# Patient Record
Sex: FEMALE | Race: BLACK OR AFRICAN AMERICAN | NOT HISPANIC OR LATINO
[De-identification: names, ages, dates, MRNs, and addresses within clinical notes are randomized per-mention and may not be internally consistent; named-entity substitution may affect disease eponyms.]

---

## 2017-01-09 ENCOUNTER — RX RENEWAL (OUTPATIENT)
Age: 62
End: 2017-01-09

## 2017-01-09 ENCOUNTER — OTHER (OUTPATIENT)
Age: 62
End: 2017-01-09

## 2017-01-13 ENCOUNTER — APPOINTMENT (OUTPATIENT)
Dept: INTERNAL MEDICINE | Facility: CLINIC | Age: 62
End: 2017-01-13

## 2017-01-19 ENCOUNTER — APPOINTMENT (OUTPATIENT)
Dept: RHEUMATOLOGY | Facility: CLINIC | Age: 62
End: 2017-01-19

## 2017-01-19 ENCOUNTER — LABORATORY RESULT (OUTPATIENT)
Age: 62
End: 2017-01-19

## 2017-02-22 ENCOUNTER — APPOINTMENT (OUTPATIENT)
Dept: RHEUMATOLOGY | Facility: CLINIC | Age: 62
End: 2017-02-22

## 2017-02-22 ENCOUNTER — LABORATORY RESULT (OUTPATIENT)
Age: 62
End: 2017-02-22

## 2017-02-22 VITALS
OXYGEN SATURATION: 98 % | HEART RATE: 103 BPM | BODY MASS INDEX: 28.43 KG/M2 | DIASTOLIC BLOOD PRESSURE: 81 MMHG | WEIGHT: 141 LBS | SYSTOLIC BLOOD PRESSURE: 117 MMHG | HEIGHT: 59 IN

## 2017-02-23 LAB
25(OH)D3 SERPL-MCNC: 34.3 NG/ML
ALBUMIN SERPL ELPH-MCNC: 4.2 G/DL
ALP BLD-CCNC: 68 U/L
ALT SERPL-CCNC: 16 U/L
ANION GAP SERPL CALC-SCNC: 17 MMOL/L
APPEARANCE: CLEAR
AST SERPL-CCNC: 19 U/L
BASOPHILS # BLD AUTO: 0 K/UL
BASOPHILS NFR BLD AUTO: 0 %
BILIRUB SERPL-MCNC: 0.2 MG/DL
BILIRUBIN URINE: NEGATIVE
BLOOD URINE: NEGATIVE
BUN SERPL-MCNC: 24 MG/DL
CALCIUM SERPL-MCNC: 9.9 MG/DL
CHLORIDE SERPL-SCNC: 97 MMOL/L
CO2 SERPL-SCNC: 26 MMOL/L
COLOR: YELLOW
CREAT SERPL-MCNC: 1.24 MG/DL
CREAT SPEC-SCNC: 338 MG/DL
CREAT/PROT UR: 0.1 RATIO
CRP SERPL-MCNC: 0.34 MG/DL
EOSINOPHIL # BLD AUTO: 0.85 K/UL
EOSINOPHIL NFR BLD AUTO: 7.4 %
ERYTHROCYTE [SEDIMENTATION RATE] IN BLOOD BY WESTERGREN METHOD: 42 MM/HR
GLUCOSE QUALITATIVE U: NORMAL MG/DL
GLUCOSE SERPL-MCNC: 102 MG/DL
HBV CORE IGG+IGM SER QL: REACTIVE
HBV SURFACE AB SER QL: REACTIVE
HBV SURFACE AG SER QL: NONREACTIVE
HCT VFR BLD CALC: 41.7 %
HCV AB SER QL: NONREACTIVE
HCV S/CO RATIO: 0.14 S/CO
HGB BLD-MCNC: 13.4 G/DL
KETONES URINE: NEGATIVE
LEUKOCYTE ESTERASE URINE: ABNORMAL
LYMPHOCYTES # BLD AUTO: 1.91 K/UL
LYMPHOCYTES NFR BLD AUTO: 16.7 %
MAN DIFF?: NORMAL
MCHC RBC-ENTMCNC: 23.7 PG
MCHC RBC-ENTMCNC: 32.1 GM/DL
MCV RBC AUTO: 73.8 FL
MONOCYTES # BLD AUTO: 1.49 K/UL
MONOCYTES NFR BLD AUTO: 13 %
NEUTROPHILS # BLD AUTO: 7.11 K/UL
NEUTROPHILS NFR BLD AUTO: 59.2 %
NITRITE URINE: NEGATIVE
PH URINE: 5
PLATELET # BLD AUTO: 300 K/UL
POTASSIUM SERPL-SCNC: 4 MMOL/L
PROT SERPL-MCNC: 7.4 G/DL
PROT UR-MCNC: 22 MG/DL
PROTEIN URINE: NEGATIVE MG/DL
RBC # BLD: 5.65 M/UL
RBC # FLD: 16.1 %
SODIUM SERPL-SCNC: 140 MMOL/L
SPECIFIC GRAVITY URINE: 1.02
UROBILINOGEN URINE: NORMAL MG/DL
WBC # FLD AUTO: 11.46 K/UL

## 2017-02-24 LAB — DSDNA AB SER-ACNC: <12 IU/ML

## 2017-02-28 LAB
INFLIXIMAB AB SERPL-MCNC: <22 NG/ML
INFLIXIMAB SERPL-MCNC: 26 UG/ML

## 2017-03-15 ENCOUNTER — LABORATORY RESULT (OUTPATIENT)
Age: 62
End: 2017-03-15

## 2017-03-15 ENCOUNTER — APPOINTMENT (OUTPATIENT)
Dept: RHEUMATOLOGY | Facility: CLINIC | Age: 62
End: 2017-03-15

## 2017-04-05 ENCOUNTER — APPOINTMENT (OUTPATIENT)
Dept: PULMONOLOGY | Facility: CLINIC | Age: 62
End: 2017-04-05

## 2017-04-05 VITALS
SYSTOLIC BLOOD PRESSURE: 138 MMHG | RESPIRATION RATE: 14 BRPM | HEIGHT: 59 IN | HEART RATE: 79 BPM | WEIGHT: 140 LBS | OXYGEN SATURATION: 90 % | TEMPERATURE: 97.8 F | DIASTOLIC BLOOD PRESSURE: 80 MMHG | BODY MASS INDEX: 28.22 KG/M2

## 2017-04-11 ENCOUNTER — OTHER (OUTPATIENT)
Age: 62
End: 2017-04-11

## 2017-04-17 ENCOUNTER — LABORATORY RESULT (OUTPATIENT)
Age: 62
End: 2017-04-17

## 2017-04-17 ENCOUNTER — APPOINTMENT (OUTPATIENT)
Dept: RHEUMATOLOGY | Facility: CLINIC | Age: 62
End: 2017-04-17

## 2017-04-17 VITALS
BODY MASS INDEX: 28.63 KG/M2 | DIASTOLIC BLOOD PRESSURE: 90 MMHG | TEMPERATURE: 98.2 F | HEART RATE: 89 BPM | HEIGHT: 59 IN | SYSTOLIC BLOOD PRESSURE: 136 MMHG | WEIGHT: 142 LBS

## 2017-04-17 RX ADMIN — DENOSUMAB 0 MG/ML: 60 INJECTION SUBCUTANEOUS at 00:00

## 2017-04-18 LAB
ALBUMIN SERPL ELPH-MCNC: 4.4 G/DL
ALP BLD-CCNC: 74 U/L
ALT SERPL-CCNC: 15 U/L
ANION GAP SERPL CALC-SCNC: 16 MMOL/L
AST SERPL-CCNC: 20 U/L
BASOPHILS # BLD AUTO: 0.16 K/UL
BASOPHILS NFR BLD AUTO: 1.8 %
BILIRUB SERPL-MCNC: 0.4 MG/DL
BUN SERPL-MCNC: 24 MG/DL
C3 SERPL-MCNC: 180 MG/DL
C4 SERPL-MCNC: 39 MG/DL
CALCIUM SERPL-MCNC: 9.6 MG/DL
CHLORIDE SERPL-SCNC: 96 MMOL/L
CO2 SERPL-SCNC: 27 MMOL/L
CREAT SERPL-MCNC: 1.14 MG/DL
CRP SERPL-MCNC: 0.51 MG/DL
DSDNA AB SER-ACNC: <12 IU/ML
EOSINOPHIL # BLD AUTO: 0.24 K/UL
EOSINOPHIL NFR BLD AUTO: 2.7 %
ERYTHROCYTE [SEDIMENTATION RATE] IN BLOOD BY WESTERGREN METHOD: 40 MM/HR
GLUCOSE SERPL-MCNC: 89 MG/DL
HCT VFR BLD CALC: 41.3 %
HGB BLD-MCNC: 12.7 G/DL
LYMPHOCYTES # BLD AUTO: 2.73 K/UL
LYMPHOCYTES NFR BLD AUTO: 30.9 %
MAN DIFF?: NORMAL
MCHC RBC-ENTMCNC: 22.6 PG
MCHC RBC-ENTMCNC: 30.8 GM/DL
MCV RBC AUTO: 73.4 FL
MONOCYTES # BLD AUTO: 0.32 K/UL
MONOCYTES NFR BLD AUTO: 3.6 %
NEUTROPHILS # BLD AUTO: 4.75 K/UL
NEUTROPHILS NFR BLD AUTO: 53.7 %
PLATELET # BLD AUTO: 298 K/UL
POTASSIUM SERPL-SCNC: 3.8 MMOL/L
PROT SERPL-MCNC: 7 G/DL
RBC # BLD: 5.63 M/UL
RBC # FLD: 17.1 %
SODIUM SERPL-SCNC: 139 MMOL/L
WBC # FLD AUTO: 8.85 K/UL

## 2017-04-19 ENCOUNTER — OTHER (OUTPATIENT)
Age: 62
End: 2017-04-19

## 2017-04-19 LAB
HBV DNA # SERPL NAA+PROBE: NOT DETECTED IU/ML
HEPB DNA PCR LOG: NOT DETECTED LOGIU/ML

## 2017-04-21 LAB
INFLIXIMAB AB SERPL-MCNC: <22 NG/ML
INFLIXIMAB SERPL-MCNC: 28 UG/ML

## 2017-05-10 ENCOUNTER — LABORATORY RESULT (OUTPATIENT)
Age: 62
End: 2017-05-10

## 2017-05-10 ENCOUNTER — APPOINTMENT (OUTPATIENT)
Dept: RHEUMATOLOGY | Facility: CLINIC | Age: 62
End: 2017-05-10

## 2017-06-09 ENCOUNTER — APPOINTMENT (OUTPATIENT)
Dept: INTERNAL MEDICINE | Facility: CLINIC | Age: 62
End: 2017-06-09

## 2017-06-09 VITALS
HEART RATE: 107 BPM | SYSTOLIC BLOOD PRESSURE: 132 MMHG | DIASTOLIC BLOOD PRESSURE: 84 MMHG | TEMPERATURE: 97.8 F | OXYGEN SATURATION: 96 % | WEIGHT: 144 LBS | HEIGHT: 59 IN | BODY MASS INDEX: 29.03 KG/M2

## 2017-06-09 LAB — HBA1C MFR BLD HPLC: 6.1

## 2017-06-22 ENCOUNTER — RX RENEWAL (OUTPATIENT)
Age: 62
End: 2017-06-22

## 2017-07-05 ENCOUNTER — RX RENEWAL (OUTPATIENT)
Age: 62
End: 2017-07-05

## 2017-07-10 ENCOUNTER — APPOINTMENT (OUTPATIENT)
Dept: RHEUMATOLOGY | Facility: CLINIC | Age: 62
End: 2017-07-10

## 2017-07-10 VITALS
WEIGHT: 142 LBS | HEART RATE: 93 BPM | OXYGEN SATURATION: 95 % | DIASTOLIC BLOOD PRESSURE: 97 MMHG | SYSTOLIC BLOOD PRESSURE: 136 MMHG | TEMPERATURE: 98.3 F | BODY MASS INDEX: 28.63 KG/M2 | HEIGHT: 59 IN

## 2017-07-27 ENCOUNTER — LABORATORY RESULT (OUTPATIENT)
Age: 62
End: 2017-07-27

## 2017-07-27 ENCOUNTER — APPOINTMENT (OUTPATIENT)
Dept: RHEUMATOLOGY | Facility: CLINIC | Age: 62
End: 2017-07-27
Payer: MEDICARE

## 2017-07-27 PROCEDURE — 96413 CHEMO IV INFUSION 1 HR: CPT

## 2017-07-27 PROCEDURE — 36415 COLL VENOUS BLD VENIPUNCTURE: CPT

## 2017-07-31 RX ORDER — DENOSUMAB 60 MG/ML
60 INJECTION SUBCUTANEOUS
Qty: 1 | Refills: 0 | Status: COMPLETED | OUTPATIENT
Start: 2017-04-17

## 2017-09-22 ENCOUNTER — LABORATORY RESULT (OUTPATIENT)
Age: 62
End: 2017-09-22

## 2017-09-22 ENCOUNTER — APPOINTMENT (OUTPATIENT)
Dept: RHEUMATOLOGY | Facility: CLINIC | Age: 62
End: 2017-09-22
Payer: MEDICARE

## 2017-09-22 PROCEDURE — 36415 COLL VENOUS BLD VENIPUNCTURE: CPT

## 2017-09-22 PROCEDURE — 96415 CHEMO IV INFUSION ADDL HR: CPT

## 2017-09-22 PROCEDURE — 96413 CHEMO IV INFUSION 1 HR: CPT

## 2017-10-09 ENCOUNTER — APPOINTMENT (OUTPATIENT)
Dept: RHEUMATOLOGY | Facility: CLINIC | Age: 62
End: 2017-10-09
Payer: MEDICARE

## 2017-10-09 VITALS
BODY MASS INDEX: 29.23 KG/M2 | OXYGEN SATURATION: 98 % | DIASTOLIC BLOOD PRESSURE: 85 MMHG | WEIGHT: 145 LBS | TEMPERATURE: 98.3 F | SYSTOLIC BLOOD PRESSURE: 143 MMHG | HEIGHT: 59 IN | HEART RATE: 87 BPM

## 2017-10-09 PROCEDURE — 96401 CHEMO ANTI-NEOPL SQ/IM: CPT

## 2017-10-09 PROCEDURE — 99214 OFFICE O/P EST MOD 30 MIN: CPT | Mod: 25

## 2017-10-09 RX ORDER — DENOSUMAB 60 MG/ML
60 INJECTION SUBCUTANEOUS
Qty: 0 | Refills: 0 | Status: COMPLETED | OUTPATIENT
Start: 2017-10-09

## 2017-10-09 RX ADMIN — DENOSUMAB 0 MG/ML: 60 INJECTION SUBCUTANEOUS at 00:00

## 2017-10-19 ENCOUNTER — APPOINTMENT (OUTPATIENT)
Dept: PULMONOLOGY | Facility: CLINIC | Age: 62
End: 2017-10-19
Payer: MEDICARE

## 2017-10-19 VITALS
WEIGHT: 147 LBS | HEART RATE: 101 BPM | SYSTOLIC BLOOD PRESSURE: 130 MMHG | BODY MASS INDEX: 29.64 KG/M2 | HEIGHT: 59 IN | OXYGEN SATURATION: 98 % | RESPIRATION RATE: 19 BRPM | TEMPERATURE: 98.6 F | DIASTOLIC BLOOD PRESSURE: 80 MMHG

## 2017-10-19 PROCEDURE — 99213 OFFICE O/P EST LOW 20 MIN: CPT

## 2017-11-06 ENCOUNTER — MEDICATION RENEWAL (OUTPATIENT)
Age: 62
End: 2017-11-06

## 2017-11-09 ENCOUNTER — APPOINTMENT (OUTPATIENT)
Dept: INTERNAL MEDICINE | Facility: CLINIC | Age: 62
End: 2017-11-09
Payer: MEDICARE

## 2017-11-09 VITALS
WEIGHT: 147 LBS | TEMPERATURE: 97.8 F | HEART RATE: 108 BPM | HEIGHT: 59 IN | DIASTOLIC BLOOD PRESSURE: 78 MMHG | OXYGEN SATURATION: 98 % | SYSTOLIC BLOOD PRESSURE: 120 MMHG | BODY MASS INDEX: 29.64 KG/M2

## 2017-11-09 LAB — HBA1C MFR BLD HPLC: 6.4

## 2017-11-09 PROCEDURE — 99214 OFFICE O/P EST MOD 30 MIN: CPT | Mod: 25

## 2017-11-09 PROCEDURE — 83036 HEMOGLOBIN GLYCOSYLATED A1C: CPT | Mod: QW

## 2017-11-16 ENCOUNTER — LABORATORY RESULT (OUTPATIENT)
Age: 62
End: 2017-11-16

## 2017-11-16 ENCOUNTER — APPOINTMENT (OUTPATIENT)
Dept: RHEUMATOLOGY | Facility: CLINIC | Age: 62
End: 2017-11-16
Payer: MEDICARE

## 2017-11-16 PROCEDURE — 96415 CHEMO IV INFUSION ADDL HR: CPT

## 2017-11-16 PROCEDURE — 96413 CHEMO IV INFUSION 1 HR: CPT

## 2017-11-16 PROCEDURE — 36415 COLL VENOUS BLD VENIPUNCTURE: CPT

## 2017-12-03 ENCOUNTER — APPOINTMENT (OUTPATIENT)
Dept: MRI IMAGING | Facility: CLINIC | Age: 62
End: 2017-12-03
Payer: COMMERCIAL

## 2017-12-03 ENCOUNTER — OUTPATIENT (OUTPATIENT)
Dept: OUTPATIENT SERVICES | Facility: HOSPITAL | Age: 62
LOS: 1 days | End: 2017-12-03
Payer: COMMERCIAL

## 2017-12-03 DIAGNOSIS — Z00.8 ENCOUNTER FOR OTHER GENERAL EXAMINATION: ICD-10-CM

## 2017-12-03 DIAGNOSIS — Z98.1 ARTHRODESIS STATUS: Chronic | ICD-10-CM

## 2017-12-03 PROCEDURE — 72141 MRI NECK SPINE W/O DYE: CPT

## 2017-12-03 PROCEDURE — 72141 MRI NECK SPINE W/O DYE: CPT | Mod: 26

## 2017-12-13 ENCOUNTER — OUTPATIENT (OUTPATIENT)
Dept: OUTPATIENT SERVICES | Facility: HOSPITAL | Age: 62
LOS: 1 days | Discharge: HOME | End: 2017-12-13

## 2017-12-13 DIAGNOSIS — Z98.1 ARTHRODESIS STATUS: Chronic | ICD-10-CM

## 2017-12-13 DIAGNOSIS — D72.89 OTHER SPECIFIED DISORDERS OF WHITE BLOOD CELLS: ICD-10-CM

## 2017-12-19 ENCOUNTER — RESULT REVIEW (OUTPATIENT)
Age: 62
End: 2017-12-19

## 2017-12-19 ENCOUNTER — OUTPATIENT (OUTPATIENT)
Dept: OUTPATIENT SERVICES | Facility: HOSPITAL | Age: 62
LOS: 1 days | End: 2017-12-19
Payer: MEDICARE

## 2017-12-19 ENCOUNTER — APPOINTMENT (OUTPATIENT)
Dept: HEMATOLOGY ONCOLOGY | Facility: CLINIC | Age: 62
End: 2017-12-19
Payer: MEDICARE

## 2017-12-19 VITALS
OXYGEN SATURATION: 99 % | HEART RATE: 100 BPM | WEIGHT: 145.51 LBS | RESPIRATION RATE: 16 BRPM | DIASTOLIC BLOOD PRESSURE: 72 MMHG | SYSTOLIC BLOOD PRESSURE: 120 MMHG | TEMPERATURE: 98.3 F | BODY MASS INDEX: 29.39 KG/M2

## 2017-12-19 DIAGNOSIS — D72.829 ELEVATED WHITE BLOOD CELL COUNT, UNSPECIFIED: ICD-10-CM

## 2017-12-19 DIAGNOSIS — Z98.1 ARTHRODESIS STATUS: Chronic | ICD-10-CM

## 2017-12-19 DIAGNOSIS — R70.0 ELEVATED ERYTHROCYTE SEDIMENTATION RATE: ICD-10-CM

## 2017-12-19 LAB
BASOPHILS # BLD AUTO: 0.1 K/UL — SIGNIFICANT CHANGE UP (ref 0–0.2)
BASOPHILS NFR BLD AUTO: 1.2 % — SIGNIFICANT CHANGE UP (ref 0–2)
EOSINOPHIL # BLD AUTO: 0.4 K/UL — SIGNIFICANT CHANGE UP (ref 0–0.5)
EOSINOPHIL NFR BLD AUTO: 4.2 % — SIGNIFICANT CHANGE UP (ref 0–6)
ERYTHROCYTE [SEDIMENTATION RATE] IN BLOOD: 17 MM/HR — SIGNIFICANT CHANGE UP (ref 0–20)
HCT VFR BLD CALC: 38.9 % — SIGNIFICANT CHANGE UP (ref 34.5–45)
HGB BLD-MCNC: 13 G/DL — SIGNIFICANT CHANGE UP (ref 11.5–15.5)
LYMPHOCYTES # BLD AUTO: 2.7 K/UL — SIGNIFICANT CHANGE UP (ref 1–3.3)
LYMPHOCYTES # BLD AUTO: 27.5 % — SIGNIFICANT CHANGE UP (ref 13–44)
MCHC RBC-ENTMCNC: 24.2 PG — LOW (ref 27–34)
MCHC RBC-ENTMCNC: 33.4 G/DL — SIGNIFICANT CHANGE UP (ref 32–36)
MCV RBC AUTO: 72.5 FL — LOW (ref 80–100)
MONOCYTES # BLD AUTO: 0.6 K/UL — SIGNIFICANT CHANGE UP (ref 0–0.9)
MONOCYTES NFR BLD AUTO: 6.6 % — SIGNIFICANT CHANGE UP (ref 2–14)
NEUTROPHILS # BLD AUTO: 5.9 K/UL — SIGNIFICANT CHANGE UP (ref 1.8–7.4)
NEUTROPHILS NFR BLD AUTO: 60.5 % — SIGNIFICANT CHANGE UP (ref 43–77)
PLATELET # BLD AUTO: 254 K/UL — SIGNIFICANT CHANGE UP (ref 150–400)
RBC # BLD: 5.36 M/UL — HIGH (ref 3.8–5.2)
RBC # FLD: 14.7 % — HIGH (ref 10.3–14.5)
WBC # BLD: 9.7 K/UL — SIGNIFICANT CHANGE UP (ref 3.8–10.5)
WBC # FLD AUTO: 9.7 K/UL — SIGNIFICANT CHANGE UP (ref 3.8–10.5)

## 2017-12-19 PROCEDURE — 99204 OFFICE O/P NEW MOD 45 MIN: CPT

## 2017-12-19 PROCEDURE — G0452: CPT | Mod: 26

## 2017-12-19 PROCEDURE — 88185 FLOWCYTOMETRY/TC ADD-ON: CPT

## 2017-12-19 PROCEDURE — 81207 BCR/ABL1 GENE MINOR BP: CPT

## 2017-12-19 PROCEDURE — 81206 BCR/ABL1 GENE MAJOR BP: CPT

## 2017-12-19 PROCEDURE — 88184 FLOWCYTOMETRY/ TC 1 MARKER: CPT

## 2017-12-19 PROCEDURE — 88189 FLOWCYTOMETRY/READ 16 & >: CPT

## 2017-12-20 DIAGNOSIS — D72.829 ELEVATED WHITE BLOOD CELL COUNT, UNSPECIFIED: ICD-10-CM

## 2017-12-22 LAB — TM INTERPRETATION: SIGNIFICANT CHANGE UP

## 2017-12-28 LAB
BCR/ABL BY RT - PCR QUANTITATIVE: SIGNIFICANT CHANGE UP
CRP SERPL-MCNC: 0.5 MG/DL

## 2018-01-12 ENCOUNTER — APPOINTMENT (OUTPATIENT)
Dept: DERMATOLOGY | Facility: CLINIC | Age: 63
End: 2018-01-12

## 2018-01-13 ENCOUNTER — APPOINTMENT (OUTPATIENT)
Dept: RHEUMATOLOGY | Facility: CLINIC | Age: 63
End: 2018-01-13
Payer: MEDICARE

## 2018-01-13 PROCEDURE — 36415 COLL VENOUS BLD VENIPUNCTURE: CPT

## 2018-01-13 PROCEDURE — 96413 CHEMO IV INFUSION 1 HR: CPT

## 2018-01-13 PROCEDURE — 96415 CHEMO IV INFUSION ADDL HR: CPT

## 2018-01-16 ENCOUNTER — OTHER (OUTPATIENT)
Age: 63
End: 2018-01-16

## 2018-02-08 ENCOUNTER — APPOINTMENT (OUTPATIENT)
Dept: RHEUMATOLOGY | Facility: CLINIC | Age: 63
End: 2018-02-08
Payer: MEDICARE

## 2018-02-08 VITALS
SYSTOLIC BLOOD PRESSURE: 136 MMHG | DIASTOLIC BLOOD PRESSURE: 83 MMHG | OXYGEN SATURATION: 97 % | HEIGHT: 59 IN | TEMPERATURE: 98.2 F | WEIGHT: 141 LBS | HEART RATE: 104 BPM | BODY MASS INDEX: 28.43 KG/M2
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
## 2018-02-08 PROCEDURE — 99215 OFFICE O/P EST HI 40 MIN: CPT

## 2018-02-09 ENCOUNTER — APPOINTMENT (OUTPATIENT)
Dept: INTERNAL MEDICINE | Facility: CLINIC | Age: 63
End: 2018-02-09

## 2018-02-09 LAB
25(OH)D3 SERPL-MCNC: 33 NG/ML
ALBUMIN SERPL ELPH-MCNC: 4.1 G/DL
ALP BLD-CCNC: 82 U/L
ALT SERPL-CCNC: 15 U/L
ANION GAP SERPL CALC-SCNC: 16 MMOL/L
AST SERPL-CCNC: 21 U/L
BASOPHILS # BLD AUTO: 0.03 K/UL
BASOPHILS NFR BLD AUTO: 0.3 %
BILIRUB SERPL-MCNC: 0.2 MG/DL
BUN SERPL-MCNC: 15 MG/DL
CALCIUM SERPL-MCNC: 10.1 MG/DL
CHLORIDE SERPL-SCNC: 99 MMOL/L
CO2 SERPL-SCNC: 28 MMOL/L
CREAT SERPL-MCNC: 1.19 MG/DL
CRP SERPL-MCNC: 0.6 MG/DL
EOSINOPHIL # BLD AUTO: 0.38 K/UL
EOSINOPHIL NFR BLD AUTO: 3.4 %
GLUCOSE SERPL-MCNC: 102 MG/DL
HCT VFR BLD CALC: 43.3 %
HGB BLD-MCNC: 13.3 G/DL
IMM GRANULOCYTES NFR BLD AUTO: 0.3 %
LYMPHOCYTES # BLD AUTO: 2.82 K/UL
LYMPHOCYTES NFR BLD AUTO: 24.9 %
MAN DIFF?: NORMAL
MCHC RBC-ENTMCNC: 23.3 PG
MCHC RBC-ENTMCNC: 30.7 GM/DL
MCV RBC AUTO: 76 FL
MONOCYTES # BLD AUTO: 1.2 K/UL
MONOCYTES NFR BLD AUTO: 10.6 %
NEUTROPHILS # BLD AUTO: 6.88 K/UL
NEUTROPHILS NFR BLD AUTO: 60.5 %
PLATELET # BLD AUTO: 299 K/UL
POTASSIUM SERPL-SCNC: 3.9 MMOL/L
PROT SERPL-MCNC: 7.1 G/DL
RBC # BLD: 5.7 M/UL
RBC # FLD: 16 %
SODIUM SERPL-SCNC: 143 MMOL/L
WBC # FLD AUTO: 11.34 K/UL

## 2018-02-16 ENCOUNTER — RX RENEWAL (OUTPATIENT)
Age: 63
End: 2018-02-16

## 2018-02-22 ENCOUNTER — MEDICATION RENEWAL (OUTPATIENT)
Age: 63
End: 2018-02-22

## 2018-02-22 RX ORDER — ALBUTEROL SULFATE 90 UG/1
108 (90 BASE) AEROSOL, METERED RESPIRATORY (INHALATION)
Qty: 1 | Refills: 6 | Status: ACTIVE | COMMUNITY
Start: 2018-02-22 | End: 1900-01-01

## 2018-03-08 ENCOUNTER — LABORATORY RESULT (OUTPATIENT)
Age: 63
End: 2018-03-08

## 2018-03-08 ENCOUNTER — APPOINTMENT (OUTPATIENT)
Dept: RHEUMATOLOGY | Facility: CLINIC | Age: 63
End: 2018-03-08
Payer: MEDICARE

## 2018-03-08 PROCEDURE — 96415 CHEMO IV INFUSION ADDL HR: CPT

## 2018-03-08 PROCEDURE — 36415 COLL VENOUS BLD VENIPUNCTURE: CPT

## 2018-03-08 PROCEDURE — 96413 CHEMO IV INFUSION 1 HR: CPT

## 2018-04-16 ENCOUNTER — APPOINTMENT (OUTPATIENT)
Dept: RHEUMATOLOGY | Facility: CLINIC | Age: 63
End: 2018-04-16
Payer: MEDICARE

## 2018-04-16 VITALS
WEIGHT: 148 LBS | TEMPERATURE: 98 F | SYSTOLIC BLOOD PRESSURE: 140 MMHG | RESPIRATION RATE: 16 BRPM | HEIGHT: 59 IN | OXYGEN SATURATION: 97 % | BODY MASS INDEX: 29.84 KG/M2 | HEART RATE: 102 BPM | DIASTOLIC BLOOD PRESSURE: 87 MMHG

## 2018-04-16 DIAGNOSIS — M81.0 AGE-RELATED OSTEOPOROSIS W/OUT CURRENT PATHOLOGICAL FRACTURE: ICD-10-CM

## 2018-04-16 PROCEDURE — 96401 CHEMO ANTI-NEOPL SQ/IM: CPT

## 2018-04-16 RX ORDER — DENOSUMAB 60 MG/ML
60 INJECTION SUBCUTANEOUS
Qty: 0 | Refills: 0 | Status: COMPLETED | OUTPATIENT
Start: 2018-04-16

## 2018-04-16 RX ADMIN — DENOSUMAB 0 MG/ML: 60 INJECTION SUBCUTANEOUS at 00:00

## 2018-04-20 ENCOUNTER — APPOINTMENT (OUTPATIENT)
Dept: INTERNAL MEDICINE | Facility: CLINIC | Age: 63
End: 2018-04-20
Payer: MEDICARE

## 2018-04-20 VITALS
HEIGHT: 59 IN | WEIGHT: 142 LBS | DIASTOLIC BLOOD PRESSURE: 80 MMHG | HEART RATE: 102 BPM | BODY MASS INDEX: 28.63 KG/M2 | TEMPERATURE: 98.9 F | SYSTOLIC BLOOD PRESSURE: 140 MMHG | OXYGEN SATURATION: 97 %

## 2018-04-20 DIAGNOSIS — H10.13 ACUTE ATOPIC CONJUNCTIVITIS, BILATERAL: ICD-10-CM

## 2018-04-20 PROCEDURE — 99214 OFFICE O/P EST MOD 30 MIN: CPT

## 2018-04-20 RX ORDER — AZELASTINE HYDROCHLORIDE 0.5 MG/ML
0.05 SOLUTION/ DROPS OPHTHALMIC
Qty: 1 | Refills: 1 | Status: ACTIVE | COMMUNITY
Start: 2017-11-09 | End: 1900-01-01

## 2018-04-23 ENCOUNTER — APPOINTMENT (OUTPATIENT)
Dept: PULMONOLOGY | Facility: CLINIC | Age: 63
End: 2018-04-23
Payer: MEDICARE

## 2018-04-23 VITALS
HEIGHT: 59 IN | BODY MASS INDEX: 28.63 KG/M2 | WEIGHT: 142 LBS | TEMPERATURE: 98.4 F | OXYGEN SATURATION: 96 % | HEART RATE: 94 BPM | DIASTOLIC BLOOD PRESSURE: 80 MMHG | SYSTOLIC BLOOD PRESSURE: 120 MMHG | RESPIRATION RATE: 16 BRPM

## 2018-04-23 LAB
BASOPHILS # BLD AUTO: 0.02 K/UL
BASOPHILS NFR BLD AUTO: 0.1 %
EOSINOPHIL # BLD AUTO: 0.35 K/UL
EOSINOPHIL NFR BLD AUTO: 2 %
HCT VFR BLD CALC: 39.7 %
HGB BLD-MCNC: 12.1 G/DL
IMM GRANULOCYTES NFR BLD AUTO: 0.4 %
LYMPHOCYTES # BLD AUTO: 2.87 K/UL
LYMPHOCYTES NFR BLD AUTO: 16.7 %
MAN DIFF?: NORMAL
MCHC RBC-ENTMCNC: 23 PG
MCHC RBC-ENTMCNC: 30.5 GM/DL
MCV RBC AUTO: 75.5 FL
MONOCYTES # BLD AUTO: 1.29 K/UL
MONOCYTES NFR BLD AUTO: 7.5 %
NEUTROPHILS # BLD AUTO: 12.61 K/UL
NEUTROPHILS NFR BLD AUTO: 73.3 %
PLATELET # BLD AUTO: 285 K/UL
RBC # BLD: 5.26 M/UL
RBC # FLD: 16.9 %
WBC # FLD AUTO: 17.21 K/UL

## 2018-04-23 PROCEDURE — 99214 OFFICE O/P EST MOD 30 MIN: CPT

## 2018-05-02 ENCOUNTER — APPOINTMENT (OUTPATIENT)
Dept: SURGICAL ONCOLOGY | Facility: CLINIC | Age: 63
End: 2018-05-02
Payer: MEDICARE

## 2018-05-02 ENCOUNTER — APPOINTMENT (OUTPATIENT)
Dept: RHEUMATOLOGY | Facility: CLINIC | Age: 63
End: 2018-05-02

## 2018-05-02 VITALS
HEART RATE: 107 BPM | OXYGEN SATURATION: 98 % | HEIGHT: 57 IN | BODY MASS INDEX: 30.63 KG/M2 | WEIGHT: 142 LBS | SYSTOLIC BLOOD PRESSURE: 151 MMHG | DIASTOLIC BLOOD PRESSURE: 98 MMHG

## 2018-05-02 DIAGNOSIS — N61.0 MASTITIS WITHOUT ABSCESS: ICD-10-CM

## 2018-05-02 PROCEDURE — 99204 OFFICE O/P NEW MOD 45 MIN: CPT

## 2018-05-04 ENCOUNTER — LABORATORY RESULT (OUTPATIENT)
Age: 63
End: 2018-05-04

## 2018-05-04 ENCOUNTER — APPOINTMENT (OUTPATIENT)
Dept: INTERNAL MEDICINE | Facility: CLINIC | Age: 63
End: 2018-05-04
Payer: MEDICARE

## 2018-05-04 VITALS
BODY MASS INDEX: 30.63 KG/M2 | HEART RATE: 99 BPM | HEIGHT: 57 IN | OXYGEN SATURATION: 97 % | TEMPERATURE: 98.3 F | SYSTOLIC BLOOD PRESSURE: 110 MMHG | DIASTOLIC BLOOD PRESSURE: 80 MMHG | WEIGHT: 142 LBS

## 2018-05-04 DIAGNOSIS — N63.10 MASTODYNIA: ICD-10-CM

## 2018-05-04 DIAGNOSIS — R21 RASH AND OTHER NONSPECIFIC SKIN ERUPTION: ICD-10-CM

## 2018-05-04 DIAGNOSIS — N64.4 MASTODYNIA: ICD-10-CM

## 2018-05-04 DIAGNOSIS — L03.90 CELLULITIS, UNSPECIFIED: ICD-10-CM

## 2018-05-04 PROCEDURE — 99213 OFFICE O/P EST LOW 20 MIN: CPT | Mod: 25

## 2018-05-04 PROCEDURE — 36415 COLL VENOUS BLD VENIPUNCTURE: CPT

## 2018-05-07 ENCOUNTER — RX RENEWAL (OUTPATIENT)
Age: 63
End: 2018-05-07

## 2018-05-07 LAB
BASOPHILS # BLD AUTO: 0.02 K/UL
BASOPHILS NFR BLD AUTO: 0.1 %
EOSINOPHIL # BLD AUTO: 0.33 K/UL
EOSINOPHIL NFR BLD AUTO: 2.4 %
HCT VFR BLD CALC: 41.1 %
HGB BLD-MCNC: 12.8 G/DL
IMM GRANULOCYTES NFR BLD AUTO: 0.3 %
LYMPHOCYTES # BLD AUTO: 3.72 K/UL
LYMPHOCYTES NFR BLD AUTO: 27.5 %
MAN DIFF?: NORMAL
MCHC RBC-ENTMCNC: 22.8 PG
MCHC RBC-ENTMCNC: 31.1 GM/DL
MCV RBC AUTO: 73.1 FL
MONOCYTES # BLD AUTO: 0.85 K/UL
MONOCYTES NFR BLD AUTO: 6.3 %
NEUTROPHILS # BLD AUTO: 8.56 K/UL
NEUTROPHILS NFR BLD AUTO: 63.4 %
PLATELET # BLD AUTO: 336 K/UL
RBC # BLD: 5.62 M/UL
RBC # FLD: 16.5 %
WBC # FLD AUTO: 13.52 K/UL

## 2018-05-08 ENCOUNTER — APPOINTMENT (OUTPATIENT)
Dept: MAMMOGRAPHY | Facility: IMAGING CENTER | Age: 63
End: 2018-05-08

## 2018-05-08 ENCOUNTER — APPOINTMENT (OUTPATIENT)
Dept: ULTRASOUND IMAGING | Facility: IMAGING CENTER | Age: 63
End: 2018-05-08

## 2018-05-30 PROBLEM — N61.0 MASTITIS, RIGHT, ACUTE: Status: ACTIVE | Noted: 2018-05-30

## 2018-06-06 ENCOUNTER — OTHER (OUTPATIENT)
Age: 63
End: 2018-06-06

## 2018-06-21 ENCOUNTER — RX RENEWAL (OUTPATIENT)
Age: 63
End: 2018-06-21

## 2018-06-26 ENCOUNTER — APPOINTMENT (OUTPATIENT)
Dept: RHEUMATOLOGY | Facility: CLINIC | Age: 63
End: 2018-06-26

## 2018-07-12 ENCOUNTER — LABORATORY RESULT (OUTPATIENT)
Age: 63
End: 2018-07-12

## 2018-07-12 ENCOUNTER — APPOINTMENT (OUTPATIENT)
Dept: RHEUMATOLOGY | Facility: CLINIC | Age: 63
End: 2018-07-12
Payer: MEDICARE

## 2018-07-12 VITALS
HEIGHT: 57 IN | BODY MASS INDEX: 30.42 KG/M2 | RESPIRATION RATE: 16 BRPM | WEIGHT: 141 LBS | HEART RATE: 68 BPM | OXYGEN SATURATION: 98 % | DIASTOLIC BLOOD PRESSURE: 83 MMHG | SYSTOLIC BLOOD PRESSURE: 167 MMHG

## 2018-07-12 VITALS — SYSTOLIC BLOOD PRESSURE: 145 MMHG | DIASTOLIC BLOOD PRESSURE: 83 MMHG

## 2018-07-12 DIAGNOSIS — L40.9 PSORIASIS, UNSPECIFIED: ICD-10-CM

## 2018-07-12 DIAGNOSIS — D72.829 ELEVATED WHITE BLOOD CELL COUNT, UNSPECIFIED: ICD-10-CM

## 2018-07-12 PROCEDURE — 99214 OFFICE O/P EST MOD 30 MIN: CPT

## 2018-07-13 ENCOUNTER — APPOINTMENT (OUTPATIENT)
Dept: DERMATOLOGY | Facility: CLINIC | Age: 63
End: 2018-07-13

## 2018-07-13 LAB
ALBUMIN SERPL ELPH-MCNC: 4.7 G/DL
ALP BLD-CCNC: 79 U/L
ALT SERPL-CCNC: 31 U/L
ANION GAP SERPL CALC-SCNC: 15 MMOL/L
APPEARANCE: CLEAR
AST SERPL-CCNC: 18 U/L
BASOPHILS # BLD AUTO: 0.04 K/UL
BASOPHILS NFR BLD AUTO: 0.3 %
BILIRUB SERPL-MCNC: 0.2 MG/DL
BILIRUBIN URINE: NEGATIVE
BLOOD URINE: NEGATIVE
BUN SERPL-MCNC: 17 MG/DL
CALCIUM SERPL-MCNC: 10.9 MG/DL
CHLORIDE SERPL-SCNC: 99 MMOL/L
CO2 SERPL-SCNC: 31 MMOL/L
COLOR: YELLOW
CREAT SERPL-MCNC: 0.88 MG/DL
CREAT SPEC-SCNC: 109 MG/DL
CREAT/PROT UR: 0.1 RATIO
CRP SERPL-MCNC: 0.42 MG/DL
DSDNA AB SER-ACNC: <12 IU/ML
EOSINOPHIL # BLD AUTO: 0.42 K/UL
EOSINOPHIL NFR BLD AUTO: 3.6 %
ERYTHROCYTE [SEDIMENTATION RATE] IN BLOOD BY WESTERGREN METHOD: 41 MM/HR
GLUCOSE QUALITATIVE U: NEGATIVE MG/DL
GLUCOSE SERPL-MCNC: 71 MG/DL
HCT VFR BLD CALC: 41 %
HGB BLD-MCNC: 12.9 G/DL
IMM GRANULOCYTES NFR BLD AUTO: 0.5 %
KETONES URINE: NEGATIVE
LEUKOCYTE ESTERASE URINE: NEGATIVE
LYMPHOCYTES # BLD AUTO: 3.1 K/UL
LYMPHOCYTES NFR BLD AUTO: 26.8 %
MAN DIFF?: NORMAL
MCHC RBC-ENTMCNC: 23.5 PG
MCHC RBC-ENTMCNC: 31.5 GM/DL
MCV RBC AUTO: 74.7 FL
MONOCYTES # BLD AUTO: 0.94 K/UL
MONOCYTES NFR BLD AUTO: 8.1 %
NEUTROPHILS # BLD AUTO: 7.01 K/UL
NEUTROPHILS NFR BLD AUTO: 60.7 %
NITRITE URINE: NEGATIVE
PH URINE: 5.5
PLATELET # BLD AUTO: 303 K/UL
POTASSIUM SERPL-SCNC: 4.4 MMOL/L
PROT SERPL-MCNC: 7.5 G/DL
PROT UR-MCNC: 6 MG/DL
PROTEIN URINE: NEGATIVE MG/DL
RBC # BLD: 5.49 M/UL
RBC # FLD: 16.8 %
SODIUM SERPL-SCNC: 145 MMOL/L
SPECIFIC GRAVITY URINE: 1.02
UROBILINOGEN URINE: NEGATIVE MG/DL
WBC # FLD AUTO: 11.57 K/UL

## 2018-07-16 LAB
ANA PAT FLD IF-IMP: ABNORMAL
ANA SER IF-ACNC: ABNORMAL
HBV DNA # SERPL NAA+PROBE: NOT DETECTED
HEPB DNA PCR LOG: NOT DETECTED LOGIU/ML

## 2018-08-08 ENCOUNTER — APPOINTMENT (OUTPATIENT)
Dept: SURGICAL ONCOLOGY | Facility: CLINIC | Age: 63
End: 2018-08-08

## 2018-08-16 ENCOUNTER — APPOINTMENT (OUTPATIENT)
Dept: INTERNAL MEDICINE | Facility: CLINIC | Age: 63
End: 2018-08-16

## 2018-08-30 ENCOUNTER — RX RENEWAL (OUTPATIENT)
Age: 63
End: 2018-08-30

## 2018-09-05 ENCOUNTER — OTHER (OUTPATIENT)
Age: 63
End: 2018-09-05

## 2018-09-05 RX ORDER — DENOSUMAB 60 MG/ML
60 INJECTION SUBCUTANEOUS
Qty: 3 | Refills: 3 | Status: ACTIVE | COMMUNITY
Start: 2018-09-05 | End: 1900-01-01

## 2018-09-17 ENCOUNTER — APPOINTMENT (OUTPATIENT)
Dept: PULMONOLOGY | Facility: CLINIC | Age: 63
End: 2018-09-17
Payer: MEDICARE

## 2018-09-17 ENCOUNTER — RX RENEWAL (OUTPATIENT)
Age: 63
End: 2018-09-17

## 2018-09-17 VITALS
BODY MASS INDEX: 29.77 KG/M2 | SYSTOLIC BLOOD PRESSURE: 130 MMHG | OXYGEN SATURATION: 98 % | DIASTOLIC BLOOD PRESSURE: 80 MMHG | RESPIRATION RATE: 16 BRPM | TEMPERATURE: 98 F | WEIGHT: 138 LBS | HEIGHT: 57 IN | HEART RATE: 95 BPM

## 2018-09-17 PROCEDURE — 99214 OFFICE O/P EST MOD 30 MIN: CPT

## 2018-10-01 ENCOUNTER — OTHER (OUTPATIENT)
Age: 63
End: 2018-10-01

## 2018-10-08 ENCOUNTER — MEDICATION RENEWAL (OUTPATIENT)
Age: 63
End: 2018-10-08

## 2018-10-08 ENCOUNTER — APPOINTMENT (OUTPATIENT)
Dept: INTERNAL MEDICINE | Facility: CLINIC | Age: 63
End: 2018-10-08
Payer: MEDICARE

## 2018-10-08 ENCOUNTER — APPOINTMENT (OUTPATIENT)
Dept: VASCULAR SURGERY | Facility: CLINIC | Age: 63
End: 2018-10-08
Payer: MEDICARE

## 2018-10-08 VITALS
SYSTOLIC BLOOD PRESSURE: 124 MMHG | HEART RATE: 80 BPM | TEMPERATURE: 97.9 F | HEIGHT: 57 IN | DIASTOLIC BLOOD PRESSURE: 76 MMHG | WEIGHT: 140 LBS | BODY MASS INDEX: 30.2 KG/M2

## 2018-10-08 VITALS
HEIGHT: 57 IN | SYSTOLIC BLOOD PRESSURE: 150 MMHG | HEART RATE: 93 BPM | BODY MASS INDEX: 30.2 KG/M2 | WEIGHT: 140 LBS | DIASTOLIC BLOOD PRESSURE: 85 MMHG

## 2018-10-08 DIAGNOSIS — Z11.1 ENCOUNTER FOR SCREENING FOR RESPIRATORY TUBERCULOSIS: ICD-10-CM

## 2018-10-08 DIAGNOSIS — Z71.9 COUNSELING, UNSPECIFIED: ICD-10-CM

## 2018-10-08 DIAGNOSIS — Z86.69 PERSONAL HISTORY OF OTHER DISEASES OF THE NERVOUS SYSTEM AND SENSE ORGANS: ICD-10-CM

## 2018-10-08 DIAGNOSIS — Z00.00 ENCOUNTER FOR GENERAL ADULT MEDICAL EXAMINATION W/OUT ABNORMAL FINDINGS: ICD-10-CM

## 2018-10-08 DIAGNOSIS — Z86.79 PERSONAL HISTORY OF OTHER DISEASES OF THE CIRCULATORY SYSTEM: ICD-10-CM

## 2018-10-08 DIAGNOSIS — R06.9 UNSPECIFIED ABNORMALITIES OF BREATHING: ICD-10-CM

## 2018-10-08 DIAGNOSIS — R55 SYNCOPE AND COLLAPSE: ICD-10-CM

## 2018-10-08 DIAGNOSIS — J45.909 UNSPECIFIED ASTHMA, UNCOMPLICATED: ICD-10-CM

## 2018-10-08 PROCEDURE — 36415 COLL VENOUS BLD VENIPUNCTURE: CPT

## 2018-10-08 PROCEDURE — 93880 EXTRACRANIAL BILAT STUDY: CPT

## 2018-10-08 PROCEDURE — 99204 OFFICE O/P NEW MOD 45 MIN: CPT

## 2018-10-08 PROCEDURE — 99495 TRANSJ CARE MGMT MOD F2F 14D: CPT | Mod: 25

## 2018-10-08 RX ORDER — DOXYCYCLINE 100 MG/1
100 TABLET, FILM COATED ORAL TWICE DAILY
Qty: 28 | Refills: 0 | Status: DISCONTINUED | COMMUNITY
Start: 2018-04-20 | End: 2018-10-08

## 2018-10-08 NOTE — HISTORY OF PRESENT ILLNESS
[FreeTextEntry1] : hospital discharge \par dx syncope \par date of admission :-9/22/18\par discharged :-9/25/18 \par \par went to visit her niece and nephew in [place] came home went to bathroom came out was stilling at home at passed out - remember feeling very hot - was taken to [facility] - did CT head neck did w/u for heart - dx as carotid stenosis left side 70 % did ECHO \par she also had 3 ribs fractured left side \par was referred to see neuro Dr Paris Camacho has appt 11/28/18  [de-identified] : this is a 63- year-old female with past medical history of rheumatoid arthritis/Psoriasis, hypertension, hyperlipidemia, and asthma, came in for her follow up after hospital discharge \par \par Carotid artery stenosis - left side - records requested \par - was placed on asa 81 and cholesterol medications \par \par osteoporosis- followed by rheumatologist Dr Guzmán  , did bone density 2016 no improvement failed Reclast , takes vitamin D 2000 units and calcium \par bone density 2016 osteoporosis -now getting Prolia shots q 6 months , last was 10/2017 \par \par Hypertension - watching diet , eats low salt, no canned foods, no chest pain , no sob,  No palpitation. \par -eats out about once a month\par -amlodipine 10 mg needs refill for water pill \par \par Hyperlipidemia- now on cholesterol medication , has chronic ace and pains due to arthritis \par  \par Rheumatoid arthritis and Psoriasis- seeing rheum on methotrexate now reduced since 10/2017 now on 10 mg prednisone q 2 days , last TB gold test 2017 negative. Stable \par \par Asthma/ COPD -\par -saw pulm 10/2017 \par -The patient states that her asthma is well controlled and she has been using her rescue inhaler therapy she states that she's used it less than once a week. Denies any chest tightness or wheezing but does have dyspnea on exertion not more than her usual.\par -did PFTs showed mixed obstructive and restrictive lung disease without bronchodilator response in the diffusion capacity is mildly reduced at 69% of expected\par - taking only Proventil inhaler \par

## 2018-10-08 NOTE — ASSESSMENT
[FreeTextEntry1] : Left carotid artery stenosis \par - on asa and cholesterol medication \par - to see neuro \par =- referral for vascular given \par - hospital records requested \par \par osteoporosis- followed by rheumatologist -on prolia q 6 months \par -Bone density 2017 osteoporosis \par \par Colonoscopy- overdue, refuses , educated pt risk of colon cancer pt verbalis and refuses to get it done , refuses to see GASTRO will get FIT kit given \par \par Bilateral/ Conjunctivitis, allergic- saw Ophth 2016 recommended to take prednisone. She took it for 2 weeks did not work, so discontinued, will try zyrtec daily with azalesthine eye drops BID \par \par Refused mammogram -Patient verbalizes and understands risk of breast cancer, but refuses to get mammogram, did that once many years ago, and does not want to go through that process again, does monthly breast exam and yearly by physician.\par \par Hypertension- better , change amlodipine 5 mg daily ,rx for HCTZ 25 daily refilled advised compliance with medication, continue current medications, low sodium-DASH diet., discussed with patient to avoid canned foods, processed foods, to eat 3-4 servings of fruits and vegetables a day,refilled amlodipine \par \par Hyperlipidemia\par -back on rx  medication \par -eat a Low-fat diet, avoid red meat, cheese, butter, peanuts and exercise daily for 40 minutes start with walking.\par \par Rheumatoid arthritis-stable on current medications, follow up with her rheumatologist as scheduled, on prednisone treatment. LAst Tb test 2017 negative \par \par Asthma-stable at present, avoid extremes of whether, pollen, dust,etc, continue albuterol as needed a, f/u with Pulmonary \par \par Prediabetic -get aic \par -high carbohydrate diet identified - sweets , ice creams etc \par D/w patient to eat a low carbohydrate diet , avoid rice , pasta , soda, juices, sweets, etc, also encouraged to walk daily 30 minutes and loose weight. \par  \par refused flu and pneumovax. \par \par Blood work done today RTC CPE

## 2018-10-08 NOTE — REVIEW OF SYSTEMS
[Negative] : Gastrointestinal [Chest Pain] : no chest pain [Palpitations] : no palpitations [Lower Ext Edema] : no lower extremity edema [Cough] : no cough [Dyspnea on Exertion] : no dyspnea on exertion [Abdominal Pain] : no abdominal pain

## 2018-10-09 ENCOUNTER — MEDICATION RENEWAL (OUTPATIENT)
Age: 63
End: 2018-10-09

## 2018-10-09 LAB
ALBUMIN SERPL ELPH-MCNC: 4.1 G/DL
ALP BLD-CCNC: 89 U/L
ALT SERPL-CCNC: 14 U/L
ANION GAP SERPL CALC-SCNC: 14 MMOL/L
APPEARANCE: CLEAR
AST SERPL-CCNC: 15 U/L
BASOPHILS # BLD AUTO: 0.03 K/UL
BASOPHILS NFR BLD AUTO: 0.2 %
BILIRUB SERPL-MCNC: 0.4 MG/DL
BILIRUBIN URINE: NEGATIVE
BLOOD URINE: NEGATIVE
BUN SERPL-MCNC: 17 MG/DL
CALCIUM SERPL-MCNC: 9.7 MG/DL
CHLORIDE SERPL-SCNC: 100 MMOL/L
CHOLEST SERPL-MCNC: 208 MG/DL
CHOLEST/HDLC SERPL: 4.5 RATIO
CO2 SERPL-SCNC: 28 MMOL/L
COLOR: YELLOW
CREAT SERPL-MCNC: 1.19 MG/DL
EOSINOPHIL # BLD AUTO: 0.44 K/UL
EOSINOPHIL NFR BLD AUTO: 3.4 %
GLUCOSE QUALITATIVE U: NEGATIVE MG/DL
GLUCOSE SERPL-MCNC: 114 MG/DL
HBA1C MFR BLD HPLC: 6.2 %
HCT VFR BLD CALC: 38.7 %
HDLC SERPL-MCNC: 46 MG/DL
HGB BLD-MCNC: 11.5 G/DL
IMM GRANULOCYTES NFR BLD AUTO: 0.2 %
KETONES URINE: NEGATIVE
LDLC SERPL CALC-MCNC: 122 MG/DL
LEUKOCYTE ESTERASE URINE: NEGATIVE
LYMPHOCYTES # BLD AUTO: 2.39 K/UL
LYMPHOCYTES NFR BLD AUTO: 18.4 %
MAN DIFF?: NORMAL
MCHC RBC-ENTMCNC: 22.8 PG
MCHC RBC-ENTMCNC: 29.7 GM/DL
MCV RBC AUTO: 76.6 FL
MONOCYTES # BLD AUTO: 0.66 K/UL
MONOCYTES NFR BLD AUTO: 5.1 %
NEUTROPHILS # BLD AUTO: 9.44 K/UL
NEUTROPHILS NFR BLD AUTO: 72.7 %
NITRITE URINE: NEGATIVE
PH URINE: 6.5
PLATELET # BLD AUTO: 355 K/UL
POTASSIUM SERPL-SCNC: 5.2 MMOL/L
PROT SERPL-MCNC: 6.8 G/DL
PROTEIN URINE: NEGATIVE MG/DL
RBC # BLD: 5.05 M/UL
RBC # FLD: 17.9 %
SODIUM SERPL-SCNC: 142 MMOL/L
SPECIFIC GRAVITY URINE: 1.02
TRIGL SERPL-MCNC: 201 MG/DL
TSH SERPL-ACNC: 2.29 UIU/ML
UROBILINOGEN URINE: NEGATIVE MG/DL
WBC # FLD AUTO: 12.99 K/UL

## 2018-10-10 LAB
M TB IFN-G BLD-IMP: ABNORMAL
QUANTIFERON TB PLUS MITOGEN MINUS NIL: 0.08 IU/ML
QUANTIFERON TB PLUS NIL: 0.04 IU/ML
QUANTIFERON TB PLUS TB1 MINUS NIL: 0 IU/ML
QUANTIFERON TB PLUS TB2 MINUS NIL: 0 IU/ML

## 2018-10-16 ENCOUNTER — RX RENEWAL (OUTPATIENT)
Age: 63
End: 2018-10-16

## 2018-10-17 ENCOUNTER — APPOINTMENT (OUTPATIENT)
Dept: RHEUMATOLOGY | Facility: CLINIC | Age: 63
End: 2018-10-17

## 2018-10-18 ENCOUNTER — APPOINTMENT (OUTPATIENT)
Dept: RHEUMATOLOGY | Facility: CLINIC | Age: 63
End: 2018-10-18
Payer: MEDICARE

## 2018-10-18 ENCOUNTER — LABORATORY RESULT (OUTPATIENT)
Age: 63
End: 2018-10-18

## 2018-10-18 ENCOUNTER — APPOINTMENT (OUTPATIENT)
Dept: VASCULAR SURGERY | Facility: CLINIC | Age: 63
End: 2018-10-18
Payer: MEDICARE

## 2018-10-18 VITALS
TEMPERATURE: 97.6 F | SYSTOLIC BLOOD PRESSURE: 149 MMHG | WEIGHT: 138 LBS | HEIGHT: 57 IN | OXYGEN SATURATION: 96 % | HEART RATE: 98 BPM | BODY MASS INDEX: 29.77 KG/M2 | DIASTOLIC BLOOD PRESSURE: 87 MMHG

## 2018-10-18 VITALS
HEART RATE: 104 BPM | BODY MASS INDEX: 30.2 KG/M2 | HEIGHT: 57 IN | SYSTOLIC BLOOD PRESSURE: 136 MMHG | DIASTOLIC BLOOD PRESSURE: 96 MMHG | TEMPERATURE: 98.4 F | WEIGHT: 140 LBS

## 2018-10-18 VITALS — HEART RATE: 104 BPM | DIASTOLIC BLOOD PRESSURE: 94 MMHG | SYSTOLIC BLOOD PRESSURE: 140 MMHG

## 2018-10-18 PROCEDURE — 99213 OFFICE O/P EST LOW 20 MIN: CPT

## 2018-10-18 RX ORDER — ASPIRIN 81 MG
81 TABLET, DELAYED RELEASE (ENTERIC COATED) ORAL
Refills: 0 | Status: DISCONTINUED | COMMUNITY

## 2018-10-18 RX ORDER — CLOPIDOGREL BISULFATE 75 MG/1
75 TABLET, FILM COATED ORAL DAILY
Qty: 30 | Refills: 3 | Status: ACTIVE | COMMUNITY
Start: 2018-10-18 | End: 1900-01-01

## 2018-10-21 ENCOUNTER — FORM ENCOUNTER (OUTPATIENT)
Age: 63
End: 2018-10-21

## 2018-10-21 DIAGNOSIS — E79.0 HYPERURICEMIA W/OUT SIGNS OF INFLAMMATORY ARTHRITIS AND TOPHACEOUS DISEASE: ICD-10-CM

## 2018-10-22 ENCOUNTER — APPOINTMENT (OUTPATIENT)
Dept: RADIOLOGY | Facility: CLINIC | Age: 63
End: 2018-10-22
Payer: MEDICARE

## 2018-10-22 ENCOUNTER — OUTPATIENT (OUTPATIENT)
Dept: OUTPATIENT SERVICES | Facility: HOSPITAL | Age: 63
LOS: 1 days | End: 2018-10-22
Payer: COMMERCIAL

## 2018-10-22 DIAGNOSIS — Z98.1 ARTHRODESIS STATUS: Chronic | ICD-10-CM

## 2018-10-22 DIAGNOSIS — M06.9 RHEUMATOID ARTHRITIS, UNSPECIFIED: ICD-10-CM

## 2018-10-22 PROCEDURE — 73630 X-RAY EXAM OF FOOT: CPT | Mod: 26,LT,RT

## 2018-10-22 PROCEDURE — 73630 X-RAY EXAM OF FOOT: CPT

## 2018-10-22 PROCEDURE — 73130 X-RAY EXAM OF HAND: CPT | Mod: 26,LT,RT

## 2018-10-22 PROCEDURE — 72040 X-RAY EXAM NECK SPINE 2-3 VW: CPT | Mod: 26

## 2018-10-22 PROCEDURE — 73130 X-RAY EXAM OF HAND: CPT

## 2018-10-22 PROCEDURE — 72040 X-RAY EXAM NECK SPINE 2-3 VW: CPT

## 2018-10-24 PROBLEM — E79.0 HYPERURICEMIA: Status: ACTIVE | Noted: 2018-10-24

## 2018-10-24 LAB
ALBUMIN SERPL ELPH-MCNC: 4.5 G/DL
ALP BLD-CCNC: 101 U/L
ALT SERPL-CCNC: 15 U/L
ANION GAP SERPL CALC-SCNC: 21 MMOL/L
APPEARANCE: CLEAR
AST SERPL-CCNC: 12 U/L
BASOPHILS # BLD AUTO: 0.02 K/UL
BASOPHILS NFR BLD AUTO: 0.1 %
BILIRUB SERPL-MCNC: 0.3 MG/DL
BILIRUBIN URINE: NEGATIVE
BLOOD URINE: NEGATIVE
BUN SERPL-MCNC: 30 MG/DL
C3 SERPL-MCNC: 193 MG/DL
C4 SERPL-MCNC: 41 MG/DL
CALCIUM SERPL-MCNC: 10.4 MG/DL
CHLORIDE SERPL-SCNC: 96 MMOL/L
CO2 SERPL-SCNC: 24 MMOL/L
COLOR: YELLOW
CREAT SERPL-MCNC: 1.32 MG/DL
CREAT SPEC-SCNC: 281 MG/DL
CREAT/PROT UR: 0.1 RATIO
CRP SERPL-MCNC: 0.96 MG/DL
DSDNA AB SER-ACNC: <12 IU/ML
EOSINOPHIL # BLD AUTO: 0.11 K/UL
EOSINOPHIL NFR BLD AUTO: 0.7 %
GLUCOSE QUALITATIVE U: NEGATIVE MG/DL
GLUCOSE SERPL-MCNC: 124 MG/DL
HCT VFR BLD CALC: 40.6 %
HGB BLD-MCNC: 12.8 G/DL
IMM GRANULOCYTES NFR BLD AUTO: 0.5 %
KETONES URINE: ABNORMAL
LEUKOCYTE ESTERASE URINE: ABNORMAL
LYMPHOCYTES # BLD AUTO: 2.72 K/UL
LYMPHOCYTES NFR BLD AUTO: 18 %
MAN DIFF?: NORMAL
MCHC RBC-ENTMCNC: 23.4 PG
MCHC RBC-ENTMCNC: 31.5 GM/DL
MCV RBC AUTO: 74.4 FL
MONOCYTES # BLD AUTO: 1.17 K/UL
MONOCYTES NFR BLD AUTO: 7.7 %
NEUTROPHILS # BLD AUTO: 11.05 K/UL
NEUTROPHILS NFR BLD AUTO: 73 %
NITRITE URINE: NEGATIVE
PH URINE: 5
PLATELET # BLD AUTO: 381 K/UL
POTASSIUM SERPL-SCNC: 3.7 MMOL/L
PROT SERPL-MCNC: 7.4 G/DL
PROT UR-MCNC: 23 MG/DL
PROTEIN URINE: NEGATIVE MG/DL
RBC # BLD: 5.46 M/UL
RBC # FLD: 16.7 %
SODIUM SERPL-SCNC: 141 MMOL/L
SPECIFIC GRAVITY URINE: 1.03
URATE SERPL-MCNC: 9.7 MG/DL
UROBILINOGEN URINE: NEGATIVE MG/DL
WBC # FLD AUTO: 15.14 K/UL

## 2018-10-24 RX ORDER — PREDNISONE 10 MG/1
10 TABLET ORAL
Qty: 60 | Refills: 0 | Status: ACTIVE | COMMUNITY
Start: 2018-10-24 | End: 1900-01-01

## 2018-10-29 ENCOUNTER — NON-APPOINTMENT (OUTPATIENT)
Age: 63
End: 2018-10-29

## 2018-10-29 ENCOUNTER — APPOINTMENT (OUTPATIENT)
Dept: CARDIOLOGY | Facility: CLINIC | Age: 63
End: 2018-10-29
Payer: MEDICARE

## 2018-10-29 VITALS
SYSTOLIC BLOOD PRESSURE: 137 MMHG | BODY MASS INDEX: 29.77 KG/M2 | DIASTOLIC BLOOD PRESSURE: 84 MMHG | HEIGHT: 57 IN | OXYGEN SATURATION: 98 % | HEART RATE: 104 BPM | WEIGHT: 138 LBS

## 2018-10-29 DIAGNOSIS — M06.9 RHEUMATOID ARTHRITIS, UNSPECIFIED: ICD-10-CM

## 2018-10-29 DIAGNOSIS — R73.03 PREDIABETES.: ICD-10-CM

## 2018-10-29 DIAGNOSIS — J45.901 CHRONIC OBSTRUCTIVE PULMONARY DISEASE WITH (ACUTE) EXACERBATION: ICD-10-CM

## 2018-10-29 DIAGNOSIS — I65.29 OCCLUSION AND STENOSIS OF UNSPECIFIED CAROTID ARTERY: ICD-10-CM

## 2018-10-29 DIAGNOSIS — J44.1 CHRONIC OBSTRUCTIVE PULMONARY DISEASE WITH (ACUTE) EXACERBATION: ICD-10-CM

## 2018-10-29 PROCEDURE — 93000 ELECTROCARDIOGRAM COMPLETE: CPT

## 2018-10-29 PROCEDURE — 99205 OFFICE O/P NEW HI 60 MIN: CPT

## 2018-10-29 RX ORDER — PREDNISONE 5 MG/1
5 TABLET ORAL
Refills: 0 | Status: ACTIVE | COMMUNITY
Start: 2018-09-17

## 2018-11-01 ENCOUNTER — OUTPATIENT (OUTPATIENT)
Dept: OUTPATIENT SERVICES | Facility: HOSPITAL | Age: 63
LOS: 1 days | End: 2018-11-01

## 2018-11-01 VITALS
WEIGHT: 136.03 LBS | HEART RATE: 83 BPM | SYSTOLIC BLOOD PRESSURE: 150 MMHG | RESPIRATION RATE: 16 BRPM | DIASTOLIC BLOOD PRESSURE: 80 MMHG | HEIGHT: 59 IN | TEMPERATURE: 97 F | OXYGEN SATURATION: 98 %

## 2018-11-01 DIAGNOSIS — R73.03 PREDIABETES: ICD-10-CM

## 2018-11-01 DIAGNOSIS — I65.22 OCCLUSION AND STENOSIS OF LEFT CAROTID ARTERY: ICD-10-CM

## 2018-11-01 DIAGNOSIS — I10 ESSENTIAL (PRIMARY) HYPERTENSION: ICD-10-CM

## 2018-11-01 DIAGNOSIS — T78.40XA ALLERGY, UNSPECIFIED, INITIAL ENCOUNTER: ICD-10-CM

## 2018-11-01 DIAGNOSIS — Z98.1 ARTHRODESIS STATUS: Chronic | ICD-10-CM

## 2018-11-01 DIAGNOSIS — G47.33 OBSTRUCTIVE SLEEP APNEA (ADULT) (PEDIATRIC): ICD-10-CM

## 2018-11-01 LAB
BLD GP AB SCN SERPL QL: NEGATIVE — SIGNIFICANT CHANGE UP
BUN SERPL-MCNC: 28 MG/DL — HIGH (ref 7–23)
CALCIUM SERPL-MCNC: 9.8 MG/DL — SIGNIFICANT CHANGE UP (ref 8.4–10.5)
CHLORIDE SERPL-SCNC: 93 MMOL/L — LOW (ref 98–107)
CO2 SERPL-SCNC: 31 MMOL/L — SIGNIFICANT CHANGE UP (ref 22–31)
CREAT SERPL-MCNC: 1.23 MG/DL — SIGNIFICANT CHANGE UP (ref 0.5–1.3)
GLUCOSE SERPL-MCNC: 141 MG/DL — HIGH (ref 70–99)
HBA1C BLD-MCNC: 6 % — HIGH (ref 4–5.6)
HCT VFR BLD CALC: 41.1 % — SIGNIFICANT CHANGE UP (ref 34.5–45)
HGB BLD-MCNC: 12.5 G/DL — SIGNIFICANT CHANGE UP (ref 11.5–15.5)
MCHC RBC-ENTMCNC: 23.2 PG — LOW (ref 27–34)
MCHC RBC-ENTMCNC: 30.4 % — LOW (ref 32–36)
MCV RBC AUTO: 76.4 FL — LOW (ref 80–100)
NRBC # FLD: 0 — SIGNIFICANT CHANGE UP
PLATELET # BLD AUTO: 343 K/UL — SIGNIFICANT CHANGE UP (ref 150–400)
PMV BLD: 10.7 FL — SIGNIFICANT CHANGE UP (ref 7–13)
POTASSIUM SERPL-MCNC: 4.5 MMOL/L — SIGNIFICANT CHANGE UP (ref 3.5–5.3)
POTASSIUM SERPL-SCNC: 4.5 MMOL/L — SIGNIFICANT CHANGE UP (ref 3.5–5.3)
RBC # BLD: 5.38 M/UL — HIGH (ref 3.8–5.2)
RBC # FLD: 17.6 % — HIGH (ref 10.3–14.5)
RH IG SCN BLD-IMP: POSITIVE — SIGNIFICANT CHANGE UP
SODIUM SERPL-SCNC: 139 MMOL/L — SIGNIFICANT CHANGE UP (ref 135–145)
WBC # BLD: 19.71 K/UL — HIGH (ref 3.8–10.5)
WBC # FLD AUTO: 19.71 K/UL — HIGH (ref 3.8–10.5)

## 2018-11-01 RX ORDER — SODIUM CHLORIDE 9 MG/ML
1000 INJECTION, SOLUTION INTRAVENOUS
Qty: 0 | Refills: 0 | Status: DISCONTINUED | OUTPATIENT
Start: 2018-11-13 | End: 2018-11-15

## 2018-11-01 NOTE — H&P PST ADULT - HISTORY OF PRESENT ILLNESS
pt is a 63 y.o. female ; pt with h/o htn, hld, ra, asthma, pt reports admission 9/22/18 to [facility] secondary to syncope ; pt states a w/u was done " blockage in my left carotid artery " pt to surgeon ; pt now presents for Left Transcarotid Artery revascularazation

## 2018-11-01 NOTE — H&P PST ADULT - CARDIOVASCULAR
Pt returned from [facility]. Pt AAOx4. Pt stable. Siderails upx2, bed in lowest position and call bell within reach. Will continue to monitor.    detailed exam details…

## 2018-11-01 NOTE — H&P PST ADULT - LYMPHATIC
posterior cervical L/supraclavicular L/posterior cervical R/anterior cervical R/anterior cervical L/supraclavicular R

## 2018-11-01 NOTE — H&P PST ADULT - PMH
Asthma    Carotid Artery Dissection    Carotid artery stenosis    Eczema  bilateral elbows  Eczema    HLD (hyperlipidemia)    HTN (Hypertension)    Myocardial Infarction    Osteoporosis    Psoriasis    Rheumatoid arthritis  tx with  methotrexate and prednisone

## 2018-11-01 NOTE — H&P PST ADULT - OTHER CARE PROVIDERS
Dr navdeep webber cardiologist          Dr Montemayor rheumatologist Dr Sharee Jacobs cardiologist          Dr Montemayor rheumatologist

## 2018-11-01 NOTE — H&P PST ADULT - PROBLEM SELECTOR PLAN 1
Left Transcarotid Artery Revascularization    pre op instructions reviewed with pt ; pt appears to have a good understanding of pre op instructions    Pt to Dr Sharee Jacobs for pre op cardiac clearance; st/echo scheduled 11/09/18    Regarding plavix ; as per Kandis in surgeons office ; pt to remain on plavix pre op

## 2018-11-01 NOTE — H&P PST ADULT - PSH
Cataract Extraction Surgery    S/P lumbar fusion  h/o vertral fracture with cement fusion 209  tonsillectomy  s/p Excision Right lymph node ; pt unclear

## 2018-11-06 PROBLEM — L30.9 DERMATITIS, UNSPECIFIED: Chronic | Status: ACTIVE | Noted: 2018-11-01

## 2018-11-06 PROBLEM — M81.0 AGE-RELATED OSTEOPOROSIS WITHOUT CURRENT PATHOLOGICAL FRACTURE: Chronic | Status: ACTIVE | Noted: 2018-11-01

## 2018-11-06 PROBLEM — I65.29 OCCLUSION AND STENOSIS OF UNSPECIFIED CAROTID ARTERY: Chronic | Status: ACTIVE | Noted: 2018-11-01

## 2018-11-09 ENCOUNTER — OTHER (OUTPATIENT)
Age: 63
End: 2018-11-09

## 2018-11-09 ENCOUNTER — APPOINTMENT (OUTPATIENT)
Dept: CV DIAGNOSTICS | Facility: HOSPITAL | Age: 63
End: 2018-11-09

## 2018-11-09 ENCOUNTER — OUTPATIENT (OUTPATIENT)
Dept: OUTPATIENT SERVICES | Facility: HOSPITAL | Age: 63
LOS: 1 days | End: 2018-11-09
Payer: COMMERCIAL

## 2018-11-09 ENCOUNTER — APPOINTMENT (OUTPATIENT)
Dept: CV DIAGNOSITCS | Facility: HOSPITAL | Age: 63
End: 2018-11-09

## 2018-11-09 DIAGNOSIS — Z00.00 ENCOUNTER FOR GENERAL ADULT MEDICAL EXAMINATION WITHOUT ABNORMAL FINDINGS: ICD-10-CM

## 2018-11-09 DIAGNOSIS — Z98.1 ARTHRODESIS STATUS: Chronic | ICD-10-CM

## 2018-11-09 PROCEDURE — A9500: CPT

## 2018-11-09 PROCEDURE — 78452 HT MUSCLE IMAGE SPECT MULT: CPT

## 2018-11-09 PROCEDURE — 93016 CV STRESS TEST SUPVJ ONLY: CPT

## 2018-11-09 PROCEDURE — 93017 CV STRESS TEST TRACING ONLY: CPT

## 2018-11-09 PROCEDURE — 93306 TTE W/DOPPLER COMPLETE: CPT

## 2018-11-09 PROCEDURE — 93018 CV STRESS TEST I&R ONLY: CPT

## 2018-11-09 PROCEDURE — 93306 TTE W/DOPPLER COMPLETE: CPT | Mod: 26

## 2018-11-09 PROCEDURE — 78452 HT MUSCLE IMAGE SPECT MULT: CPT | Mod: 26

## 2018-11-09 RX ORDER — DIPHENHYDRAMINE HCL 50 MG/1
50 CAPSULE ORAL
Qty: 1 | Refills: 0 | Status: ACTIVE | COMMUNITY
Start: 2018-11-09 | End: 1900-01-01

## 2018-11-09 RX ORDER — PREDNISONE 50 MG/1
50 TABLET ORAL
Qty: 3 | Refills: 0 | Status: ACTIVE | COMMUNITY
Start: 2018-11-09 | End: 1900-01-01

## 2018-11-12 ENCOUNTER — TRANSCRIPTION ENCOUNTER (OUTPATIENT)
Age: 63
End: 2018-11-12

## 2018-11-12 NOTE — ASU PATIENT PROFILE, ADULT - PRESSURE ULCER(S)
Chief Complaint   Patient presents with   • Pneumonia       S:  Patient presents with chief complaint of Pneumonia  .  States she went to urgent care on Saturday and was told to follow up today. States she had fever, she had felt short of breath as well.  She had a CXR and was found to have bilateral pneumonia.  She was pus on levaquin and prednisone as well.  States she is feeling better than she was on Saturday.  She states she feels short of breath.  Has felt restless and not sleeping well.  She is diabetic and blood sugars have running around 240.  Has had bloody nose yesterday, left ear is aching as well.  States she has also had loose stools.  Started on Saturday and yesterday.      Stays at the [facility].  And has nursing access and lab draw access there apparently.  Patient in a nun.    ROS:  As above    Past Medical History   Diagnosis Date   • Arthritis    • Atrial fibrillation      On coumadin and asa-sees cardiology at [facility]   • Benign neoplasm of colon 2004     Colon Polyp   • Benign neoplasm of colon 2007     Tubular adenoma   • Carpal tunnel syndrome    • Chronic airway obstruction, V1 74%, DL 70% 3/21/2011   • Coronary atherosclerosis of unspecified type of vessel, native or graft      stent placed to rca-   • CREST syndrome    • Diffuse cystic mastopathy    • GERD (gastroesophageal reflux disease)    • Malignant hyperthermia      pt's nephew  from malignant hyperthermia   • Other and unspecified hyperlipidemia    • Other chronic pain      arthritis   • Other specified disorder of female genital organs      interductal hyperplasia of breast   • PONV (postoperative nausea and vomiting)    • Proteinuria 2007   • Pulmonary HTN      2ndary to dona-   • Tricuspid regurgitation      Severe   • Type II or unspecified type diabetes mellitus without mention of complication, not stated as uncontrolled    • Unspecified essential hypertension         Current Outpatient Prescriptions   Medication   • predniSONE (DELTASONE) 20 MG tablet   • levofloxacin (LEVAQUIN) 500 MG tablet   • Tadalafil, PAH, (ADCIRCA) 20 MG Tab   • metFORMIN (GLUCOPHAGE) 500 MG tablet   • metolazone (ZAROXOLYN) 2.5 MG tablet   • losartan (COZAAR) 50 MG tablet   • HYDROcodone-acetaminophen (NORCO) 5-325 MG per tablet   • glimepiride (AMARYL) 4 MG tablet   • digoxin (LANOXIN) 0.125 MG tablet   • hydroxychloroquine (PLAQUENIL) 200 MG tablet   • cetirizine (ZYRTEC) 5 MG tablet   • potassium chloride (K-DUR, KLOR-CON) 10 MEQ CR tablet   • esomeprazole (NEXIUM) 40 MG capsule   • furosemide (LASIX) 40 MG tablet   • fluticasone-salmeterol (ADVAIR HFA) 115-21 MCG/ACT inhaler   • diphenhydrAMINE (BENADRYL) 25 MG capsule   • atorvastatin (LIPITOR) 40 MG tablet   • metformin (GLUCOPHAGE-XR) 500 MG 24 hr tablet   • diltiazem (CARDIZEM) 120 MG tablet   • albuterol (PROAIR HFA) 108 (90 BASE) MCG/ACT inhaler   • mometasone (NASONEX) 50 MCG/ACT nasal spray   • aspirin  MG EC tablet   • calcium carbonate-vitamin D (CALTRATE+D) 600-400 MG-UNIT per tablet   • docusate sodium (COLACE) 250 MG capsule   • calcium carbonate (TUMS) 500 MG chewable tablet   • folic acid (FOLATE) 400 MCG tablet   • warfarin (COUMADIN) 7.5 MG tablet   • Omega-3 Fatty Acids (GNP FISH OIL) 1200 MG OR CAPS   • VITAMIN B-12 500 MCG PO TABS   • VITAMIN B-6 50 MG PO TABS   • VITAMIN C 500 MG PO TABS   • CINNAMON 500 MG PO CAPS   • VITAMIN D3 1000 UNIT PO TABS   • TYLENOL ARTHRITIS PAIN 650 MG PO TBCR   • VITAMIN E 400 UNIT PO CAPS   • OCUVITE LUTEIN CAPS   PO   • ONETOUCH DELICA LANCETS 33G Misc   • ONETOUCH VERIO   • saline nasal (AYR,NASOGEL) Gel   • hydroCORTisone (ANUSOL-HC) 25 MG suppository   • penciclovir (DENAVIR) 1 % cream   • hydrocortisone (CORTIZONE) 2.5 % cream   • lidocaine (LIDODERM) 5 %     No current facility-administered medications for this visit.        Health Maintenance Summary     Topic Due On Due Status  Completed On    Osteoporosis Screening  Completed Jul 21, 2016    Immunization - Td/Tdap Jul 29, 2024 Not Due Jul 29, 2014    Immunization-Zoster  Completed Feb 3, 2010    Immunization - Pneumococcal  Completed Jun 14, 2016    Diabetes Eye Exam Mar 22, 2017 Not Due Mar 22, 2016    Glycohemoglobin A1C  (Diabetes Sugar)  Jan 11, 2017 Due On Oct 11, 2016    GFR  (Kidney Function Test)  Dec 14, 2017 Not Due Dec 14, 2016    Diabetes Foot Exam  Jul 6, 2017 Not Due Jul 6, 2016    Medicare Wellness Visit Jul 6, 2017 Due Soon Jul 6, 2016    Immunization-Influenza  Completed Sep 1, 2016            O:   VS:   Visit Vitals   • /60   • Pulse 98   • Temp 97.9 °F (36.6 °C) (Oral)   • Resp 18   • Wt 67.4 kg   • SpO2 96%   • BMI 26.32 kg/m2    Body mass index is 26.32 kg/(m^2).   Gen:  Patient is alert and oriented in NAD  HEENT:  TMs normal bilaterally, nasal turbinates with slight irritation on the left side, oropharynx is normal, no LAD  CV:  Irregular heart rate, varying from low 90s to low 100s.  No m/r/g  Resp:  LCTA bilaterally  Ext:  No peripheral edema        A/P:    CAP (community acquired pneumonia)  She seems to be improving add probiotic and finish out abx course and prednisone, watch blood sugars which should start to decline.  Her hypoxia has resolved.  She will need a repeat CXR in a month    Atrial fibrillation, unspecified type  Heart rate control a little borderline due to infection, will monitor without additional med for now, INR elevated today, will hold coumadin today and further instructions per her coumadin clinic through her cardiologist.   - Prothrombin Time; Future        Return in about 1 month (around 2/16/2017) for f/u on the pneumonia elevated heart rate.         no

## 2018-11-13 ENCOUNTER — APPOINTMENT (OUTPATIENT)
Dept: VASCULAR SURGERY | Facility: HOSPITAL | Age: 63
End: 2018-11-13

## 2018-11-13 ENCOUNTER — INPATIENT (INPATIENT)
Facility: HOSPITAL | Age: 63
LOS: 14 days | Discharge: NURSING, REHAB OR LONG-TERM CARE | End: 2018-11-28
Attending: SURGERY | Admitting: SURGERY
Payer: MEDICARE

## 2018-11-13 VITALS
HEIGHT: 59 IN | SYSTOLIC BLOOD PRESSURE: 155 MMHG | TEMPERATURE: 98 F | RESPIRATION RATE: 18 BRPM | WEIGHT: 136.03 LBS | HEART RATE: 101 BPM | DIASTOLIC BLOOD PRESSURE: 85 MMHG | OXYGEN SATURATION: 97 %

## 2018-11-13 DIAGNOSIS — I65.22 OCCLUSION AND STENOSIS OF LEFT CAROTID ARTERY: ICD-10-CM

## 2018-11-13 DIAGNOSIS — Z98.1 ARTHRODESIS STATUS: Chronic | ICD-10-CM

## 2018-11-13 LAB
GLUCOSE BLDC GLUCOMTR-MCNC: 119 MG/DL — HIGH (ref 70–99)
GLUCOSE BLDC GLUCOMTR-MCNC: 159 MG/DL — HIGH (ref 70–99)
RH IG SCN BLD-IMP: POSITIVE — SIGNIFICANT CHANGE UP

## 2018-11-13 PROCEDURE — 70496 CT ANGIOGRAPHY HEAD: CPT | Mod: 26

## 2018-11-13 PROCEDURE — 70498 CT ANGIOGRAPHY NECK: CPT | Mod: 26

## 2018-11-13 PROCEDURE — 37215 TRANSCATH STENT CCA W/EPS: CPT

## 2018-11-13 PROCEDURE — 70450 CT HEAD/BRAIN W/O DYE: CPT | Mod: 26,59

## 2018-11-13 RX ORDER — SODIUM CHLORIDE 9 MG/ML
1000 INJECTION, SOLUTION INTRAVENOUS ONCE
Qty: 0 | Refills: 0 | Status: COMPLETED | OUTPATIENT
Start: 2018-11-13 | End: 2018-11-13

## 2018-11-13 RX ORDER — SODIUM CHLORIDE 9 MG/ML
1000 INJECTION, SOLUTION INTRAVENOUS
Qty: 0 | Refills: 0 | Status: DISCONTINUED | OUTPATIENT
Start: 2018-11-13 | End: 2018-11-13

## 2018-11-13 RX ORDER — FENTANYL CITRATE 50 UG/ML
50 INJECTION INTRAVENOUS
Qty: 0 | Refills: 0 | Status: DISCONTINUED | OUTPATIENT
Start: 2018-11-13 | End: 2018-11-13

## 2018-11-13 RX ORDER — ALBUMIN HUMAN 25 %
250 VIAL (ML) INTRAVENOUS
Qty: 0 | Refills: 0 | Status: COMPLETED | OUTPATIENT
Start: 2018-11-13 | End: 2018-11-13

## 2018-11-13 RX ORDER — ATORVASTATIN CALCIUM 80 MG/1
80 TABLET, FILM COATED ORAL AT BEDTIME
Qty: 0 | Refills: 0 | Status: DISCONTINUED | OUTPATIENT
Start: 2018-11-13 | End: 2018-11-28

## 2018-11-13 RX ORDER — HYDROCHLOROTHIAZIDE 25 MG
25 TABLET ORAL DAILY
Qty: 0 | Refills: 0 | Status: DISCONTINUED | OUTPATIENT
Start: 2018-11-13 | End: 2018-11-13

## 2018-11-13 RX ORDER — ONDANSETRON 8 MG/1
4 TABLET, FILM COATED ORAL ONCE
Qty: 0 | Refills: 0 | Status: DISCONTINUED | OUTPATIENT
Start: 2018-11-13 | End: 2018-11-13

## 2018-11-13 RX ORDER — HEPARIN SODIUM 5000 [USP'U]/ML
5000 INJECTION INTRAVENOUS; SUBCUTANEOUS EVERY 8 HOURS
Qty: 0 | Refills: 0 | Status: DISCONTINUED | OUTPATIENT
Start: 2018-11-13 | End: 2018-11-28

## 2018-11-13 RX ORDER — AMLODIPINE BESYLATE 2.5 MG/1
5 TABLET ORAL DAILY
Qty: 0 | Refills: 0 | Status: DISCONTINUED | OUTPATIENT
Start: 2018-11-13 | End: 2018-11-13

## 2018-11-13 RX ORDER — SODIUM CHLORIDE 9 MG/ML
1000 INJECTION INTRAMUSCULAR; INTRAVENOUS; SUBCUTANEOUS ONCE
Qty: 0 | Refills: 0 | Status: COMPLETED | OUTPATIENT
Start: 2018-11-13 | End: 2018-11-13

## 2018-11-13 RX ORDER — CLOPIDOGREL BISULFATE 75 MG/1
75 TABLET, FILM COATED ORAL DAILY
Qty: 0 | Refills: 0 | Status: DISCONTINUED | OUTPATIENT
Start: 2018-11-13 | End: 2018-11-28

## 2018-11-13 RX ORDER — FENTANYL CITRATE 50 UG/ML
25 INJECTION INTRAVENOUS
Qty: 0 | Refills: 0 | Status: DISCONTINUED | OUTPATIENT
Start: 2018-11-13 | End: 2018-11-13

## 2018-11-13 RX ADMIN — SODIUM CHLORIDE 75 MILLILITER(S): 9 INJECTION, SOLUTION INTRAVENOUS at 21:36

## 2018-11-13 RX ADMIN — Medication 1000 MILLILITER(S): at 11:15

## 2018-11-13 RX ADMIN — Medication 1000 MILLILITER(S): at 11:31

## 2018-11-13 RX ADMIN — FENTANYL CITRATE 25 MICROGRAM(S): 50 INJECTION INTRAVENOUS at 12:00

## 2018-11-13 RX ADMIN — SODIUM CHLORIDE 1000 MILLILITER(S): 9 INJECTION INTRAMUSCULAR; INTRAVENOUS; SUBCUTANEOUS at 10:50

## 2018-11-13 RX ADMIN — SODIUM CHLORIDE 30 MILLILITER(S): 9 INJECTION, SOLUTION INTRAVENOUS at 17:40

## 2018-11-13 RX ADMIN — CLOPIDOGREL BISULFATE 75 MILLIGRAM(S): 75 TABLET, FILM COATED ORAL at 17:01

## 2018-11-13 RX ADMIN — SODIUM CHLORIDE 2000 MILLILITER(S): 9 INJECTION, SOLUTION INTRAVENOUS at 12:45

## 2018-11-13 RX ADMIN — HEPARIN SODIUM 5000 UNIT(S): 5000 INJECTION INTRAVENOUS; SUBCUTANEOUS at 17:41

## 2018-11-13 RX ADMIN — FENTANYL CITRATE 25 MICROGRAM(S): 50 INJECTION INTRAVENOUS at 11:48

## 2018-11-13 RX ADMIN — ATORVASTATIN CALCIUM 80 MILLIGRAM(S): 80 TABLET, FILM COATED ORAL at 21:36

## 2018-11-13 NOTE — CONSULT NOTE ADULT - ASSESSMENT
ASSESSMENT:  63y Female  s/p left transcarotid arterial sten via right femoral vein access; course complicated by code stroke approximately 8 hours postop.     PLAN:  ***  Neurologic:   Respiratory:   Cardiovascular:   Gastrointestinal/Nutrition:   Renal/Genitourinary:   Hematologic:   Infectious Disease:   Tubes/Lines/Drains:   Endocrine:   Disposition: To be followed on continuum of care    --------------------------------------------------------------------------------------    Critical Care Diagnoses: ASSESSMENT:  63y Female  s/p left transcarotid arterial sten via right femoral vein access; course complicated by code stroke approximately 8 hours postop.     PLAN:   Neurologic: Likely TIA  - f/u final read CT scans; prelim: MC2 occlusion  - per neurology: as symptoms as resolving, q4h neurological exams adequate     Respiratory: stable on RA    Cardiovascular:   - permissive hypertension per neurology: will d/c standing anti-hypertensives  - monitor on telemetry    Gastrointestinal/Nutrition:   NPO for now; advance diet tomorrow if stable    Renal/Genitourinary   - Monitor I/Os; replete electrolytes as needed    Hematologic:   - trend H/H  - c/w plavx    Infectious Disease: No active issues    Endocrine: No active issues    Disposition: To be followed on continuum of care    ------------------------------------------------------------------------------------ ASSESSMENT:  63y Female  s/p left transcarotid arterial sten via right femoral vein access; course complicated by code stroke approximately 8 hours postop.     PLAN:   Neurologic: TIA vs. emoblic  - f/u final read CT scans; prelim: patent left ICA stent and a distal M2 segment occlusion  - q1h neurological exams    Respiratory: stable on RA    Cardiovascular:   - permissive hypertension per neurology: will d/c standing anti-hypertensive  - goal -180; will start Mariusz to increase blood pressure     Gastrointestinal/Nutrition:   NPO for now; advance diet tomorrow if stable    Renal/Genitourinary   - Monitor I/Os; replete electrolytes as needed    Hematologic:   - trend H/H  - c/w plavx    Infectious Disease: No active issues    Endocrine: No active issues    Disposition: SICU    ------------------------------------------------------------------------------------

## 2018-11-13 NOTE — CONSULT NOTE ADULT - ASSESSMENT
Pt is a 63 one year old female with a Pmhx of HTN, HLD, RA, Asthma. She is now s/p elective left transcarotid artery revascularization via right groin access. Left sided carotid disease was found upon syncopal workup at [facility]. There were no complications estephanie-procedurally however a code stroke was called at 6:39pm because RN noticed pt to have speech changes, mildly garbled upon her return from the PACU. The vascular fellow was alerted who noted her to word finding difficulties and right tongue deviation. Last known well time is unclear to me as pt was in the PACU and was noted to have slurred speech s/p procedure possibly due to ETT. It was deemed that she was following commands at that time however a full aphasic exam was not performed until after arrival to the floor. NIHSS initially was 2 for dysarthria and mild right facial droop. the episdoe of word finding difficulties resolved by the time of my assessment. Upon arrival to the CT scanner her facial droop resolved as well. The only remaining neurological deficit was dysarthria. NIHSS 0. Pt was exluded form receiving tPA due to unclear last known well time, improvement of neurological exam and recent carotid arterial puncture that is non-compressible. Initial Ct head showed no evidence of acute hem    Recommendations:  Permissive Hypertension - Pt is being trasnferred to the SICU, Q2H neurochecks and started on Pressors  C/w Plavix   High intensity statin Atorvastatin 80mg daily  Basic labs and Hba1c and lipid panel   Neuro-assessments per protocol  MRI brain wo contrast Pt is a 63 one year old female with a Pmhx of HTN, HLD, RA, Asthma. She is now s/p elective left transcarotid artery revascularization via right groin access. Left sided carotid disease was found upon syncopal workup at [facility]. There were no complications estephanie-procedurally however a code stroke was called at 6:39pm because RN noticed pt to have speech changes, mildly garbled upon her return from the PACU. The vascular fellow was alerted who noted her to word finding difficulties and right tongue deviation. Last known well time is unclear to me as pt was in the PACU and was noted to have slurred speech s/p procedure possibly due to ETT. It was deemed that she was following commands at that time however a full aphasic exam was not performed until after arrival to the floor. NIHSS initially was 2 for dysarthria and mild right facial droop. the episode of word finding difficulties resolved by the time of my assessment. Upon arrival to the CT scanner her facial droop resolved as well. The only remaining neurological deficit was dysarthria. NIHSS 1. Pt was excluded from receiving tPA due to unclear last known well time, improvement of neurological exam and recent carotid arterial puncture that is non-compressible. Initial Ct head showed no evidence of acute findings. CTA head and neck showed a distal M2 branch occlusion    Over the course of the next few hours pt's neurological exam worsened. NIHSS 10, she was globally aphasic, not following commands and there was no blink to threat on the right. No hemiparesis or gross sensory loss. This case was discussed with the stroke attending on call and the INR attending on call, Dr. Mack and Dr. Sibley respectively. She was deemed not a candidate for intracranial thrombectomy due to the location of the M2 occlusion.     Recommendations:  Permissive Hypertension - Pt is being transferred to the SICU, Q2H neurochecks and started on Pressors  C/w Plavix   High intensity statin - Atorvastatin 80mg daily  Basic labs and Hba1c and lipid panel   Neuro-assessments per protocol  MRI brain wo contrast  TTE with Bubble study  Telemetry   Make NPO and perform dysphagia screen  PT/OT/SLP

## 2018-11-13 NOTE — CHART NOTE - NSCHARTNOTEFT_GEN_A_CORE
17:30 Called to examine patient after patient transferred to floor by nurse. Patient noticed to have some speech changes as well as tongue deviation to right. Patient examined at bedside. Patient was noted to be A&Ox3. Patient able to verbalize as well as follow commands. Speech was slightly garbled. Patient moved upper and lower extremities b/l.  Tongue slightly deviated to right. VS stable. HR 80 120/67 rr 17 SPo2 98%.STAT loabs ordered    Patient re-examined 10 minutes later with fellow and patient unable to verbalize. Patient had word finding difficulty and difficulty following commands. Code stroke called. CTA of head and neck ordered.     Neurology came to evaluate patient. 17:30 Called to examine patient after patient transferred to floor by nurse. Patient noticed to have some speech changes as well as tongue deviation to right. Patient examined at bedside. Patient was noted to be A&Ox3. Patient able to verbalize as well as follow commands. Speech was slightly garbled. Patient moved upper and lower extremities b/l.  Tongue slightly deviated to right. New from previous post-op check. VS stable. HR 80 120/67 rr 17 SPo2 98%.STAT loabs ordered    Patient re-examined 10 minutes later with fellow and patient unable to verbalize. Patient had word finding difficulty and difficulty following commands. Right facial droop noted. Code stroke called. CTA of head and neck ordered.     Neurology came to evaluate patient. 18:00 Called to examine patient after patient transferred to floor by nurse. Patient noticed to have some speech changes as well as tongue deviation to right. Patient examined at bedside. Patient was noted to be A&Ox3. Patient had increased facial swelling and no droop was noted. Patient able to verbalize as well as follow commands. Speech was slightly garbled. Patient moved upper and lower extremities b/l.  Tongue slightly deviated to right. New from previous post-op check. VS stable. HR 80 120/67 rr 17 SPo2 98%.STAT labs ordered. Vascular fellow notified.    Patient re-examined 10 minutes later with fellow and patient unable to verbalize. Patient had word finding difficulty and difficulty following commands. Right facial droop noted. Code stroke called. CTA of head and neck ordered. Dr. Heath notified.     Neurology came to evaluate patient. Rec STAT head/Neck CTA.      19:30 Addendum: On transport to CT scan facial droop resolved. Enrique recommending permissive HTN, Q1H neuro checks. Possible TIA in setting of resolving symptoms. SICU consult called for Q1H neuro checks. 18:25 Called to examine patient after patient transferred to floor by nurse. Patient noticed to have some speech changes as well as tongue deviation to right. Patient examined at bedside. Patient was noted to be A&Ox3. Patient had increased facial swelling and no droop was noted. Patient able to verbalize as well as follow commands. Speech was slightly garbled. Patient moved upper and lower extremities b/l.  Tongue slightly deviated to right. New from previous post-op check. VS stable. HR 80 120/67 rr 17 SPo2 98%.STAT labs ordered. Vascular fellow notified.    Patient re-examined 10 minutes later with fellow and patient unable to verbalize. Patient had word finding difficulty and difficulty following commands. Right facial droop noted. Code stroke called. CTA of head and neck ordered. Dr. Heath notified.     Neurology came to evaluate patient. Rec STAT head/Neck CTA.      19:30 Addendum: On transport to CT scan facial droop resolved. Enrique recommending permissive HTN, Q1H neuro checks. Possible TIA in setting of resolving symptoms. SICU consult called for Q1H neuro checks. 18:25 Called to examine patient after patient transferred to floor by nurse. Patient noticed to have some speech changes as well as tongue deviation to right. Patient examined at bedside. Patient was noted to be A&Ox3. Patient had increased facial swelling and no droop was noted. Patient able to verbalize as well as follow commands. Speech was slightly garbled. Patient moved upper and lower extremities b/l.  Tongue slightly deviated to right. New from previous post-op check. VS stable. HR 80 120/67 rr 17 SPo2 98%.STAT labs ordered. Vascular fellow notified.    Patient re-examined 10 minutes later with fellow and patient unable to verbalize. Patient had word finding difficulty and difficulty following commands. Right facial droop noted. Code stroke called. CTA of head and neck ordered. Dr. Heath notified.     Neurology came to evaluate patient. Rec STAT head/Neck CTA.     19:30 Addendum: On transport to CT scan facial droop resolved. Enrique recommending permissive HTN, Q1H neuro checks. Possible TIA in setting of resolving symptoms. SICU consult called for Q1H neuro checks.

## 2018-11-13 NOTE — CONSULT NOTE ADULT - SUBJECTIVE AND OBJECTIVE BOX
Neurology Consult Note Neurology Consult Note    Pt is a 63 one year old female with a Pmhx of HTN, HLD, RA, Asthma. She is now s/p elective left transcarotid artery revascularization via right groin access. Left sided carotid disease was found upon syncopal workup at [facility]. There were no complications estephanie-procedurally however a code stroke was called at 6:39pm because RN noticed pt to have speech changes, mildly garbled upon her return from the PACU. The vascular fellow was alerted who noted her to be having word finding difficulties and right tongue deviation. Last known well time is unclear to me as pt was in the PACU and was noted to have slurred speech s/p procedure possibly due to ETT. It was deemed that she was following commands however a full aphasic exam was not performed until after arrival to the floor.       Past Medical History:  Asthma    Carotid Artery Dissection    Carotid artery stenosis    Eczema  bilateral elbows  Eczema    HLD (hyperlipidemia)    HTN (Hypertension)    Myocardial Infarction    Osteoporosis    Psoriasis    Rheumatoid arthritis  tx with  methotrexate and prednisone.     Past Surgical History:  Cataract Extraction Surgery    S/P lumbar fusion  h/o vertral fracture with cement fusion 209  tonsillectomy  s/p Excision Right lymph node ; pt unclear.     Family History:  Mother  Still living? Unknown  Family history of hypertension in mother, Age at diagnosis: Age Unknown  Family history of diabetes mellitus in mother, Age at diagnosis: Age Unknown.     Social History: Lives with sister, single, retired      Physical Exam:  Vital Signs Last 24 Hrs  T(C): 36.6 (13 Nov 2018 21:24), Max: 36.8 (13 Nov 2018 06:28)  T(F): 97.8 (13 Nov 2018 21:24), Max: 98.3 (13 Nov 2018 06:28)  HR: 96 (13 Nov 2018 23:12) (76 - 101)  BP: 108/75 (13 Nov 2018 23:12) (98/67 - 155/85)  BP(mean): --  RR: 16 (13 Nov 2018 21:24) (14 - 18)  SpO2: 96% (13 Nov 2018 21:24) (94% - 100%)    General Adult Exam  GENERAL APPEARANCE: Well developed, well nourished, less cooperative, and appears to be in no acute distress.  CARDIAC: Normal S1 and S2. No S3, S4 or murmurs. Rhythm is regular. .  LUNGS: Clear to auscultation and percussion without rales, rhonchi, wheezing or diminished breath sounds.  ABDOMEN: Soft, nondistended, nontender. No guarding or rebound. No masses.  SKIN: Skin normal color, texture and turgor with no lesions or eruptions.    Neurological Exam:  Mental Status: Orientated to self, date and place.  Attention intact.  Mild dysarthria, no aphasia or neglect.  Knowledge intact.  Registration intact.  Short and long term memory grossly intact.      Cranial Nerves: CN I - not tested. PERRL, EOMI, VFF, CN V1-3 intact to light touch and pinprick.  Mild right facial droop.  Hearing intact to finger rub bilaterally.  Tongue, uvula and palate midline.  Sternocleidomastoid and Trapezius intact bilaterally.    Motor:   Tone: normal.                  Strength:     [] Upper extremity                      Delt       Bicep    Tricep                                                  R         5/5        5/5        5/5       5/5                                               L          5/5        5/5        5/5       5/5  [] Lower extremity                       HF          KE          KF        DF         PF                                               R        5/5        5/5        5/5       5/5       5/5                                               L         5/5        5/5       5/5       5/5        5/5  Pronator drift: none                 Dysmetria: None to finger-nose-finger b/l  No truncal ataxia.    Tremor: No resting, postural or action tremor.      Sensation: intact to light touch, pinprick throughout    Deep Tendon Reflexes:  Toes flexor bilaterally    Gait: unable to assess     Labs  No labs - Pt refused.     Imaging  < from: CT Brain Stroke Protocol (11.13.18 @ 19:38) >  No acute intracranial hemorrhage.  No abnormal extra-axial fluid collection.    The basal cisterns are patent.   No acute hydrocephalus.   No midline shift or mass effect.    Moderate age-related cerebral atrophy.   Severe chronic microvascular ischemic disease.  Basal ganglia calcification left greater than right, unchanged.  There is atherosclerotic disease of the left distal M1 segment.    No pathological opacification of the paranasal sinuses or mastoid cells.  No depressed calvarial fracture.     IMPRESSION:   No acute intracranial hemorrhage, midline shift or mass effect.     < end of copied text >    < from: CT Angio Head w/ IV Cont (11.13.18 @ 19:39) >  CTA head:  1. Subtle left M2 segment branch occlusion.  2. Alternating areas of stenosis involving the distal left [place] branch   vasculature.  3. Moderate focal stenosis involving the mid basilar artery and left V4   segment.   4. 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    CTA neck:  1. Interval placement of a left-sided internal carotid artery stent when   compared with the outside CT angiogramexamination.  2. 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 Findings appear unchanged when compared to the   prior exam.  3. Mild pulmonary edema which is partially visualized within the upper   lobes.    < end of copied text >

## 2018-11-13 NOTE — CONSULT NOTE ADULT - SUBJECTIVE AND OBJECTIVE BOX
SICU Consultation Note  =====================================================  Surgery Information: 63 year old female s/p left transcarotid arterial sten via right femoral vein access; course complicated by code stroke approximately 8 hours postop.     HPI:  pt is a 63 y.o. female ; pt with h/o htn, hld, ra, asthma, pt reports admission 9/22/18 to [facility] secondary to syncope ; pt states a w/u was done " blockage in my left carotid artery " pt to surgeon ; pt now presents for Left Transcarotid Artery revascularazation (01 Nov 2018 14:30)    Allergies: dairy products (Anaphylaxis; Hives)  eggs (Anaphylaxis; Hives)  fish (Anaphylaxis; Hives)  lamb, tomatoes, milk (Anaphylaxis; Hives)  peanut butter (Anaphylaxis)  peanuts (Anaphylaxis; Hives)  penicillins (Anaphylaxis)  propofol (Angioedema)  shellfish (Anaphylaxis; Hives)    PAST MEDICAL & SURGICAL HISTORY:  Osteoporosis  Eczema: bilateral elbows  Carotid artery stenosis  Rheumatoid arthritis: tx with  methotrexate and prednisone  HLD (hyperlipidemia)  Carotid Artery Dissection  Eczema  Psoriasis  Asthma  HTN (Hypertension)  Myocardial Infarction  S/P lumbar fusion: h/o vertral fracture with cement fusion 209  Cataract Extraction Surgery  tonsillectomy: s/p Excision Right lymph node ; pt unclear    FAMILY HISTORY:  Family history of diabetes mellitus in mother (Mother)  Family history of hypertension in mother (Mothe)    ADVANCE DIRECTIVES: Presumed Full Code    HOME MEDICATION:  amLODIPine 5 mg oral tablet: 1 tab(s) orally once a day (13 Nov 2018 06:35)  atorvastatin 40 mg oral tablet: 2 tab(s) orally once a day (13 Nov 2018 06:35)  clopidogrel 75 mg oral tablet: 1 tab(s) orally once a day (13 Nov 2018 06:35)  folic acid 1 mg oral tablet: 1 tab(s) orally once a day (13 Nov 2018 06:35)  hydroCHLOROthiazide 25 mg oral tablet: 1 tab(s) orally once a day (13 Nov 2018 06:35)  methotrexate 2.5 mg oral tablet: 4 tab(s) orally once a week Thursday (13 Nov 2018 06:35)  predniSONE 10 mg oral tablet: taper ; to complete 11/05/18 (13 Nov 2018 06:35)  Proventil: 2 puff(s) inhaled , As Needed (13 Nov 2018 06:35)  Tylenol Arthritis Caplet: 2 tab(s) orally , As Needed (13 Nov 2018 06:35)      CURRENT MEDICATIONS:   --------------------------------------------------------------------------------------  Neurologic Medications    Respiratory Medications    Cardiovascular Medications  hydrochlorothiazide 25 milliGRAM(s) Oral daily    Gastrointestinal Medications  lactated ringers. 1000 milliLiter(s) IV Continuous <Continuous>    Genitourinary Medications    Hematologic/Oncologic Medications  clopidogrel Tablet 75 milliGRAM(s) Oral daily  heparin  Injectable 5000 Unit(s) SubCutaneous every 8 hours    Antimicrobial/Immunologic Medications    Endocrine/Metabolic Medications  atorvastatin 80 milliGRAM(s) Oral at bedtime  predniSONE   Tablet 10 milliGRAM(s) Oral daily    Topical/Other Medications    --------------------------------------------------------------------------------------    VITAL SIGNS, INS/OUTS (last 24 hours):  --------------------------------------------------------------------------------------    T(C): 36.6 (11-13-18 @ 17:32), Max: 36.8 (11-13-18 @ 06:28)  HR: 80 (11-13-18 @ 17:32) (76 - 101)  BP: 120/67 (11-13-18 @ 17:32) (98/67 - 155/85)  ABP: --  ABP(mean): --  RR: 17 (11-13-18 @ 17:32) (14 - 18)  SpO2: 98% (11-13-18 @ 17:32) (94% - 100%)  Wt(kg): --  CVP(mm Hg): --      11-13 @ 07:01  -  11-13 @ 21:16  --------------------------------------------------------  IN:    IV PiggyBack: 500 mL    Lactated Ringers IV Bolus: 1000 mL    lactated ringers.: 60 mL    lactated ringers.: 1400 mL    Oral Fluid: 150 mL  Total IN: 3110 mL    OUT:    Voided: 1900 mL  Total OUT: 1900 mL    Total NET: 1210 L  --------------------------------------------------------------------------------------    EXAM  NEUROLOGY  Exam: Normal, NAD, alert, oriented x3  - R tongue deviation  - Mid R lip droop    HEENT  Exam: atraumatic.  EOMI  - R eye s/p cataract surgery, pupil not responsive  - L eye: pupil reactive, sluggish  - L neck dressing: seronsanguinous stained, no hematoma or swelling noted   -Generalized facial puffiness-per nurse and patient this is baseline    RESPIRATORY  Exam: Lungs clear to auscultation, Normal expansion/effort    CARDIOVASCULAR  Exam: S1, S2.  Regular rate and rhythm.     GI/NUTRITION  Exam: Abdomen soft, Non-tender, Non-distended  Current Diet:  NPO    VASCULAR  Exam: Extremities warm, pink, well-perfused    MUSCULOSKELETAL  Exam: All extremities moving spontaneously without limitations   - 5/5 strength in all extremities    SKIN:  Exam: Good skin turgor, no skin breakdown.    METABOLIC/FLUIDS/ELECTROLYTES  lactated ringers. 1000 milliLiter(s) IV Continuous <Continuous>      HEMATOLOGIC  [x] DVT Prophylaxis: clopidogrel Tablet 75 milliGRAM(s) Oral daily  heparin  Injectable 5000 Unit(s) SubCutaneous every 8 hours    Transfusions:	[] PRBC	[] Platelets		[] FFP	[] Cryoprecipitate      Tubes/Lines/Drains  ***  [x] Peripheral IV  [] Central Venous Line     	[] R	[] L	[] IJ	[] Fem	[] SC	Date Placed:   [] Arterial Line		[] R	[] L	[] Fem	[] Rad	[] Ax	Date Placed:   [] PICC:         	[] Midline		[] Mediport  [] Urinary Catheter		Date Placed:     LABS  --------------------------------------------------------------------------------------  -------------------------------------------------------------------------------------- SICU Consultation Note  =====================================================  Surgery Information: 63 year old female s/p left transcarotid arterial sten via right femoral vein access; course complicated by code stroke approximately 8 hours postop.     HPI:  pt is a 63 y.o. female ; pt with h/o htn, hld, ra, asthma, pt reports admission 9/22/18 to [facility] secondary to syncope ; pt states a w/u was done " blockage in my left carotid artery " pt to surgeon ; pt now s/p left transcarotid arterial sten via right femoral vein access.    Timeline:  18:25 Primary team called to examine patient after patient transferred to floor by nurse. Patient noticed to have some speech changes as well as tongue deviation to right. Patient examined at bedside. Patient was noted to be A&Ox3. Patient had increased facial swelling and no droop was noted. Patient able to verbalize as well as follow commands. Speech was slightly garbled. Patient moved upper and lower extremities b/l.  Tongue slightly deviated to right. New from previous post-op check. VS stable. HR 80 120/67 rr 17 SPo2 98%.STAT labs ordered. Vascular fellow notified.    Patient re-examined 10 minutes later with fellow and patient unable to verbalize. Patient had word finding difficulty and difficulty following commands. Right facial droop noted. Code stroke called. CTA of head and neck ordered. Dr. Heath notified. Neurology came to evaluate patient. Rec STAT head/Neck CTA.     19:30 Addendum: On transport to CT scan facial droop resolved. Enrique recommending permissive HTN, Q1H neuro checks. Possible TIA in setting of resolving symptoms. SICU consult called for Q1H neuro checks.    Allergies: dairy products (Anaphylaxis; Hives)  eggs (Anaphylaxis; Hives)  fish (Anaphylaxis; Hives)  lamb, tomatoes, milk (Anaphylaxis; Hives)  peanut butter (Anaphylaxis)  peanuts (Anaphylaxis; Hives)  penicillins (Anaphylaxis)  propofol (Angioedema)  shellfish (Anaphylaxis; Hives)    PAST MEDICAL & SURGICAL HISTORY:  Osteoporosis  Eczema: bilateral elbows  Carotid artery stenosis  Rheumatoid arthritis: tx with  methotrexate and prednisone  HLD (hyperlipidemia)  Carotid Artery Dissection  Eczema  Psoriasis  Asthma  HTN (Hypertension)  Myocardial Infarction  S/P lumbar fusion: h/o vertral fracture with cement fusion 209  Cataract Extraction Surgery  tonsillectomy: s/p Excision Right lymph node ; pt unclear    FAMILY HISTORY:  Family history of diabetes mellitus in mother (Mother)  Family history of hypertension in mother (Mothe)    ADVANCE DIRECTIVES: Presumed Full Code    HOME MEDICATION:  amLODIPine 5 mg oral tablet: 1 tab(s) orally once a day (13 Nov 2018 06:35)  atorvastatin 40 mg oral tablet: 2 tab(s) orally once a day (13 Nov 2018 06:35)  clopidogrel 75 mg oral tablet: 1 tab(s) orally once a day (13 Nov 2018 06:35)  folic acid 1 mg oral tablet: 1 tab(s) orally once a day (13 Nov 2018 06:35)  hydroCHLOROthiazide 25 mg oral tablet: 1 tab(s) orally once a day (13 Nov 2018 06:35)  methotrexate 2.5 mg oral tablet: 4 tab(s) orally once a week Thursday (13 Nov 2018 06:35)  predniSONE 10 mg oral tablet: taper ; to complete 11/05/18 (13 Nov 2018 06:35)  Proventil: 2 puff(s) inhaled , As Needed (13 Nov 2018 06:35)  Tylenol Arthritis Caplet: 2 tab(s) orally , As Needed (13 Nov 2018 06:35)      CURRENT MEDICATIONS:   --------------------------------------------------------------------------------------  Neurologic Medications    Respiratory Medications    Cardiovascular Medications  hydrochlorothiazide 25 milliGRAM(s) Oral daily    Gastrointestinal Medications  lactated ringers. 1000 milliLiter(s) IV Continuous <Continuous>    Genitourinary Medications    Hematologic/Oncologic Medications  clopidogrel Tablet 75 milliGRAM(s) Oral daily  heparin  Injectable 5000 Unit(s) SubCutaneous every 8 hours    Antimicrobial/Immunologic Medications    Endocrine/Metabolic Medications  atorvastatin 80 milliGRAM(s) Oral at bedtime  predniSONE   Tablet 10 milliGRAM(s) Oral daily    Topical/Other Medications    --------------------------------------------------------------------------------------    VITAL SIGNS, INS/OUTS (last 24 hours):  --------------------------------------------------------------------------------------    T(C): 36.6 (11-13-18 @ 17:32), Max: 36.8 (11-13-18 @ 06:28)  HR: 80 (11-13-18 @ 17:32) (76 - 101)  BP: 120/67 (11-13-18 @ 17:32) (98/67 - 155/85)  ABP: --  ABP(mean): --  RR: 17 (11-13-18 @ 17:32) (14 - 18)  SpO2: 98% (11-13-18 @ 17:32) (94% - 100%)  Wt(kg): --  CVP(mm Hg): --      11-13 @ 07:01  -  11-13 @ 21:16  --------------------------------------------------------  IN:    IV PiggyBack: 500 mL    Lactated Ringers IV Bolus: 1000 mL    lactated ringers.: 60 mL    lactated ringers.: 1400 mL    Oral Fluid: 150 mL  Total IN: 3110 mL    OUT:    Voided: 1900 mL  Total OUT: 1900 mL    Total NET: 1210 L  --------------------------------------------------------------------------------------    EXAM  NEUROLOGY  Exam: Normal, NAD, alert, oriented x3  - R tongue deviation  - Mid R lip droop    HEENT  Exam: atraumatic.  EOMI  - R eye s/p cataract surgery, pupil not responsive  - L eye: pupil reactive, sluggish  - L neck dressing: seronsanguinous stained, no hematoma or swelling noted   -Generalized facial puffiness-per nurse and patient this is baseline    RESPIRATORY  Exam: Lungs clear to auscultation, Normal expansion/effort    CARDIOVASCULAR  Exam: S1, S2.  Regular rate and rhythm.     GI/NUTRITION  Exam: Abdomen soft, Non-tender, Non-distended  Current Diet:  NPO    VASCULAR  Exam: Extremities warm, pink, well-perfused    MUSCULOSKELETAL  Exam: All extremities moving spontaneously without limitations   - 5/5 strength in all extremities    SKIN:  Exam: Good skin turgor, no skin breakdown.    METABOLIC/FLUIDS/ELECTROLYTES  lactated ringers. 1000 milliLiter(s) IV Continuous <Continuous>      HEMATOLOGIC  [x] DVT Prophylaxis: clopidogrel Tablet 75 milliGRAM(s) Oral daily  heparin  Injectable 5000 Unit(s) SubCutaneous every 8 hours    Transfusions:	[] PRBC	[] Platelets		[] FFP	[] Cryoprecipitate      Tubes/Lines/Drains  ***  [x] Peripheral IV  [] Central Venous Line     	[] R	[] L	[] IJ	[] Fem	[] SC	Date Placed:   [] Arterial Line		[] R	[] L	[] Fem	[] Rad	[] Ax	Date Placed:   [] PICC:         	[] Midline		[] Mediport  [] Urinary Catheter		Date Placed:     LABS  --------------------------------------------------------------------------------------  -------------------------------------------------------------------------------------- SICU Consultation Note  =====================================================  Surgery Information: 63 year old female s/p left transcarotid arterial sten via right femoral vein access; course complicated by code stroke approximately 8 hours postop.     HPI:  pt is a 63 y.o. female ; pt with h/o htn, hld, ra, asthma, pt reports admission 9/22/18 to [facility] secondary to syncope ; pt states a w/u was done " blockage in my left carotid artery " pt to surgeon ; pt now s/p left transcarotid arterial sten via right femoral vein access.    Timeline:  18:25 Primary team called to examine patient after patient transferred to floor by nurse. Patient noticed to have some speech changes as well as tongue deviation to right. Patient examined at bedside. Patient was noted to be A&Ox3. Patient had increased facial swelling and no droop was noted. Patient able to verbalize as well as follow commands. Speech was slightly garbled. Patient moved upper and lower extremities b/l.  Tongue slightly deviated to right. New from previous post-op check. VS stable. HR 80 120/67 rr 17 SPo2 98%.STAT labs ordered. Vascular fellow notified.    Patient re-examined 10 minutes later with fellow and patient unable to verbalize. Patient had word finding difficulty and difficulty following commands. Right facial droop noted. Code stroke called. CTA of head and neck ordered. Dr. Heath notified. Neurology came to evaluate patient. Rec STAT head/Neck CTA.     19:30 Addendum: On transport to CT scan facial droop resolved. Enrique recommending permissive HTN, Q1H neuro checks. Possible TIA in setting of resolving symptoms. SICU consult called for Q1H neuro checks. Her BP had remained in the  mmHg, she was not hypoxic or tachycardic.   21:00: Prelim CT: patent left ICA stent and a distal M2 segment occlusion, new from pre-op. About an hour after the CT scan, the aphasia returned.    Allergies: dairy products (Anaphylaxis; Hives)  eggs (Anaphylaxis; Hives)  fish (Anaphylaxis; Hives)  lamb, tomatoes, milk (Anaphylaxis; Hives)  peanut butter (Anaphylaxis)  peanuts (Anaphylaxis; Hives)  penicillins (Anaphylaxis)  propofol (Angioedema)  shellfish (Anaphylaxis; Hives)    PAST MEDICAL & SURGICAL HISTORY:  Osteoporosis  Eczema: bilateral elbows  Carotid artery stenosis  Rheumatoid arthritis: tx with  methotrexate and prednisone  HLD (hyperlipidemia)  Carotid Artery Dissection  Eczema  Psoriasis  Asthma  HTN (Hypertension)  Myocardial Infarction  S/P lumbar fusion: h/o vertral fracture with cement fusion 209  Cataract Extraction Surgery  tonsillectomy: s/p Excision Right lymph node ; pt unclear    FAMILY HISTORY:  Family history of diabetes mellitus in mother (Mother)  Family history of hypertension in mother (Mothe)    ADVANCE DIRECTIVES: Presumed Full Code    HOME MEDICATION:  amLODIPine 5 mg oral tablet: 1 tab(s) orally once a day (13 Nov 2018 06:35)  atorvastatin 40 mg oral tablet: 2 tab(s) orally once a day (13 Nov 2018 06:35)  clopidogrel 75 mg oral tablet: 1 tab(s) orally once a day (13 Nov 2018 06:35)  folic acid 1 mg oral tablet: 1 tab(s) orally once a day (13 Nov 2018 06:35)  hydroCHLOROthiazide 25 mg oral tablet: 1 tab(s) orally once a day (13 Nov 2018 06:35)  methotrexate 2.5 mg oral tablet: 4 tab(s) orally once a week Thursday (13 Nov 2018 06:35)  predniSONE 10 mg oral tablet: taper ; to complete 11/05/18 (13 Nov 2018 06:35)  Proventil: 2 puff(s) inhaled , As Needed (13 Nov 2018 06:35)  Tylenol Arthritis Caplet: 2 tab(s) orally , As Needed (13 Nov 2018 06:35)      CURRENT MEDICATIONS:   --------------------------------------------------------------------------------------  Neurologic Medications    Respiratory Medications    Cardiovascular Medications  hydrochlorothiazide 25 milliGRAM(s) Oral daily    Gastrointestinal Medications  lactated ringers. 1000 milliLiter(s) IV Continuous <Continuous>    Genitourinary Medications    Hematologic/Oncologic Medications  clopidogrel Tablet 75 milliGRAM(s) Oral daily  heparin  Injectable 5000 Unit(s) SubCutaneous every 8 hours    Antimicrobial/Immunologic Medications    Endocrine/Metabolic Medications  atorvastatin 80 milliGRAM(s) Oral at bedtime  predniSONE   Tablet 10 milliGRAM(s) Oral daily    Topical/Other Medications    --------------------------------------------------------------------------------------    VITAL SIGNS, INS/OUTS (last 24 hours):  --------------------------------------------------------------------------------------    T(C): 36.6 (11-13-18 @ 17:32), Max: 36.8 (11-13-18 @ 06:28)  HR: 80 (11-13-18 @ 17:32) (76 - 101)  BP: 120/67 (11-13-18 @ 17:32) (98/67 - 155/85)  ABP: --  ABP(mean): --  RR: 17 (11-13-18 @ 17:32) (14 - 18)  SpO2: 98% (11-13-18 @ 17:32) (94% - 100%)  Wt(kg): --  CVP(mm Hg): --      11-13 @ 07:01  -  11-13 @ 21:16  --------------------------------------------------------  IN:    IV PiggyBack: 500 mL    Lactated Ringers IV Bolus: 1000 mL    lactated ringers.: 60 mL    lactated ringers.: 1400 mL    Oral Fluid: 150 mL  Total IN: 3110 mL    OUT:    Voided: 1900 mL  Total OUT: 1900 mL    Total NET: 1210 L  --------------------------------------------------------------------------------------    EXAM  NEUROLOGY  Exam: Normal, NAD, alert, oriented x3  - R tongue deviation  - Mid R lip droop    HEENT  Exam: atraumatic.  EOMI  - R eye s/p cataract surgery, pupil not responsive  - L eye: pupil reactive, sluggish  - L neck dressing: seronsanguinous stained, no hematoma or swelling noted   -Generalized facial puffiness-per nurse and patient this is baseline    RESPIRATORY  Exam: Lungs clear to auscultation, Normal expansion/effort    CARDIOVASCULAR  Exam: S1, S2.  Regular rate and rhythm.     GI/NUTRITION  Exam: Abdomen soft, Non-tender, Non-distended  Current Diet:  NPO    VASCULAR  Exam: Extremities warm, pink, well-perfused    MUSCULOSKELETAL  Exam: All extremities moving spontaneously without limitations   - 5/5 strength in all extremities    SKIN:  Exam: Good skin turgor, no skin breakdown.    METABOLIC/FLUIDS/ELECTROLYTES  lactated ringers. 1000 milliLiter(s) IV Continuous <Continuous>      HEMATOLOGIC  [x] DVT Prophylaxis: clopidogrel Tablet 75 milliGRAM(s) Oral daily  heparin  Injectable 5000 Unit(s) SubCutaneous every 8 hours    Transfusions:	[] PRBC	[] Platelets		[] FFP	[] Cryoprecipitate      Tubes/Lines/Drains  ***  [x] Peripheral IV  [] Central Venous Line     	[] R	[] L	[] IJ	[] Fem	[] SC	Date Placed:   [] Arterial Line		[] R	[] L	[] Fem	[] Rad	[] Ax	Date Placed:   [] PICC:         	[] Midline		[] Mediport  [] Urinary Catheter		Date Placed:     LABS  --------------------------------------------------------------------------------------  --------------------------------------------------------------------------------------

## 2018-11-13 NOTE — CHART NOTE - NSCHARTNOTEFT_GEN_A_CORE
Vascular Fellow Note:    Pt is a 64yo F s/p left trans carotid arterial revascularization for asymptomatic left carotid artery stenosis found on syncope workup. About 8 hours after the case, the patient developed a right sided lip droop and what appeared to be an expressive aphasia. Her BP had remained in the  mmHg, she was not hypoxic or tachycardic. A Stroke Code was called and showed a patent left ICA stent and a distal M2 segment occlusion, new from pre-op. On the way to CT scan, the patient's symptoms improved. However, about an hour after the CT scan, the aphasia returned.    Recommendations:  1. Findings discussed with Dr. Heath  2. Will transfer the patient to SICU and start on neosynephrine gtt to increase SBP and perform q1h neuro checks  3. Neurology/Stroke Follow up

## 2018-11-13 NOTE — CONSULT NOTE ADULT - CONSULT REASON
Code stroke, slurred speech word finding difficulties Code stroke, slurred speech and word finding difficulties

## 2018-11-13 NOTE — CONSULT NOTE ADULT - ATTENDING COMMENTS
Ms. Dewitt is a 63 one year old female with a Pmhx of HTN, HLD, RA, Asthma. She is now s/p elective left transcarotid artery revascularizations/p stent.  it appears she was not a candidate for IV thrombosis need to unknown last seen normal, NIH stroke scale.  Today her neurological exam is significant for aphasia and right hemiparesis   impression: Left MCA territory ischemic infarct, likely artery to artery embolism/cardioembolic in origin  Permissive HTN upto systolic 180  continue antiplatelet, statins 80 mg  neurochecks per protocol q.1 hour  SCDs for DVT prophylaxis  PT OT and speech assessment.

## 2018-11-13 NOTE — PROGRESS NOTE ADULT - SUBJECTIVE AND OBJECTIVE BOX
Vascular surgery post op check     SUBJECTIVE: Patient seen and examined at bedside, patient without complaints. Patient denies N/V, SOB, CP. Patient POD#0 S/P left transcarotid arterial stent, right femoral vein access. Patient tolerating post-op pain well. Patient denies numbness and tingling of extremities.     Vital Signs Last 24 Hrs  T(C): 36.4 (13 Nov 2018 15:00), Max: 36.8 (13 Nov 2018 06:28)  T(F): 97.5 (13 Nov 2018 15:00), Max: 98.3 (13 Nov 2018 06:28)  HR: 83 (13 Nov 2018 17:00) (76 - 101)  BP: 149/68 (13 Nov 2018 17:00) (98/67 - 155/85)  BP(mean): --  RR: 16 (13 Nov 2018 17:00) (14 - 18)  SpO2: 100% (13 Nov 2018 17:00) (94% - 100%)  I&O's Detail    13 Nov 2018 07:01  -  13 Nov 2018 17:30  --------------------------------------------------------  IN:    IV PiggyBack: 500 mL    Lactated Ringers IV Bolus: 1000 mL    lactated ringers.: 60 mL    lactated ringers.: 1400 mL    Oral Fluid: 150 mL  Total IN: 3110 mL    OUT:    Voided: 1300 mL  Total OUT: 1300 mL    Total NET: 1810 mL        MEDICATIONS  (STANDING):  amLODIPine   Tablet 5 milliGRAM(s) Oral daily  atorvastatin 80 milliGRAM(s) Oral at bedtime  clopidogrel Tablet 75 milliGRAM(s) Oral daily  heparin  Injectable 5000 Unit(s) SubCutaneous every 8 hours  hydrochlorothiazide 25 milliGRAM(s) Oral daily  lactated ringers. 1000 milliLiter(s) (30 mL/Hr) IV Continuous <Continuous>  lactated ringers. 1000 milliLiter(s) (100 mL/Hr) IV Continuous <Continuous>  predniSONE   Tablet 10 milliGRAM(s) Oral daily    MEDICATIONS  (PRN):  fentaNYL    Injectable 25 MICROGram(s) IV Push every 5 minutes PRN Moderate Pain (4 - 6)  fentaNYL    Injectable 50 MICROGram(s) IV Push every 10 minutes PRN Severe Pain (7 - 10)  ondansetron Injectable 4 milliGRAM(s) IV Push once PRN Nausea and/or Vomiting      Physical Exam  General: A&Ox3, NAD  Neuro: Cranial nerves grossly in tact   Abdominal: soft, NT, ND  Extremities: ROM/strength grossly in tact for b/l upper and lower extremities. Right groin puncture dressing c/d/i, right groin soft, no evidence of hematoma    LABS:      ABO Interpretation: O (11-13-18 @ 06:09)      Patient is a 63y Female Patient POD#0 S/P left transcarotid arterial stent, right femoral vein access  -pain control prn  -DVT ppx  -plavix daily  -ADAT  -IS  -SBP goal between 110-140

## 2018-11-13 NOTE — PROVIDER CONTACT NOTE (OTHER) - ASSESSMENT
On admission patient observed to have right eye  not round , not equal or reactive to light . tongue  not medial,  patient having trouble expressing herself and finding it difficult finding words, grabbled speech noted.  Bilateral upper and lower extremities no drift noted and assess is WDL. vitals stable, not SOB

## 2018-11-14 LAB
ALBUMIN SERPL ELPH-MCNC: 3.9 G/DL — SIGNIFICANT CHANGE UP (ref 3.3–5)
ALP SERPL-CCNC: 61 U/L — SIGNIFICANT CHANGE UP (ref 40–120)
ALT FLD-CCNC: 18 U/L — SIGNIFICANT CHANGE UP (ref 4–33)
APTT BLD: 26.1 SEC — LOW (ref 27.5–36.3)
AST SERPL-CCNC: 13 U/L — SIGNIFICANT CHANGE UP (ref 4–32)
BASOPHILS # BLD AUTO: 0.05 K/UL — SIGNIFICANT CHANGE UP (ref 0–0.2)
BASOPHILS NFR BLD AUTO: 0.3 % — SIGNIFICANT CHANGE UP (ref 0–2)
BILIRUB SERPL-MCNC: 0.7 MG/DL — SIGNIFICANT CHANGE UP (ref 0.2–1.2)
BLD GP AB SCN SERPL QL: NEGATIVE — SIGNIFICANT CHANGE UP
BUN SERPL-MCNC: 14 MG/DL — SIGNIFICANT CHANGE UP (ref 7–23)
CALCIUM SERPL-MCNC: 8.9 MG/DL — SIGNIFICANT CHANGE UP (ref 8.4–10.5)
CHLORIDE SERPL-SCNC: 104 MMOL/L — SIGNIFICANT CHANGE UP (ref 98–107)
CK MB BLD-MCNC: 2.96 NG/ML — SIGNIFICANT CHANGE UP (ref 1–4.7)
CK SERPL-CCNC: 106 U/L — SIGNIFICANT CHANGE UP (ref 25–170)
CO2 SERPL-SCNC: 30 MMOL/L — SIGNIFICANT CHANGE UP (ref 22–31)
CREAT SERPL-MCNC: 0.96 MG/DL — SIGNIFICANT CHANGE UP (ref 0.5–1.3)
EOSINOPHIL # BLD AUTO: 0.06 K/UL — SIGNIFICANT CHANGE UP (ref 0–0.5)
EOSINOPHIL NFR BLD AUTO: 0.3 % — SIGNIFICANT CHANGE UP (ref 0–6)
GLUCOSE SERPL-MCNC: 87 MG/DL — SIGNIFICANT CHANGE UP (ref 70–99)
HCT VFR BLD CALC: 32.4 % — LOW (ref 34.5–45)
HGB BLD-MCNC: 10.2 G/DL — LOW (ref 11.5–15.5)
IMM GRANULOCYTES # BLD AUTO: 0.2 # — SIGNIFICANT CHANGE UP
IMM GRANULOCYTES NFR BLD AUTO: 1 % — SIGNIFICANT CHANGE UP (ref 0–1.5)
INR BLD: 1.06 — SIGNIFICANT CHANGE UP (ref 0.88–1.17)
LYMPHOCYTES # BLD AUTO: 15 % — SIGNIFICANT CHANGE UP (ref 13–44)
LYMPHOCYTES # BLD AUTO: 2.97 K/UL — SIGNIFICANT CHANGE UP (ref 1–3.3)
MCHC RBC-ENTMCNC: 23.7 PG — LOW (ref 27–34)
MCHC RBC-ENTMCNC: 31.5 % — LOW (ref 32–36)
MCV RBC AUTO: 75.2 FL — LOW (ref 80–100)
MONOCYTES # BLD AUTO: 1.41 K/UL — HIGH (ref 0–0.9)
MONOCYTES NFR BLD AUTO: 7.1 % — SIGNIFICANT CHANGE UP (ref 2–14)
NEUTROPHILS # BLD AUTO: 15.09 K/UL — HIGH (ref 1.8–7.4)
NEUTROPHILS NFR BLD AUTO: 76.3 % — SIGNIFICANT CHANGE UP (ref 43–77)
NRBC # FLD: 0 — SIGNIFICANT CHANGE UP
PLATELET # BLD AUTO: 269 K/UL — SIGNIFICANT CHANGE UP (ref 150–400)
PMV BLD: 11.2 FL — SIGNIFICANT CHANGE UP (ref 7–13)
POTASSIUM SERPL-MCNC: 3.4 MMOL/L — LOW (ref 3.5–5.3)
POTASSIUM SERPL-SCNC: 3.4 MMOL/L — LOW (ref 3.5–5.3)
PROT SERPL-MCNC: 5.9 G/DL — LOW (ref 6–8.3)
PROTHROM AB SERPL-ACNC: 11.8 SEC — SIGNIFICANT CHANGE UP (ref 9.8–13.1)
RBC # BLD: 4.31 M/UL — SIGNIFICANT CHANGE UP (ref 3.8–5.2)
RBC # FLD: 17.3 % — HIGH (ref 10.3–14.5)
RH IG SCN BLD-IMP: POSITIVE — SIGNIFICANT CHANGE UP
SODIUM SERPL-SCNC: 144 MMOL/L — SIGNIFICANT CHANGE UP (ref 135–145)
WBC # BLD: 19.78 K/UL — HIGH (ref 3.8–10.5)
WBC # FLD AUTO: 19.78 K/UL — HIGH (ref 3.8–10.5)

## 2018-11-14 PROCEDURE — 99291 CRITICAL CARE FIRST HOUR: CPT

## 2018-11-14 PROCEDURE — 99233 SBSQ HOSP IP/OBS HIGH 50: CPT

## 2018-11-14 RX ORDER — CHLORHEXIDINE GLUCONATE 213 G/1000ML
1 SOLUTION TOPICAL ONCE
Qty: 0 | Refills: 0 | Status: COMPLETED | OUTPATIENT
Start: 2018-11-15 | End: 2018-11-15

## 2018-11-14 RX ORDER — POTASSIUM CHLORIDE 20 MEQ
10 PACKET (EA) ORAL
Qty: 0 | Refills: 0 | Status: COMPLETED | OUTPATIENT
Start: 2018-11-14 | End: 2018-11-14

## 2018-11-14 RX ADMIN — SODIUM CHLORIDE 75 MILLILITER(S): 9 INJECTION, SOLUTION INTRAVENOUS at 09:35

## 2018-11-14 RX ADMIN — Medication 100 MILLIEQUIVALENT(S): at 06:24

## 2018-11-14 RX ADMIN — CLOPIDOGREL BISULFATE 75 MILLIGRAM(S): 75 TABLET, FILM COATED ORAL at 12:15

## 2018-11-14 RX ADMIN — HEPARIN SODIUM 5000 UNIT(S): 5000 INJECTION INTRAVENOUS; SUBCUTANEOUS at 18:04

## 2018-11-14 RX ADMIN — Medication 100 MILLIEQUIVALENT(S): at 05:30

## 2018-11-14 RX ADMIN — ATORVASTATIN CALCIUM 80 MILLIGRAM(S): 80 TABLET, FILM COATED ORAL at 22:27

## 2018-11-14 RX ADMIN — HEPARIN SODIUM 5000 UNIT(S): 5000 INJECTION INTRAVENOUS; SUBCUTANEOUS at 09:35

## 2018-11-14 RX ADMIN — HEPARIN SODIUM 5000 UNIT(S): 5000 INJECTION INTRAVENOUS; SUBCUTANEOUS at 01:08

## 2018-11-14 RX ADMIN — SODIUM CHLORIDE 75 MILLILITER(S): 9 INJECTION, SOLUTION INTRAVENOUS at 01:08

## 2018-11-14 RX ADMIN — Medication 100 MILLIEQUIVALENT(S): at 04:35

## 2018-11-14 NOTE — PROGRESS NOTE ADULT - ASSESSMENT
ASSESSMENT:  63y Female  s/p left transcarotid arterial sten via right femoral vein access; course complicated by code stroke approximately 8 hours postop.     PLAN:   Neurologic: TIA vs. emoblic  - f/u final read CT scans; prelim: patent left ICA stent and a distal M2 segment occlusion  - q1h neurological exams    Respiratory: stable on RA    Cardiovascular:   - permissive hypertension per neurology: will d/c standing anti-hypertensive  - goal -180; will start Mariusz to increase blood pressure     Gastrointestinal/Nutrition:   NPO for now; advance diet tomorrow if stable    Renal/Genitourinary   - Monitor I/Os; replete electrolytes as needed    Hematologic:   - trend H/H  - c/w plavx    Infectious Disease: No active issues    Endocrine: No active issues    Disposition: SICU    ------------------------------------------------------------------------------------ ASSESSMENT:  63y Female  s/p left transcarotid arterial sten via right femoral vein access; course complicated by code stroke approximately 8 hours postop.     PLAN:   Neurologic: TIA vs. emoblic  - f/u final read CT scans; prelim: patent left ICA stent and a distal M2 segment occlusion  - q1h neurological exams    Respiratory: stable on RA    Cardiovascular:   - permissive hypertension per neurology: will d/c standing anti-hypertensive  - goal -180; will start Mariusz to increase blood pressure     Gastrointestinal/Nutrition:   NPO for now; advance diet if remains stable    Renal/Genitourinary   - Monitor I/Os; replete electrolytes as needed    Hematologic:   - trend H/H  - c/w plavix    Infectious Disease: No active issues    Endocrine: No active issues    Disposition: SICU    ------------------------------------------------------------------------------------ ASSESSMENT:  63y Female  s/p left transcarotid arterial sten via right femoral vein access; course complicated by code stroke approximately 8 hours postop.     PLAN:   Neurologic: TIA vs. emoblic  - f/u final read CT scans; prelim: patent left ICA stent and a distal M2 segment occlusion  - q1h neurological exams    Respiratory: stable on RA    Cardiovascular:   - permissive hypertension per neurology: will d/c standing anti-hypertensive  - goal -180; will start Mariusz if needed to increase blood pressure     Gastrointestinal/Nutrition:   NPO for now; advance diet if remains stable    Renal/Genitourinary   - Monitor I/Os; replete electrolytes as needed    Hematologic:   - trend H/H  - c/w plavix    Infectious Disease: No active issues    Endocrine: No active issues    Disposition: SICU    ------------------------------------------------------------------------------------ ASSESSMENT:  63y Female  s/p left transcarotid arterial sten via right femoral vein access; course complicated by code stroke approximately 8 hours postop.     PLAN:   Neurologic: Left MCA stroke  - CT: left M2 segment branch occlusion, Alternating areas of stenosis involving the distal left [place] branch   vasculature, additional findings as above  - q1h neurological exams  - will obtain MRI pre neuro recs    Respiratory:   - stable on RA  - mild pulmonary edema seen on CT    Cardiovascular:   - permissive hypertension per neurology: will d/c standing anti-hypertensive  - goal -180; will start Mariusz if needed to increase blood pressure     Gastrointestinal/Nutrition:   - NPO for now  - will obtain swallow study  - advance diet if remains stable    Renal/Genitourinary   - Monitor I/Os  - replete electrolytes as needed    Hematologic:   - trend H/H  - c/w plavix, sub Q heparin    Infectious Disease: No active issues    Endocrine: No active issues    Disposition: SICU    ------------------------------------------------------------------------------------

## 2018-11-14 NOTE — PROGRESS NOTE ADULT - SUBJECTIVE AND OBJECTIVE BOX
SICU DAILY PROGRESS NOTE  =====================================================  INTERVAL/OVERNIGHT EVENTS:   -         Surgery Information: 63 year old female s/p left transcarotid arterial sten via right femoral vein access (11/13/18); course complicated by code stroke approximately 8 hours postop.     HPI:  pt is a 63 y.o. female ; pt with h/o htn, hld, ra, asthma, pt reports admission 9/22/18 to [facility] secondary to syncope ; pt states a w/u was done " blockage in my left carotid artery " pt to surgeon ; pt now s/p left transcarotid arterial sten via right femoral vein access.    Timeline:  18:25 Primary team called to examine patient after patient transferred to floor by nurse. Patient noticed to have some speech changes as well as tongue deviation to right. Patient examined at bedside. Patient was noted to be A&Ox3. Patient had increased facial swelling and no droop was noted. Patient able to verbalize as well as follow commands. Speech was slightly garbled. Patient moved upper and lower extremities b/l.  Tongue slightly deviated to right. New from previous post-op check. VS stable. HR 80 120/67 rr 17 SPo2 98%.STAT labs ordered. Vascular fellow notified.    Patient re-examined 10 minutes later with fellow and patient unable to verbalize. Patient had word finding difficulty and difficulty following commands. Right facial droop noted. Code stroke called. CTA of head and neck ordered. Dr. Heath notified. Neurology came to evaluate patient. Rec STAT head/Neck CTA.     19:30 Addendum: On transport to CT scan facial droop resolved. Enrique recommending permissive HTN, Q1H neuro checks. Possible TIA in setting of resolving symptoms. SICU consult called for Q1H neuro checks. Her BP had remained in the  mmHg, she was not hypoxic or tachycardic.   21:00: Prelim CT: patent left ICA stent and a distal M2 segment occlusion, new from pre-op. About an hour after the CT scan, the aphasia returned.        HOME MEDICATION:  amLODIPine 5 mg oral tablet: 1 tab(s) orally once a day (13 Nov 2018 06:35)  atorvastatin 40 mg oral tablet: 2 tab(s) orally once a day (13 Nov 2018 06:35)  clopidogrel 75 mg oral tablet: 1 tab(s) orally once a day (13 Nov 2018 06:35)  folic acid 1 mg oral tablet: 1 tab(s) orally once a day (13 Nov 2018 06:35)  hydroCHLOROthiazide 25 mg oral tablet: 1 tab(s) orally once a day (13 Nov 2018 06:35)  methotrexate 2.5 mg oral tablet: 4 tab(s) orally once a week Thursday (13 Nov 2018 06:35)  predniSONE 10 mg oral tablet: taper ; to complete 11/05/18 (13 Nov 2018 06:35)  Proventil: 2 puff(s) inhaled , As Needed (13 Nov 2018 06:35)  Tylenol Arthritis Caplet: 2 tab(s) orally , As Needed (13 Nov 2018 06:35)      CURRENT MEDICATIONS:   --------------------------------------------------------------------------------------  Neurologic Medications    Respiratory Medications    Cardiovascular Medications  hydrochlorothiazide 25 milliGRAM(s) Oral daily    Gastrointestinal Medications  lactated ringers. 1000 milliLiter(s) IV Continuous <Continuous>    Genitourinary Medications    Hematologic/Oncologic Medications  clopidogrel Tablet 75 milliGRAM(s) Oral daily  heparin  Injectable 5000 Unit(s) SubCutaneous every 8 hours    Antimicrobial/Immunologic Medications    Endocrine/Metabolic Medications  atorvastatin 80 milliGRAM(s) Oral at bedtime  predniSONE   Tablet 10 milliGRAM(s) Oral daily    Topical/Other Medications    --------------------------------------------------------------------------------------    VITAL SIGNS, INS/OUTS (last 24 hours):  --------------------------------------------------------------------------------------    T(C): 36.6 (11-13-18 @ 17:32), Max: 36.8 (11-13-18 @ 06:28)  HR: 80 (11-13-18 @ 17:32) (76 - 101)  BP: 120/67 (11-13-18 @ 17:32) (98/67 - 155/85)  ABP: --  ABP(mean): --  RR: 17 (11-13-18 @ 17:32) (14 - 18)  SpO2: 98% (11-13-18 @ 17:32) (94% - 100%)  Wt(kg): --  CVP(mm Hg): --      11-13 @ 07:01  -  11-13 @ 21:16  --------------------------------------------------------  IN:    IV PiggyBack: 500 mL    Lactated Ringers IV Bolus: 1000 mL    lactated ringers.: 60 mL    lactated ringers.: 1400 mL    Oral Fluid: 150 mL  Total IN: 3110 mL    OUT:    Voided: 1900 mL  Total OUT: 1900 mL    Total NET: 1210 L  --------------------------------------------------------------------------------------    EXAM  NEUROLOGY  Exam: Normal, NAD, alert, oriented x3  - R tongue deviation  - Mid R lip droop    HEENT  Exam: atraumatic.  EOMI  - R eye s/p cataract surgery, pupil not responsive  - L eye: pupil reactive, sluggish  - L neck dressing: seronsanguinous stained, no hematoma or swelling noted   -Generalized facial puffiness-per nurse and patient this is baseline    RESPIRATORY  Exam: Lungs clear to auscultation, Normal expansion/effort    CARDIOVASCULAR  Exam: S1, S2.  Regular rate and rhythm.     GI/NUTRITION  Exam: Abdomen soft, Non-tender, Non-distended  Current Diet:  NPO    VASCULAR  Exam: Extremities warm, pink, well-perfused    MUSCULOSKELETAL  Exam: All extremities moving spontaneously without limitations   - 5/5 strength in all extremities    SKIN:  Exam: Good skin turgor, no skin breakdown.    METABOLIC/FLUIDS/ELECTROLYTES  lactated ringers. 1000 milliLiter(s) IV Continuous <Continuous>      HEMATOLOGIC  [x] DVT Prophylaxis: clopidogrel Tablet 75 milliGRAM(s) Oral daily  heparin  Injectable 5000 Unit(s) SubCutaneous every 8 hours    Transfusions:	[] PRBC	[] Platelets		[] FFP	[] Cryoprecipitate      Tubes/Lines/Drains  ***  [x] Peripheral IV  [] Central Venous Line     	[] R	[] L	[] IJ	[] Fem	[] SC	Date Placed:   [] Arterial Line		[] R	[] L	[] Fem	[] Rad	[] Ax	Date Placed:   [] PICC:         	[] Midline		[] Mediport  [] Urinary Catheter		Date Placed:     LABS  --------------------------------------------------------------------------------------  -------------------------------------------------------------------------------------- SICU DAILY PROGRESS NOTE  =====================================================  INTERVAL/OVERNIGHT EVENTS:   - Patient transferred to ICU after re-assessment demonstrated return of neurologic symptoms.   - Arterial line placed for accurate monitoring demonstrated SBP: 150-160s without vasopressor support.   - Overnight did not have return of neurologic deficits.       Surgery Information: 63 year old female s/p left transcarotid arterial sten via right femoral vein access (11/13/18); course complicated by code stroke approximately 8 hours postop.     HPI:  pt is a 63 y.o. female ; pt with h/o htn, hld, ra, asthma, pt reports admission 9/22/18 to [facility] secondary to syncope ; pt states a w/u was done " blockage in my left carotid artery " pt to surgeon ; pt now s/p left transcarotid arterial sten via right femoral vein access.    Timeline:  18:25 Primary team called to examine patient after patient transferred to floor by nurse. Patient noticed to have some speech changes as well as tongue deviation to right. Patient examined at bedside. Patient was noted to be A&Ox3. Patient had increased facial swelling and no droop was noted. Patient able to verbalize as well as follow commands. Speech was slightly garbled. Patient moved upper and lower extremities b/l.  Tongue slightly deviated to right. New from previous post-op check. VS stable. HR 80 120/67 rr 17 SPo2 98%.STAT labs ordered. Vascular fellow notified.    Patient re-examined 10 minutes later with fellow and patient unable to verbalize. Patient had word finding difficulty and difficulty following commands. Right facial droop noted. Code stroke called. CTA of head and neck ordered. Dr. Heath notified. Neurology came to evaluate patient. Rec STAT head/Neck CTA.     19:30 Addendum: On transport to CT scan facial droop resolved. Neuro recommending permissive HTN, Q1H neuro checks. Possible TIA in setting of resolving symptoms. SICU consult called for Q1H neuro checks. Her BP had remained in the  mmHg, she was not hypoxic or tachycardic.   21:00: Prelim CT: patent left ICA stent and a distal M2 segment occlusion, new from pre-op. About an hour after the CT scan, the aphasia returned.        HOME MEDICATION:  amLODIPine 5 mg oral tablet: 1 tab(s) orally once a day (13 Nov 2018 06:35)  atorvastatin 40 mg oral tablet: 2 tab(s) orally once a day (13 Nov 2018 06:35)  clopidogrel 75 mg oral tablet: 1 tab(s) orally once a day (13 Nov 2018 06:35)  folic acid 1 mg oral tablet: 1 tab(s) orally once a day (13 Nov 2018 06:35)  hydroCHLOROthiazide 25 mg oral tablet: 1 tab(s) orally once a day (13 Nov 2018 06:35)  methotrexate 2.5 mg oral tablet: 4 tab(s) orally once a week Thursday (13 Nov 2018 06:35)  predniSONE 10 mg oral tablet: taper ; to complete 11/05/18 (13 Nov 2018 06:35)  Proventil: 2 puff(s) inhaled , As Needed (13 Nov 2018 06:35)  Tylenol Arthritis Caplet: 2 tab(s) orally , As Needed (13 Nov 2018 06:35)      CURRENT MEDICATIONS:   --------------------------------------------------------------------------------------  Neurologic Medications    Respiratory Medications    Cardiovascular Medications  hydrochlorothiazide 25 milliGRAM(s) Oral daily    Gastrointestinal Medications  lactated ringers. 1000 milliLiter(s) IV Continuous <Continuous>    Genitourinary Medications    Hematologic/Oncologic Medications  clopidogrel Tablet 75 milliGRAM(s) Oral daily  heparin  Injectable 5000 Unit(s) SubCutaneous every 8 hours    Antimicrobial/Immunologic Medications    Endocrine/Metabolic Medications  atorvastatin 80 milliGRAM(s) Oral at bedtime  predniSONE   Tablet 10 milliGRAM(s) Oral daily    Topical/Other Medications    --------------------------------------------------------------------------------------    VITAL SIGNS, INS/OUTS (last 24 hours):  --------------------------------------------------------------------------------------    T(C): 36.6 (11-13-18 @ 17:32), Max: 36.8 (11-13-18 @ 06:28)  HR: 80 (11-13-18 @ 17:32) (76 - 101)  BP: 120/67 (11-13-18 @ 17:32) (98/67 - 155/85)  ABP: --  ABP(mean): --  RR: 17 (11-13-18 @ 17:32) (14 - 18)  SpO2: 98% (11-13-18 @ 17:32) (94% - 100%)  Wt(kg): --  CVP(mm Hg): --      11-13 @ 07:01  -  11-13 @ 21:16  --------------------------------------------------------  IN:    IV PiggyBack: 500 mL    Lactated Ringers IV Bolus: 1000 mL    lactated ringers.: 60 mL    lactated ringers.: 1400 mL    Oral Fluid: 150 mL  Total IN: 3110 mL    OUT:    Voided: 1900 mL  Total OUT: 1900 mL    Total NET: 1210 L  --------------------------------------------------------------------------------------    EXAM  NEUROLOGY  Exam: Normal, NAD, alert, oriented x3  - R tongue deviation  - Mid R lip droop    HEENT  Exam: atraumatic.  EOMI  - R eye s/p cataract surgery, pupil not responsive  - L eye: pupil reactive, sluggish  - L neck dressing: seronsanguinous stained, no hematoma or swelling noted   -Generalized facial puffiness-per nurse and patient this is baseline    RESPIRATORY  Exam: Lungs clear to auscultation, Normal expansion/effort    CARDIOVASCULAR  Exam: S1, S2.  Regular rate and rhythm.     GI/NUTRITION  Exam: Abdomen soft, Non-tender, Non-distended  Current Diet:  NPO    VASCULAR  Exam: Extremities warm, pink, well-perfused    MUSCULOSKELETAL  Exam: All extremities moving spontaneously without limitations   - 5/5 strength in all extremities    SKIN:  Exam: Good skin turgor, no skin breakdown.    METABOLIC/FLUIDS/ELECTROLYTES  lactated ringers. 1000 milliLiter(s) IV Continuous <Continuous>      HEMATOLOGIC  [x] DVT Prophylaxis: clopidogrel Tablet 75 milliGRAM(s) Oral daily  heparin  Injectable 5000 Unit(s) SubCutaneous every 8 hours    Transfusions:	[] PRBC	[] Platelets		[] FFP	[] Cryoprecipitate      Tubes/Lines/Drains  ***  [x] Peripheral IV  [] Central Venous Line     	[] R	[] L	[] IJ	[] Fem	[] SC	Date Placed:   [] Arterial Line		[] R	[] L	[] Fem	[] Rad	[] Ax	Date Placed:   [] PICC:         	[] Midline		[] Mediport  [] Urinary Catheter		Date Placed:     LABS  --------------------------------------------------------------------------------------  CBC (11-14 @ 01:30)                              10.2<L>                         19.78<H>  )----------------(  269        76.3  % Neutrophils, 15.0  % Lymphocytes, ANC: 15.09<H>                              32.4<L>                BMP (11-14 @ 01:30)             144     |  104     |  14    		Ca++ --      Ca 8.9                ---------------------------------( 87    		Mg --                 3.4<L>  |  30      |  0.96  			Ph --        LFTs (11-14 @ 01:30)      TPro 5.9<L> / Alb 3.9 / TBili 0.7 / DBili -- / AST 13 / ALT 18 / AlkPhos 61    Coags (11-14 @ 01:30)  aPTT 26.1<L> / INR 1.06 / PT 11.8          -------------------------------------------------------------------------------------- SICU DAILY PROGRESS NOTE  =====================================================  INTERVAL/OVERNIGHT EVENTS:   - Patient transferred to ICU after re-assessment demonstrated return of neurologic symptoms.   - Arterial line placed for accurate monitoring demonstrated SBP: 150-160s without vasopressor support.   - Concern for return of expressive aphasia this AM     Surgery Information: 63 year old female s/p left transcarotid arterial sten via right femoral vein access (11/13/18); course complicated by code stroke approximately 8 hours postop.     HPI:  pt is a 63 y.o. female ; pt with h/o htn, hld, ra, asthma, pt reports admission 9/22/18 to [facility] secondary to syncope ; pt states a w/u was done " blockage in my left carotid artery " pt to surgeon ; pt now s/p left transcarotid arterial sten via right femoral vein access.    Timeline:  18:25 Primary team called to examine patient after patient transferred to floor by nurse. Patient noticed to have some speech changes as well as tongue deviation to right. Patient examined at bedside. Patient was noted to be A&Ox3. Patient had increased facial swelling and no droop was noted. Patient able to verbalize as well as follow commands. Speech was slightly garbled. Patient moved upper and lower extremities b/l.  Tongue slightly deviated to right. New from previous post-op check. VS stable. HR 80 120/67 rr 17 SPo2 98%.STAT labs ordered. Vascular fellow notified.    Patient re-examined 10 minutes later with fellow and patient unable to verbalize. Patient had word finding difficulty and difficulty following commands. Right facial droop noted. Code stroke called. CTA of head and neck ordered. Dr. Heath notified. Neurology came to evaluate patient. Rec STAT head/Neck CTA.     19:30 Addendum: On transport to CT scan facial droop resolved. Neuro recommending permissive HTN, Q1H neuro checks. Possible TIA in setting of resolving symptoms. SICU consult called for Q1H neuro checks. Her BP had remained in the  mmHg, she was not hypoxic or tachycardic.   21:00: Prelim CT: patent left ICA stent and a distal M2 segment occlusion, new from pre-op. About an hour after the CT scan, the aphasia returned.        HOME MEDICATION:  amLODIPine 5 mg oral tablet: 1 tab(s) orally once a day (13 Nov 2018 06:35)  atorvastatin 40 mg oral tablet: 2 tab(s) orally once a day (13 Nov 2018 06:35)  clopidogrel 75 mg oral tablet: 1 tab(s) orally once a day (13 Nov 2018 06:35)  folic acid 1 mg oral tablet: 1 tab(s) orally once a day (13 Nov 2018 06:35)  hydroCHLOROthiazide 25 mg oral tablet: 1 tab(s) orally once a day (13 Nov 2018 06:35)  methotrexate 2.5 mg oral tablet: 4 tab(s) orally once a week Thursday (13 Nov 2018 06:35)  predniSONE 10 mg oral tablet: taper ; to complete 11/05/18 (13 Nov 2018 06:35)  Proventil: 2 puff(s) inhaled , As Needed (13 Nov 2018 06:35)  Tylenol Arthritis Caplet: 2 tab(s) orally , As Needed (13 Nov 2018 06:35)      CURRENT MEDICATIONS:   --------------------------------------------------------------------------------------  Neurologic Medications    Respiratory Medications    Cardiovascular Medications  hydrochlorothiazide 25 milliGRAM(s) Oral daily    Gastrointestinal Medications  lactated ringers. 1000 milliLiter(s) IV Continuous <Continuous>    Genitourinary Medications    Hematologic/Oncologic Medications  clopidogrel Tablet 75 milliGRAM(s) Oral daily  heparin  Injectable 5000 Unit(s) SubCutaneous every 8 hours    Antimicrobial/Immunologic Medications    Endocrine/Metabolic Medications  atorvastatin 80 milliGRAM(s) Oral at bedtime  predniSONE   Tablet 10 milliGRAM(s) Oral daily    Topical/Other Medications    --------------------------------------------------------------------------------------    VITAL SIGNS, INS/OUTS (last 24 hours):  --------------------------------------------------------------------------------------    T(C): 36.6 (11-13-18 @ 17:32), Max: 36.8 (11-13-18 @ 06:28)  HR: 80 (11-13-18 @ 17:32) (76 - 101)  BP: 120/67 (11-13-18 @ 17:32) (98/67 - 155/85)  ABP: --  ABP(mean): --  RR: 17 (11-13-18 @ 17:32) (14 - 18)  SpO2: 98% (11-13-18 @ 17:32) (94% - 100%)  Wt(kg): --  CVP(mm Hg): --      11-13 @ 07:01  -  11-13 @ 21:16  --------------------------------------------------------  IN:    IV PiggyBack: 500 mL    Lactated Ringers IV Bolus: 1000 mL    lactated ringers.: 60 mL    lactated ringers.: 1400 mL    Oral Fluid: 150 mL  Total IN: 3110 mL    OUT:    Voided: 1900 mL  Total OUT: 1900 mL    Total NET: 1210 L  --------------------------------------------------------------------------------------    EXAM  NEUROLOGY  Exam: Normal, NAD, alert, oriented x3  - R tongue deviation  - Mid R lip droop    HEENT  Exam: atraumatic.  EOMI  - R eye s/p cataract surgery, pupil not responsive  - L eye: pupil reactive, sluggish  - L neck dressing: seronsanguinous stained, no hematoma or swelling noted   -Generalized facial puffiness-per nurse and patient this is baseline    RESPIRATORY  Exam: Lungs clear to auscultation, Normal expansion/effort    CARDIOVASCULAR  Exam: S1, S2.  Regular rate and rhythm.     GI/NUTRITION  Exam: Abdomen soft, Non-tender, Non-distended  Current Diet:  NPO    VASCULAR  Exam: Extremities warm, pink, well-perfused    MUSCULOSKELETAL  Exam: All extremities moving spontaneously without limitations   - 5/5 strength in all extremities    SKIN:  Exam: Good skin turgor, no skin breakdown.    METABOLIC/FLUIDS/ELECTROLYTES  lactated ringers. 1000 milliLiter(s) IV Continuous <Continuous>      HEMATOLOGIC  [x] DVT Prophylaxis: clopidogrel Tablet 75 milliGRAM(s) Oral daily  heparin  Injectable 5000 Unit(s) SubCutaneous every 8 hours    Transfusions:	[] PRBC	[] Platelets		[] FFP	[] Cryoprecipitate      Tubes/Lines/Drains  ***  [x] Peripheral IV  [] Central Venous Line     	[] R	[] L	[] IJ	[] Fem	[] SC	Date Placed:   [] Arterial Line		[] R	[] L	[] Fem	[] Rad	[] Ax	Date Placed:   [] PICC:         	[] Midline		[] Mediport  [] Urinary Catheter		Date Placed:     LABS  --------------------------------------------------------------------------------------  CBC (11-14 @ 01:30)                              10.2<L>                         19.78<H>  )----------------(  269        76.3  % Neutrophils, 15.0  % Lymphocytes, ANC: 15.09<H>                              32.4<L>                BMP (11-14 @ 01:30)             144     |  104     |  14    		Ca++ --      Ca 8.9                ---------------------------------( 87    		Mg --                 3.4<L>  |  30      |  0.96  			Ph --        LFTs (11-14 @ 01:30)      TPro 5.9<L> / Alb 3.9 / TBili 0.7 / DBili -- / AST 13 / ALT 18 / AlkPhos 61    Coags (11-14 @ 01:30)  aPTT 26.1<L> / INR 1.06 / PT 11.8          --------------------------------------------------------------------------------------      EXAM:  CT ANGIO BRAIN ([id number])AW IC     PROCEDURE DATE:  Nov 13 2018     IMPRESSION:    CTA head:  1. Subtle left M2 segment branch occlusion.  2. Alternating areas of stenosis involving the distal left [place] branch   vasculature.  3. Moderate focal stenosis involving the mid basilar artery and left V4   segment.   4. 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    CTA neck:  1. Interval placement of a left-sided internal carotid artery stent when   compared with the outside CT angiogramexamination.  2. 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 Findings appear unchanged when compared to the   prior exam.  3. Mild pulmonary edema which is partially visualized within the upper   lobes.    Recommend further evaluation with MRI examination, if there are no   clinical contraindications.

## 2018-11-14 NOTE — PROGRESS NOTE ADULT - ATTENDING COMMENTS
63y Female  s/p left transcarotid arterial sten via right femoral vein access; course complicated by code stroke approximately 8 hours postop.     PLAN:   Neurologic: Left MCA stroke  - CT: left M2 segment branch occlusion, Alternating areas of stenosis involving the distal left [place] branch   vasculature, additional findings as above  - q1h neurological exams  - will obtain MRI pre neuro recs    Respiratory:   - stable on RA  - mild pulmonary edema seen on CT    Cardiovascular:   - permissive hypertension per neurology: will d/c standing anti-hypertensive  - goal -180; will start Mariusz if needed to increase blood pressure     Gastrointestinal/Nutrition:   - NPO for now  - will obtain swallow study  - advance diet if remains stable    Renal/Genitourinary   - Monitor I/Os  - replete electrolytes as needed    Hematologic:   - trend H/H  - c/w plavix, sub Q heparin    Infectious Disease: No active issues    Endocrine: No active issues    Disposition: SICU  The patient is a critical care patient with life threatening hemodynamic and metabolic instability in SICU.  I have personally interviewed and examined the patient, reviewed data and laboratory tests/x-rays and all pertinent electronic images.  The SICU team has a constant risk benefit analyzes discussion with the primary team, all consultants, House Staff and PA's on all decisions.  These diagnoses are unrelated to the surgical procedure noted above.  I met with family if needed to get further history, discuss the case and make care decisions for this patient who might not be able to participate.  Time involved in performance of separately billable procedures was not counted toward my critical care time. There is no overlap.  I spent 55-75 minutes  in total providing critical care for the diagnoses, assessment and plan above.

## 2018-11-14 NOTE — PROGRESS NOTE ADULT - SUBJECTIVE AND OBJECTIVE BOX
General Surgery Progress Note    SUBJECTIVE:  The patient was seen and examined. No acute events overnight.   stroke sxs yesterday, pt sent to [facility] for monitoring, neurology involved w/ management.  pain well controlled     OBJECTIVE:     ** VITAL SIGNS / I&O's **    Vital Signs Last 24 Hrs  T(C): 36.7 (14 Nov 2018 04:00), Max: 36.7 (13 Nov 2018 10:25)  T(F): 98 (14 Nov 2018 04:00), Max: 98.1 (13 Nov 2018 10:25)  HR: 110 (14 Nov 2018 09:00) (76 - 110)  BP: 108/82 (14 Nov 2018 04:36) (98/67 - 154/78)  BP(mean): 92 (14 Nov 2018 04:36) (79 - 99)  RR: 21 (14 Nov 2018 09:00) (14 - 22)  SpO2: 99% (14 Nov 2018 09:00) (92% - 100%)      13 Nov 2018 07:01  -  14 Nov 2018 07:00  --------------------------------------------------------  IN:    IV PiggyBack: 800 mL    Lactated Ringers IV Bolus: 1000 mL    lactated ringers.: 1400 mL    lactated ringers.: 885 mL    Oral Fluid: 150 mL  Total IN: 4235 mL    OUT:    Voided: 2700 mL  Total OUT: 2700 mL    Total NET: 1535 mL          ** PHYSICAL EXAM **    General: NAD  Abdominal: soft, NT, ND  Extremities: ROM/strength grossly in tact for b/l upper and lower extremities. Right groin puncture dressing c/d/i, right groin soft, no evidence of hematoma  Neuro: slurring speech, R. facial droop, tongue deviation to right.    ** LABS **                          10.2   19.78 )-----------( 269      ( 14 Nov 2018 01:30 )             32.4     14 Nov 2018 01:30    144    |  104    |  14     ----------------------------<  87     3.4     |  30     |  0.96     Ca    8.9        14 Nov 2018 01:30    TPro  5.9    /  Alb  3.9    /  TBili  0.7    /  DBili  x      /  AST  13     /  ALT  18     /  AlkPhos  61     14 Nov 2018 01:30    PT/INR - ( 14 Nov 2018 01:30 )   PT: 11.8 SEC;   INR: 1.06          PTT - ( 14 Nov 2018 01:30 )  PTT:26.1 SEC  CAPILLARY BLOOD GLUCOSE      POCT Blood Glucose.: 119 mg/dL (13 Nov 2018 18:45)    CARDIAC MARKERS ( 14 Nov 2018 01:30 )  x     / x     / 106 u/L / 2.96 ng/mL / x          LIVER FUNCTIONS - ( 14 Nov 2018 01:30 )  Alb: 3.9 g/dL / Pro: 5.9 g/dL / ALK PHOS: 61 u/L / ALT: 18 u/L / AST: 13 u/L / GGT: x                 MEDICATIONS  (STANDING):  atorvastatin 80 milliGRAM(s) Oral at bedtime  clopidogrel Tablet 75 milliGRAM(s) Oral daily  heparin  Injectable 5000 Unit(s) SubCutaneous every 8 hours  lactated ringers. 1000 milliLiter(s) (75 mL/Hr) IV Continuous <Continuous>    MEDICATIONS  (PRN):

## 2018-11-14 NOTE — PROGRESS NOTE ADULT - SUBJECTIVE AND OBJECTIVE BOX
POST ANESTHESIA EVALUATION    63y Female POSTOP DAY 1   MENTAL STATUS: Patient participation [  ] Awake     [ x ] Arousable     [  ] Sedated    AIRWAY PATENCY: [ x ] Satisfactory  [  ] Other:     Vital Signs Last 24 Hrs  T(C): 36.7 (14 Nov 2018 04:00), Max: 36.7 (14 Nov 2018 00:00)  T(F): 98 (14 Nov 2018 04:00), Max: 98.1 (14 Nov 2018 00:00)  HR: 110 (14 Nov 2018 09:00) (76 - 110)  BP: 108/82 (14 Nov 2018 04:36) (98/67 - 154/78)  BP(mean): 92 (14 Nov 2018 04:36) (79 - 99)  RR: 21 (14 Nov 2018 09:00) (14 - 22)  SpO2: 99% (14 Nov 2018 09:00) (92% - 100%)  I&O's Summary    13 Nov 2018 07:01  -  14 Nov 2018 07:00  --------------------------------------------------------  IN: 4235 mL / OUT: 2700 mL / NET: 1535 mL          HYDRATION STATUS:  [ x ] SATISFACTORY   [  ] OTHER    NAUSEA/ VOMITTING:  [x  ] NONE  [  ] CONTROLLED [  ] OTHER     PAIN: [ x ] CONTROLLED WITH CURRENT REGIMEN  [  ] OTHER    [x   ] NO APPARENT ANESTHESIA COMPLICATIONS      Comments: Patient with new expressive aphasia and right sided facial droop and weakness of right side starting at approximately 5 am.

## 2018-11-14 NOTE — PROGRESS NOTE ADULT - SUBJECTIVE AND OBJECTIVE BOX
Events of evening noted.  Waxing and waning aphasia.  Aphasia improves with permissive and induced hypertension.  CTA shows M2 occlusion deemed too remote for intervention by neurology.  Incisions clean and intact  Speech and swallow eval  F/U neurology  SICU care

## 2018-11-14 NOTE — PROGRESS NOTE ADULT - ASSESSMENT
63y Female  s/p left transcarotid arterial sten via right femoral vein access; course complicated by code stroke approximately 8 hours postop.    PLAN:  -care per [facility]  -appreciate neurology recs  -pain control  -monitor vs, uop  -npo, advance diet as appropriate

## 2018-11-15 LAB
APTT BLD: 32.1 SEC — SIGNIFICANT CHANGE UP (ref 27.5–36.3)
BUN SERPL-MCNC: 14 MG/DL — SIGNIFICANT CHANGE UP (ref 7–23)
CA-I BLD-SCNC: 1.16 MMOL/L — SIGNIFICANT CHANGE UP (ref 1.03–1.23)
CALCIUM SERPL-MCNC: 8.6 MG/DL — SIGNIFICANT CHANGE UP (ref 8.4–10.5)
CHLORIDE SERPL-SCNC: 104 MMOL/L — SIGNIFICANT CHANGE UP (ref 98–107)
CO2 SERPL-SCNC: 31 MMOL/L — SIGNIFICANT CHANGE UP (ref 22–31)
CREAT SERPL-MCNC: 0.92 MG/DL — SIGNIFICANT CHANGE UP (ref 0.5–1.3)
GLUCOSE SERPL-MCNC: 73 MG/DL — SIGNIFICANT CHANGE UP (ref 70–99)
HCT VFR BLD CALC: 30.7 % — LOW (ref 34.5–45)
HGB BLD-MCNC: 9.4 G/DL — LOW (ref 11.5–15.5)
INR BLD: 1.03 — SIGNIFICANT CHANGE UP (ref 0.88–1.17)
MAGNESIUM SERPL-MCNC: 1.9 MG/DL — SIGNIFICANT CHANGE UP (ref 1.6–2.6)
MCHC RBC-ENTMCNC: 23.4 PG — LOW (ref 27–34)
MCHC RBC-ENTMCNC: 30.6 % — LOW (ref 32–36)
MCV RBC AUTO: 76.6 FL — LOW (ref 80–100)
NRBC # FLD: 0.03 — SIGNIFICANT CHANGE UP
PHOSPHATE SERPL-MCNC: 3.2 MG/DL — SIGNIFICANT CHANGE UP (ref 2.5–4.5)
PLATELET # BLD AUTO: 216 K/UL — SIGNIFICANT CHANGE UP (ref 150–400)
PMV BLD: 10.8 FL — SIGNIFICANT CHANGE UP (ref 7–13)
POTASSIUM SERPL-MCNC: 3.7 MMOL/L — SIGNIFICANT CHANGE UP (ref 3.5–5.3)
POTASSIUM SERPL-SCNC: 3.7 MMOL/L — SIGNIFICANT CHANGE UP (ref 3.5–5.3)
PROTHROM AB SERPL-ACNC: 11.8 SEC — SIGNIFICANT CHANGE UP (ref 9.8–13.1)
RBC # BLD: 4.01 M/UL — SIGNIFICANT CHANGE UP (ref 3.8–5.2)
RBC # FLD: 17.7 % — HIGH (ref 10.3–14.5)
SODIUM SERPL-SCNC: 143 MMOL/L — SIGNIFICANT CHANGE UP (ref 135–145)
WBC # BLD: 13.74 K/UL — HIGH (ref 3.8–10.5)
WBC # FLD AUTO: 13.74 K/UL — HIGH (ref 3.8–10.5)

## 2018-11-15 PROCEDURE — 99233 SBSQ HOSP IP/OBS HIGH 50: CPT

## 2018-11-15 RX ORDER — MAGNESIUM SULFATE 500 MG/ML
2 VIAL (ML) INJECTION ONCE
Qty: 0 | Refills: 0 | Status: COMPLETED | OUTPATIENT
Start: 2018-11-15 | End: 2018-11-15

## 2018-11-15 RX ORDER — POTASSIUM CHLORIDE 20 MEQ
10 PACKET (EA) ORAL
Qty: 0 | Refills: 0 | Status: COMPLETED | OUTPATIENT
Start: 2018-11-15 | End: 2018-11-15

## 2018-11-15 RX ADMIN — ATORVASTATIN CALCIUM 80 MILLIGRAM(S): 80 TABLET, FILM COATED ORAL at 21:39

## 2018-11-15 RX ADMIN — Medication 100 MILLIEQUIVALENT(S): at 10:43

## 2018-11-15 RX ADMIN — Medication 100 MILLIEQUIVALENT(S): at 08:43

## 2018-11-15 RX ADMIN — SODIUM CHLORIDE 75 MILLILITER(S): 9 INJECTION, SOLUTION INTRAVENOUS at 01:03

## 2018-11-15 RX ADMIN — CHLORHEXIDINE GLUCONATE 1 APPLICATION(S): 213 SOLUTION TOPICAL at 10:54

## 2018-11-15 RX ADMIN — CLOPIDOGREL BISULFATE 75 MILLIGRAM(S): 75 TABLET, FILM COATED ORAL at 12:03

## 2018-11-15 RX ADMIN — Medication 100 MILLIEQUIVALENT(S): at 07:30

## 2018-11-15 RX ADMIN — SODIUM CHLORIDE 75 MILLILITER(S): 9 INJECTION, SOLUTION INTRAVENOUS at 07:30

## 2018-11-15 RX ADMIN — Medication 50 GRAM(S): at 07:30

## 2018-11-15 RX ADMIN — HEPARIN SODIUM 5000 UNIT(S): 5000 INJECTION INTRAVENOUS; SUBCUTANEOUS at 01:03

## 2018-11-15 RX ADMIN — HEPARIN SODIUM 5000 UNIT(S): 5000 INJECTION INTRAVENOUS; SUBCUTANEOUS at 10:55

## 2018-11-15 RX ADMIN — HEPARIN SODIUM 5000 UNIT(S): 5000 INJECTION INTRAVENOUS; SUBCUTANEOUS at 17:52

## 2018-11-15 NOTE — PROGRESS NOTE ADULT - SUBJECTIVE AND OBJECTIVE BOX
General Surgery Progress Note    SUBJECTIVE:  The patient was seen and examined. No acute events overnight.       OBJECTIVE:     ** VITAL SIGNS / I&O's **    Vital Signs Last 24 Hrs  T(C): 36.3 (15 Nov 2018 12:00), Max: 36.8 (14 Nov 2018 16:00)  T(F): 97.3 (15 Nov 2018 12:00), Max: 98.3 (14 Nov 2018 16:00)  HR: 91 (15 Nov 2018 12:00) (66 - 99)  BP: --  BP(mean): --  RR: 21 (15 Nov 2018 12:00) (13 - 24)  SpO2: 97% (15 Nov 2018 12:00) (97% - 100%)    I&O's Detail    14 Nov 2018 07:01  -  15 Nov 2018 07:00  --------------------------------------------------------  IN:    lactated ringers.: 1800 mL    Oral Fluid: 100 mL  Total IN: 1900 mL    OUT:    Voided: 3500 mL  Total OUT: 3500 mL    Total NET: -1600 mL      15 Nov 2018 07:01  -  15 Nov 2018 13:19  --------------------------------------------------------  IN:    IV PiggyBack: 350 mL    lactated ringers.: 225 mL  Total IN: 575 mL    OUT:    Voided: 800 mL  Total OUT: 800 mL    Total NET: -225 mL      ** PHYSICAL EXAM **    General: NAD  Abdominal: soft, NT, ND  Extremities: ROM/strength grossly in tact for b/l upper and lower extremities. Right groin puncture dressing c/d/i, right groin soft, no evidence of hematoma  Neuro: slurring speech, R. facial droop, improved, tongue deviation to right.    ** LABS **                                  9.4    13.74 )-----------( 216      ( 15 Nov 2018 02:10 )             30.7     11-15    143  |  104  |  14  ----------------------------<  73  3.7   |  31  |  0.92    Ca    8.6      15 Nov 2018 02:10  Phos  3.2     11-15  Mg     1.9     11-15    TPro  5.9<L>  /  Alb  3.9  /  TBili  0.7  /  DBili  x   /  AST  13  /  ALT  18  /  AlkPhos  61  11-14    LIVER FUNCTIONS - ( 14 Nov 2018 01:30 )  Alb: 3.9 g/dL / Pro: 5.9 g/dL / ALK PHOS: 61 u/L / ALT: 18 u/L / AST: 13 u/L / GGT: x                 MEDICATIONS  (STANDING):  atorvastatin 80 milliGRAM(s) Oral at bedtime  clopidogrel Tablet 75 milliGRAM(s) Oral daily  heparin  Injectable 5000 Unit(s) SubCutaneous every 8 hours  lactated ringers. 1000 milliLiter(s) (75 mL/Hr) IV Continuous <Continuous>    MEDICATIONS  (PRN):

## 2018-11-15 NOTE — PROGRESS NOTE ADULT - ASSESSMENT
ASSESSMENT:  63y Female  s/p left transcarotid arterial sten via right femoral vein access; course complicated by code stroke approximately 8 hours postop.     PLAN:   Neurologic: Left MCA stroke  - CT: left M2 segment branch occlusion, Alternating areas of stenosis involving the distal left [place] branch   vasculature, additional findings as above  - q1h neurological exams  - will obtain MRI pre neuro recs    Respiratory:   - stable on RA  - mild pulmonary edema seen on CT    Cardiovascular:   - permissive hypertension per neurology: will d/c standing anti-hypertensive  - goal -180; will start Mariusz if needed to increase blood pressure     Gastrointestinal/Nutrition:   - NPO for now  - will obtain swallow study  - advance diet if remains stable    Renal/Genitourinary   - Monitor I/Os  - replete electrolytes as needed    Hematologic:   - trend H/H  - c/w plavix, sub Q heparin    Infectious Disease: No active issues    Endocrine: No active issues    Disposition: SICU    ------------------------------------------------------------------------------------ ASSESSMENT:  63y Female  s/p left transcarotid arterial sten via right femoral vein access; course complicated by code stroke approximately 8 hours postop.     PLAN:   Neurologic: Left MCA stroke  - CT: left M2 segment branch occlusion, Alternating areas of stenosis involving the distal left [place] branch   vasculature, additional findings as above  - q4h neurological exams  - will obtain MRI per neuro recs    Respiratory:   - stable on RA  - mild pulmonary edema seen on CT    Cardiovascular:   - permissive hypertension per neurology: will d/c standing anti-hypertensive  - goal -180; will start Mariusz if needed to increase blood pressure     Gastrointestinal/Nutrition:   - will obtain swallow study then will start diet and advance as appropriate    Renal/Genitourinary   - cont IVF while NPO  - Monitor I/Os  - replete electrolytes as needed    Hematologic:   - trend H/H  - c/w plavix, sub Q heparin    Infectious Disease: No active issues    Endocrine: No active issues    Disposition: Neuro Floor  ------------------------------------------------------------------------------------

## 2018-11-15 NOTE — SWALLOW BEDSIDE ASSESSMENT ADULT - CONSISTENCIES ADMINISTERED
puree Saltine Crackers; Patient does not want any types of Cookies (e.g. Kayla Doone or Jairo Crackers)/solid thin liquid

## 2018-11-15 NOTE — PROGRESS NOTE ADULT - SUBJECTIVE AND OBJECTIVE BOX
Speech slightly improved  Passed swallow exam. will start soft mechanical  Incision clean and intact  Await MRI

## 2018-11-15 NOTE — PROGRESS NOTE ADULT - SUBJECTIVE AND OBJECTIVE BOX
SICU DAILY PROGRESS NOTE  =====================================================  INTERVAL/OVERNIGHT EVENTS:   - blood pressures between 140-170 throughout day   - still some expressive aphasia and RUE weakness   - MRI head ordered    Surgery Information: 63 year old female s/p left transcarotid arterial sten via right femoral vein access (11/13/18); course complicated by code stroke approximately 8 hours postop.     HPI:  pt is a 63 y.o. female ; pt with h/o htn, hld, ra, asthma, pt reports admission 9/22/18 to [facility] secondary to syncope ; pt states a w/u was done " blockage in my left carotid artery " pt to surgeon ; pt now s/p left transcarotid arterial sten via right femoral vein access.    Timeline:  18:25 Primary team called to examine patient after patient transferred to floor by nurse. Patient noticed to have some speech changes as well as tongue deviation to right. Patient examined at bedside. Patient was noted to be A&Ox3. Patient had increased facial swelling and no droop was noted. Patient able to verbalize as well as follow commands. Speech was slightly garbled. Patient moved upper and lower extremities b/l.  Tongue slightly deviated to right. New from previous post-op check. VS stable. HR 80 120/67 rr 17 SPo2 98%.STAT labs ordered. Vascular fellow notified.    Patient re-examined 10 minutes later with fellow and patient unable to verbalize. Patient had word finding difficulty and difficulty following commands. Right facial droop noted. Code stroke called. CTA of head and neck ordered. Dr. Heath notified. Neurology came to evaluate patient. Rec STAT head/Neck CTA.     19:30 Addendum: On transport to CT scan facial droop resolved. Neuro recommending permissive HTN, Q1H neuro checks. Possible TIA in setting of resolving symptoms. SICU consult called for Q1H neuro checks. Her BP had remained in the  mmHg, she was not hypoxic or tachycardic.   21:00: Prelim CT: patent left ICA stent and a distal M2 segment occlusion, new from pre-op. About an hour after the CT scan, the aphasia returned.        HOME MEDICATION:  amLODIPine 5 mg oral tablet: 1 tab(s) orally once a day (13 Nov 2018 06:35)  atorvastatin 40 mg oral tablet: 2 tab(s) orally once a day (13 Nov 2018 06:35)  clopidogrel 75 mg oral tablet: 1 tab(s) orally once a day (13 Nov 2018 06:35)  folic acid 1 mg oral tablet: 1 tab(s) orally once a day (13 Nov 2018 06:35)  hydroCHLOROthiazide 25 mg oral tablet: 1 tab(s) orally once a day (13 Nov 2018 06:35)  methotrexate 2.5 mg oral tablet: 4 tab(s) orally once a week Thursday (13 Nov 2018 06:35)  predniSONE 10 mg oral tablet: taper ; to complete 11/05/18 (13 Nov 2018 06:35)  Proventil: 2 puff(s) inhaled , As Needed (13 Nov 2018 06:35)  Tylenol Arthritis Caplet: 2 tab(s) orally , As Needed (13 Nov 2018 06:35)      CURRENT MEDICATIONS:   --------------------------------------------------------------------------------------  Neurologic Medications    Respiratory Medications    Cardiovascular Medications  hydrochlorothiazide 25 milliGRAM(s) Oral daily    Gastrointestinal Medications  lactated ringers. 1000 milliLiter(s) IV Continuous <Continuous>    Genitourinary Medications    Hematologic/Oncologic Medications  clopidogrel Tablet 75 milliGRAM(s) Oral daily  heparin  Injectable 5000 Unit(s) SubCutaneous every 8 hours    Antimicrobial/Immunologic Medications    Endocrine/Metabolic Medications  atorvastatin 80 milliGRAM(s) Oral at bedtime  predniSONE   Tablet 10 milliGRAM(s) Oral daily    Topical/Other Medications    --------------------------------------------------------------------------------------    VITAL SIGNS, INS/OUTS (last 24 hours):  --------------------------------------------------------------------------------------    T(C): 36.6 (11-13-18 @ 17:32), Max: 36.8 (11-13-18 @ 06:28)  HR: 80 (11-13-18 @ 17:32) (76 - 101)  BP: 120/67 (11-13-18 @ 17:32) (98/67 - 155/85)  ABP: --  ABP(mean): --  RR: 17 (11-13-18 @ 17:32) (14 - 18)  SpO2: 98% (11-13-18 @ 17:32) (94% - 100%)  Wt(kg): --  CVP(mm Hg): --      11-13 @ 07:01  -  11-13 @ 21:16  --------------------------------------------------------  IN:    IV PiggyBack: 500 mL    Lactated Ringers IV Bolus: 1000 mL    lactated ringers.: 60 mL    lactated ringers.: 1400 mL    Oral Fluid: 150 mL  Total IN: 3110 mL    OUT:    Voided: 1900 mL  Total OUT: 1900 mL    Total NET: 1210 L  --------------------------------------------------------------------------------------    EXAM  NEUROLOGY  Exam: Normal, NAD, alert, oriented x3  - R tongue deviation  - Mid R lip droop    HEENT  Exam: atraumatic.  EOMI  - R eye s/p cataract surgery, pupil not responsive  - L eye: pupil reactive, sluggish  - L neck dressing: seronsanguinous stained, no hematoma or swelling noted   -Generalized facial puffiness-per nurse and patient this is baseline    RESPIRATORY  Exam: Lungs clear to auscultation, Normal expansion/effort    CARDIOVASCULAR  Exam: S1, S2.  Regular rate and rhythm.     GI/NUTRITION  Exam: Abdomen soft, Non-tender, Non-distended  Current Diet:  NPO    VASCULAR  Exam: Extremities warm, pink, well-perfused    MUSCULOSKELETAL  Exam: All extremities moving spontaneously without limitations   - 5/5 strength in all extremities    SKIN:  Exam: Good skin turgor, no skin breakdown.    METABOLIC/FLUIDS/ELECTROLYTES  lactated ringers. 1000 milliLiter(s) IV Continuous <Continuous>      HEMATOLOGIC  [x] DVT Prophylaxis: clopidogrel Tablet 75 milliGRAM(s) Oral daily  heparin  Injectable 5000 Unit(s) SubCutaneous every 8 hours    Transfusions:	[] PRBC	[] Platelets		[] FFP	[] Cryoprecipitate      Tubes/Lines/Drains  ***  [x] Peripheral IV  [] Central Venous Line     	[] R	[] L	[] IJ	[] Fem	[] SC	Date Placed:   [] Arterial Line		[] R	[] L	[] Fem	[] Rad	[] Ax	Date Placed:   [] PICC:         	[] Midline		[] Mediport  [] Urinary Catheter		Date Placed:     LABS  --------------------------------------------------------------------------------------  CBC (11-14 @ 01:30)                              10.2<L>                         19.78<H>  )----------------(  269        76.3  % Neutrophils, 15.0  % Lymphocytes, ANC: 15.09<H>                              32.4<L>                BMP (11-14 @ 01:30)             144     |  104     |  14    		Ca++ --      Ca 8.9                ---------------------------------( 87    		Mg --                 3.4<L>  |  30      |  0.96  			Ph --        LFTs (11-14 @ 01:30)      TPro 5.9<L> / Alb 3.9 / TBili 0.7 / DBili -- / AST 13 / ALT 18 / AlkPhos 61    Coags (11-14 @ 01:30)  aPTT 26.1<L> / INR 1.06 / PT 11.8          --------------------------------------------------------------------------------------      EXAM:  CT ANGIO BRAIN (W)AW IC     PROCEDURE DATE:  Nov 13 2018     IMPRESSION:    CTA head:  1. Subtle left M2 segment branch occlusion.  2. Alternating areas of stenosis involving the distal left [place] branch   vasculature.  3. Moderate focal stenosis involving the mid basilar artery and left V4   segment.   4. 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    CTA neck:  1. Interval placement of a left-sided internal carotid artery stent when   compared with the outside CT angiogramexamination.  2. 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 Findings appear unchanged when compared to the   prior exam.  3. Mild pulmonary edema which is partially visualized within the upper   lobes.    Recommend further evaluation with MRI examination, if there are no   clinical contraindications. SICU DAILY PROGRESS NOTE  =====================================================  INTERVAL/OVERNIGHT EVENTS:   - blood pressures between 140-170 throughout day   - still some expressive aphasia and RUE weakness   - MRI head ordered    Surgery Information: 63 year old female s/p left transcarotid arterial sten via right femoral vein access (11/13/18); course complicated by code stroke approximately 8 hours postop.     HPI:  pt is a 63 y.o. female ; pt with h/o htn, hld, ra, asthma, pt reports admission 9/22/18 to [facility] secondary to syncope ; pt states a w/u was done " blockage in my left carotid artery " pt to surgeon ; pt now s/p left transcarotid arterial sten via right femoral vein access.    Timeline:  18:25 Primary team called to examine patient after patient transferred to floor by nurse. Patient noticed to have some speech changes as well as tongue deviation to right. Patient examined at bedside. Patient was noted to be A&Ox3. Patient had increased facial swelling and no droop was noted. Patient able to verbalize as well as follow commands. Speech was slightly garbled. Patient moved upper and lower extremities b/l.  Tongue slightly deviated to right. New from previous post-op check. VS stable. HR 80 120/67 rr 17 SPo2 98%.STAT labs ordered. Vascular fellow notified.    Patient re-examined 10 minutes later with fellow and patient unable to verbalize. Patient had word finding difficulty and difficulty following commands. Right facial droop noted. Code stroke called. CTA of head and neck ordered. Dr. Heath notified. Neurology came to evaluate patient. Rec STAT head/Neck CTA.     19:30 Addendum: On transport to CT scan facial droop resolved. Neuro recommending permissive HTN, Q1H neuro checks. Possible TIA in setting of resolving symptoms. SICU consult called for Q1H neuro checks. Her BP had remained in the  mmHg, she was not hypoxic or tachycardic.   21:00: Prelim CT: patent left ICA stent and a distal M2 segment occlusion, new from pre-op. About an hour after the CT scan, the aphasia returned.        HOME MEDICATION:  amLODIPine 5 mg oral tablet: 1 tab(s) orally once a day (13 Nov 2018 06:35)  atorvastatin 40 mg oral tablet: 2 tab(s) orally once a day (13 Nov 2018 06:35)  clopidogrel 75 mg oral tablet: 1 tab(s) orally once a day (13 Nov 2018 06:35)  folic acid 1 mg oral tablet: 1 tab(s) orally once a day (13 Nov 2018 06:35)  hydroCHLOROthiazide 25 mg oral tablet: 1 tab(s) orally once a day (13 Nov 2018 06:35)  methotrexate 2.5 mg oral tablet: 4 tab(s) orally once a week Thursday (13 Nov 2018 06:35)  predniSONE 10 mg oral tablet: taper ; to complete 11/05/18 (13 Nov 2018 06:35)  Proventil: 2 puff(s) inhaled , As Needed (13 Nov 2018 06:35)  Tylenol Arthritis Caplet: 2 tab(s) orally , As Needed (13 Nov 2018 06:35)      CURRENT MEDICATIONS:   --------------------------------------------------------------------------------------  Neurologic Medications    Respiratory Medications    Cardiovascular Medications  hydrochlorothiazide 25 milliGRAM(s) Oral daily    Gastrointestinal Medications  lactated ringers. 1000 milliLiter(s) IV Continuous <Continuous>    Genitourinary Medications    Hematologic/Oncologic Medications  clopidogrel Tablet 75 milliGRAM(s) Oral daily  heparin  Injectable 5000 Unit(s) SubCutaneous every 8 hours    Antimicrobial/Immunologic Medications    Endocrine/Metabolic Medications  atorvastatin 80 milliGRAM(s) Oral at bedtime  predniSONE   Tablet 10 milliGRAM(s) Oral daily    Topical/Other Medications    --------------------------------------------------------------------------------------    VITAL SIGNS, INS/OUTS (last 24 hours):  --------------------------------------------------------------------------------------    T(C): 36.6 (11-13-18 @ 17:32), Max: 36.8 (11-13-18 @ 06:28)  HR: 80 (11-13-18 @ 17:32) (76 - 101)  BP: 120/67 (11-13-18 @ 17:32) (98/67 - 155/85)  ABP: --  ABP(mean): --  RR: 17 (11-13-18 @ 17:32) (14 - 18)  SpO2: 98% (11-13-18 @ 17:32) (94% - 100%)  Wt(kg): --  CVP(mm Hg): --      11-13 @ 07:01  -  11-13 @ 21:16  --------------------------------------------------------  IN:    IV PiggyBack: 500 mL    Lactated Ringers IV Bolus: 1000 mL    lactated ringers.: 60 mL    lactated ringers.: 1400 mL    Oral Fluid: 150 mL  Total IN: 3110 mL    OUT:    Voided: 1900 mL  Total OUT: 1900 mL    Total NET: 1210 L  --------------------------------------------------------------------------------------    EXAM  NEUROLOGY  Exam: Normal, NAD, alert, oriented x3  - R tongue deviation  - Mid R lip droop    HEENT  Exam: atraumatic.  EOMI  - R eye s/p cataract surgery, pupil not responsive  - L eye: pupil reactive, sluggish  - L neck dressing: seronsanguinous stained, no hematoma or swelling noted   -Generalized facial puffiness-per nurse and patient this is baseline    RESPIRATORY  Exam: Lungs clear to auscultation, Normal expansion/effort    CARDIOVASCULAR  Exam: S1, S2.  Regular rate and rhythm.     GI/NUTRITION  Exam: Abdomen soft, Non-tender, Non-distended  Current Diet:  NPO    VASCULAR  Exam: Extremities warm, pink, well-perfused    MUSCULOSKELETAL  Exam: All extremities moving spontaneously without limitations   - 5/5 strength in all extremities    SKIN:  Exam: Good skin turgor, no skin breakdown.    METABOLIC/FLUIDS/ELECTROLYTES  lactated ringers. 1000 milliLiter(s) IV Continuous <Continuous>      HEMATOLOGIC  [x] DVT Prophylaxis: clopidogrel Tablet 75 milliGRAM(s) Oral daily  heparin  Injectable 5000 Unit(s) SubCutaneous every 8 hours    Transfusions:	[] PRBC	[] Platelets		[] FFP	[] Cryoprecipitate      Tubes/Lines/Drains  ***  [x] Peripheral IV  [] Central Venous Line     	[] R	[] L	[] IJ	[] Fem	[] SC	Date Placed:   [] Arterial Line		[] R	[] L	[] Fem	[] Rad	[] Ax	Date Placed:   [] PICC:         	[] Midline		[] Mediport  [] Urinary Catheter		Date Placed:     LABS  --------------------------------------------------------------------------------------                        9.4    13.74 )-----------( 216      ( 15 Nov 2018 02:10 )             30.7     11-15    143  |  104  |  14  ----------------------------<  73  3.7   |  31  |  0.92    Ca    8.6      15 Nov 2018 02:10  Phos  3.2     11-15  Mg     1.9     11-15    TPro  5.9<L>  /  Alb  3.9  /  TBili  0.7  /  DBili  x   /  AST  13  /  ALT  18  /  AlkPhos  61  11-14            --------------------------------------------------------------------------------------      EXAM:  CT ANGIO BRAIN (W)AW IC     PROCEDURE DATE:  Nov 13 2018     IMPRESSION:    CTA head:  1. Subtle left M2 segment branch occlusion.  2. Alternating areas of stenosis involving the distal left [place] branch   vasculature.  3. Moderate focal stenosis involving the mid basilar artery and left V4   segment.   4. 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    CTA neck:  1. Interval placement of a left-sided internal carotid artery stent when   compared with the outside CT angiogramexamination.  2. 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 Findings appear unchanged when compared to the   prior exam.  3. Mild pulmonary edema which is partially visualized within the upper   lobes.    Recommend further evaluation with MRI examination, if there are no   clinical contraindications.

## 2018-11-15 NOTE — PROGRESS NOTE ADULT - ASSESSMENT
63y Female  s/p left transcarotid arterial sten via right femoral vein access; course complicated by code stroke approximately 8 hours postop.    PLAN:  -care per sicu  -appreciate neurology recs  -MRI   -pain control  -PT/speech   -advance diet as appropriate

## 2018-11-15 NOTE — SWALLOW BEDSIDE ASSESSMENT ADULT - ORAL PREPARATORY PHASE
Within functional limits slow chewing intermittent anterior loss/spillage on the right side of oral cavity

## 2018-11-16 LAB
ALBUMIN SERPL ELPH-MCNC: 3.8 G/DL — SIGNIFICANT CHANGE UP (ref 3.3–5)
ALP SERPL-CCNC: 64 U/L — SIGNIFICANT CHANGE UP (ref 40–120)
ALT FLD-CCNC: 14 U/L — SIGNIFICANT CHANGE UP (ref 4–33)
ANISOCYTOSIS BLD QL: SLIGHT — SIGNIFICANT CHANGE UP
APTT BLD: 42 SEC — HIGH (ref 27.5–36.3)
AST SERPL-CCNC: 14 U/L — SIGNIFICANT CHANGE UP (ref 4–32)
BASOPHILS # BLD AUTO: 0.04 K/UL — SIGNIFICANT CHANGE UP (ref 0–0.2)
BASOPHILS NFR BLD AUTO: 0.2 % — SIGNIFICANT CHANGE UP (ref 0–2)
BASOPHILS NFR SPEC: 0 % — SIGNIFICANT CHANGE UP (ref 0–2)
BILIRUB SERPL-MCNC: 1.3 MG/DL — HIGH (ref 0.2–1.2)
BLASTS # FLD: 0 % — SIGNIFICANT CHANGE UP (ref 0–0)
BLD GP AB SCN SERPL QL: NEGATIVE — SIGNIFICANT CHANGE UP
BUN SERPL-MCNC: 12 MG/DL — SIGNIFICANT CHANGE UP (ref 7–23)
BUN SERPL-MCNC: 12 MG/DL — SIGNIFICANT CHANGE UP (ref 7–23)
CALCIUM SERPL-MCNC: 9 MG/DL — SIGNIFICANT CHANGE UP (ref 8.4–10.5)
CALCIUM SERPL-MCNC: 9 MG/DL — SIGNIFICANT CHANGE UP (ref 8.4–10.5)
CHLORIDE SERPL-SCNC: 93 MMOL/L — LOW (ref 98–107)
CHLORIDE SERPL-SCNC: 93 MMOL/L — LOW (ref 98–107)
CK MB BLD-MCNC: 1.33 NG/ML — SIGNIFICANT CHANGE UP (ref 1–4.7)
CK MB BLD-MCNC: SIGNIFICANT CHANGE UP (ref 0–2.5)
CK SERPL-CCNC: 62 U/L — SIGNIFICANT CHANGE UP (ref 25–170)
CO2 SERPL-SCNC: 28 MMOL/L — SIGNIFICANT CHANGE UP (ref 22–31)
CO2 SERPL-SCNC: 28 MMOL/L — SIGNIFICANT CHANGE UP (ref 22–31)
CREAT SERPL-MCNC: 1.04 MG/DL — SIGNIFICANT CHANGE UP (ref 0.5–1.3)
CREAT SERPL-MCNC: 1.04 MG/DL — SIGNIFICANT CHANGE UP (ref 0.5–1.3)
EOSINOPHIL # BLD AUTO: 0.1 K/UL — SIGNIFICANT CHANGE UP (ref 0–0.5)
EOSINOPHIL NFR BLD AUTO: 0.4 % — SIGNIFICANT CHANGE UP (ref 0–6)
EOSINOPHIL NFR FLD: 0 % — SIGNIFICANT CHANGE UP (ref 0–6)
GIANT PLATELETS BLD QL SMEAR: PRESENT — SIGNIFICANT CHANGE UP
GLUCOSE BLDC GLUCOMTR-MCNC: 161 MG/DL — HIGH (ref 70–99)
GLUCOSE SERPL-MCNC: 147 MG/DL — HIGH (ref 70–99)
GLUCOSE SERPL-MCNC: 147 MG/DL — HIGH (ref 70–99)
HCT VFR BLD CALC: 33.8 % — LOW (ref 34.5–45)
HGB BLD-MCNC: 10.7 G/DL — LOW (ref 11.5–15.5)
HYPOCHROMIA BLD QL: SLIGHT — SIGNIFICANT CHANGE UP
IMM GRANULOCYTES # BLD AUTO: 0.24 # — SIGNIFICANT CHANGE UP
IMM GRANULOCYTES NFR BLD AUTO: 1.1 % — SIGNIFICANT CHANGE UP (ref 0–1.5)
INR BLD: 1.21 — HIGH (ref 0.88–1.17)
LYMPHOCYTES # BLD AUTO: 1.95 K/UL — SIGNIFICANT CHANGE UP (ref 1–3.3)
LYMPHOCYTES # BLD AUTO: 8.7 % — LOW (ref 13–44)
LYMPHOCYTES NFR SPEC AUTO: 13.1 % — SIGNIFICANT CHANGE UP (ref 13–44)
MAGNESIUM SERPL-MCNC: 2.2 MG/DL — SIGNIFICANT CHANGE UP (ref 1.6–2.6)
MCHC RBC-ENTMCNC: 23.4 PG — LOW (ref 27–34)
MCHC RBC-ENTMCNC: 31.7 % — LOW (ref 32–36)
MCV RBC AUTO: 73.8 FL — LOW (ref 80–100)
METAMYELOCYTES # FLD: 0 % — SIGNIFICANT CHANGE UP (ref 0–1)
MICROCYTES BLD QL: SLIGHT — SIGNIFICANT CHANGE UP
MONOCYTES # BLD AUTO: 2.06 K/UL — HIGH (ref 0–0.9)
MONOCYTES NFR BLD AUTO: 9.1 % — SIGNIFICANT CHANGE UP (ref 2–14)
MONOCYTES NFR BLD: 0.9 % — LOW (ref 2–9)
MYELOCYTES NFR BLD: 0 % — SIGNIFICANT CHANGE UP (ref 0–0)
NEUTROPHIL AB SER-ACNC: 86 % — HIGH (ref 43–77)
NEUTROPHILS # BLD AUTO: 18.13 K/UL — HIGH (ref 1.8–7.4)
NEUTROPHILS NFR BLD AUTO: 80.5 % — HIGH (ref 43–77)
NEUTS BAND # BLD: 0 % — SIGNIFICANT CHANGE UP (ref 0–6)
NRBC # FLD: 0 — SIGNIFICANT CHANGE UP
OTHER - HEMATOLOGY %: 0 — SIGNIFICANT CHANGE UP
PHOSPHATE SERPL-MCNC: 3.3 MG/DL — SIGNIFICANT CHANGE UP (ref 2.5–4.5)
PLATELET # BLD AUTO: 233 K/UL — SIGNIFICANT CHANGE UP (ref 150–400)
PLATELET COUNT - ESTIMATE: NORMAL — SIGNIFICANT CHANGE UP
PMV BLD: 10.6 FL — SIGNIFICANT CHANGE UP (ref 7–13)
POLYCHROMASIA BLD QL SMEAR: SLIGHT — SIGNIFICANT CHANGE UP
POTASSIUM SERPL-MCNC: 4 MMOL/L — SIGNIFICANT CHANGE UP (ref 3.5–5.3)
POTASSIUM SERPL-MCNC: 4 MMOL/L — SIGNIFICANT CHANGE UP (ref 3.5–5.3)
POTASSIUM SERPL-SCNC: 4 MMOL/L — SIGNIFICANT CHANGE UP (ref 3.5–5.3)
POTASSIUM SERPL-SCNC: 4 MMOL/L — SIGNIFICANT CHANGE UP (ref 3.5–5.3)
PROMYELOCYTES # FLD: 0 % — SIGNIFICANT CHANGE UP (ref 0–0)
PROT SERPL-MCNC: 6.4 G/DL — SIGNIFICANT CHANGE UP (ref 6–8.3)
PROTHROM AB SERPL-ACNC: 13.5 SEC — HIGH (ref 9.8–13.1)
RBC # BLD: 4.58 M/UL — SIGNIFICANT CHANGE UP (ref 3.8–5.2)
RBC # FLD: 17 % — HIGH (ref 10.3–14.5)
RH IG SCN BLD-IMP: POSITIVE — SIGNIFICANT CHANGE UP
SODIUM SERPL-SCNC: 134 MMOL/L — LOW (ref 135–145)
SODIUM SERPL-SCNC: 134 MMOL/L — LOW (ref 135–145)
TROPONIN T, HIGH SENSITIVITY: 26 NG/L — SIGNIFICANT CHANGE UP (ref ?–14)
VARIANT LYMPHS # BLD: 0 % — SIGNIFICANT CHANGE UP
WBC # BLD: 22.52 K/UL — HIGH (ref 3.8–10.5)
WBC # FLD AUTO: 22.52 K/UL — HIGH (ref 3.8–10.5)

## 2018-11-16 PROCEDURE — 70496 CT ANGIOGRAPHY HEAD: CPT | Mod: 26

## 2018-11-16 PROCEDURE — 99291 CRITICAL CARE FIRST HOUR: CPT

## 2018-11-16 PROCEDURE — 70498 CT ANGIOGRAPHY NECK: CPT | Mod: 26

## 2018-11-16 RX ORDER — PHENYLEPHRINE HYDROCHLORIDE 10 MG/ML
0.1 INJECTION INTRAVENOUS
Qty: 40 | Refills: 0 | Status: DISCONTINUED | OUTPATIENT
Start: 2018-11-16 | End: 2018-11-16

## 2018-11-16 RX ORDER — CHLORHEXIDINE GLUCONATE 213 G/1000ML
1 SOLUTION TOPICAL
Qty: 0 | Refills: 0 | Status: DISCONTINUED | OUTPATIENT
Start: 2018-11-16 | End: 2018-11-22

## 2018-11-16 RX ORDER — SODIUM CHLORIDE 9 MG/ML
1000 INJECTION, SOLUTION INTRAVENOUS
Qty: 0 | Refills: 0 | Status: DISCONTINUED | OUTPATIENT
Start: 2018-11-16 | End: 2018-11-16

## 2018-11-16 RX ORDER — PHENYLEPHRINE HYDROCHLORIDE 10 MG/ML
0.5 INJECTION INTRAVENOUS
Qty: 40 | Refills: 0 | Status: DISCONTINUED | OUTPATIENT
Start: 2018-11-16 | End: 2018-11-18

## 2018-11-16 RX ORDER — SODIUM CHLORIDE 9 MG/ML
1000 INJECTION INTRAMUSCULAR; INTRAVENOUS; SUBCUTANEOUS
Qty: 0 | Refills: 0 | Status: DISCONTINUED | OUTPATIENT
Start: 2018-11-16 | End: 2018-11-25

## 2018-11-16 RX ORDER — MIDODRINE HYDROCHLORIDE 2.5 MG/1
10 TABLET ORAL EVERY 8 HOURS
Qty: 0 | Refills: 0 | Status: DISCONTINUED | OUTPATIENT
Start: 2018-11-16 | End: 2018-11-17

## 2018-11-16 RX ADMIN — PHENYLEPHRINE HYDROCHLORIDE 13.2 MICROGRAM(S)/KG/MIN: 10 INJECTION INTRAVENOUS at 19:53

## 2018-11-16 RX ADMIN — HEPARIN SODIUM 5000 UNIT(S): 5000 INJECTION INTRAVENOUS; SUBCUTANEOUS at 10:18

## 2018-11-16 RX ADMIN — HEPARIN SODIUM 5000 UNIT(S): 5000 INJECTION INTRAVENOUS; SUBCUTANEOUS at 01:02

## 2018-11-16 RX ADMIN — ATORVASTATIN CALCIUM 80 MILLIGRAM(S): 80 TABLET, FILM COATED ORAL at 21:17

## 2018-11-16 RX ADMIN — MIDODRINE HYDROCHLORIDE 10 MILLIGRAM(S): 2.5 TABLET ORAL at 14:52

## 2018-11-16 RX ADMIN — PHENYLEPHRINE HYDROCHLORIDE 13.2 MICROGRAM(S)/KG/MIN: 10 INJECTION INTRAVENOUS at 14:53

## 2018-11-16 RX ADMIN — CHLORHEXIDINE GLUCONATE 1 APPLICATION(S): 213 SOLUTION TOPICAL at 21:18

## 2018-11-16 RX ADMIN — HEPARIN SODIUM 5000 UNIT(S): 5000 INJECTION INTRAVENOUS; SUBCUTANEOUS at 17:26

## 2018-11-16 RX ADMIN — SODIUM CHLORIDE 50 MILLILITER(S): 9 INJECTION INTRAMUSCULAR; INTRAVENOUS; SUBCUTANEOUS at 19:53

## 2018-11-16 RX ADMIN — CLOPIDOGREL BISULFATE 75 MILLIGRAM(S): 75 TABLET, FILM COATED ORAL at 14:50

## 2018-11-16 RX ADMIN — PHENYLEPHRINE HYDROCHLORIDE 2.64 MICROGRAM(S)/KG/MIN: 10 INJECTION INTRAVENOUS at 08:59

## 2018-11-16 RX ADMIN — MIDODRINE HYDROCHLORIDE 10 MILLIGRAM(S): 2.5 TABLET ORAL at 21:18

## 2018-11-16 RX ADMIN — SODIUM CHLORIDE 50 MILLILITER(S): 9 INJECTION INTRAMUSCULAR; INTRAVENOUS; SUBCUTANEOUS at 10:19

## 2018-11-16 NOTE — SWALLOW BEDSIDE ASSESSMENT ADULT - SPECIFY REASON(S)
to reassess swallow function. pt is familiar to this dept as she was seen for a clinical assessment of swallow function on 11/15/2018 (please see report for details)

## 2018-11-16 NOTE — PROGRESS NOTE ADULT - ASSESSMENT
63 one year old female with a Pmhx of HTN, HLD, RA, Asthma. She is s/p elective left transcarotid artery revascularization via right groin access. Left sided carotid disease was found upon syncopal workup at [facility]. There were no complications estephanie-procedurally however a code stroke was called after procedure because RN noticed pt to have speech changes, mildly garbled upon her return from the PACU. The vascular fellow was alerted who noted her to word finding difficulties and right tongue deviation. Last known well time was unclear at that time.  NIHSS initially was 2 for dysarthria and mild right facial droop. the episode of word finding difficulties resolved by the time of assessment. Initial Ct head showed no evidence of acute findings. CTA head and neck showed a distal M2 branch occlusion    After this, the pt's neurological exam worsened. NIHSS 10, she was globally aphasic, not following commands and there was no blink to threat on the right. No hemiparesis or gross sensory loss. This case was discussed with the stroke attending on call and the INR attending on call, Dr. Mack and Dr. Sibley respectively. She was deemed not a candidate for intracranial thrombectomy due to the location of the M2 occlusion at that time.     Today, she was found to be weak on the right after taking meds with her right hand earlier in the nurse's shift however unable to determine true LKN because unclear waxing and waning motor exam on the right, which if was a change, the time of change in exam is unknown. NIHSS was 15 notable for aphasia and R sided weakness which improved slightly after CT. CT showed no acute infarct and CTA was stable as compared to prior study.     Impression: Worsening of right sided deficits and aphasia possibly secondary to hypoperfusion of area supplied by the L M2 high grade stenosis due to fluctuating blood pressure.

## 2018-11-16 NOTE — SWALLOW BEDSIDE ASSESSMENT ADULT - PHARYNGEAL PHASE
Delayed pharyngeal swallow/Decreased laryngeal elevation Multiple swallows/Delayed pharyngeal swallow/Decreased laryngeal elevation Cough post oral intake/Throat clear post oral intake/Delayed pharyngeal swallow/Decreased laryngeal elevation

## 2018-11-16 NOTE — PROGRESS NOTE ADULT - ASSESSMENT
64y/o F w/ HTN, HLD, RA, Asthma s/p elective left transcarotid arterial sten via right femoral vein access (11/13/18). course complicated by code stroke approximately 8 hours postop. CTA head and neck showed a distal M2 branch occlusion. She was deemed not a candidate for intracranial thrombectomy due to the location of the M2 occlusion at that time. Patient monitored in SICU, found to be stable, and sent to the floor. The following day patient was found with possible worsening right side weakness and worsening aphasia. Code stroke called. CT showed no acute infarct and CTA was stable as compared to prior study. Concern by neuro that worsening of right sided deficits and aphasia possibly secondary to hypoperfusion of area supplied by the L M2 high grade stenosis due to fluctuating blood pressure.    Neurologic: Left MCA stroke  - CT: left M2 segment branch occlusion, Alternating areas of stenosis involving the distal left [place] branch vasculature  - repeat CT unchanged  - Q1H neurological exams  - will obtain MRI per neuro recs    Respiratory:   - stable on RA  - mild pulmonary edema seen on CT    Cardiovascular:   - permissive hypertension per neurology  - Maintain BPs >160/90  - Continue madai gtt  - Start Midodrine 10 Q8H - can then wean madai if needed    Gastrointestinal/Nutrition:   - strict NPO  - will obtain swallow study prior to advancing diet    Renal/Genitourinary   - cont IVF while NPO  - Monitor I/Os  - replete electrolytes as needed    Hematologic:   - trend H/H  - c/w plavix, sub Q heparin    Infectious Disease: No active issues    Endocrine: No active issues    Disposition: SICU    Critical care diagnosis: stroke

## 2018-11-16 NOTE — PHYSICAL THERAPY INITIAL EVALUATION ADULT - PASSIVE RANGE OF MOTION EXAMINATION, REHAB EVAL
Right ankle DF -10 from neutral/deficits as listed below/bilateral lower extremity Passive ROM was WFL (within functional limits)/bilateral upper extremity Passive ROM was WFL (within functional limits)

## 2018-11-16 NOTE — STROKE CODE NOTE - ABSOLUTE EXCLUSION OTHER CRITERIA
unclear LKN because she was having the weakness earlier in admission that was waxing and waning in nature

## 2018-11-16 NOTE — PHYSICAL THERAPY INITIAL EVALUATION ADULT - MANUAL MUSCLE TESTING RESULTS, REHAB EVAL
Left UE/LE at least 4/5 t/o, Right UE sh 1/5, Right elbow 2/5, Right hand 1/5 , Right quad 3/5, Right hip flx 3-/5, hip abd/add 2/5 , Right ankle DF 1/5

## 2018-11-16 NOTE — PHYSICAL THERAPY INITIAL EVALUATION ADULT - PERTINENT HX OF CURRENT PROBLEM, REHAB EVAL
63 one year old female with a Pmhx of HTN, HLD, RA, Asthma. She is s/p elective left transcarotid artery revascularization via right groin access 2/2 Left sided carotid disease. Code stroke was called after procedure because RN noticed pt to have speech changes, mildly garbled upon her return from the PACU. CT head negative. CTA head and neck revealed a distal M2 branch occlusion.

## 2018-11-16 NOTE — PROGRESS NOTE ADULT - PROBLEM SELECTOR PLAN 1
Please keep blood pressure at minimum 160/90 with pressors if necessary.   Q1 hour neurological checks (stroke, neurovascular).   MRI brain w/out contrast when stable.   Continue plavix, DVT ppx.  PT/OT evaluation,   NPO without meds until passes dysphagia.

## 2018-11-16 NOTE — SWALLOW BEDSIDE ASSESSMENT ADULT - SWALLOW EVAL: PROGNOSIS
DIAGNOSIS CONT:  4- severe pharyngeal dysphagia given thin liquids marked by delayed swallow trigger and reduced hyolaryngeal excursion resulting in immediate coughing post swallow, suggestive of penetration/aspiration.

## 2018-11-16 NOTE — PROGRESS NOTE ADULT - ATTENDING COMMENTS
64y/o F w/ HTN, HLD, RA, Asthma s/p elective left transcarotid arterial sten via right femoral vein access (11/13/18). course complicated by code stroke approximately 8 hours postop. CTA head and neck showed a distal M2 branch occlusion. She was deemed not a candidate for intracranial thrombectomy due to the location of the M2 occlusion at that time. Patient monitored in SICU, found to be stable, and sent to the floor. The following day patient was found with possible worsening right side weakness and worsening aphasia. Code stroke called. CT showed no acute infarct and CTA was stable as compared to prior study. Concern by neuro that worsening of right sided deficits and aphasia possibly secondary to hypoperfusion of area supplied by the L M2 high grade stenosis due to fluctuating blood pressure.    Neurologic: Left MCA stroke  - CT: left M2 segment branch occlusion, Alternating areas of stenosis involving the distal left [place] branch vasculature  - repeat CT unchanged  - Q1H neurological exams  - will obtain MRI per neuro recs    Respiratory:   - stable on RA  - mild pulmonary edema seen on CT    Cardiovascular:   - permissive hypertension per neurology  - Maintain BPs >160/90  - Continue madai gtt  - Start Midodrine 10 Q8H - can then wean madai if needed    Gastrointestinal/Nutrition:   - strict NPO  - will obtain swallow study prior to advancing diet    Renal/Genitourinary   - cont IVF while NPO  - Monitor I/Os  - replete electrolytes as needed    Hematologic:   - trend H/H  - c/w plavix, sub Q heparin    Infectious Disease: No active issues    Endocrine: No active issues    Disposition: SICU    Critical care diagnosis: stroke/CVA-TIA, respiratory abnormality.  The patient is a critical care patient with life threatening hemodynamic and metabolic instability in SICU.  I have personally interviewed when possible and examined the patient, reviewed data and laboratory tests/x-rays and all pertinent electronic images.  I was physically present for the key portions of the evaluation and management (E/M) service provided.   The SICU team has a constant risk benefit analyzes discussion with the primary team, all consultants, House Staff and PA's on all decisions.  These diagnoses are unrelated to the surgical procedure noted above.  I meet with family if needed to get further history, discuss the case and make care decisions for this patient who might not be able to participate.  Time involved in performance of separately billable procedures was not counted toward my critical care time. There is no overlap.  I spent 55-75 minutes of critical care time for the diagnoses, assessment, plan and interventions.

## 2018-11-16 NOTE — PROGRESS NOTE ADULT - SUBJECTIVE AND OBJECTIVE BOX
Neurology Progress Note    Name  RUPINDER TRIPP    Code stroke called for worsening aphasia and R weakness. According to primary team, she had never been weak on the right before but the nurse had gotten report that she had waxing and waning right sided weakness while in the SICU that was attributed to "TIA's". Therefore, unclear last known normal.                                                   PAST MEDICAL & SURGICAL HISTORY:  Osteoporosis  Eczema: bilateral elbows  Carotid artery stenosis  Rheumatoid arthritis: tx with  methotrexate and prednisone  HLD (hyperlipidemia)  Carotid Artery Dissection  Eczema  Psoriasis  Asthma  HTN (Hypertension)  Myocardial Infarction  S/P lumbar fusion: h/o vertral fracture with cement fusion 209  Cataract Extraction Surgery  tonsillectomy: s/p Excision Right lymph node ; pt unclear          MEDICATIONS  (STANDING):  atorvastatin 80 milliGRAM(s) Oral at bedtime  clopidogrel Tablet 75 milliGRAM(s) Oral daily  heparin  Injectable 5000 Unit(s) SubCutaneous every 8 hours  phenylephrine    Infusion 0.1 MICROgram(s)/kG/Min (2.64 mL/Hr) IV Continuous <Continuous>    MEDICATIONS  (PRN):      Objective  Vital Signs Last 24 Hrs  T(C): 37.4 (16 Nov 2018 08:13), Max: 37.8 (15 Nov 2018 21:49)  T(F): 99.4 (16 Nov 2018 08:13), Max: 100.1 (15 Nov 2018 21:49)  HR: 99 (16 Nov 2018 08:30) (84 - 125)  BP: 120/70 (16 Nov 2018 08:30) (92/74 - 153/84)  BP(mean): 82 (16 Nov 2018 08:30) (73 - 97)  RR: 20 (16 Nov 2018 08:30) (16 - 28)  SpO2: 100% (16 Nov 2018 08:30) (97% - 100%)    General Exam   General appearance: No acute distress, well-nourished  Respiratory:    non-labored respirations               Neurological Exam  Mental Status:  awake, alert and oriented to birthday only, one-word answers only, following simple commands although not at initial evaluation, repetition and naming not intact    Cranial Nerves: no gaze preference, decreased blink to threat on R, R facial droop, moderate dysarthria    Motor:   Tone:   normal               Strength:  Upper extremity - full strength L UE, R UE drifts to bed immediately but does not hit bed immediately                 Lower extremity  - R leg externally rotated and short, L withdraws from pain more than R  Pronator drift:  R UE         Dysmetria: unable to perform  Tremor:  none appreciated at rest or in action    Other Studies                          10.7   22.52 )-----------( 233      ( 16 Nov 2018 06:18 )             33.8     11-16    134<L>  |  93<L>  |  12  ----------------------------<  147<H>  4.0   |  28  |  1.04    Ca    9.0      16 Nov 2018 06:18  Phos  3.3     11-16  Mg     2.2     11-16    TPro  6.4  /  Alb  3.8  /  TBili  1.3<H>  /  DBili  x   /  AST  14  /  ALT  14  /  AlkPhos  64  11-16    LIVER FUNCTIONS - ( 16 Nov 2018 06:18 )  Alb: 3.8 g/dL / Pro: 6.4 g/dL / ALK PHOS: 64 u/L / ALT: 14 u/L / AST: 14 u/L / GGT: x             Radiology    CTh/CTA: no infarct, stable exam as compared to prior CT and CTA, reviewed with neuroradiologist

## 2018-11-16 NOTE — PROGRESS NOTE ADULT - ATTENDING COMMENTS
seen and examined the patient with neurology resident. Her clinical exam today is significant for mixed aphasia, right facial droop and right hemiparesis.  Left hemispheric ischemic infarct in the setting of left carotid in-stent restenosis.  Blood pressure in the higher range of 140-160 systolic/80-90 diastolic to maintain adequate perfusion. At the same time precautions have been followed to prevent cerebral hypoperfusion syndrome (as a result of revascularization).  Vascular surgery follow up  PT/OT/rehabilitation consultation

## 2018-11-16 NOTE — PROVIDER CONTACT NOTE (OTHER) - ASSESSMENT
Patient presented with right sided weakness in right upper and lower extremities. Code stroke called. Patient vitals at 6:46 /64, , O2 99, Temp 98.6.   Finger stick of 161 done at 6:49

## 2018-11-16 NOTE — PHYSICAL THERAPY INITIAL EVALUATION ADULT - IMPAIRMENTS FOUND, PT EVAL
gait, locomotion, and balance/neuromotor development and sensory integration/tone/speech/muscle strength

## 2018-11-16 NOTE — PROGRESS NOTE ADULT - SUBJECTIVE AND OBJECTIVE BOX
SICU DAILY PROGRESS NOTE  =====================================================  INTERVAL/OVERNIGHT EVENTS:   - patient transferred to floor  - code stroke called for worsening R sided weakness  - CT angio head/neck unchanged from prior  - Patient admitted to SICU for further monitoring  - Concern by neuro that worsening of right sided deficits and aphasia possibly secondary to hypoperfusion of area supplied by the L M2 high grade stenosis due to fluctuating blood pressure  - Started on madai gtt to maintain pressures >160/90    62y/o F w/ HTN, HLD, RA, Asthma  s/p elective left transcarotid arterial sten via right femoral vein access (11/13/18). course complicated by code stroke approximately 8 hours postop. CTA head and neck showed a distal M2 branch occlusion. She was deemed not a candidate for intracranial thrombectomy due to the location of the M2 occlusion at that time. Patient monitored in SICU, found to be stable, and sent to the floor. The following day patient was found with possible worsening right side weakness and worsening aphasia. CT showed no acute infarct and CTA was stable as compared to prior study.     T(C): 37.1 (11-16-18 @ 12:00), Max: 37.8 (11-15-18 @ 21:49)  HR: 72 (11-16-18 @ 12:30) (65 - 125)  BP: 157/76 (11-16-18 @ 12:30) (92/74 - 183/83)  BP(mean): 95 (11-16-18 @ 12:30) (73 - 111)  ABP: 151/79 (11-15-18 @ 15:00) (151/79 - 155/77)  ABP(mean): 106 (11-15-18 @ 15:00) (105 - 109)  RR: 18 (11-16-18 @ 12:30) (17 - 28)  SpO2: 100% (11-16-18 @ 12:30) (92% - 100%)  Wt(kg): --  CVP(mm Hg): --  CI: --  CAPILLARY BLOOD GLUCOSE      POCT Blood Glucose.: 161 mg/dL (16 Nov 2018 06:49)   N/A      11-15 @ 07:01 - 11-16 @ 07:00  --------------------------------------------------------  IN:    IV PiggyBack: 350 mL    lactated ringers.: 225 mL  Total IN: 575 mL    OUT:    Voided: 2900 mL  Total OUT: 2900 mL    Total NET: -2325 mL      11-16 @ 07:01 - 11-16 @ 12:49  --------------------------------------------------------  IN:    phenylephrine   Infusion: 184 mL    sodium chloride 0.9%.: 150 mL  Total IN: 334 mL    OUT:  Total OUT: 0 mL    Total NET: 334 mL       EXAM  NEUROLOGY  Exam:  - significantly less verbal that yesterday  - R tongue deviation  - Mid R lip droop    HEENT  Exam: atraumatic.  EOMI  - R eye s/p cataract surgery, pupil not responsive  - L eye: pupil reactive, sluggish  - L neck dressing: seronsanguinous stained, no hematoma or swelling noted   -Generalized facial puffiness-per nurse and patient this is baseline    RESPIRATORY  Exam: Lungs clear to auscultation, Normal expansion/effort    CARDIOVASCULAR  Exam: S1, S2.  Regular rate and rhythm.     GI/NUTRITION  Exam: Abdomen soft, Non-tender, Non-distended  Current Diet:  NPO    VASCULAR  Exam: Extremities warm, pink, well-perfused    MUSCULOSKELETAL  Exam: All extremities moving spontaneously without limitations   - 5/5 strength in all extremities    SKIN:  Exam: Good skin turgor, no skin breakdown.    METABOLIC/FLUIDS/ELECTROLYTES  lactated ringers. 1000 milliLiter(s) IV Continuous <Continuous>      HEMATOLOGIC  [x] DVT Prophylaxis: clopidogrel Tablet 75 milliGRAM(s) Oral daily  heparin  Injectable 5000 Unit(s) SubCutaneous every 8 hours      Tubes/Lines/Drains  [x] Peripheral IV  [] Central Venous Line     	[] R	[] L	[] IJ	[] Fem	[] SC	Date Placed:   [] Arterial Line		[] R	[] L	[] Fem	[] Rad	[] Ax	Date Placed:   [] PICC:         	[] Midline		[] Mediport  [] Urinary Catheter		Date Placed:     LABS                                              10.7                  Neurophils% (auto):   80.5   (11-16 @ 06:18):    22.52)-----------(233          Lymphocytes% (auto):  8.7                                           33.8                   Eosinphils% (auto):   0.4      Manual%: Neutrophils 86.0 ; Lymphocytes 13.1 ; Eosinophils 0.0  ; Bands%: 0    ; Blasts 0          11-16    134<L>  |  93<L>  |  12  ----------------------------<  147<H>  4.0   |  28  |  1.04    Ca    9.0      16 Nov 2018 06:18  Phos  3.3     11-16  Mg     2.2     11-16    TPro  6.4  /  Alb  3.8  /  TBili  1.3<H>  /  DBili  x   /  AST  14  /  ALT  14  /  AlkPhos  64  11-16    ( 11-16 @ 06:18 )   PT: 13.5 SEC;   INR: 1.21   aPTT: 42.0 SEC        RECENT CULTURES:      EXAM:  CT ANGIO NECK (W)AW IC    EXAM:  CT ANGIO BRAIN (W)AW IC    EXAM:  CT BRAIN STROKE PROTOCOL      PROCEDURE DATE:  Nov 16 2018     IMPRESSION: Microvascular disease. No evidence for acute intracranial hemorrhage or  new acute transcortical territorial infarction. CT angiography of the extracranial circulation demonstrates no interval  change. There is left internal carotid artery 75% in-stent stenosis. CT angiography of the intracranial circulation is unchanged. There are  multiple tandem moderate tosevere stenoses along bilateral [place], MCA, and PCA vessels as described above. Most significantly, is high-grade  stenosis along the left M2 superior division, unchanged from prior  imaging.

## 2018-11-16 NOTE — PROGRESS NOTE ADULT - ASSESSMENT
63y Female  s/p left transcarotid arterial sten via right femoral vein access; course complicated by code stroke approximately 8 hours postop which appears to have progressed/worsened acutely this morning    PLAN:  -CTA head/neck stroke protocol  -Neuro consult  -SICU consult  -MRI   -pain control

## 2018-11-16 NOTE — PROGRESS NOTE ADULT - ATTENDING COMMENTS
seen and examined, events noted.  CTA reviewed. fluctuating neurologic symptoms.  Embolus not amenable to neurointervention.    cont keep elevated map, plavix, icu care

## 2018-11-17 LAB
APTT BLD: 29.1 SEC — SIGNIFICANT CHANGE UP (ref 27.5–36.3)
BASOPHILS # BLD AUTO: 0.09 K/UL — SIGNIFICANT CHANGE UP (ref 0–0.2)
BASOPHILS NFR BLD AUTO: 0.4 % — SIGNIFICANT CHANGE UP (ref 0–2)
BUN SERPL-MCNC: 12 MG/DL — SIGNIFICANT CHANGE UP (ref 7–23)
CALCIUM SERPL-MCNC: 8.3 MG/DL — LOW (ref 8.4–10.5)
CHLORIDE SERPL-SCNC: 101 MMOL/L — SIGNIFICANT CHANGE UP (ref 98–107)
CO2 SERPL-SCNC: 24 MMOL/L — SIGNIFICANT CHANGE UP (ref 22–31)
CREAT SERPL-MCNC: 0.99 MG/DL — SIGNIFICANT CHANGE UP (ref 0.5–1.3)
EOSINOPHIL # BLD AUTO: 0.44 K/UL — SIGNIFICANT CHANGE UP (ref 0–0.5)
EOSINOPHIL NFR BLD AUTO: 2 % — SIGNIFICANT CHANGE UP (ref 0–6)
GLUCOSE SERPL-MCNC: 95 MG/DL — SIGNIFICANT CHANGE UP (ref 70–99)
HCT VFR BLD CALC: 34.6 % — SIGNIFICANT CHANGE UP (ref 34.5–45)
HGB BLD-MCNC: 10.8 G/DL — LOW (ref 11.5–15.5)
IMM GRANULOCYTES # BLD AUTO: 0.27 # — SIGNIFICANT CHANGE UP
IMM GRANULOCYTES NFR BLD AUTO: 1.2 % — SIGNIFICANT CHANGE UP (ref 0–1.5)
INR BLD: 1.14 — SIGNIFICANT CHANGE UP (ref 0.88–1.17)
LYMPHOCYTES # BLD AUTO: 13.6 % — SIGNIFICANT CHANGE UP (ref 13–44)
LYMPHOCYTES # BLD AUTO: 3.06 K/UL — SIGNIFICANT CHANGE UP (ref 1–3.3)
MAGNESIUM SERPL-MCNC: 2.3 MG/DL — SIGNIFICANT CHANGE UP (ref 1.6–2.6)
MCHC RBC-ENTMCNC: 23.9 PG — LOW (ref 27–34)
MCHC RBC-ENTMCNC: 31.2 % — LOW (ref 32–36)
MCV RBC AUTO: 76.5 FL — LOW (ref 80–100)
MONOCYTES # BLD AUTO: 2.18 K/UL — HIGH (ref 0–0.9)
MONOCYTES NFR BLD AUTO: 9.7 % — SIGNIFICANT CHANGE UP (ref 2–14)
NEUTROPHILS # BLD AUTO: 16.42 K/UL — HIGH (ref 1.8–7.4)
NEUTROPHILS NFR BLD AUTO: 73.1 % — SIGNIFICANT CHANGE UP (ref 43–77)
NRBC # FLD: 0 — SIGNIFICANT CHANGE UP
PA ADP PRP-ACNC: 107 PRU — LOW (ref 194–418)
PHOSPHATE SERPL-MCNC: 3 MG/DL — SIGNIFICANT CHANGE UP (ref 2.5–4.5)
PLATELET # BLD AUTO: 288 K/UL — SIGNIFICANT CHANGE UP (ref 150–400)
PMV BLD: 11.3 FL — SIGNIFICANT CHANGE UP (ref 7–13)
POTASSIUM SERPL-MCNC: 4.2 MMOL/L — SIGNIFICANT CHANGE UP (ref 3.5–5.3)
POTASSIUM SERPL-SCNC: 4.2 MMOL/L — SIGNIFICANT CHANGE UP (ref 3.5–5.3)
PROTHROM AB SERPL-ACNC: 13.1 SEC — SIGNIFICANT CHANGE UP (ref 9.8–13.1)
RBC # BLD: 4.52 M/UL — SIGNIFICANT CHANGE UP (ref 3.8–5.2)
RBC # FLD: 17.9 % — HIGH (ref 10.3–14.5)
SODIUM SERPL-SCNC: 138 MMOL/L — SIGNIFICANT CHANGE UP (ref 135–145)
WBC # BLD: 22.46 K/UL — HIGH (ref 3.8–10.5)
WBC # FLD AUTO: 22.46 K/UL — HIGH (ref 3.8–10.5)

## 2018-11-17 PROCEDURE — 70549 MR ANGIOGRAPH NECK W/O&W/DYE: CPT | Mod: 26

## 2018-11-17 PROCEDURE — 70553 MRI BRAIN STEM W/O & W/DYE: CPT | Mod: 26

## 2018-11-17 PROCEDURE — 99233 SBSQ HOSP IP/OBS HIGH 50: CPT

## 2018-11-17 PROCEDURE — 99231 SBSQ HOSP IP/OBS SF/LOW 25: CPT

## 2018-11-17 RX ORDER — MIDODRINE HYDROCHLORIDE 2.5 MG/1
20 TABLET ORAL EVERY 8 HOURS
Qty: 0 | Refills: 0 | Status: DISCONTINUED | OUTPATIENT
Start: 2018-11-17 | End: 2018-11-28

## 2018-11-17 RX ADMIN — HEPARIN SODIUM 5000 UNIT(S): 5000 INJECTION INTRAVENOUS; SUBCUTANEOUS at 02:21

## 2018-11-17 RX ADMIN — HEPARIN SODIUM 5000 UNIT(S): 5000 INJECTION INTRAVENOUS; SUBCUTANEOUS at 17:02

## 2018-11-17 RX ADMIN — MIDODRINE HYDROCHLORIDE 10 MILLIGRAM(S): 2.5 TABLET ORAL at 04:06

## 2018-11-17 RX ADMIN — PHENYLEPHRINE HYDROCHLORIDE 13.2 MICROGRAM(S)/KG/MIN: 10 INJECTION INTRAVENOUS at 08:09

## 2018-11-17 RX ADMIN — SODIUM CHLORIDE 50 MILLILITER(S): 9 INJECTION INTRAMUSCULAR; INTRAVENOUS; SUBCUTANEOUS at 19:54

## 2018-11-17 RX ADMIN — CHLORHEXIDINE GLUCONATE 1 APPLICATION(S): 213 SOLUTION TOPICAL at 23:03

## 2018-11-17 RX ADMIN — MIDODRINE HYDROCHLORIDE 20 MILLIGRAM(S): 2.5 TABLET ORAL at 23:03

## 2018-11-17 RX ADMIN — HEPARIN SODIUM 5000 UNIT(S): 5000 INJECTION INTRAVENOUS; SUBCUTANEOUS at 08:10

## 2018-11-17 RX ADMIN — MIDODRINE HYDROCHLORIDE 20 MILLIGRAM(S): 2.5 TABLET ORAL at 13:24

## 2018-11-17 RX ADMIN — PHENYLEPHRINE HYDROCHLORIDE 13.2 MICROGRAM(S)/KG/MIN: 10 INJECTION INTRAVENOUS at 19:53

## 2018-11-17 RX ADMIN — ATORVASTATIN CALCIUM 80 MILLIGRAM(S): 80 TABLET, FILM COATED ORAL at 23:04

## 2018-11-17 RX ADMIN — SODIUM CHLORIDE 50 MILLILITER(S): 9 INJECTION INTRAMUSCULAR; INTRAVENOUS; SUBCUTANEOUS at 08:09

## 2018-11-17 RX ADMIN — CLOPIDOGREL BISULFATE 75 MILLIGRAM(S): 75 TABLET, FILM COATED ORAL at 13:24

## 2018-11-17 NOTE — PROGRESS NOTE ADULT - SUBJECTIVE AND OBJECTIVE BOX
General Surgery Progress Note    SUBJECTIVE:  The patient was seen and examined. No acute events overnight.   Pain controlled.  Aphasia improving. A&O to person, time, and place.    OBJECTIVE:     ** VITAL SIGNS / I&O's **    Vital Signs Last 24 Hrs  T(C): 36.8 (17 Nov 2018 08:00), Max: 37.3 (16 Nov 2018 16:00)  T(F): 98.3 (17 Nov 2018 08:00), Max: 99.1 (16 Nov 2018 16:00)  HR: 67 (17 Nov 2018 08:11) (59 - 113)  BP: 147/76 (17 Nov 2018 08:00) (92/62 - 197/81)  BP(mean): 95 (17 Nov 2018 08:00) (61 - 111)  RR: 18 (17 Nov 2018 08:11) (12 - 25)  SpO2: 100% (17 Nov 2018 08:11) (98% - 100%)      16 Nov 2018 07:01  -  17 Nov 2018 07:00  --------------------------------------------------------  IN:    phenylephrine   Infusion: 948 mL    phenylephrine   Infusion: 184 mL    sodium chloride 0.9%.: 1100 mL  Total IN: 2232 mL    OUT:    Voided: 1975 mL  Total OUT: 1975 mL    Total NET: 257 mL      17 Nov 2018 07:01  -  17 Nov 2018 08:37  --------------------------------------------------------  IN:    phenylephrine   Infusion: 89.8 mL    sodium chloride 0.9%.: 100 mL  Total IN: 189.8 mL    OUT:  Total OUT: 0 mL    Total NET: 189.8 mL          ** PHYSICAL EXAM **    -- CONSTITUTIONAL: Alert, NAD  -- PULMONARY: non-labored respirations  -- EXTR: ROM/strength absent on patient's right side. Right groin puncture dressing c/d/i, right groin soft, no evidence of hematoma  -- NEURO: apahasic with improving R. facial droop, tongue deviation to right. can lift right arm against gravity. +motor and sensations in LEs.    ** LABS **                          10.8   22.46 )-----------( 288      ( 17 Nov 2018 02:30 )             34.6     17 Nov 2018 02:30    138    |  101    |  12     ----------------------------<  95     4.2     |  24     |  0.99     Ca    8.3        17 Nov 2018 02:30  Phos  3.0       17 Nov 2018 02:30  Mg     2.3       17 Nov 2018 02:30    TPro  6.4    /  Alb  3.8    /  TBili  1.3    /  DBili  x      /  AST  14     /  ALT  14     /  AlkPhos  64     16 Nov 2018 06:18    PT/INR - ( 17 Nov 2018 02:30 )   PT: 13.1 SEC;   INR: 1.14          PTT - ( 17 Nov 2018 02:30 )  PTT:29.1 SEC  CAPILLARY BLOOD GLUCOSE        CARDIAC MARKERS ( 16 Nov 2018 06:18 )  x     / x     / 62 u/L / 1.33 ng/mL / x          LIVER FUNCTIONS - ( 16 Nov 2018 06:18 )  Alb: 3.8 g/dL / Pro: 6.4 g/dL / ALK PHOS: 64 u/L / ALT: 14 u/L / AST: 14 u/L / GGT: x                 MEDICATIONS  (STANDING):  atorvastatin 80 milliGRAM(s) Oral at bedtime  chlorhexidine 4% Liquid 1 Application(s) Topical <User Schedule>  clopidogrel Tablet 75 milliGRAM(s) Oral daily  heparin  Injectable 5000 Unit(s) SubCutaneous every 8 hours  midodrine 10 milliGRAM(s) Oral every 8 hours  phenylephrine    Infusion 0.5 MICROgram(s)/kG/Min (13.2 mL/Hr) IV Continuous <Continuous>  sodium chloride 0.9%. 1000 milliLiter(s) (50 mL/Hr) IV Continuous <Continuous>    MEDICATIONS  (PRN):

## 2018-11-17 NOTE — PROGRESS NOTE ADULT - ASSESSMENT
Left MCA territory ischemic infarct, likely artery to artery embolism/cardioembolic in origin  Permissive HTN upto systolic 180 goal sbp 140-160  continue antiplatelet, statins 80 mg  neurochecks per protocol q.1 hour  SCDs for DVT prophylaxis  PT OT and speech assessment.

## 2018-11-17 NOTE — PROGRESS NOTE ADULT - ATTENDING COMMENTS
62y/o F w/ HTN, HLD, RA, Asthma s/p elective left transcarotid arterial sten via right femoral vein access (11/13/18). course complicated by code stroke approximately 8 hours postop. CTA head and neck showed a distal M2 branch occlusion. She was deemed not a candidate for intracranial thrombectomy due to the location of the M2 occlusion at that time. Patient monitored in SICU, found to be stable, and sent to the floor. The following day patient was found with possible worsening right side weakness and worsening aphasia. Code stroke called. CT showed no acute infarct and CTA was stable as compared to prior study. Concern by neuro that worsening of right sided deficits and aphasia possibly secondary to hypoperfusion of area supplied by the L M2 high grade stenosis due to fluctuating blood pressure.    Code Status: full    PLAN:     Neurologic:   - CT: left M2 segment branch occlusion, Alternating areas of stenosis involving the distal left [place] branch vasculature  - repeat CT unchanged  - Q1H neurological exams  - will obtain MRI per neuro recs  - SBP goal 140-150                                                                                                                                                                                                                                                                                                                                              Pulmonary:   - stable on RA    Cardiovascular:   - permissive hypertension per neurology  - Maintain SBPs 140-160  - Continue amdai gtt  - increase Midodrine 10 to 20mg Q8H - can then wean madai if needed    Gastrointestinal:  - dysphagia diet    Renal:  - monitor I/O  - replete lytes PRN    Hematologic:  - c/w plavix, SQH    Infectious Disease: no active issues     Endocrine: f/u BMP      Critical care diagnoses: CVA.  The patient is a critical care patient with life threatening hemodynamic and metabolic instability in SICU.  I have personally interviewed when possible and examined the patient, reviewed data and laboratory tests/x-rays and all pertinent electronic images.  I was physically present for the key portions of the evaluation and management (E/M) service provided.   The SICU team has a constant risk benefit analyzes discussion with the primary team, all consultants, House Staff and PA's on all decisions.  These diagnoses are unrelated to the surgical procedure noted above.  I meet with family if needed to get further history, discuss the case and make care decisions for this patient who might not be able to participate.  Time involved in performance of separately billable procedures was not counted toward my critical care time. There is no overlap.  I spent 55-75 minutes of critical care time for the diagnoses, assessment, plan and interventions.

## 2018-11-17 NOTE — PROGRESS NOTE ADULT - SUBJECTIVE AND OBJECTIVE BOX
Subjective: no new complaints    Objective:   Vital Signs Last 24 Hrs  T(C): 36.8 (17 Nov 2018 08:00), Max: 37.3 (16 Nov 2018 16:00)  T(F): 98.3 (17 Nov 2018 08:00), Max: 99.1 (16 Nov 2018 16:00)  HR: 64 (17 Nov 2018 10:00) (59 - 113)  BP: 144/69 (17 Nov 2018 10:00) (92/62 - 197/81)  BP(mean): 88 (17 Nov 2018 10:00) (61 - 111)  RR: 20 (17 Nov 2018 10:00) (12 - 25)  SpO2: 100% (17 Nov 2018 10:00) (98% - 100%)    awake and alert  dysarthric. right facial weakness. right side weak  able to follow simple commands to close eyes. read "pea" but perseverated with attempt to read longer phrase and anisa inaccurate. able to repeat 1 word mostly but with dysarthria      Other:    11-17    138  |  101  |  12  ----------------------------<  95  4.2   |  24  |  0.99    Ca    8.3<L>      17 Nov 2018 02:30  Phos  3.0     11-17  Mg     2.3     11-17    TPro  6.4  /  Alb  3.8  /  TBili  1.3<H>  /  DBili  x   /  AST  14  /  ALT  14  /  AlkPhos  64  11-16    LIVER FUNCTIONS - ( 16 Nov 2018 06:18 )  Alb: 3.8 g/dL / Pro: 6.4 g/dL / ALK PHOS: 64 u/L / ALT: 14 u/L / AST: 14 u/L / GGT: x                                 10.8   22.46 )-----------( 288      ( 17 Nov 2018 02:30 )             34.6       Radiology  HCT 11/16/18  Microvascular disease. No evidence for acute intracranial hemorrhage or   new acute transcortical territorial infarction.  CT angiography of the extracranial circulation demonstrates no interval   change. There is left internal carotid artery 75% in-stent stenosis.  CT angiography of the intracranial circulation is unchanged. There are   multiple tandem moderate tosevere stenoses along bilateral [place], MCA, and   PCA vessels as described above. Most significantly, is high-grade   stenosis along the left M2 superior division, unchanged from prior   imaging.        MRI pending    MEDICATIONS  (STANDING):  atorvastatin 80 milliGRAM(s) Oral at bedtime  chlorhexidine 4% Liquid 1 Application(s) Topical <User Schedule>  clopidogrel Tablet 75 milliGRAM(s) Oral daily  heparin  Injectable 5000 Unit(s) SubCutaneous every 8 hours  midodrine 20 milliGRAM(s) Oral every 8 hours  phenylephrine    Infusion 0.5 MICROgram(s)/kG/Min (13.2 mL/Hr) IV Continuous <Continuous>  sodium chloride 0.9%. 1000 milliLiter(s) (50 mL/Hr) IV Continuous <Continuous>    MEDICATIONS  (PRN):

## 2018-11-17 NOTE — PROGRESS NOTE ADULT - ASSESSMENT
63y Female  s/p left transcarotid arterial sten via right femoral vein access; course complicated by code stroke approximately 8 hours postop. now in SICU for monitoring    PLAN:  -care per sicu  -f/u MRI reading  -appreciate neurology, speech/swallow recs  -pain control

## 2018-11-17 NOTE — PROGRESS NOTE ADULT - ASSESSMENT
ASSESSMENT: 64y/o F w/ HTN, HLD, RA, Asthma s/p elective left transcarotid arterial sten via right femoral vein access (11/13/18). course complicated by code stroke approximately 8 hours postop. CTA head and neck showed a distal M2 branch occlusion. She was deemed not a candidate for intracranial thrombectomy due to the location of the M2 occlusion at that time. Patient monitored in SICU, found to be stable, and sent to the floor. The following day patient was found with possible worsening right side weakness and worsening aphasia. Code stroke called. CT showed no acute infarct and CTA was stable as compared to prior study. Concern by neuro that worsening of right sided deficits and aphasia possibly secondary to hypoperfusion of area supplied by the L M2 high grade stenosis due to fluctuating blood pressure.    Code Status: full    PLAN:     Neurologic:   - CT: left M2 segment branch occlusion, Alternating areas of stenosis involving the distal left [place] branch vasculature  - repeat CT unchanged  - Q1H neurological exams  - will obtain MRI per neuro recs  - SBP goal 140-150                                                                                                                                                                                                                                                                                                                                              Pulmonary:   - stable on RA    Cardiovascular:   - permissive hypertension per neurology  - Maintain SBPs 140-160  - Continue madai gtt  - Start Midodrine 10 Q8H - can then wean madai if needed    Gastrointestinal:  - dysphagia diet    Renal:  - monitor I/O  - replete lytes PRN    Hematologic:  - c/w plavix, SQH    Infectious Disease: no active issues     Endocrine: f/u BMP      Critical care diagnoses: SICU ASSESSMENT: 62y/o F w/ HTN, HLD, RA, Asthma s/p elective left transcarotid arterial sten via right femoral vein access (11/13/18). course complicated by code stroke approximately 8 hours postop. CTA head and neck showed a distal M2 branch occlusion. She was deemed not a candidate for intracranial thrombectomy due to the location of the M2 occlusion at that time. Patient monitored in SICU, found to be stable, and sent to the floor. The following day patient was found with possible worsening right side weakness and worsening aphasia. Code stroke called. CT showed no acute infarct and CTA was stable as compared to prior study. Concern by neuro that worsening of right sided deficits and aphasia possibly secondary to hypoperfusion of area supplied by the L M2 high grade stenosis due to fluctuating blood pressure.    Code Status: full    PLAN:     Neurologic:   - CT: left M2 segment branch occlusion, Alternating areas of stenosis involving the distal left [place] branch vasculature  - repeat CT unchanged  - Q1H neurological exams  - will obtain MRI per neuro recs  - SBP goal 140-150                                                                                                                                                                                                                                                                                                                                              Pulmonary:   - stable on RA    Cardiovascular:   - permissive hypertension per neurology  - Maintain SBPs 140-160  - Continue madai gtt  - increase Midodrine 10 to 20mg Q8H - can then wean madai if needed    Gastrointestinal:  - dysphagia diet    Renal:  - monitor I/O  - replete lytes PRN    Hematologic:  - c/w plavix, SQH    Infectious Disease: no active issues     Endocrine: f/u BMP      Critical care diagnoses: SICU

## 2018-11-18 LAB
BUN SERPL-MCNC: 10 MG/DL — SIGNIFICANT CHANGE UP (ref 7–23)
CA-I BLD-SCNC: 1.05 MMOL/L — SIGNIFICANT CHANGE UP (ref 1.03–1.23)
CALCIUM SERPL-MCNC: 8.1 MG/DL — LOW (ref 8.4–10.5)
CHLORIDE SERPL-SCNC: 106 MMOL/L — SIGNIFICANT CHANGE UP (ref 98–107)
CO2 SERPL-SCNC: 22 MMOL/L — SIGNIFICANT CHANGE UP (ref 22–31)
CREAT SERPL-MCNC: 0.92 MG/DL — SIGNIFICANT CHANGE UP (ref 0.5–1.3)
GLUCOSE SERPL-MCNC: 81 MG/DL — SIGNIFICANT CHANGE UP (ref 70–99)
HCT VFR BLD CALC: 31.6 % — LOW (ref 34.5–45)
HGB BLD-MCNC: 9.7 G/DL — LOW (ref 11.5–15.5)
MAGNESIUM SERPL-MCNC: 2.2 MG/DL — SIGNIFICANT CHANGE UP (ref 1.6–2.6)
MCHC RBC-ENTMCNC: 24 PG — LOW (ref 27–34)
MCHC RBC-ENTMCNC: 30.7 % — LOW (ref 32–36)
MCV RBC AUTO: 78 FL — LOW (ref 80–100)
NRBC # FLD: 0 — SIGNIFICANT CHANGE UP
PHOSPHATE SERPL-MCNC: 2 MG/DL — LOW (ref 2.5–4.5)
PLATELET # BLD AUTO: 258 K/UL — SIGNIFICANT CHANGE UP (ref 150–400)
PMV BLD: 11.1 FL — SIGNIFICANT CHANGE UP (ref 7–13)
POTASSIUM SERPL-MCNC: 4.2 MMOL/L — SIGNIFICANT CHANGE UP (ref 3.5–5.3)
POTASSIUM SERPL-SCNC: 4.2 MMOL/L — SIGNIFICANT CHANGE UP (ref 3.5–5.3)
RBC # BLD: 4.05 M/UL — SIGNIFICANT CHANGE UP (ref 3.8–5.2)
RBC # FLD: 17.5 % — HIGH (ref 10.3–14.5)
SODIUM SERPL-SCNC: 138 MMOL/L — SIGNIFICANT CHANGE UP (ref 135–145)
WBC # BLD: 19.9 K/UL — HIGH (ref 3.8–10.5)
WBC # FLD AUTO: 19.9 K/UL — HIGH (ref 3.8–10.5)

## 2018-11-18 PROCEDURE — 99233 SBSQ HOSP IP/OBS HIGH 50: CPT

## 2018-11-18 RX ADMIN — MIDODRINE HYDROCHLORIDE 20 MILLIGRAM(S): 2.5 TABLET ORAL at 06:15

## 2018-11-18 RX ADMIN — HEPARIN SODIUM 5000 UNIT(S): 5000 INJECTION INTRAVENOUS; SUBCUTANEOUS at 00:35

## 2018-11-18 RX ADMIN — CLOPIDOGREL BISULFATE 75 MILLIGRAM(S): 75 TABLET, FILM COATED ORAL at 14:20

## 2018-11-18 RX ADMIN — SODIUM CHLORIDE 50 MILLILITER(S): 9 INJECTION INTRAMUSCULAR; INTRAVENOUS; SUBCUTANEOUS at 23:09

## 2018-11-18 RX ADMIN — Medication 63.75 MILLIMOLE(S): at 06:15

## 2018-11-18 RX ADMIN — HEPARIN SODIUM 5000 UNIT(S): 5000 INJECTION INTRAVENOUS; SUBCUTANEOUS at 08:40

## 2018-11-18 RX ADMIN — HEPARIN SODIUM 5000 UNIT(S): 5000 INJECTION INTRAVENOUS; SUBCUTANEOUS at 17:12

## 2018-11-18 RX ADMIN — SODIUM CHLORIDE 50 MILLILITER(S): 9 INJECTION INTRAMUSCULAR; INTRAVENOUS; SUBCUTANEOUS at 08:39

## 2018-11-18 RX ADMIN — ATORVASTATIN CALCIUM 80 MILLIGRAM(S): 80 TABLET, FILM COATED ORAL at 23:06

## 2018-11-18 RX ADMIN — CHLORHEXIDINE GLUCONATE 1 APPLICATION(S): 213 SOLUTION TOPICAL at 23:06

## 2018-11-18 RX ADMIN — PHENYLEPHRINE HYDROCHLORIDE 13.2 MICROGRAM(S)/KG/MIN: 10 INJECTION INTRAVENOUS at 08:40

## 2018-11-18 RX ADMIN — MIDODRINE HYDROCHLORIDE 20 MILLIGRAM(S): 2.5 TABLET ORAL at 14:20

## 2018-11-18 RX ADMIN — MIDODRINE HYDROCHLORIDE 20 MILLIGRAM(S): 2.5 TABLET ORAL at 23:09

## 2018-11-18 NOTE — PROGRESS NOTE ADULT - SUBJECTIVE AND OBJECTIVE BOX
SICU AM PROGRESS NOTE    HISTORY  64y/o F w/ HTN, HLD, RA, Asthma  s/p elective left transcarotid arterial sten via right femoral vein access (11/13/18). course complicated by code stroke approximately 8 hours postop. CTA head and neck showed a distal M2 branch occlusion. She was deemed not a candidate for intracranial thrombectomy due to the location of the M2 occlusion at that time. Patient monitored in SICU, found to be stable, and sent to the floor. The following day patient was found with possible worsening right side weakness and worsening aphasia. CT showed no acute infarct and CTA showed ~75% stenosis of left carotid stent    24 HOUR EVENTS:  - midodrine increased to 20mg Q8  - attempted to contact neurology for definitive plan  - obtained MRI head; awaiting official read   - continuing waxing and waning mental status, aphasia, R sided weakness      SUBJECTIVE/ROS:  [ ] A ten-point review of systems was otherwise negative except as noted.  [X] Due to altered mental status/intubation, subjective information were not able to be obtained from the patient. History was obtained, to the extent possible, from review of the chart and collateral sources of information.      NEURO  Exam:  - waxing/waning speech  - R tongue deviation  - Mid R lip droop  [x] Adequacy of sedation and pain control has been assessed and adjusted      RESPIRATORY  RR: 13 (11-17-18 @ 01:00) (13 - 25)  SpO2: 100% (11-17-18 @ 01:00) (92% - 100%)  Wt(kg): --  Exam: Lungs clear to auscultation, Normal expansion/effort      CARDIOVASCULAR  HR: 66 (11-17-18 @ 01:00) (59 - 122)  BP: 138/66 (11-17-18 @ 01:00) (92/62 - 197/81)  BP(mean): 86 (11-17-18 @ 01:00) (61 - 111)  ABP: --  ABP(mean): --  Wt(kg): --  CVP(cm H2O): --      Exam: S1, S2  Cardiac Rhythm: normal sinus  Meds: midodrine 10 milliGRAM(s) Oral every 8 hours  phenylephrine    Infusion 0.5 MICROgram(s)/kG/Min IV Continuous <Continuous>        GI/NUTRITION  Exam: Abdomen soft, Non-tender, Non-distended  Current Diet:  NPO      GENITOURINARY  I&O's Detail    11-15 @ 07:01 - 11-16 @ 07:00  --------------------------------------------------------  IN:    IV PiggyBack: 350 mL    lactated ringers.: 225 mL  Total IN: 575 mL    OUT:    Voided: 2900 mL  Total OUT: 2900 mL    Total NET: -2325 mL      11-16 @ 07:01 - 11-17 @ 02:29  --------------------------------------------------------  IN:    phenylephrine   Infusion: 184 mL    phenylephrine   Infusion: 658.8 mL    sodium chloride 0.9%.: 800 mL  Total IN: 1642.8 mL    OUT:    Voided: 1125 mL  Total OUT: 1125 mL    Total NET: 517.8 mL          11-16    134<L>  |  93<L>  |  12  ----------------------------<  147<H>  4.0   |  28  |  1.04    Ca    9.0      16 Nov 2018 06:18  Phos  3.3     11-16  Mg     2.2     11-16    TPro  6.4  /  Alb  3.8  /  TBili  1.3<H>  /  DBili  x   /  AST  14  /  ALT  14  /  AlkPhos  64  11-16    [ ] Sandoval catheter, indication: N/A  Meds: sodium chloride 0.9%. 1000 milliLiter(s) IV Continuous <Continuous>        HEMATOLOGIC  Meds: clopidogrel Tablet 75 milliGRAM(s) Oral daily  heparin  Injectable 5000 Unit(s) SubCutaneous every 8 hours    [x] VTE Prophylaxis                        10.7   22.52 )-----------( 233      ( 16 Nov 2018 06:18 )             33.8     PT/INR - ( 16 Nov 2018 06:18 )   PT: 13.5 SEC;   INR: 1.21          PTT - ( 16 Nov 2018 06:18 )  PTT:42.0 SEC  Transfusion     [ ] PRBC   [ ] Platelets   [ ] FFP   [ ] Cryoprecipitate      INFECTIOUS DISEASES  T(C): 36.7 (11-17-18 @ 00:00), Max: 37.4 (11-16-18 @ 08:13)  Wt(kg): --  WBC Count: 22.52 K/uL (11-16 @ 06:18)    Recent Cultures:    Meds:       ENDOCRINE  CAPILLARY BLOOD GLUCOSE      POCT Blood Glucose.: 161 mg/dL (16 Nov 2018 06:49)    Meds: atorvastatin 80 milliGRAM(s) Oral at bedtime        ACCESS DEVICES:  [X] Peripheral IV  [ ] Central Venous Line	[ ] R	[ ] L	[ ] IJ	[ ] Fem	[ ] SC	Placed:   [ ] Arterial Line		[ ] R	[ ] L	[ ] Fem	[ ] Rad	[ ] Ax	Placed:   [ ] PICC:					[ ] Mediport  [ ] Urinary Catheter, Date Placed:   [ ] Necessity of urinary, arterial, and venous catheters discussed    OTHER MEDICATIONS:  chlorhexidine 4% Liquid 1 Application(s) Topical <User Schedule>      IMAGING:  < from: CT Angio Neck w/ IV Cont (11.16.18 @ 08:05) >  IMPRESSION:  Microvascular disease. No evidence for acute intracranial hemorrhage or   new acute transcortical territorial infarction.  CT angiography of the extracranial circulation demonstrates no interval   change. There is left internal carotid artery 75% in-stent stenosis.  CT angiography of the intracranial circulation is unchanged. There are   multiple tandem moderate tosevere stenoses along bilateral [place], MCA, and   PCA vessels as described above. Most significantly, is high-grade   stenosis along the left M2 superior division, unchanged from prior   imaging.    < end of copied text > SICU AM PROGRESS NOTE    HISTORY  64y/o F w/ HTN, HLD, RA, Asthma  s/p elective left transcarotid arterial sten via right femoral vein access (11/13/18). course complicated by code stroke approximately 8 hours postop. CTA head and neck showed a distal M2 branch occlusion. She was deemed not a candidate for intracranial thrombectomy due to the location of the M2 occlusion at that time. Patient monitored in SICU, found to be stable, and sent to the floor. The following day patient was found with possible worsening right side weakness and worsening aphasia. CT showed no acute infarct and CTA showed ~75% stenosis of left carotid stent    24 HOUR EVENTS:  - midodrine increased to 20mg Q8  - attempted to contact neurology for definitive plan  - obtained MRI head; awaiting official read   - continuing waxing and waning mental status, aphasia, R sided weakness      SUBJECTIVE/ROS:  [ ] A ten-point review of systems was otherwise negative except as noted.  [X] Due to altered mental status/intubation, subjective information were not able to be obtained from the patient. History was obtained, to the extent possible, from review of the chart and collateral sources of information.      NEURO  Exam:  - waxing/waning speech  - R tongue deviation  - Mid R lip droop  [x] Adequacy of sedation and pain control has been assessed and adjusted      RESPIRATORY  RR: 13 (11-17-18 @ 01:00) (13 - 25)  SpO2: 100% (11-17-18 @ 01:00) (92% - 100%)  Wt(kg): --  Exam: Lungs clear to auscultation, Normal expansion/effort      CARDIOVASCULAR  HR: 66 (11-17-18 @ 01:00) (59 - 122)  BP: 138/66 (11-17-18 @ 01:00) (92/62 - 197/81)  BP(mean): 86 (11-17-18 @ 01:00) (61 - 111)      Exam: S1, S2  Cardiac Rhythm: normal sinus  Meds: midodrine 10 milliGRAM(s) Oral every 8 hours  phenylephrine    Infusion 0.5 MICROgram(s)/kG/Min IV Continuous <Continuous>        GI/NUTRITION  Exam: Abdomen soft, Non-tender, Non-distended  Current Diet:  NPO      GENITOURINARY  I&O's Detail    11-15 @ 07:01  -  11-16 @ 07:00  --------------------------------------------------------  IN:    IV PiggyBack: 350 mL    lactated ringers.: 225 mL  Total IN: 575 mL    OUT:    Voided: 2900 mL  Total OUT: 2900 mL    Total NET: -2325 mL      11-16 @ 07:01 - 11-17 @ 02:29  --------------------------------------------------------  IN:    phenylephrine   Infusion: 184 mL    phenylephrine   Infusion: 658.8 mL    sodium chloride 0.9%.: 800 mL  Total IN: 1642.8 mL    OUT:    Voided: 1125 mL  Total OUT: 1125 mL    Total NET: 517.8 mL          11-16    134<L>  |  93<L>  |  12  ----------------------------<  147<H>  4.0   |  28  |  1.04    Ca    9.0      16 Nov 2018 06:18  Phos  3.3     11-16  Mg     2.2     11-16    TPro  6.4  /  Alb  3.8  /  TBili  1.3<H>  /  DBili  x   /  AST  14  /  ALT  14  /  AlkPhos  64  11-16    [ ] Sandoval catheter, indication: N/A  Meds: sodium chloride 0.9%. 1000 milliLiter(s) IV Continuous <Continuous>        HEMATOLOGIC  Meds: clopidogrel Tablet 75 milliGRAM(s) Oral daily  heparin  Injectable 5000 Unit(s) SubCutaneous every 8 hours    [x] VTE Prophylaxis                        10.7   22.52 )-----------( 233      ( 16 Nov 2018 06:18 )             33.8     PT/INR - ( 16 Nov 2018 06:18 )   PT: 13.5 SEC;   INR: 1.21          PTT - ( 16 Nov 2018 06:18 )  PTT:42.0 SEC  Transfusion     [ ] PRBC   [ ] Platelets   [ ] FFP   [ ] Cryoprecipitate      INFECTIOUS DISEASES  T(C): 36.7 (11-17-18 @ 00:00), Max: 37.4 (11-16-18 @ 08:13)  Wt(kg): --  WBC Count: 22.52 K/uL (11-16 @ 06:18)    Recent Cultures:    Meds:       ENDOCRINE  CAPILLARY BLOOD GLUCOSE      POCT Blood Glucose.: 161 mg/dL (16 Nov 2018 06:49)    Meds: atorvastatin 80 milliGRAM(s) Oral at bedtime        ACCESS DEVICES:  [X] Peripheral IV  [ ] Central Venous Line	[ ] R	[ ] L	[ ] IJ	[ ] Fem	[ ] SC	Placed:   [ ] Arterial Line		[ ] R	[ ] L	[ ] Fem	[ ] Rad	[ ] Ax	Placed:   [ ] PICC:					[ ] Mediport  [ ] Urinary Catheter, Date Placed:   [ ] Necessity of urinary, arterial, and venous catheters discussed    OTHER MEDICATIONS:  chlorhexidine 4% Liquid 1 Application(s) Topical <User Schedule>      IMAGING:  < from: CT Angio Neck w/ IV Cont (11.16.18 @ 08:05) >  IMPRESSION:  Microvascular disease. No evidence for acute intracranial hemorrhage or   new acute transcortical territorial infarction.  CT angiography of the extracranial circulation demonstrates no interval   change. There is left internal carotid artery 75% in-stent stenosis.  CT angiography of the intracranial circulation is unchanged. There are   multiple tandem moderate tosevere stenoses along bilateral [place], MCA, and   PCA vessels as described above. Most significantly, is high-grade   stenosis along the left M2 superior division, unchanged from prior   imaging.    < end of copied text >

## 2018-11-18 NOTE — PROGRESS NOTE ADULT - ASSESSMENT
ASSESSMENT: 64y/o F w/ HTN, HLD, RA, Asthma s/p elective left transcarotid arterial sten via right femoral vein access (11/13/18). course complicated by code stroke approximately 8 hours postop. CTA head and neck showed a distal M2 branch occlusion. She was deemed not a candidate for intracranial thrombectomy due to the location of the M2 occlusion at that time. Patient monitored in SICU, found to be stable, and sent to the floor. The following day patient was found with possible worsening right side weakness and worsening aphasia. Code stroke called. CT showed no acute infarct and CTA was stable as compared to prior study. Concern by neuro that worsening of right sided deficits and aphasia possibly secondary to hypoperfusion of area supplied by the L M2 high grade stenosis due to fluctuating blood pressure.    Code Status: full    PLAN:     Neurologic:   - CT: left M2 segment branch occlusion, Alternating areas of stenosis involving the distal left [place] branch vasculature  - repeat CT unchanged  - Q1H neurological exams  - will obtain MRI per neuro recs  - SBP goal 140-150                                                                                                                                                                                                                                                                                                                                              Pulmonary:   - stable on RA    Cardiovascular:   - permissive hypertension per neurology  - Maintain SBPs 140-160  - Continue madai gtt  - increase Midodrine 10 to 20mg Q8H - can then wean madai if needed    Gastrointestinal:  - dysphagia diet    Renal:  - monitor I/O  - replete lytes PRN    Hematologic:  - c/w plavix, SQH    Infectious Disease: no active issues     Endocrine: f/u BMP      Critical care diagnoses: SICU ASSESSMENT: 62y/o F w/ HTN, HLD, RA, Asthma s/p elective left transcarotid arterial sten via right femoral vein access (11/13/18). course complicated by code stroke approximately 8 hours postop. CTA head and neck showed a distal M2 branch occlusion. She was deemed not a candidate for intracranial thrombectomy due to the location of the M2 occlusion at that time. Patient monitored in SICU, found to be stable, and sent to the floor. The following day patient was found with possible worsening right side weakness and worsening aphasia. Code stroke called. CT showed no acute infarct and CTA was stable as compared to prior study. Concern by neuro that worsening of right sided deficits and aphasia possibly secondary to hypoperfusion of area supplied by the L M2 high grade stenosis due to fluctuating blood pressure.    Code Status: full    PLAN:     Neurologic:   - CT: left M2 segment branch occlusion, Alternating areas of stenosis involving the distal left [place] branch vasculature  - repeat CT unchanged  - Q1H neurological exams  - MRI obtained - prelim read unchanged, f/u full read                                                                                                                                                                                                                                                                                                                                        Pulmonary:   - stable on RA    Cardiovascular:   - HR 60's  - BP goal 140-160  - Midodrine 20mg q8h    Gastrointestinal:  - dysphagia diet    Renal:  - monitor I/O  - replete lytes PRN  - cont w/ IVF 50cc until consistently eating    Hematologic:  - c/w plavix, SQH    Infectious Disease: no active issues     Endocrine: f/u BMP      Critical care diagnoses: SICU

## 2018-11-18 NOTE — PROGRESS NOTE ADULT - SUBJECTIVE AND OBJECTIVE BOX
General Surgery Progress Note    SUBJECTIVE:  The patient was seen and examined. No acute events overnight.   Pain controlled.  Aphasia improving. A&O to person, and place.    OBJECTIVE:     ** VITAL SIGNS / I&O's **    Vital Signs Last 24 Hrs  T(C): 37.1 (18 Nov 2018 08:00), Max: 37.2 (17 Nov 2018 20:00)  T(F): 98.7 (18 Nov 2018 08:00), Max: 98.9 (17 Nov 2018 20:00)  HR: 61 (18 Nov 2018 08:30) (60 - 78)  BP: 182/82 (18 Nov 2018 08:30) (106/54 - 188/79)  BP(mean): 102 (18 Nov 2018 08:30) (58 - 108)  RR: 19 (18 Nov 2018 08:30) (16 - 23)  SpO2: 100% (18 Nov 2018 08:30) (99% - 100%)    I&O's Detail    17 Nov 2018 07:01  -  18 Nov 2018 07:00  --------------------------------------------------------  IN:    IV PiggyBack: 250 mL    Oral Fluid: 400 mL    phenylephrine   Infusion: 968.9 mL    sodium chloride 0.9%.: 1100 mL  Total IN: 2718.9 mL    OUT:    Voided: 1200 mL  Total OUT: 1200 mL    Total NET: 1518.9 mL      18 Nov 2018 07:01  -  18 Nov 2018 09:21  --------------------------------------------------------  IN:    phenylephrine   Infusion: 69 mL    sodium chloride 0.9%.: 100 mL  Total IN: 169 mL    OUT:  Total OUT: 0 mL    Total NET: 169 mL        ** PHYSICAL EXAM **    -- CONSTITUTIONAL: Alert, NAD  -- PULMONARY: non-labored respirations  -- EXTR: ROM/strength: 2/5 RUE, 3/5 RLE. Right groin puncture dressing c/d/i, right groin soft, no evidence of hematoma  -- NEURO: apahasic with improving R. facial droop, tongue deviation to right.     ** LABS **                                 9.7    19.90 )-----------( 258      ( 18 Nov 2018 03:19 )             31.6     11-18    138  |  106  |  10  ----------------------------<  81  4.2   |  22  |  0.92    Ca    8.1<L>      18 Nov 2018 03:19  Phos  2.0     11-18  Mg     2.2     11-18       < from: MR Angio Neck w/wo IV Cont (11.17.18 @ 23:03) >    ******PRELIMINARY REPORT******    ******PRELIMINARY REPORT******            EXAM:  MR ANGIO NECK WAW IC        PROCEDURE DATE:  Nov 17 2018     ******PRELIMINARY REPORT******    ******PRELIMINARY REPORT******            INTERPRETATION:  Limited evaluation of the left internal carotid artery   at the level of the stent secondary to signal dropout from susceptibility   artifiact from the metallic stent. There is flow in the L internal   carotid artery distally. Findings are better appreciated on the prior CT   angio brain/neck.    Reviewed with radiologist Dr. Richard Barraza. d/w Dr. Veras            ******PRELIMINARY REPORT******    ******PRELIMINARY REPORT******          ALBARO GUTIERREZ M.D., RADIOLOGY RESIDENT

## 2018-11-18 NOTE — PROGRESS NOTE ADULT - ASSESSMENT
63y Female  s/p left transcarotid arterial sten via right femoral vein access; course complicated by code stroke approximately 8 hours postop. now in SICU for monitoring    PLAN:  -care per sicu  -f/u official MRI reading  -appreciate neurology, speech/swallow recs  -pain control

## 2018-11-18 NOTE — PROGRESS NOTE ADULT - ATTENDING COMMENTS
62y/o F w/ HTN, HLD, RA, Asthma s/p elective left transcarotid arterial sten via right femoral vein access (11/13/18). course complicated by code stroke approximately 8 hours postop. CTA head and neck showed a distal M2 branch occlusion. She was deemed not a candidate for intracranial thrombectomy due to the location of the M2 occlusion at that time. Patient monitored in SICU, found to be stable, and sent to the floor. The following day patient was found with possible worsening right side weakness and worsening aphasia. Code stroke called. CT showed no acute infarct and CTA was stable as compared to prior study. Concern by neuro that worsening of right sided deficits and aphasia possibly secondary to hypoperfusion of area supplied by the L M2 high grade stenosis due to fluctuating blood pressure.    Code Status: full    PLAN:     Neurologic:   - CT: left M2 segment branch occlusion, Alternating areas of stenosis involving the distal left [place] branch vasculature  - repeat CT unchanged  - Q1H neurological exams  - will obtain MRI per neuro recs  - SBP goal 140-150                                                                                                                                                                                                                                                                                                                                              Pulmonary:   - stable on RA    Cardiovascular:   - permissive hypertension per neurology  - Maintain SBPs 140-160  - Continue madai gtt  - increase Midodrine 10 to 20mg Q8H - can then wean madai if needed    Gastrointestinal:  - dysphagia diet    Renal:  - monitor I/O  - replete lytes PRN    Hematologic:  - c/w plavix, SQH    Infectious Disease: no active issues     Endocrine: f/u BMP      Critical care diagnoses: SICU  The patient is a critical care patient with life threatening hemodynamic and metabolic instability in SICU.  I have personally interviewed when possible and examined the patient, reviewed data and laboratory tests/x-rays and all pertinent electronic images.  I was physically present for the key portions of the evaluation and management (E/M) service provided.   The SICU team has a constant risk benefit analyzes discussion with the primary team, all consultants, House Staff and PA's on all decisions.  These diagnoses are unrelated to the surgical procedure noted above.  I meet with family if needed to get further history, discuss the case and make care decisions for this patient who might not be able to participate.  Time involved in performance of separately billable procedures was not counted toward my critical care time. There is no overlap.  I spent 55-75 minutes of critical care time for the diagnoses, assessment, plan and interventions.

## 2018-11-19 LAB
BUN SERPL-MCNC: 11 MG/DL — SIGNIFICANT CHANGE UP (ref 7–23)
CALCIUM SERPL-MCNC: 7.8 MG/DL — LOW (ref 8.4–10.5)
CHLORIDE SERPL-SCNC: 107 MMOL/L — SIGNIFICANT CHANGE UP (ref 98–107)
CO2 SERPL-SCNC: 24 MMOL/L — SIGNIFICANT CHANGE UP (ref 22–31)
CREAT SERPL-MCNC: 1.04 MG/DL — SIGNIFICANT CHANGE UP (ref 0.5–1.3)
GLUCOSE SERPL-MCNC: 83 MG/DL — SIGNIFICANT CHANGE UP (ref 70–99)
HCT VFR BLD CALC: 28.1 % — LOW (ref 34.5–45)
HGB BLD-MCNC: 8.5 G/DL — LOW (ref 11.5–15.5)
MAGNESIUM SERPL-MCNC: 2.2 MG/DL — SIGNIFICANT CHANGE UP (ref 1.6–2.6)
MCHC RBC-ENTMCNC: 23.2 PG — LOW (ref 27–34)
MCHC RBC-ENTMCNC: 30.2 % — LOW (ref 32–36)
MCV RBC AUTO: 76.6 FL — LOW (ref 80–100)
NRBC # FLD: 0 — SIGNIFICANT CHANGE UP
PHOSPHATE SERPL-MCNC: 1.7 MG/DL — LOW (ref 2.5–4.5)
PLATELET # BLD AUTO: 230 K/UL — SIGNIFICANT CHANGE UP (ref 150–400)
PMV BLD: 11.3 FL — SIGNIFICANT CHANGE UP (ref 7–13)
POTASSIUM SERPL-MCNC: 3.8 MMOL/L — SIGNIFICANT CHANGE UP (ref 3.5–5.3)
POTASSIUM SERPL-SCNC: 3.8 MMOL/L — SIGNIFICANT CHANGE UP (ref 3.5–5.3)
RBC # BLD: 3.67 M/UL — LOW (ref 3.8–5.2)
RBC # FLD: 17.3 % — HIGH (ref 10.3–14.5)
SODIUM SERPL-SCNC: 139 MMOL/L — SIGNIFICANT CHANGE UP (ref 135–145)
WBC # BLD: 13.14 K/UL — HIGH (ref 3.8–10.5)
WBC # FLD AUTO: 13.14 K/UL — HIGH (ref 3.8–10.5)

## 2018-11-19 PROCEDURE — 99233 SBSQ HOSP IP/OBS HIGH 50: CPT

## 2018-11-19 PROCEDURE — 51701 INSERT BLADDER CATHETER: CPT

## 2018-11-19 PROCEDURE — 99233 SBSQ HOSP IP/OBS HIGH 50: CPT | Mod: GC

## 2018-11-19 RX ORDER — PHENYLEPHRINE HYDROCHLORIDE 10 MG/ML
0.5 INJECTION INTRAVENOUS
Qty: 40 | Refills: 0 | Status: DISCONTINUED | OUTPATIENT
Start: 2018-11-19 | End: 2018-11-19

## 2018-11-19 RX ORDER — POTASSIUM PHOSPHATE, MONOBASIC POTASSIUM PHOSPHATE, DIBASIC 236; 224 MG/ML; MG/ML
15 INJECTION, SOLUTION INTRAVENOUS ONCE
Qty: 0 | Refills: 0 | Status: COMPLETED | OUTPATIENT
Start: 2018-11-19 | End: 2018-11-19

## 2018-11-19 RX ORDER — SODIUM CHLORIDE 9 MG/ML
500 INJECTION, SOLUTION INTRAVENOUS ONCE
Qty: 0 | Refills: 0 | Status: COMPLETED | OUTPATIENT
Start: 2018-11-19 | End: 2018-11-19

## 2018-11-19 RX ORDER — HYDROCORTISONE 20 MG
50 TABLET ORAL EVERY 6 HOURS
Qty: 0 | Refills: 0 | Status: DISCONTINUED | OUTPATIENT
Start: 2018-11-19 | End: 2018-11-21

## 2018-11-19 RX ORDER — ALBUTEROL 90 UG/1
2 AEROSOL, METERED ORAL EVERY 6 HOURS
Qty: 0 | Refills: 0 | Status: DISCONTINUED | OUTPATIENT
Start: 2018-11-19 | End: 2018-11-28

## 2018-11-19 RX ORDER — PHENYLEPHRINE HYDROCHLORIDE 10 MG/ML
0.1 INJECTION INTRAVENOUS
Qty: 40 | Refills: 0 | Status: DISCONTINUED | OUTPATIENT
Start: 2018-11-19 | End: 2018-11-20

## 2018-11-19 RX ADMIN — SODIUM CHLORIDE 50 MILLILITER(S): 9 INJECTION INTRAMUSCULAR; INTRAVENOUS; SUBCUTANEOUS at 08:11

## 2018-11-19 RX ADMIN — POTASSIUM PHOSPHATE, MONOBASIC POTASSIUM PHOSPHATE, DIBASIC 62.5 MILLIMOLE(S): 236; 224 INJECTION, SOLUTION INTRAVENOUS at 05:57

## 2018-11-19 RX ADMIN — Medication 50 MILLIGRAM(S): at 16:03

## 2018-11-19 RX ADMIN — HEPARIN SODIUM 5000 UNIT(S): 5000 INJECTION INTRAVENOUS; SUBCUTANEOUS at 01:46

## 2018-11-19 RX ADMIN — SODIUM CHLORIDE 50 MILLILITER(S): 9 INJECTION INTRAMUSCULAR; INTRAVENOUS; SUBCUTANEOUS at 19:36

## 2018-11-19 RX ADMIN — CHLORHEXIDINE GLUCONATE 1 APPLICATION(S): 213 SOLUTION TOPICAL at 21:47

## 2018-11-19 RX ADMIN — PHENYLEPHRINE HYDROCHLORIDE 13.2 MICROGRAM(S)/KG/MIN: 10 INJECTION INTRAVENOUS at 11:07

## 2018-11-19 RX ADMIN — POTASSIUM PHOSPHATE, MONOBASIC POTASSIUM PHOSPHATE, DIBASIC 62.5 MILLIMOLE(S): 236; 224 INJECTION, SOLUTION INTRAVENOUS at 12:06

## 2018-11-19 RX ADMIN — HEPARIN SODIUM 5000 UNIT(S): 5000 INJECTION INTRAVENOUS; SUBCUTANEOUS at 08:11

## 2018-11-19 RX ADMIN — SODIUM CHLORIDE 1000 MILLILITER(S): 9 INJECTION, SOLUTION INTRAVENOUS at 13:46

## 2018-11-19 RX ADMIN — PHENYLEPHRINE HYDROCHLORIDE 2.64 MICROGRAM(S)/KG/MIN: 10 INJECTION INTRAVENOUS at 21:12

## 2018-11-19 RX ADMIN — MIDODRINE HYDROCHLORIDE 20 MILLIGRAM(S): 2.5 TABLET ORAL at 13:47

## 2018-11-19 RX ADMIN — ATORVASTATIN CALCIUM 80 MILLIGRAM(S): 80 TABLET, FILM COATED ORAL at 21:47

## 2018-11-19 RX ADMIN — MIDODRINE HYDROCHLORIDE 20 MILLIGRAM(S): 2.5 TABLET ORAL at 21:47

## 2018-11-19 RX ADMIN — MIDODRINE HYDROCHLORIDE 20 MILLIGRAM(S): 2.5 TABLET ORAL at 05:57

## 2018-11-19 RX ADMIN — CLOPIDOGREL BISULFATE 75 MILLIGRAM(S): 75 TABLET, FILM COATED ORAL at 11:10

## 2018-11-19 RX ADMIN — HEPARIN SODIUM 5000 UNIT(S): 5000 INJECTION INTRAVENOUS; SUBCUTANEOUS at 16:01

## 2018-11-19 NOTE — PROGRESS NOTE ADULT - SUBJECTIVE AND OBJECTIVE BOX
Aphasic this morning with right sided weakness.  This appears to me to vary with BP.  Currently her Bp is 103/54. This may be too low for her to maintain a better neurologic exam.  Recommend increased fluid administration to bring up BP at this time  Neck incision clean and intact  Physical therapy and speech eval

## 2018-11-19 NOTE — PROCEDURE NOTE - NSINDICATIONS_GEN_A_CORE
urinry obstruction or retention/history of difficult urethral catheterization
monitoring purposes/critical patient

## 2018-11-19 NOTE — CHART NOTE - NSCHARTNOTEFT_GEN_A_CORE
SURGERY C - TEAM CHART NOTE    CT reviewed with team and radiology. No evidence of thrombus at site of L. ICA stent. Discussed finding w/ neurology who confirms no anticoagulation necessary at present time if no concern for thrombus at stent site.    MEENA Hogue MD, PGY-I  Surgery, C-Team   Pager: [phone]

## 2018-11-19 NOTE — PROCEDURE NOTE - NSPROCDETAILS_GEN_ALL_CORE
sterile technique, indwelling urinary device inserted
location identified, draped/prepped, sterile technique used, needle inserted/introduced/hemostasis with direct pressure, dressing applied/sutured in place/positive blood return obtained via catheter/Seldinger technique/all materials/supplies accounted for at end of procedure

## 2018-11-19 NOTE — PROGRESS NOTE ADULT - ASSESSMENT
63y Female s/p left transcarotid arterial sten via right femoral vein access; course complicated by code stroke approximately 8 hours postop. now in SICU for monitoring    PLAN:  -Recommend increased fluid administration to bring up BP at this time  -care per sicu  -appreciate neurology, speech/swallow recs  -pain control

## 2018-11-19 NOTE — PROCEDURE NOTE - NSURITECHNIQUE_GU_A_CORE
Proper hand hygiene was performed/Sterile gloves were worn for the duration of the procedure/A sterile drape was used to cover all adjacent areas/The site was cleaned with soap/water and sterile solution (betadine)/The urinary drainage system is closed at the end of the procedure/The catheter was appropriately lubricated/The catheter was secured with a securement device (e.g. StatLock)/The collection bag is below the level of the patient and urinary bladder/All applicable medical record documentation is completed

## 2018-11-19 NOTE — PROGRESS NOTE ADULT - SUBJECTIVE AND OBJECTIVE BOX
Neurology Follow up note    Patient is a 63y old  Female who presents with a chief complaint of stroke (17 Nov 2018 10:43)      Subjective: Interval History - No events overnight    Objective:   Vital Signs Last 24 Hrs  T(C): 36.6 (19 Nov 2018 12:00), Max: 37.2 (18 Nov 2018 20:00)  T(F): 97.9 (19 Nov 2018 12:00), Max: 98.9 (18 Nov 2018 20:00)  HR: 84 (19 Nov 2018 14:00) (63 - 101)  BP: 121/65 (19 Nov 2018 14:00) (92/44 - 143/70)  BP(mean): 78 (19 Nov 2018 14:00) (55 - 102)  RR: 22 (19 Nov 2018 14:00) (16 - 22)  SpO2: 99% (19 Nov 2018 14:00) (99% - 100%)    General Exam:   General appearance: No acute distress                   Neurological Exam:  Mental Status: unable to assess orientation as patient not verbalizing. Attention intact. shows 2 fingers, points to ceiling and door, does not state name, does no repeat, does not identify pen and watch.      Cranial Nerves: VFF, no nystagmus or diplopia.  No APD.    CN V1-3 intact to light touch and pinprick.  No facial asymmetry.  Hearing intact to finger rub bilaterally.  Tongue, uvula and palate midline.  Sternocleidomastoid and Trapezius intact bilaterally.    Motor:   Tone: normal.                  Strength: intact throughout  Pronator drift: none                 Dysmeria: None to finger-nose-finger or heel-shin-heel  No truncal ataxia.    Tremor: No resting, postural or action tremor.  No myoclonus.    Sensation: intact to light touch, pinprick, vibration and proprioception    Deep Tendon Reflexes: 1+ bilateral biceps, triceps, brachioradialis, knee and ankle  Toes flexor bilaterally    Gait: normal and stable.      Other:    11-19    139  |  107  |  11  ----------------------------<  83  3.8   |  24  |  1.04    Ca    7.8<L>      19 Nov 2018 02:25  Phos  1.7     11-19  Mg     2.2     11-19 11-19    139  |  107  |  11  ----------------------------<  83  3.8   |  24  |  1.04    Ca    7.8<L>      19 Nov 2018 02:25  Phos  1.7     11-19  Mg     2.2     11-19                              8.5    13.14 )-----------( 230      ( 19 Nov 2018 02:25 )             28.1     Radiology    EKG:  tele:  TTE:  EEG:      MEDICATIONS  (STANDING):  atorvastatin 80 milliGRAM(s) Oral at bedtime  chlorhexidine 4% Liquid 1 Application(s) Topical <User Schedule>  clopidogrel Tablet 75 milliGRAM(s) Oral daily  heparin  Injectable 5000 Unit(s) SubCutaneous every 8 hours  midodrine 20 milliGRAM(s) Oral every 8 hours  phenylephrine    Infusion 0.5 MICROgram(s)/kG/Min (13.2 mL/Hr) IV Continuous <Continuous>  sodium chloride 0.9%. 1000 milliLiter(s) (50 mL/Hr) IV Continuous <Continuous>    MEDICATIONS  (PRN):  ALBUTerol    90 MICROgram(s) HFA Inhaler 2 Puff(s) Inhalation every 6 hours PRN Shortness of Breath and/or Wheezing Neurology Follow up note    Patient is a 63y old  Female who presents with a chief complaint of stroke (17 Nov 2018 10:43)      Subjective: Interval History - No events overnight    Objective:   Vital Signs Last 24 Hrs  T(C): 36.6 (19 Nov 2018 12:00), Max: 37.2 (18 Nov 2018 20:00)  T(F): 97.9 (19 Nov 2018 12:00), Max: 98.9 (18 Nov 2018 20:00)  HR: 84 (19 Nov 2018 14:00) (63 - 101)  BP: 121/65 (19 Nov 2018 14:00) (92/44 - 143/70)  BP(mean): 78 (19 Nov 2018 14:00) (55 - 102)  RR: 22 (19 Nov 2018 14:00) (16 - 22)  SpO2: 99% (19 Nov 2018 14:00) (99% - 100%)    General Exam:   General appearance: No acute distress                   Neurological Exam:  Mental Status: unable to assess orientation as patient not verbalizing. Attention intact. shows 2 fingers, points to ceiling and door, protrudes tongue. does not state name, does not repeat, does not identify pen and watch.     Cranial Nerves: no nystagmus. No facial asymmetry.   Tongue midline          Strength: moving all 4 extremities              Gait: deferred    Other:    11-19    139  |  107  |  11  ----------------------------<  83  3.8   |  24  |  1.04    Ca    7.8<L>      19 Nov 2018 02:25  Phos  1.7     11-19  Mg     2.2     11-19 11-19    139  |  107  |  11  ----------------------------<  83  3.8   |  24  |  1.04    Ca    7.8<L>      19 Nov 2018 02:25  Phos  1.7     11-19  Mg     2.2     11-19                              8.5    13.14 )-----------( 230      ( 19 Nov 2018 02:25 )             28.1     Radiology    EKG:  tele:  TTE:  EEG:      MEDICATIONS  (STANDING):  atorvastatin 80 milliGRAM(s) Oral at bedtime  chlorhexidine 4% Liquid 1 Application(s) Topical <User Schedule>  clopidogrel Tablet 75 milliGRAM(s) Oral daily  heparin  Injectable 5000 Unit(s) SubCutaneous every 8 hours  midodrine 20 milliGRAM(s) Oral every 8 hours  phenylephrine    Infusion 0.5 MICROgram(s)/kG/Min (13.2 mL/Hr) IV Continuous <Continuous>  sodium chloride 0.9%. 1000 milliLiter(s) (50 mL/Hr) IV Continuous <Continuous>    MEDICATIONS  (PRN):  ALBUTerol    90 MICROgram(s) HFA Inhaler 2 Puff(s) Inhalation every 6 hours PRN Shortness of Breath and/or Wheezing Neurology Follow up note    Patient is a 63y old  Female who presents with a chief complaint of stroke (17 Nov 2018 10:43)    Subjective: Interval History - No events overnight    Objective:   Vital Signs Last 24 Hrs  T(C): 36.6 (19 Nov 2018 12:00), Max: 37.2 (18 Nov 2018 20:00)  T(F): 97.9 (19 Nov 2018 12:00), Max: 98.9 (18 Nov 2018 20:00)  HR: 84 (19 Nov 2018 14:00) (63 - 101)  BP: 121/65 (19 Nov 2018 14:00) (92/44 - 143/70)  BP(mean): 78 (19 Nov 2018 14:00) (55 - 102)  RR: 22 (19 Nov 2018 14:00) (16 - 22)  SpO2: 99% (19 Nov 2018 14:00) (99% - 100%)    General Exam:   General appearance: No acute distress                   Neurological Exam:  Mental Status: unable to assess orientation as patient not verbalizing. Attention intact. shows 2 fingers, points to ceiling and door, protrudes tongue. does not state name, does not repeat, does not identify pen and watch.     Cranial Nerves: no nystagmus. No facial asymmetry.   Tongue midline          Strength: moving all 4 extremities              Gait: deferred    Other:    11-19    139  |  107  |  11  ----------------------------<  83  3.8   |  24  |  1.04    Ca    7.8<L>      19 Nov 2018 02:25  Phos  1.7     11-19  Mg     2.2     11-19 11-19    139  |  107  |  11  ----------------------------<  83  3.8   |  24  |  1.04    Ca    7.8<L>      19 Nov 2018 02:25  Phos  1.7     11-19  Mg     2.2     11-19                              8.5    13.14 )-----------( 230      ( 19 Nov 2018 02:25 )             28.1     Radiology    EKG:  tele:  TTE:  EEG:      MEDICATIONS  (STANDING):  atorvastatin 80 milliGRAM(s) Oral at bedtime  chlorhexidine 4% Liquid 1 Application(s) Topical <User Schedule>  clopidogrel Tablet 75 milliGRAM(s) Oral daily  heparin  Injectable 5000 Unit(s) SubCutaneous every 8 hours  midodrine 20 milliGRAM(s) Oral every 8 hours  phenylephrine    Infusion 0.5 MICROgram(s)/kG/Min (13.2 mL/Hr) IV Continuous <Continuous>  sodium chloride 0.9%. 1000 milliLiter(s) (50 mL/Hr) IV Continuous <Continuous>    MEDICATIONS  (PRN):  ALBUTerol    90 MICROgram(s) HFA Inhaler 2 Puff(s) Inhalation every 6 hours PRN Shortness of Breath and/or Wheezing

## 2018-11-19 NOTE — PROGRESS NOTE ADULT - ASSESSMENT
Impression: L MCA infarct likely 2/2 artery to artery embolism from carotid artery.    Plan:  -c/w antiplatelet  -c/w statin 80  -consider anticoagulation for L ICA in-stent thrombosis  -no requirement for pressors to raise BP from neurologic perspective as patient does not have fluctuating symptoms 63 years old woman with multiple vascular risk factors including age, HTN and HPLD was admitted to [facility] for an elective left carotid revascularization of the left ICA stent placement. After the procedure, she was noted to have right-sided weakness and language disturbance in the form of global aphasia. CT brain on 11/13 did not show any evidence of acute infarct or hemorrhage. CTA head and neck on 11/13 to my eye showed multifocal intracranial atherosclerosis including severe stenosis of left MCA proximal and distal M1 segment, multifocal stenosis involving inferior M2 segment and occlusion of the superior M2 segment and left proximal ICA stent. CTA head and neck on 11/16 showed 75% stenosis of left ICA stent (in-stent stenosis), which appears to be same as compared to 11/13 to my eye. MRI brain on 11/17 showed acute infarct in the left MCA distribution. TTE did not show any obvious structural cardiac source of embolism. Neurological examination today shows expressive aphasia and severe right hemiparesis (RUE 0/5 and RLE 2/5).     Impression:  Cerebral embolism with cerebral infarction. Left MCA distribution stroke in the periprocedural period after carotid revascularization with carotid artery stent placement     Plan:  - Agree to continue with clopidogrel 75 mg once a day for secondary stroke prevention in the setting of recent carotid artery stent placement. No absolute neurovascular contraindication to consider dual antiplatelet therapy if indicated from carotid artery stent standpoint.  - Would optimal benefit from repeat conventional angiogram to better characteriz the degree of in-stent stenosis; final decision is deferred to vascular surgeon.  - Atorvastatin 80 mg at bedtime considering likely atheroembolic etiology of her stroke.  - Allow permissive hypertension up to 220/120 mmHg in the setting of ICA stenosis, extensive intracranial atherosclerotic stenoses and recent infarct. Role of induced hypertension with vasopressor agents like phenylephrine at this time (almost a week from her stroke) remains unclear and to be guided based on neurological examination.  - Continue with telemetry.  - HbA1c 6.0, consider checking LDL; continue with aggressive vascular risk factors modification  - PT/OT/speech and swallow evaluation.  - Stroke education.  - Would continue to follow    Above-mentioned plan was discussed with patient in detail. All questions were answered and concerns were addressed.

## 2018-11-19 NOTE — PROGRESS NOTE ADULT - SUBJECTIVE AND OBJECTIVE BOX
GENERAL SURGERY DAILY PROGRESS NOTE:       Subjective:    MR head/neck showed acute versus acute/subacute infarction along the left MCA distribution involving the left frontal corona radiata, centrum semiovale ovale.MR angiography is unchanged from prior CT angiogram. Loss of flow within the right V3 segment. Left internal carotid artery stent susceptibility limits evaluation for flow however, stent narrowing appears grossly unchanged from prior CT angiogram of the neck.      Objective:    PE:  -- CONSTITUTIONAL: Alert, NAD  -- PULMONARY: non-labored respirations  -- EXTR: ROM/strength: 2/5 RUE, 3/5 RLE. Right groin puncture dressing c/d/i, right groin soft, no evidence of hematoma  -- NEURO: apahasic with R. facial droop, tongue deviation to right.     Vital Signs Last 24 Hrs  T(C): 36.6 (2018 12:00), Max: 37.2 (2018 20:00)  T(F): 97.9 (2018 12:00), Max: 98.9 (2018 20:00)  HR: 84 (2018 14:00) (63 - 101)  BP: 121/65 (2018 14:00) (92/44 - 143/70)  BP(mean): 78 (2018 14:00) (55 - 102)  RR: 22 (2018 14:00) (16 - 22)  SpO2: 99% (2018 14:00) (99% - 100%)    I&O's Detail    2018 07:01  -  2018 07:00  --------------------------------------------------------  IN:    IV PiggyBack: 250 mL    Oral Fluid: 300 mL    phenylephrine   Infusion: 121.8 mL    sodium chloride 0.9%.: 1000 mL  Total IN: 1671.8 mL    OUT:    Indwelling Catheter - Urethral: 500 mL    Voided: 1000 mL  Total OUT: 1500 mL    Total NET: 171.8 mL      2018 07:01  -  2018 15:07  --------------------------------------------------------  IN:    IV PiggyBack: 250 mL    Lactated Ringers IV Bolus: 500 mL    Oral Fluid: 300 mL    phenylephrine   Infusion: 13.2 mL    sodium chloride 0.9%.: 200 mL  Total IN: 1263.2 mL    OUT:    Indwelling Catheter - Urethral: 420 mL  Total OUT: 420 mL    Total NET: 843.2 mL          Daily     Daily Weight in k.8 (2018 06:00)    MEDICATIONS  (STANDING):  atorvastatin 80 milliGRAM(s) Oral at bedtime  chlorhexidine 4% Liquid 1 Application(s) Topical <User Schedule>  clopidogrel Tablet 75 milliGRAM(s) Oral daily  heparin  Injectable 5000 Unit(s) SubCutaneous every 8 hours  midodrine 20 milliGRAM(s) Oral every 8 hours  phenylephrine    Infusion 0.5 MICROgram(s)/kG/Min (13.2 mL/Hr) IV Continuous <Continuous>  sodium chloride 0.9%. 1000 milliLiter(s) (50 mL/Hr) IV Continuous <Continuous>    MEDICATIONS  (PRN):  ALBUTerol    90 MICROgram(s) HFA Inhaler 2 Puff(s) Inhalation every 6 hours PRN Shortness of Breath and/or Wheezing      LABS:                        8.5    13.14 )-----------( 230      ( 2018 02:25 )             28.1         139  |  107  |  11  ----------------------------<  83  3.8   |  24  |  1.04    Ca    7.8<L>      2018 02:25  Phos  1.7       Mg     2.2                 RADIOLOGY & ADDITIONAL STUDIES:

## 2018-11-19 NOTE — PROGRESS NOTE ADULT - SUBJECTIVE AND OBJECTIVE BOX
64y/o F w/ HTN, HLD, RA, Asthma  s/p elective left transcarotid arterial sten via right femoral vein access (11/13/18). course complicated by code stroke approximately 8 hours postop. CTA head and neck showed a distal M2 branch occlusion. She was deemed not a candidate for intracranial thrombectomy due to the location of the M2 occlusion at that time. Patient monitored in SICU, found to be stable, and sent to the floor. The following day patient was found with possible worsening right side weakness and worsening aphasia. CT showed no acute infarct and CTA showed ~75% stenosis of left carotid stent.    24 HOUR EVENTS:  - CT and MRA head stable    Allergies: dairy products (Anaphylaxis; Hives)  eggs (Anaphylaxis; Hives)  fish (Anaphylaxis; Hives)  lamb, tomatoes, milk (Anaphylaxis; Hives)  peanut butter (Anaphylaxis)  peanuts (Anaphylaxis; Hives)  penicillins (Anaphylaxis)  propofol (Angioedema)  shellfish (Anaphylaxis; Hives)      MEDICATIONS:   --------------------------------------------------------------------------------------  Neurologic Medications  Respiratory Medications  Cardiovascular Medications  midodrine 20 milliGRAM(s) Oral every 8 hours  Gastrointestinal Medications  sodium chloride 0.9%. 1000 milliLiter(s) IV Continuous <Continuous>  Genitourinary Medications  Hematologic/Oncologic Medications  clopidogrel Tablet 75 milliGRAM(s) Oral daily  heparin  Injectable 5000 Unit(s) SubCutaneous every 8 hours  Antimicrobial/Immunologic Medications  Endocrine/Metabolic Medications  atorvastatin 80 milliGRAM(s) Oral at bedtime  Topical/Other Medications  chlorhexidine 4% Liquid 1 Application(s) Topical <User Schedule>    --------------------------------------------------------------------------------------    VITAL SIGNS, INS/OUTS (last 24 hours):  T(C): 37.1 (11-19-18 @ 00:00), Max: 37.2 (11-18-18 @ 20:00)  HR: 77 (11-19-18 @ 00:00) (59 - 90)  BP: 115/57 (11-19-18 @ 00:00) (98/55 - 185/77)  BP(mean): 71 (11-19-18 @ 00:00) (53 - 106)  RR: 22 (11-19-18 @ 00:00) (15 - 22)  SpO2: 99% (11-19-18 @ 00:00) (98% - 100%)  11-18 @ 07:01  -  11-19 @ 00:23  --------------------------------------------------------  IN:    Oral Fluid: 300 mL    phenylephrine   Infusion: 121.8 mL    sodium chloride 0.9%.: 850 mL  Total IN: 1271.8 mL  OUT:    Voided: 1000 mL  Total OUT: 1000 mL  Total NET: 271.8 mL    EXAM  NEUROLOGY  Exam: Expressive asphasia slurring of words, A&O, NAD  HEENT  Exam: Normocephalic, atraumatic, EOMI.    RESPIRATORY  Exam: Lungs clear to auscultation, Normal expansion/effort.   CARDIOVASCULAR  Exam: S1, S2.  Regular rate and rhythm.  Peripheral edema   GI/NUTRITION  Exam: Abdomen soft, Non-tender, Non-distended.   VASCULAR  Exam: Extremities warm, pink, well-perfused.   MUSCULOSKELETAL  Exam: All extremities moving spontaneously without limitations.   SKIN  Exam: Good skin turgor, no skin breakdown.   METABOLIC/FLUIDS/ELECTROLYTES  sodium chloride 0.9%. 1000 milliLiter(s) IV Continuous <Continuous>  HEMATOLOGIC  [x] VTE Prophylaxis: clopidogrel Tablet 75 milliGRAM(s) Oral daily  heparin  Injectable 5000 Unit(s) SubCutaneous every 8 hours    Transfusions:	[] PRBC	[] Platelets		[] FFP	[] Cryoprecipitate    INFECTIOUS DISEASE  Antimicrobials/Immunologic Medications:    Day #      of     ***    Tubes/Lines/Drains    [x] Peripheral IV  [] Central Venous Line     	[] R	[] L	[] IJ	[] Fem	[] SC	Date Placed:   [] Arterial Line		[] R	[] L	[] Fem	[] Rad	[] Ax	Date Placed:   [] PICC		[] Midline		[] Mediport  [] Urinary Catheter		Date Placed:   [x] Necessity of urinary, arterial, and venous catheters discussed    LABS  --------------------------------------------------------------------------------------  ((Insert SICU Labs here))***  --------------------------------------------------------------------------------------    OTHER LABORATORY:   IMAGING STUDIES:   CXR:   IMAGING:  < from: CT Angio Neck w/ IV Cont (11.16.18 @ 08:05) >  IMPRESSION:  Microvascular disease. No evidence for acute intracranial hemorrhage or   new acute transcortical territorial infarction.  CT angiography of the extracranial circulation demonstrates no interval   change. There is left internal carotid artery 75% in-stent stenosis.  CT angiography of the intracranial circulation is unchanged. There are   multiple tandem moderate tosevere stenoses along bilateral [place], MCA, and   PCA vessels as described above. Most significantly, is high-grade   stenosis along the left M2 superior division, unchanged from prior   imaging.    < end of copied text > 62y/o F w/ HTN, HLD, RA, Asthma  s/p elective left transcarotid arterial sten via right femoral vein access (11/13/18). course complicated by code stroke approximately 8 hours postop. CTA head and neck showed a distal M2 branch occlusion. She was deemed not a candidate for intracranial thrombectomy due to the location of the M2 occlusion at that time. Patient monitored in SICU, found to be stable, and sent to the floor. The following day patient was found with possible worsening right side weakness and worsening aphasia. CT showed no acute infarct and CTA showed ~75% stenosis of left carotid stent.    24 HOUR EVENTS:  - CT and MRA head stable  - Pt did not void overnight, bladder scan > 500, robb placed    Allergies: dairy products (Anaphylaxis; Hives)  eggs (Anaphylaxis; Hives)  fish (Anaphylaxis; Hives)  lamb, tomatoes, milk (Anaphylaxis; Hives)  peanut butter (Anaphylaxis)  peanuts (Anaphylaxis; Hives)  penicillins (Anaphylaxis)  propofol (Angioedema)  shellfish (Anaphylaxis; Hives)      MEDICATIONS:   --------------------------------------------------------------------------------------  Neurologic Medications  Respiratory Medications  Cardiovascular Medications  midodrine 20 milliGRAM(s) Oral every 8 hours  Gastrointestinal Medications  sodium chloride 0.9%. 1000 milliLiter(s) IV Continuous <Continuous>  Genitourinary Medications  Hematologic/Oncologic Medications  clopidogrel Tablet 75 milliGRAM(s) Oral daily  heparin  Injectable 5000 Unit(s) SubCutaneous every 8 hours  Antimicrobial/Immunologic Medications  Endocrine/Metabolic Medications  atorvastatin 80 milliGRAM(s) Oral at bedtime  Topical/Other Medications  chlorhexidine 4% Liquid 1 Application(s) Topical <User Schedule>    --------------------------------------------------------------------------------------    VITAL SIGNS, INS/OUTS (last 24 hours):  T(C): 37.1 (11-19-18 @ 00:00), Max: 37.2 (11-18-18 @ 20:00)  HR: 77 (11-19-18 @ 00:00) (59 - 90)  BP: 115/57 (11-19-18 @ 00:00) (98/55 - 185/77)  BP(mean): 71 (11-19-18 @ 00:00) (53 - 106)  RR: 22 (11-19-18 @ 00:00) (15 - 22)  SpO2: 99% (11-19-18 @ 00:00) (98% - 100%)  11-18 @ 07:01  -  11-19 @ 00:23  --------------------------------------------------------  IN:    Oral Fluid: 300 mL    phenylephrine   Infusion: 121.8 mL    sodium chloride 0.9%.: 850 mL  Total IN: 1271.8 mL  OUT:    Voided: 1000 mL  Total OUT: 1000 mL  Total NET: 271.8 mL    EXAM  NEUROLOGY  Exam: Expressive asphasia slurring of words, A&O, NAD  HEENT  Exam: Normocephalic, atraumatic, EOMI.    RESPIRATORY  Exam: Lungs clear to auscultation, Normal expansion/effort.   CARDIOVASCULAR  Exam: S1, S2.  Regular rate and rhythm.  Peripheral edema   GI/NUTRITION  Exam: Abdomen soft, Non-tender, Non-distended.   VASCULAR  Exam: Extremities warm, pink, well-perfused.   MUSCULOSKELETAL  Exam: All extremities moving spontaneously without limitations.   SKIN  Exam: Good skin turgor, no skin breakdown.   METABOLIC/FLUIDS/ELECTROLYTES  sodium chloride 0.9%. 1000 milliLiter(s) IV Continuous <Continuous>  HEMATOLOGIC  [x] VTE Prophylaxis: clopidogrel Tablet 75 milliGRAM(s) Oral daily  heparin  Injectable 5000 Unit(s) SubCutaneous every 8 hours    Transfusions:	[] PRBC	[] Platelets		[] FFP	[] Cryoprecipitate    INFECTIOUS DISEASE  Antimicrobials/Immunologic Medications:    Day #      of     ***    Tubes/Lines/Drains    [x] Peripheral IV  [] Central Venous Line     	[] R	[] L	[] IJ	[] Fem	[] SC	Date Placed:   [] Arterial Line		[] R	[] L	[] Fem	[] Rad	[] Ax	Date Placed:   [] PICC		[] Midline		[] Mediport  [] Urinary Catheter		Date Placed:   [x] Necessity of urinary, arterial, and venous catheters discussed    LABS  --------------------------------------------------------------------------------------  ((Insert SICU Labs here))***  --------------------------------------------------------------------------------------    OTHER LABORATORY:   IMAGING STUDIES:   CXR:   IMAGING:  < from: CT Angio Neck w/ IV Cont (11.16.18 @ 08:05) >  IMPRESSION:  Microvascular disease. No evidence for acute intracranial hemorrhage or   new acute transcortical territorial infarction.  CT angiography of the extracranial circulation demonstrates no interval   change. There is left internal carotid artery 75% in-stent stenosis.  CT angiography of the intracranial circulation is unchanged. There are   multiple tandem moderate tosevere stenoses along bilateral [place], MCA, and   PCA vessels as described above. Most significantly, is high-grade   stenosis along the left M2 superior division, unchanged from prior   imaging.    < end of copied text > 64y/o F w/ HTN, HLD, RA, Asthma  s/p elective left transcarotid arterial sten via right femoral vein access (11/13/18). course complicated by code stroke approximately 8 hours postop. CTA head and neck showed a distal M2 branch occlusion. She was deemed not a candidate for intracranial thrombectomy due to the location of the M2 occlusion at that time. Patient monitored in SICU, found to be stable, and sent to the floor. The following day patient was found with possible worsening right side weakness and worsening aphasia. CT showed no acute infarct and CTA showed ~75% stenosis of left carotid stent.    24 HOUR EVENTS:  - CT and MRA head stable  - Pt did not void overnight, bladder scan > 500, failed straight cath by RN, clarisse albarado    Allergies: dairy products (Anaphylaxis; Hives)  eggs (Anaphylaxis; Hives)  fish (Anaphylaxis; Hives)  lamb, tomatoes, milk (Anaphylaxis; Hives)  peanut butter (Anaphylaxis)  peanuts (Anaphylaxis; Hives)  penicillins (Anaphylaxis)  propofol (Angioedema)  shellfish (Anaphylaxis; Hives)      MEDICATIONS:   --------------------------------------------------------------------------------------  Neurologic Medications  Respiratory Medications  Cardiovascular Medications  midodrine 20 milliGRAM(s) Oral every 8 hours  Gastrointestinal Medications  sodium chloride 0.9%. 1000 milliLiter(s) IV Continuous <Continuous>  Genitourinary Medications  Hematologic/Oncologic Medications  clopidogrel Tablet 75 milliGRAM(s) Oral daily  heparin  Injectable 5000 Unit(s) SubCutaneous every 8 hours  Antimicrobial/Immunologic Medications  Endocrine/Metabolic Medications  atorvastatin 80 milliGRAM(s) Oral at bedtime  Topical/Other Medications  chlorhexidine 4% Liquid 1 Application(s) Topical <User Schedule>    --------------------------------------------------------------------------------------    VITAL SIGNS, INS/OUTS (last 24 hours):  T(C): 37.1 (11-19-18 @ 00:00), Max: 37.2 (11-18-18 @ 20:00)  HR: 77 (11-19-18 @ 00:00) (59 - 90)  BP: 115/57 (11-19-18 @ 00:00) (98/55 - 185/77)  BP(mean): 71 (11-19-18 @ 00:00) (53 - 106)  RR: 22 (11-19-18 @ 00:00) (15 - 22)  SpO2: 99% (11-19-18 @ 00:00) (98% - 100%)  11-18 @ 07:01  -  11-19 @ 00:23  --------------------------------------------------------  IN:    Oral Fluid: 300 mL    phenylephrine   Infusion: 121.8 mL    sodium chloride 0.9%.: 850 mL  Total IN: 1271.8 mL  OUT:    Voided: 1000 mL  Total OUT: 1000 mL  Total NET: 271.8 mL    EXAM  NEUROLOGY  Exam: Expressive asphasia slurring of words, A&O, NAD  HEENT  Exam: Normocephalic, atraumatic, EOMI.    RESPIRATORY  Exam: Lungs clear to auscultation, Normal expansion/effort.   CARDIOVASCULAR  Exam: S1, S2.  Regular rate and rhythm.  Peripheral edema   GI/NUTRITION  Exam: Abdomen soft, Non-tender, Non-distended.   VASCULAR  Exam: Extremities warm, pink, well-perfused.   MUSCULOSKELETAL  Exam: All extremities moving spontaneously without limitations.   SKIN  Exam: Good skin turgor, no skin breakdown.   METABOLIC/FLUIDS/ELECTROLYTES  sodium chloride 0.9%. 1000 milliLiter(s) IV Continuous <Continuous>  HEMATOLOGIC  [x] VTE Prophylaxis: clopidogrel Tablet 75 milliGRAM(s) Oral daily  heparin  Injectable 5000 Unit(s) SubCutaneous every 8 hours    Transfusions:	[] PRBC	[] Platelets		[] FFP	[] Cryoprecipitate    INFECTIOUS DISEASE  Antimicrobials/Immunologic Medications:    Day #      of     ***    Tubes/Lines/Drains    [x] Peripheral IV  [] Central Venous Line     	[] R	[] L	[] IJ	[] Fem	[] SC	Date Placed:   [] Arterial Line		[] R	[] L	[] Fem	[] Rad	[] Ax	Date Placed:   [] PICC		[] Midline		[] Mediport  [] Urinary Catheter		Date Placed:   [x] Necessity of urinary, arterial, and venous catheters discussed    LABS  --------------------------------------------------------------------------------------  ((Insert SICU Labs here))***  --------------------------------------------------------------------------------------    OTHER LABORATORY:   IMAGING STUDIES:   CXR:   IMAGING:  < from: CT Angio Neck w/ IV Cont (11.16.18 @ 08:05) >  IMPRESSION:  Microvascular disease. No evidence for acute intracranial hemorrhage or   new acute transcortical territorial infarction.  CT angiography of the extracranial circulation demonstrates no interval   change. There is left internal carotid artery 75% in-stent stenosis.  CT angiography of the intracranial circulation is unchanged. There are   multiple tandem moderate tosevere stenoses along bilateral [place], MCA, and   PCA vessels as described above. Most significantly, is high-grade   stenosis along the left M2 superior division, unchanged from prior   imaging.    < end of copied text >

## 2018-11-19 NOTE — PROGRESS NOTE ADULT - ASSESSMENT
ASSESSMENT: 64y/o F w/ HTN, HLD, RA, Asthma s/p elective left transcarotid arterial sten via right femoral vein access (11/13/18). course complicated by code stroke approximately 8 hours postop. CTA head and neck showed a distal M2 branch occlusion. She was deemed not a candidate for intracranial thrombectomy due to the location of the M2 occlusion at that time. Patient monitored in SICU, found to be stable, and sent to the floor. The following day patient was found with possible worsening right side weakness and worsening aphasia. Code stroke called. CT showed no acute infarct and CTA was stable as compared to prior study. Concern by neuro that worsening of right sided deficits and aphasia possibly secondary to hypoperfusion of area supplied by the L M2 high grade stenosis due to fluctuating blood pressure.    Code Status: full    PLAN:   Neurologic:   - CT: left M2 segment branch occlusion, Alternating areas of stenosis involving the distal left [place] branch vasculature  - repeat CT unchanged  - Q2H neurological exams  - MRI obtained - unchaged                                                                                                                                                                                                                                                                                                                             Pulmonary:   - stable on RA    Cardiovascular:   - HR 60's  - BP goal 140-160  - Midodrine 20mg q8h    Gastrointestinal:  - dysphagia diet    Renal:  - monitor I/O  - replete lytes PRN  - cont w/ IVF 50cc until consistently eating    Hematologic:  - c/w plavix, SQH    Infectious Disease: no active issues     Endocrine: f/u BMP    SICU AM NOTE    Interval Events:   - Patient remained ASSESSMENT: 62y/o F w/ HTN, HLD, RA, Asthma s/p elective left transcarotid arterial sten via right femoral vein access (11/13/18). course complicated by code stroke approximately 8 hours postop. CTA head and neck showed a distal M2 branch occlusion. She was deemed not a candidate for intracranial thrombectomy due to the location of the M2 occlusion at that time. Patient monitored in SICU, found to be stable, and sent to the floor. The following day patient was found with possible worsening right side weakness and worsening aphasia. Code stroke called. CT showed no acute infarct and CTA was stable as compared to prior study. Concern by neuro that worsening of right sided deficits and aphasia possibly secondary to hypoperfusion of area supplied by the L M2 high grade stenosis due to fluctuating blood pressure.    Code Status: full    PLAN:   Neurologic:   - CT: left M2 segment branch occlusion, Alternating areas of stenosis involving the distal left [place] branch vasculature  - repeat CT unchanged  - Q2H neurological exams  - MRI obtained - unchaged                                                                                                                                                                                                                                                                                                                             Pulmonary:   - stable on RA    Cardiovascular:   - HR 60's  - BP goal 140-160  - Midodrine 20mg q8h    Gastrointestinal:  - dysphagia diet    Renal:  - monitor I/O  - replete lytes PRN  - cont w/ IVF 50cc until consistently eating  - Sandoval placed for urinary retention    Hematologic:  - c/w plavix, SQH    Infectious Disease: no active issues     Endocrine: f/u BMP    SICU AM NOTE    Interval Events:   - Patient remained ASSESSMENT: 62y/o F w/ HTN, HLD, RA, Asthma s/p elective left transcarotid arterial sten via right femoral vein access (11/13/18). course complicated by code stroke approximately 8 hours postop. CTA head and neck showed a distal M2 branch occlusion. She was deemed not a candidate for intracranial thrombectomy due to the location of the M2 occlusion at that time. Patient monitored in SICU, found to be stable, and sent to the floor. The following day patient was found with possible worsening right side weakness and worsening aphasia. Code stroke called. CT showed no acute infarct and CTA was stable as compared to prior study. Concern by neuro that worsening of right sided deficits and aphasia possibly secondary to hypoperfusion of area supplied by the L M2 high grade stenosis due to fluctuating blood pressure.    Code Status: full    PLAN:   Neurologic:   - CT: left M2 segment branch occlusion, Alternating areas of stenosis involving the distal left [place] branch vasculature  - repeat CT unchanged  - Q2H neurological exams  - MRI obtained - unchanged  - f/u neuro & vascular rec's                                                                                                                                                                                                                                                                                                                             Pulmonary: Hx asthma  - stable on RA  - proventil PRN    Cardiovascular:   - HR 60's  - BP goal 140-160, currently in 110's.   - C/w Midodrine 20mg q8h, try 500cc IVF bolus  - Will consider starting on steroids if IVF does not increase BP sufficiently  - c/w statin    Gastrointestinal:  - dysphagia diet  - f/u esophagram today    Renal:  - monitor I/O  - replete lytes PRN  - cont w/ IVF 50cc until consistently eating  - Sandoval placed for urinary retention, cont for now    Hematologic:  - c/w plavix, SQH    Infectious Disease:  - afebrile  - WBC downtrending, o/w no issues     Endocrine:  - no issues    Dispo: Possible transfer to floors in PM

## 2018-11-19 NOTE — PROGRESS NOTE ADULT - ATTENDING COMMENTS
I have personally interviewed and examined this patient, reviewed pertinent labs and imaging, and discussed the case with colleagues, residents, physician assistants, and nurses on SICU rounds.         minutes in total were spent in providing direct critical care for the diagnoses, assessment and plan outlined below.  These diagnoses are unrelated to the surgical procedure.  Additionally, time spent in the performance of separately billable procedures was not counted toward the critical care time.  There is no overlap.    The active critical care issues are:  1.    Neuro: neuro checks  pulm: restart home proventil  cards: midodrine, plavix, statin, 500cc bolus - assess volume responsiveness and concurrent   gi: dysphagia diet  gu: monitor urine output, robb replaced for retention  heme: no active   ID: no active issues  endo: monitor blood sugar, will consider adding some steroids given recent use and relative hypotension  proph: sqh      PT I have personally interviewed and examined this patient, reviewed pertinent labs and imaging, and discussed the case with colleagues, residents, physician assistants, and nurses on SICU rounds.    30     minutes in total were spent in providing direct critical care for the diagnoses, assessment and plan outlined below.  These diagnoses are unrelated to the surgical procedure.  Additionally, time spent in the performance of separately billable procedures was not counted toward the critical care time.  There is no overlap.    The active critical care issues are:     I63.512 Cerebral infarction involving left middle cerebral artery   R33.9 Urinary retention   I95.9 Hypotension, unspecified hypotension type   I69.391 Dysphagia due to recent cerebral infarction        Neuro: neuro checks  pulm: restart home proventil  cards: midodrine, plavix, statin, 500cc bolus - assess volume responsiveness and concurrent   gi: dysphagia diet  gu: monitor urine output, robb replaced for retention  heme: no active   ID: no active issues  endo: monitor blood sugar, will consider adding some steroids given recent use and relative hypotension  proph: sqh      PT

## 2018-11-20 LAB
BUN SERPL-MCNC: 7 MG/DL — SIGNIFICANT CHANGE UP (ref 7–23)
CALCIUM SERPL-MCNC: 8.5 MG/DL — SIGNIFICANT CHANGE UP (ref 8.4–10.5)
CHLORIDE SERPL-SCNC: 105 MMOL/L — SIGNIFICANT CHANGE UP (ref 98–107)
CO2 SERPL-SCNC: 21 MMOL/L — LOW (ref 22–31)
CREAT SERPL-MCNC: 0.9 MG/DL — SIGNIFICANT CHANGE UP (ref 0.5–1.3)
GLUCOSE SERPL-MCNC: 109 MG/DL — HIGH (ref 70–99)
HCT VFR BLD CALC: 28.2 % — LOW (ref 34.5–45)
HGB BLD-MCNC: 8.7 G/DL — LOW (ref 11.5–15.5)
MAGNESIUM SERPL-MCNC: 2.2 MG/DL — SIGNIFICANT CHANGE UP (ref 1.6–2.6)
MCHC RBC-ENTMCNC: 23.6 PG — LOW (ref 27–34)
MCHC RBC-ENTMCNC: 30.9 % — LOW (ref 32–36)
MCV RBC AUTO: 76.6 FL — LOW (ref 80–100)
NRBC # FLD: 0 — SIGNIFICANT CHANGE UP
PHOSPHATE SERPL-MCNC: 2.5 MG/DL — SIGNIFICANT CHANGE UP (ref 2.5–4.5)
PLATELET # BLD AUTO: 238 K/UL — SIGNIFICANT CHANGE UP (ref 150–400)
PMV BLD: 11.1 FL — SIGNIFICANT CHANGE UP (ref 7–13)
POTASSIUM SERPL-MCNC: 3.9 MMOL/L — SIGNIFICANT CHANGE UP (ref 3.5–5.3)
POTASSIUM SERPL-SCNC: 3.9 MMOL/L — SIGNIFICANT CHANGE UP (ref 3.5–5.3)
RBC # BLD: 3.68 M/UL — LOW (ref 3.8–5.2)
RBC # FLD: 16.8 % — HIGH (ref 10.3–14.5)
SODIUM SERPL-SCNC: 139 MMOL/L — SIGNIFICANT CHANGE UP (ref 135–145)
WBC # BLD: 10.29 K/UL — SIGNIFICANT CHANGE UP (ref 3.8–10.5)
WBC # FLD AUTO: 10.29 K/UL — SIGNIFICANT CHANGE UP (ref 3.8–10.5)

## 2018-11-20 PROCEDURE — 99233 SBSQ HOSP IP/OBS HIGH 50: CPT | Mod: GC

## 2018-11-20 PROCEDURE — 70496 CT ANGIOGRAPHY HEAD: CPT | Mod: 26

## 2018-11-20 PROCEDURE — 99291 CRITICAL CARE FIRST HOUR: CPT

## 2018-11-20 PROCEDURE — 70498 CT ANGIOGRAPHY NECK: CPT | Mod: 26

## 2018-11-20 RX ORDER — PHENYLEPHRINE HYDROCHLORIDE 10 MG/ML
0.2 INJECTION INTRAVENOUS
Qty: 40 | Refills: 0 | Status: DISCONTINUED | OUTPATIENT
Start: 2018-11-20 | End: 2018-11-21

## 2018-11-20 RX ORDER — PHENYLEPHRINE HYDROCHLORIDE 10 MG/ML
0.2 INJECTION INTRAVENOUS
Qty: 40 | Refills: 0 | Status: DISCONTINUED | OUTPATIENT
Start: 2018-11-20 | End: 2018-11-20

## 2018-11-20 RX ORDER — PHENYLEPHRINE HYDROCHLORIDE 10 MG/ML
0.1 INJECTION INTRAVENOUS
Qty: 40 | Refills: 0 | Status: DISCONTINUED | OUTPATIENT
Start: 2018-11-20 | End: 2018-11-20

## 2018-11-20 RX ADMIN — Medication 50 MILLIGRAM(S): at 05:57

## 2018-11-20 RX ADMIN — PHENYLEPHRINE HYDROCHLORIDE 5.28 MICROGRAM(S)/KG/MIN: 10 INJECTION INTRAVENOUS at 17:14

## 2018-11-20 RX ADMIN — MIDODRINE HYDROCHLORIDE 20 MILLIGRAM(S): 2.5 TABLET ORAL at 21:31

## 2018-11-20 RX ADMIN — Medication 50 MILLIGRAM(S): at 17:13

## 2018-11-20 RX ADMIN — HEPARIN SODIUM 5000 UNIT(S): 5000 INJECTION INTRAVENOUS; SUBCUTANEOUS at 21:30

## 2018-11-20 RX ADMIN — MIDODRINE HYDROCHLORIDE 20 MILLIGRAM(S): 2.5 TABLET ORAL at 13:35

## 2018-11-20 RX ADMIN — Medication 50 MILLIGRAM(S): at 12:14

## 2018-11-20 RX ADMIN — SODIUM CHLORIDE 50 MILLILITER(S): 9 INJECTION INTRAMUSCULAR; INTRAVENOUS; SUBCUTANEOUS at 12:15

## 2018-11-20 RX ADMIN — ATORVASTATIN CALCIUM 80 MILLIGRAM(S): 80 TABLET, FILM COATED ORAL at 21:31

## 2018-11-20 RX ADMIN — HEPARIN SODIUM 5000 UNIT(S): 5000 INJECTION INTRAVENOUS; SUBCUTANEOUS at 00:04

## 2018-11-20 RX ADMIN — MIDODRINE HYDROCHLORIDE 20 MILLIGRAM(S): 2.5 TABLET ORAL at 05:57

## 2018-11-20 RX ADMIN — CLOPIDOGREL BISULFATE 75 MILLIGRAM(S): 75 TABLET, FILM COATED ORAL at 12:14

## 2018-11-20 RX ADMIN — HEPARIN SODIUM 5000 UNIT(S): 5000 INJECTION INTRAVENOUS; SUBCUTANEOUS at 12:14

## 2018-11-20 RX ADMIN — Medication 50 MILLIGRAM(S): at 00:03

## 2018-11-20 RX ADMIN — CHLORHEXIDINE GLUCONATE 1 APPLICATION(S): 213 SOLUTION TOPICAL at 21:31

## 2018-11-20 RX ADMIN — PHENYLEPHRINE HYDROCHLORIDE 2.64 MICROGRAM(S)/KG/MIN: 10 INJECTION INTRAVENOUS at 12:15

## 2018-11-20 NOTE — STROKE CODE NOTE - NIH STROKE SCALE: 10. DYSARTHRIA, QM
(1) Mild-to-moderate dysarthria; patient slurs at least some words and, at worst, can be understood with some difficulty
(UN) Intubated or other physical barrier
(1) Mild-to-moderate dysarthria; patient slurs at least some words and, at worst, can be understood with some difficulty

## 2018-11-20 NOTE — SWALLOW BEDSIDE ASSESSMENT ADULT - ASR SWALLOW LABIAL MOBILITY
reduced lip retraction on the right side; right facial weakness/droop
pt with difficulty executing isolated and integrated oral movements
pt with difficulty executing isolated and integrated oral movements

## 2018-11-20 NOTE — STROKE CODE NOTE - NIH STROKE SCALE: 1B. LOC QUESTIONS, QM
(2) Answers neither question correctly
(2) Answers neither question correctly
(0) Answers both questions correctly

## 2018-11-20 NOTE — PROGRESS NOTE ADULT - ASSESSMENT
63 years old woman with multiple vascular risk factors including age, HTN and HPLD was admitted to [facility] for an elective left carotid revascularization of the left ICA stent placement. After the procedure, she was noted to have right-sided weakness and language disturbance in the form of global aphasia. CT brain on 11/13 did not show any evidence of acute infarct or hemorrhage. CTA head and neck on 11/13  showed multifocal intracranial atherosclerosis including severe stenosis of left MCA proximal and distal M1 segment, multifocal stenosis involving inferior M2 segment and occlusion of the superior M2 segment and left proximal ICA stent. CTA head and neck on 11/16 showed 75% stenosis of left ICA stent (in-stent stenosis), which appears to be same as compared to 11/13. MRI brain on 11/17 showed acute infarct in the left MCA distribution. TTE did not show any obvious structural cardiac source of embolism. Neurological examination today shows expressive aphasia and severe right hemiparesis (RUE 0/5 and RLE 2/5).     Impression: L MCA infarct likely 2/2 artery to artery embolism with source of embolism from L ICA atherosclerotic plaque after carotid revascularization procedure.    Plan:  -continue with plavix 75 mg PO daily  -no objection to starting ASA 81 if indicated per vascular surgery 63 years old woman with multiple vascular risk factors including age, HTN and HPLD was admitted to [facility] for an elective left carotid revascularization of the left ICA stent placement. After the procedure, she was noted to have right-sided weakness and language disturbance in the form of global aphasia. CT brain on 11/13 did not show any evidence of acute infarct or hemorrhage. CTA head and neck on 11/13  showed multifocal intracranial atherosclerosis including severe stenosis of left MCA proximal and distal M1 segment, multifocal stenosis involving inferior M2 segment and occlusion of the superior M2 segment and left proximal ICA stent. CTA head and neck on 11/16 showed 75% stenosis of left ICA stent (in-stent stenosis), which appears to be same as compared to 11/13. MRI brain on 11/17 showed acute infarct in the left MCA distribution. TTE did not show any obvious structural cardiac source of embolism. Neurological examination today shows expressive aphasia and severe right hemiparesis (RUE 0/5 and RLE 2/5).     Impression: L MCA infarct likely 2/2 artery to artery embolism with source of embolism from L ICA atherosclerotic plaque after carotid revascularization procedure.    Plan:  -continue with plavix 75 mg PO daily  -no objection to starting ASA 81 as part of dual antiplatelet therapy if indicated per vascular surgery for carotid artery stent  -may benefit from repeat conventional angiogram to assess carotid artery stent but decision deferred to vascular surgery  -c/w atorvastatin 80 mg PO daily  -can continue with pressor support with goal SBP in 160s and evaluate for improvement in neurological exam. If no improvement in neurological exam, would advise titrating off pressors  -Lipid panel  -PT/OT/speech eval  -neurology will continue to follow 63 years old woman with multiple vascular risk factors including age, HTN and HPLD was admitted to [facility] for an elective left carotid revascularization of the left ICA stent placement. After the procedure, she was noted to have right-sided weakness and language disturbance in the form of global aphasia. CT brain on 11/13 did not show any evidence of acute infarct or hemorrhage. CTA head and neck on 11/13  showed multifocal intracranial atherosclerosis including severe stenosis of left MCA proximal and distal M1 segment, multifocal stenosis involving inferior M2 segment and occlusion of the superior M2 segment and left proximal ICA stent. CTA head and neck on 11/16 showed 75% stenosis of left ICA stent (in-stent stenosis), which appears to be same as compared to 11/13. MRI brain on 11/17 showed acute infarct in the left MCA distribution. TTE did not show any obvious structural cardiac source of embolism. Neurological examination today shows expressive aphasia and severe right hemiparesis (RUE 0/5 and RLE 2/5). Code stroke called again 11/20, CTH/CTA shows multiple stenoses in left distal M1, left proximal M2, and acute L MCA infarct.    Impression: L MCA infarct likely 2/2 artery to artery embolism with source of embolism from L ICA atherosclerotic plaque after carotid revascularization procedure.    Plan:  -continue with plavix 75 mg PO daily  -no objection to starting ASA 81 as part of dual antiplatelet therapy if indicated per vascular surgery for carotid artery stent  -may benefit from repeat conventional angiogram to assess carotid artery stent but decision deferred to vascular surgery  -c/w atorvastatin 80 mg PO daily  -can continue with pressor support with goal SBP in 160s and evaluate for improvement in neurological exam. If no improvement in neurological exam, would advise titrating off pressors  -Lipid panel  -PT/OT/speech eval  -neurology will continue to follow 63 years old woman with multiple vascular risk factors including age, HTN and HPLD was admitted to [facility] for an elective left carotid revascularization of the left ICA stent placement. After the procedure, she was noted to have right-sided weakness and language disturbance in the form of global aphasia. CT brain on 11/13 did not show any evidence of acute infarct or hemorrhage. CTA head and neck on 11/13 to my eye showed multifocal intracranial atherosclerosis including severe stenosis of left MCA proximal and distal M1 segment, multifocal stenosis involving inferior M2 segment and occlusion of the superior M2 segment and left proximal ICA stent. CTA head and neck on 11/16 showed 75% stenosis of left ICA stent (in-stent stenosis), which appears to be same as compared to 11/13 to my eye. MRI brain on 11/17 showed acute infarct in the left MCA distribution. TTE did not show any obvious structural cardiac source of embolism. On 11/20, she was noted to have fluctuating right hemiparesis with respect to right arm weakness. CT brain on 11/20 showed expected evolution of left MCA distribution infarct and CTA head and neck showed stable findings.    Impression:  Cerebral embolism with cerebral infarction. Left MCA distribution stroke in the periprocedural period after carotid revascularization with carotid artery stent placement     Plan:  - Agree to continue with clopidogrel 75 mg once a day for secondary stroke prevention in the setting of recent carotid artery stent placement. No absolute neurovascular contraindication to consider dual antiplatelet therapy if indicated from carotid artery stent standpoint.  - Would optimal benefit from repeat conventional angiogram to better characterize the degree of in-stent stenosis; final decision is deferred to vascular surgeon.  - Atorvastatin 80 mg at bedtime considering likely atheroembolic etiology of her stroke.  - Considering fluctuating neurological examination and some improvement in neurological examination with higher blood pressure readings, agree to continue with vasopressor support with good blood pressure being SBP  mmHg for now and consider titrating vasopressor agents based on neurological examination.  - Continue with telemetry.  - HbA1c 6.0, consider checking LDL; continue with aggressive vascular risk factors modification  - PT/OT/speech and swallow evaluation.  - Stroke education.  - Would continue to follow.    Above-mentioned plan was discussed with the patient in detail.

## 2018-11-20 NOTE — PROGRESS NOTE ADULT - ASSESSMENT
63F s/p l. TCARS with post op course c/b code stroke now in sicu with reduced neurologic status. Patient hemodynamically stable at present time with SBP low 100s, may need support to maintain adequate cerebral perfusion.    - C/w ASA/plavix   - Permissive HTN: recommend target bp 140-160  - Recommend PT/OT  - Recommend esophogram   - neurology following, appreciate continued recommendations  - care per sicu team, appreciate continued management by sicu team    Surgery, C-Team   Pager: [phone]

## 2018-11-20 NOTE — PROGRESS NOTE ADULT - SUBJECTIVE AND OBJECTIVE BOX
Clinically worsened neurologic exam this am  Code stroke called  Heading for CT now  By report, no motor in RUE and complete aphasia  Await CT results

## 2018-11-20 NOTE — STROKE CODE NOTE - NIH STROKE SCALE: 4. FACIAL PALSY, QM
(1) Minor paralysis (flattened nasolabial fold, asymmetry on smiling)
(3) Complete paralysis of one or both sides (absence of facial movement in the upper and lower face)
(1) Minor paralysis (flattened nasolabial fold, asymmetry on smiling)

## 2018-11-20 NOTE — SWALLOW BEDSIDE ASSESSMENT ADULT - SWALLOW EVAL: STRUCTURAL ABNORMALITIES
lingual deviation to the right upon protrusion/present
present/lingual deviation to the right upon protrusion

## 2018-11-20 NOTE — PROGRESS NOTE ADULT - SUBJECTIVE AND OBJECTIVE BOX
Surgery C-Team Daily Progress Note     RUPINDER TRIPP | MRN-[id number]    SUBJECTIVE / 24H EVENTS  Patient seen and examined on morning rounds. No acute events overnight. Patient remains aphasic with right sided weakness.     OBJECTIVE:    VITAL SIGNS:  T(C): 36.7 (18 @ 00:00), Max: 37.2 (18 @ 04:00)  HR: 65 (18 @ 00:00) (57 - 101)  BP: 126/61 (18 @ 00:00) (92/44 - 150/65)  RR: 17 (18 @ 00:00) (16 - 24)  SpO2: 97% (18 @ 00:00) (96% - 100%)    Daily Weight in k.8 (2018 06:00)      PHYSICAL EXAM:  Gen: NAD  LS: Respirations unlabored.   Card: on telemetry in sicu  GI: Soft. Nontender. Nondistended.   Ext: 2/5 RUE, 3/5 RLE. Right groin puncture dressing c/d/i, right groin soft w/o evidence of hematoma.   Neuro: R. facial droop, tongue deviation to r.       18 @ 07:01  -  18 @ 07:00  --------------------------------------------------------  IN:    IV PiggyBack: 250 mL    Oral Fluid: 300 mL    phenylephrine   Infusion: 121.8 mL    sodium chloride 0.9%.: 1000 mL  Total IN: 1671.8 mL    OUT:    Indwelling Catheter - Urethral: 500 mL    Voided: 1000 mL  Total OUT: 1500 mL    Total NET: 171.8 mL      18 @ 07:01  -  18 @ 01:09  --------------------------------------------------------  IN:    IV PiggyBack: 250 mL    Lactated Ringers IV Bolus: 500 mL    Oral Fluid: 420 mL    phenylephrine   Infusion: 7.9 mL    phenylephrine   Infusion: 13.2 mL    sodium chloride 0.9%.: 650 mL  Total IN: 1841.1 mL    OUT:    Indwelling Catheter - Urethral: 1805 mL  Total OUT: 1805 mL    Total NET: 36.1 mL      LAB VALUES:      139  |  107  |  11  ----------------------------<  83  3.8   |  24  |  1.04    Ca    7.8<L>      2018 02:25  Phos  1.7       Mg     2.2                                      8.5    13.14 )-----------( 230      ( 2018 02:25 )             28.1         MICROBIOLOGY:    No new microbiology data for review.     RADIOLOGY:    No new radiographic images for review.    MEDICATIONS  (STANDING):  atorvastatin 80 milliGRAM(s) Oral at bedtime  chlorhexidine 4% Liquid 1 Application(s) Topical <User Schedule>  clopidogrel Tablet 75 milliGRAM(s) Oral daily  heparin  Injectable 5000 Unit(s) SubCutaneous every 8 hours  hydrocortisone sodium succinate Injectable 50 milliGRAM(s) IV Push every 6 hours  midodrine 20 milliGRAM(s) Oral every 8 hours  phenylephrine    Infusion 0.1 MICROgram(s)/kG/Min (2.64 mL/Hr) IV Continuous <Continuous>  sodium chloride 0.9%. 1000 milliLiter(s) (50 mL/Hr) IV Continuous <Continuous>    MEDICATIONS  (PRN):  ALBUTerol    90 MICROgram(s) HFA Inhaler 2 Puff(s) Inhalation every 6 hours PRN Shortness of Breath and/or Wheezing Surgery C-Team Daily Progress Note     RUPINDER TRIPP | [id number]    SUBJECTIVE / 24H EVENTS  Patient seen and examined on morning rounds. No acute events overnight. Patient remains aphasic with right sided weakness. Now off pressors. Remains on midodrine/hydrocortisone.     OBJECTIVE:    VITAL SIGNS:  T(C): 36.7 (18 @ 00:00), Max: 37.2 (18 @ 04:00)  HR: 65 (18 @ 00:00) (57 - 101)  BP: 126/61 (18 @ 00:00) (92/44 - 150/65)  RR: 17 (18 @ 00:00) (16 - 24)  SpO2: 97% (18 @ 00:00) (96% - 100%)    Daily Weight in k.8 (2018 06:00)      PHYSICAL EXAM:  Gen: NAD  LS: Respirations unlabored.   Card: on telemetry in sicu  GI: Soft. Nontender. Nondistended.   Ext: 2/5 RUE, 3/5 RLE. Right groin puncture dressing c/d/i, right groin soft w/o evidence of hematoma.   Neuro: R. facial droop, tongue deviation to r.       18 @ 07:01  -  18 @ 07:00  --------------------------------------------------------  IN:    IV PiggyBack: 250 mL    Oral Fluid: 300 mL    phenylephrine   Infusion: 121.8 mL    sodium chloride 0.9%.: 1000 mL  Total IN: 1671.8 mL    OUT:    Indwelling Catheter - Urethral: 500 mL    Voided: 1000 mL  Total OUT: 1500 mL    Total NET: 171.8 mL      18 @ 07:01  -  18 @ 01:09  --------------------------------------------------------  IN:    IV PiggyBack: 250 mL    Lactated Ringers IV Bolus: 500 mL    Oral Fluid: 420 mL    phenylephrine   Infusion: 7.9 mL    phenylephrine   Infusion: 13.2 mL    sodium chloride 0.9%.: 650 mL  Total IN: 1841.1 mL    OUT:    Indwelling Catheter - Urethral: 1805 mL  Total OUT: 1805 mL    Total NET: 36.1 mL      LAB VALUES:      139  |  107  |  11  ----------------------------<  83  3.8   |  24  |  1.04    Ca    7.8<L>      2018 02:25  Phos  1.7       Mg     2.2                                      8.5    13.14 )-----------( 230      ( 2018 02:25 )             28.1         MICROBIOLOGY:    No new microbiology data for review.     RADIOLOGY:    No new radiographic images for review.    MEDICATIONS  (STANDING):  atorvastatin 80 milliGRAM(s) Oral at bedtime  chlorhexidine 4% Liquid 1 Application(s) Topical <User Schedule>  clopidogrel Tablet 75 milliGRAM(s) Oral daily  heparin  Injectable 5000 Unit(s) SubCutaneous every 8 hours  hydrocortisone sodium succinate Injectable 50 milliGRAM(s) IV Push every 6 hours  midodrine 20 milliGRAM(s) Oral every 8 hours  phenylephrine    Infusion 0.1 MICROgram(s)/kG/Min (2.64 mL/Hr) IV Continuous <Continuous>  sodium chloride 0.9%. 1000 milliLiter(s) (50 mL/Hr) IV Continuous <Continuous>    MEDICATIONS  (PRN):  ALBUTerol    90 MICROgram(s) HFA Inhaler 2 Puff(s) Inhalation every 6 hours PRN Shortness of Breath and/or Wheezing

## 2018-11-20 NOTE — STROKE CODE NOTE - NIH STROKE SCALE: 6B. MOTOR LEG, RIGHT, QM
(3) No effort against gravity; leg falls to bed immediately
(2) Some effort against gravity; leg falls to bed by 5 secs, but has some effort against gravity
(0) No drift; leg holds 30 degree position for full 5 secs

## 2018-11-20 NOTE — PROGRESS NOTE ADULT - ATTENDING COMMENTS
I have personally interviewed and examined this patient, reviewed pertinent labs and imaging, and discussed the case with colleagues, residents, physician assistants, and nurses on SICU rounds.    30     minutes in total were spent in providing direct critical care for the diagnoses, assessment and plan outlined below.  These diagnoses are unrelated to the surgical procedure.  Additionally, time spent in the performance of separately billable procedures was not counted toward the critical care time.  There is no overlap.    The active critical care issues are:     I63.512 Cerebral infarction involving left middle cerebral artery   R33.9 Urinary retention   I95.9 Hypotension, unspecified hypotension type   I69.391 Dysphagia due to recent cerebral infarction        Neuro: neuro checks, head CT for worsened neuro deficit   pulm: restart home proventil  cards: midodrine, plavix, statin, madai gtt for systolic goal 140-160  gi: dysphagia diet  gu: monitor urine output, robb replaced for retention, on NS for poor po intake  heme: no active   ID: no active issues  endo: monitor blood sugar, cont steroids given recent use and relative hypotension  proph: sqh I have personally interviewed and examined this patient, reviewed pertinent labs and imaging, and discussed the case with colleagues, residents, physician assistants, and nurses on SICU rounds.    30     minutes in total were spent in providing direct critical care for the diagnoses, assessment and plan outlined below.  These diagnoses are unrelated to the surgical procedure.  Additionally, time spent in the performance of separately billable procedures was not counted toward the critical care time.  There is no overlap.    The active critical care issues are:     I63.512 Cerebral infarction involving left middle cerebral artery   R33.9 Urinary retention   I95.9 Hypotension, unspecified hypotension type   I69.391 Dysphagia due to recent cerebral infarction        Neuro: neuro checks, head and neck CT/CTA for worsened neuro deficit   pulm: restart home proventil  cards: midodrine, plavix, statin, madai gtt for systolic goal 140-160  gi: dysphagia diet  gu: monitor urine output, robb replaced for retention, on NS for poor po intake  heme: no active   ID: no active issues  endo: monitor blood sugar, cont steroids given recent use and relative hypotension  proph: sqh

## 2018-11-20 NOTE — SWALLOW BEDSIDE ASSESSMENT ADULT - SLP GENERAL OBSERVATIONS
Patient alert and oriented. Patient able to follow simple commands and make simple needs known.
awake, cooperative
awake, cooperative

## 2018-11-20 NOTE — PROGRESS NOTE ADULT - SUBJECTIVE AND OBJECTIVE BOX
Neurology Follow up note    Patient is a 63y old  Female who presents with a chief complaint of stroke (17 Nov 2018 10:43)      Subjective: Interval History - No events overnight    Objective:   Vital Signs Last 24 Hrs  T(C): 36.7 (20 Nov 2018 12:00), Max: 37.2 (19 Nov 2018 16:00)  T(F): 98 (20 Nov 2018 12:00), Max: 99 (19 Nov 2018 16:00)  HR: 63 (20 Nov 2018 13:00) (57 - 84)  BP: 179/94 (20 Nov 2018 13:00) (108/67 - 179/94)  BP(mean): 115 (20 Nov 2018 13:00) (69 - 115)  RR: 22 (20 Nov 2018 13:00) (15 - 24)  SpO2: 100% (20 Nov 2018 13:00) (96% - 100%)    General Exam:   General appearance: No acute distress                   Neurological Exam:  Mental Status: Orientated to self, date and place.  Attention intact.  Unable to produce speech. Able to identify pen and button.      Cranial Nerves: VFF. CN V1-3 intact to light touch and pinprick. +dense right facial droop. Tongue midline.                  Strength: 0/5 RUE, 2/5 RLE. 5/5 LUE and LLE, no drift left side.  Pronator drift: none                 Dysmetria: None to finger-nose-finger  No truncal ataxia.    Tremor: No resting, postural or action tremor.  No myoclonus.  Sensation: intact to light touch    Gait: deferred    Other:    11-20    139  |  105  |  7   ----------------------------<  109<H>  3.9   |  21<L>  |  0.90    Ca    8.5      20 Nov 2018 02:27  Phos  2.5     11-20  Mg     2.2     11-20      11-20    139  |  105  |  7   ----------------------------<  109<H>  3.9   |  21<L>  |  0.90    Ca    8.5      20 Nov 2018 02:27  Phos  2.5     11-20  Mg     2.2     11-20                              8.7    10.29 )-----------( 238      ( 20 Nov 2018 02:27 )             28.2     Radiology    EKG:  tele:  TTE:  EEG:      MEDICATIONS  (STANDING):  atorvastatin 80 milliGRAM(s) Oral at bedtime  chlorhexidine 4% Liquid 1 Application(s) Topical <User Schedule>  clopidogrel Tablet 75 milliGRAM(s) Oral daily  heparin  Injectable 5000 Unit(s) SubCutaneous every 8 hours  hydrocortisone sodium succinate Injectable 50 milliGRAM(s) IV Push every 6 hours  midodrine 20 milliGRAM(s) Oral every 8 hours  phenylephrine    Infusion 0.2 MICROgram(s)/kG/Min (5.28 mL/Hr) IV Continuous <Continuous>  sodium chloride 0.9%. 1000 milliLiter(s) (50 mL/Hr) IV Continuous <Continuous>    MEDICATIONS  (PRN):  ALBUTerol    90 MICROgram(s) HFA Inhaler 2 Puff(s) Inhalation every 6 hours PRN Shortness of Breath and/or Wheezing Neurology Follow up note    Patient is a 63y old  Female who presents with a chief complaint of stroke (17 Nov 2018 10:43)    Subjective: Interval History - No events overnight    Objective:   Vital Signs Last 24 Hrs  T(C): 36.7 (20 Nov 2018 12:00), Max: 37.2 (19 Nov 2018 16:00)  T(F): 98 (20 Nov 2018 12:00), Max: 99 (19 Nov 2018 16:00)  HR: 63 (20 Nov 2018 13:00) (57 - 84)  BP: 179/94 (20 Nov 2018 13:00) (108/67 - 179/94)  BP(mean): 115 (20 Nov 2018 13:00) (69 - 115)  RR: 22 (20 Nov 2018 13:00) (15 - 24)  SpO2: 100% (20 Nov 2018 13:00) (96% - 100%)    General Exam:   General appearance: No acute distress                   Neurological Exam:  Mental Status: Orientated to self, date and place.  Attention intact.  Unable to produce speech. Able to identify pen and button.      Cranial Nerves: VFF. CN V1-3 intact to light touch and pinprick. +dense right facial droop. Tongue midline.                  Strength: 0/5 RUE, 2/5 RLE. 5/5 LUE and LLE, no drift left side.  Pronator drift: none                 Dysmetria: None to finger-nose-finger  No truncal ataxia.    Tremor: No resting, postural or action tremor.  No myoclonus.  Sensation: intact to light touch    Gait: deferred    Other:    11-20    139  |  105  |  7   ----------------------------<  109<H>  3.9   |  21<L>  |  0.90    Ca    8.5      20 Nov 2018 02:27  Phos  2.5     11-20  Mg     2.2     11-20      11-20    139  |  105  |  7   ----------------------------<  109<H>  3.9   |  21<L>  |  0.90    Ca    8.5      20 Nov 2018 02:27  Phos  2.5     11-20  Mg     2.2     11-20                              8.7    10.29 )-----------( 238      ( 20 Nov 2018 02:27 )             28.2     Radiology    EKG:  tele:  TTE:  EEG:      MEDICATIONS  (STANDING):  atorvastatin 80 milliGRAM(s) Oral at bedtime  chlorhexidine 4% Liquid 1 Application(s) Topical <User Schedule>  clopidogrel Tablet 75 milliGRAM(s) Oral daily  heparin  Injectable 5000 Unit(s) SubCutaneous every 8 hours  hydrocortisone sodium succinate Injectable 50 milliGRAM(s) IV Push every 6 hours  midodrine 20 milliGRAM(s) Oral every 8 hours  phenylephrine    Infusion 0.2 MICROgram(s)/kG/Min (5.28 mL/Hr) IV Continuous <Continuous>  sodium chloride 0.9%. 1000 milliLiter(s) (50 mL/Hr) IV Continuous <Continuous>    MEDICATIONS  (PRN):  ALBUTerol    90 MICROgram(s) HFA Inhaler 2 Puff(s) Inhalation every 6 hours PRN Shortness of Breath and/or Wheezing Neurology Follow up note    Patient is a 63y old  Female who presents with a chief complaint of stroke (17 Nov 2018 10:43)    Subjective: Interval History - was reportedly moving RUE this morning at breakfast. later on in the morning, code stroke called as patient was unable to move RUE.     Objective:   Vital Signs Last 24 Hrs  T(C): 36.7 (20 Nov 2018 12:00), Max: 37.2 (19 Nov 2018 16:00)  T(F): 98 (20 Nov 2018 12:00), Max: 99 (19 Nov 2018 16:00)  HR: 63 (20 Nov 2018 13:00) (57 - 84)  BP: 179/94 (20 Nov 2018 13:00) (108/67 - 179/94)  BP(mean): 115 (20 Nov 2018 13:00) (69 - 115)  RR: 22 (20 Nov 2018 13:00) (15 - 24)  SpO2: 100% (20 Nov 2018 13:00) (96% - 100%)    General Exam:   General appearance: No acute distress                   Neurological Exam:  Mental Status: Orientated to self, date and place.  Attention intact.  Unable to produce speech. Able to identify pen and button.      Cranial Nerves: VFF. CN V1-3 intact to light touch and pinprick. +dense right facial droop. Tongue midline.                  Strength: 0/5 RUE, 2/5 RLE. 5/5 LUE and LLE, no drift left side.  Pronator drift: none                 Dysmetria: None to finger-nose-finger  No truncal ataxia.    Tremor: No resting, postural or action tremor.  No myoclonus.  Sensation: intact to light touch    Gait: deferred    Other:    11-20    139  |  105  |  7   ----------------------------<  109<H>  3.9   |  21<L>  |  0.90    Ca    8.5      20 Nov 2018 02:27  Phos  2.5     11-20  Mg     2.2     11-20 11-20    139  |  105  |  7   ----------------------------<  109<H>  3.9   |  21<L>  |  0.90    Ca    8.5      20 Nov 2018 02:27  Phos  2.5     11-20  Mg     2.2     11-20                              8.7    10.29 )-----------( 238      ( 20 Nov 2018 02:27 )             28.2     Radiology    < from: CT Brain Stroke Protocol (11.20.18 @ 10:14) >  Impression: Acute left MCA infarct.    Narrowing of the proximal portion of the carotid stent as described above.    Decreased caliber of the right vertebral artery which is more prominent   distally.    Multiple areas of stenosis involving the left distal M1 segment and   proximal posterior M2 segment with no flow related enhancement seen   throughout the left M2 segments.    Short segment of stenosis involving the posterior right M2 segment.    < end of copied text >        EKG:  tele:  TTE:  EEG:      MEDICATIONS  (STANDING):  atorvastatin 80 milliGRAM(s) Oral at bedtime  chlorhexidine 4% Liquid 1 Application(s) Topical <User Schedule>  clopidogrel Tablet 75 milliGRAM(s) Oral daily  heparin  Injectable 5000 Unit(s) SubCutaneous every 8 hours  hydrocortisone sodium succinate Injectable 50 milliGRAM(s) IV Push every 6 hours  midodrine 20 milliGRAM(s) Oral every 8 hours  phenylephrine    Infusion 0.2 MICROgram(s)/kG/Min (5.28 mL/Hr) IV Continuous <Continuous>  sodium chloride 0.9%. 1000 milliLiter(s) (50 mL/Hr) IV Continuous <Continuous>    MEDICATIONS  (PRN):  ALBUTerol    90 MICROgram(s) HFA Inhaler 2 Puff(s) Inhalation every 6 hours PRN Shortness of Breath and/or Wheezing

## 2018-11-20 NOTE — STROKE CODE NOTE - NIH STROKE SCALE: 9. BEST LANGUAGE, QM
(2) Severe aphasia; all communication is through fragmentary expression; great need for inference, questioning, and guessing by the listener. Range of information that can be exchanged is limited; listener carries burden of communication. Examiner cannot identify materials provided from patient response.
(3) Mute, global aphasia; no usable speech or auditory comprehension
(0) No aphasia; normal

## 2018-11-20 NOTE — PROGRESS NOTE ADULT - SUBJECTIVE AND OBJECTIVE BOX
VASCULAR SURGERY DAILY PROGRESS NOTE:       Subjective:    Code stroke was called for worsened motor exam in RUE 0/. CTH/CTA neck were performed. CTH showed acute L MCA stroke, unchanged from prior imaging . CTA neck showing unchanged proximal 72% occlusion of carotid stent        Objective:    PE:  Gen: NAD  LS: Respirations unlabored.   Card: on telemetry in sicu  GI: Soft. Nontender. Nondistended.   Ext: 0/5 RUE, 3/5 RLE. Right groin puncture dressing c/d/i, right groin soft w/o evidence of hematoma.   Neuro: R. facial droop, tongue deviation to r.     Vital Signs Last 24 Hrs  T(C): 36.7 (2018 12:00), Max: 37.2 (2018 16:00)  T(F): 98 (2018 12:00), Max: 99 (2018 16:00)  HR: 69 (2018 12:00) (57 - 84)  BP: 170/85 (2018 12:00) (108/67 - 170/85)  BP(mean): 107 (2018 12:00) (69 - 114)  RR: 20 (2018 12:00) (15 - 24)  SpO2: 99% (2018 12:00) (96% - 100%)    I&O's Detail    2018 07:01  -  2018 07:00  --------------------------------------------------------  IN:    IV PiggyBack: 250 mL    Lactated Ringers IV Bolus: 500 mL    Oral Fluid: 420 mL    phenylephrine   Infusion: 13.2 mL    phenylephrine   Infusion: 7.9 mL    sodium chloride 0.9%.: 1000 mL  Total IN: 2191.1 mL    OUT:    Indwelling Catheter - Urethral: 2185 mL  Total OUT: 2185 mL    Total NET: 6.1 mL      2018 07:01  -  2018 13:19  --------------------------------------------------------  IN:    Oral Fluid: 120 mL    phenylephrine   Infusion: 13.2 mL    sodium chloride 0.9%.: 250 mL  Total IN: 383.2 mL    OUT:    Indwelling Catheter - Urethral: 240 mL  Total OUT: 240 mL    Total NET: 143.2 mL          Daily     Daily Weight in k.3 (2018 06:00)    MEDICATIONS  (STANDING):  atorvastatin 80 milliGRAM(s) Oral at bedtime  chlorhexidine 4% Liquid 1 Application(s) Topical <User Schedule>  clopidogrel Tablet 75 milliGRAM(s) Oral daily  heparin  Injectable 5000 Unit(s) SubCutaneous every 8 hours  hydrocortisone sodium succinate Injectable 50 milliGRAM(s) IV Push every 6 hours  midodrine 20 milliGRAM(s) Oral every 8 hours  phenylephrine    Infusion 0.1 MICROgram(s)/kG/Min (2.64 mL/Hr) IV Continuous <Continuous>  sodium chloride 0.9%. 1000 milliLiter(s) (50 mL/Hr) IV Continuous <Continuous>    MEDICATIONS  (PRN):  ALBUTerol    90 MICROgram(s) HFA Inhaler 2 Puff(s) Inhalation every 6 hours PRN Shortness of Breath and/or Wheezing      LABS:                        8.7    10.29 )-----------( 238      ( 2018 02:27 )             28.2     11-20    139  |  105  |  7   ----------------------------<  109<H>  3.9   |  21<L>  |  0.90    Ca    8.5      2018 02:27  Phos  2.5     11-20  Mg     2.2     11-20            RADIOLOGY & ADDITIONAL STUDIES: VASCULAR SURGERY DAILY PROGRESS NOTE:       Subjective:    Code stroke was called for worsened motor exam in RUE 0/. CTH/CTA neck were performed. CTH showed acute L MCA stroke, unchanged from prior imaging . CTA neck showing unchanged proximal 72% occlusion of carotid stent        Objective:    PE:  Gen: NAD  LS: Respirations unlabored.   Card: on telemetry in sicu  Ext: 0/5 RUE, 3/5 RLE. Right groin puncture dressing c/d/i, right groin soft w/o evidence of hematoma.   Neuro: R. facial droop, tongue deviation to r.     Vital Signs Last 24 Hrs  T(C): 36.7 (2018 12:00), Max: 37.2 (2018 16:00)  T(F): 98 (2018 12:00), Max: 99 (2018 16:00)  HR: 69 (2018 12:00) (57 - 84)  BP: 170/85 (2018 12:00) (108/67 - 170/85)  BP(mean): 107 (2018 12:00) (69 - 114)  RR: 20 (2018 12:00) (15 - 24)  SpO2: 99% (2018 12:00) (96% - 100%)    I&O's Detail    2018 07:01  -  2018 07:00  --------------------------------------------------------  IN:    IV PiggyBack: 250 mL    Lactated Ringers IV Bolus: 500 mL    Oral Fluid: 420 mL    phenylephrine   Infusion: 13.2 mL    phenylephrine   Infusion: 7.9 mL    sodium chloride 0.9%.: 1000 mL  Total IN: 2191.1 mL    OUT:    Indwelling Catheter - Urethral: 2185 mL  Total OUT: 2185 mL    Total NET: 6.1 mL      2018 07:01  -  2018 13:19  --------------------------------------------------------  IN:    Oral Fluid: 120 mL    phenylephrine   Infusion: 13.2 mL    sodium chloride 0.9%.: 250 mL  Total IN: 383.2 mL    OUT:    Indwelling Catheter - Urethral: 240 mL  Total OUT: 240 mL    Total NET: 143.2 mL          Daily     Daily Weight in k.3 (2018 06:00)    MEDICATIONS  (STANDING):  atorvastatin 80 milliGRAM(s) Oral at bedtime  chlorhexidine 4% Liquid 1 Application(s) Topical <User Schedule>  clopidogrel Tablet 75 milliGRAM(s) Oral daily  heparin  Injectable 5000 Unit(s) SubCutaneous every 8 hours  hydrocortisone sodium succinate Injectable 50 milliGRAM(s) IV Push every 6 hours  midodrine 20 milliGRAM(s) Oral every 8 hours  phenylephrine    Infusion 0.1 MICROgram(s)/kG/Min (2.64 mL/Hr) IV Continuous <Continuous>  sodium chloride 0.9%. 1000 milliLiter(s) (50 mL/Hr) IV Continuous <Continuous>    MEDICATIONS  (PRN):  ALBUTerol    90 MICROgram(s) HFA Inhaler 2 Puff(s) Inhalation every 6 hours PRN Shortness of Breath and/or Wheezing      LABS:                        8.7    10.29 )-----------( 238      ( 2018 02:27 )             28.2     11-20    139  |  105  |  7   ----------------------------<  109<H>  3.9   |  21<L>  |  0.90    Ca    8.5      2018 02:27  Phos  2.5     11-20  Mg     2.2     11-20            RADIOLOGY & ADDITIONAL STUDIES:

## 2018-11-20 NOTE — SWALLOW BEDSIDE ASSESSMENT ADULT - ASR SWALLOW RECOMMEND DIAG
VFSS/MBS/discussed with SICU team, recommend an objective swallow study secondary to repeated CVA's and neurological changes.
VFSS/MBS/discussed with SICU team

## 2018-11-20 NOTE — STROKE CODE NOTE - NIH STROKE SCALE: 1C. LOC COMMANDS, QM
(2) Performs neither task correctly
(0) Performs both tasks correctly
(0) Performs both tasks correctly

## 2018-11-20 NOTE — SWALLOW BEDSIDE ASSESSMENT ADULT - ASR SWALLOW REFERRAL
RD; to ensure adequate caloric intake Neurology to continue f/u/Registered Dietitian/neurology
Registered Dietitian/neurology/RD; to ensure adequate caloric intake Neurology to continue f/u

## 2018-11-20 NOTE — SWALLOW BEDSIDE ASSESSMENT ADULT - ASR SWALLOW LINGUAL MOBILITY
deviates to the right side; able to lateralize tongue
pt with difficulty executing isolated and integrated oral movements
pt with difficulty executing isolated and integrated oral movements

## 2018-11-20 NOTE — PROGRESS NOTE ADULT - SUBJECTIVE AND OBJECTIVE BOX
24 HOUR EVENTS:      HISTORY  62y/o F w/ HTN, HLD, RA, Asthma  s/p elective left transcarotid arterial sten via right femoral vein access (11/13/18). course complicated by code stroke approximately 8 hours postop. CTA head and neck showed a distal M2 branch occlusion. She was deemed not a candidate for intracranial thrombectomy due to the location of the M2 occlusion at that time. Patient monitored in SICU, found to be stable, and sent to the floor. The following day patient was found with possible worsening right side weakness and worsening aphasia. CT showed no acute infarct and CTA showed ~75% stenosis of left carotid stent.    SUBJECTIVE/ROS:  [X ] A ten-point review of systems was otherwise negative except as noted.  [ ] Due to altered mental status/intubation, subjective information were not able to be obtained from the patient. History was obtained, to the extent possible, from review of the chart and collateral sources of information.    NEURO  RASS: 0  Exam: Expressive asphasia slurring of words, A&O, NAD  Meds:   [x] Adequacy of sedation and pain control has been assessed and adjusted    RESPIRATORY  RR: 17 (11-20-18 @ 00:00) (16 - 24)  SpO2: 97% (11-20-18 @ 00:00) (96% - 100%)  Exam: unlabored, clear to auscultation bilaterally    [N/A] Extubation Readiness Assessed  Meds: ALBUTerol    90 MICROgram(s) HFA Inhaler 2 Puff(s) Inhalation every 6 hours PRN Shortness of Breath and/or Wheezing    CARDIOVASCULAR  HR: 65 (11-20-18 @ 00:00) (57 - 101)  BP: 126/61 (11-20-18 @ 00:00) (92/44 - 150/65)  BP(mean): 78 (11-20-18 @ 00:00) (55 - 102)    Exam: regular rate and rhythm  Cardiac Rhythm: sinus  Perfusion     []Adequate   [X]Inadequate  Mentation   [x]Normal       [ ]Reduced  Extremities  [x]Warm         [ ]Cool  Volume Status [ ]Hypervolemic [x]Euvolemic [ ]Hypovolemic  Meds: midodrine 20 milliGRAM(s) Oral every 8 hours  phenylephrine    Infusion 0.1 MICROgram(s)/kG/Min IV Continuous <Continuous>      GI/NUTRITION  Exam: soft, nontender, nondistended  Diet: dysphagia       GENITOURINARY  I&O's Detail    11-18 @ 07:01  -  11-19 @ 07:00  --------------------------------------------------------  IN:    IV PiggyBack: 250 mL    Oral Fluid: 300 mL    phenylephrine   Infusion: 121.8 mL    sodium chloride 0.9%.: 1000 mL  Total IN: 1671.8 mL    OUT:    Indwelling Catheter - Urethral: 500 mL    Voided: 1000 mL  Total OUT: 1500 mL    Total NET: 171.8 mL      11-19 @ 07:01  -  11-20 @ 01:14  --------------------------------------------------------  IN:    IV PiggyBack: 250 mL    Lactated Ringers IV Bolus: 500 mL    Oral Fluid: 420 mL    phenylephrine   Infusion: 7.9 mL    phenylephrine   Infusion: 13.2 mL    sodium chloride 0.9%.: 650 mL  Total IN: 1841.1 mL    OUT:    Indwelling Catheter - Urethral: 1805 mL  Total OUT: 1805 mL    Total NET: 36.1 mL    11-19    139  |  107  |  11  ----------------------------<  83  3.8   |  24  |  1.04    Ca    7.8<L>      19 Nov 2018 02:25  Phos  1.7     11-19  Mg     2.2     11-19      [x ] Sandoval catheter, indication: N/A  Meds: sodium chloride 0.9%. 1000 milliLiter(s) IV Continuous <Continuous>      HEMATOLOGIC  Meds: clopidogrel Tablet 75 milliGRAM(s) Oral daily  heparin  Injectable 5000 Unit(s) SubCutaneous every 8 hours    [x] VTE Prophylaxis                        8.5    13.14 )-----------( 230      ( 19 Nov 2018 02:25 )             28.1       Transfusion     [0 ] PRBC   [0 ] Platelets   [0 ] FFP   [0 ] Cryoprecipitate    INFECTIOUS DISEASES  WBC Count: 13.14 K/uL (11-19 @ 02:25)          ENDOCRINE  Meds: atorvastatin 80 milliGRAM(s) Oral at bedtime  hydrocortisone sodium succinate Injectable 50 milliGRAM(s) IV Push every 6 hours      ACCESS DEVICES:  [X ] Peripheral IV  [ ] Central Venous Line	[ ] R	[ ] L	[ ] IJ	[ ] Fem	[ ] SC	Placed:   [ ] Arterial Line		[ ] R	[ ] L	[ ] Fem	[ ] Rad	[ ] Ax	Placed:   [ ] PICC:					[ ] Mediport  [ ] Urinary Catheter, Date Placed:   [x] Necessity of urinary, arterial, and venous catheters discussed    OTHER MEDICATIONS:  chlorhexidine 4% Liquid 1 Application(s) Topical <User Schedule>    CODE STATUS: FULL      IMAGING: 24 HOUR EVENTS: Given 500cc fluid bolus and started on 50mg q8h Solu-Cortef in addition to midodrine. Awaiting Esophogram later today, refused study yesterday. Levophed resumed to achieve goal SBP >110.      HISTORY  64y/o F w/ HTN, HLD, RA, Asthma  s/p elective left transcarotid arterial sten via right femoral vein access (11/13/18). course complicated by code stroke approximately 8 hours postop. CTA head and neck showed a distal M2 branch occlusion. She was deemed not a candidate for intracranial thrombectomy due to the location of the M2 occlusion at that time. Patient monitored in SICU, found to be stable, and sent to the floor. The following day patient was found with possible worsening right side weakness and worsening aphasia. CT showed no acute infarct and CTA showed ~75% stenosis of left carotid stent.    SUBJECTIVE/ROS:  [X ] A ten-point review of systems was otherwise negative except as noted.  [ ] Due to altered mental status/intubation, subjective information were not able to be obtained from the patient. History was obtained, to the extent possible, from review of the chart and collateral sources of information.    NEURO  RASS: 0  Exam: Expressive asphasia slurring of words, A&O, NAD  Meds:   [x] Adequacy of sedation and pain control has been assessed and adjusted    RESPIRATORY  RR: 17 (11-20-18 @ 00:00) (16 - 24)  SpO2: 97% (11-20-18 @ 00:00) (96% - 100%)  Exam: unlabored, clear to auscultation bilaterally    [N/A] Extubation Readiness Assessed  Meds: ALBUTerol    90 MICROgram(s) HFA Inhaler 2 Puff(s) Inhalation every 6 hours PRN Shortness of Breath and/or Wheezing    CARDIOVASCULAR  HR: 65 (11-20-18 @ 00:00) (57 - 101)  BP: 126/61 (11-20-18 @ 00:00) (92/44 - 150/65)  BP(mean): 78 (11-20-18 @ 00:00) (55 - 102)    Exam: regular rate and rhythm  Cardiac Rhythm: sinus  Perfusion     []Adequate   [X]Inadequate  Mentation   [x]Normal       [ ]Reduced  Extremities  [x]Warm         [ ]Cool  Volume Status [ ]Hypervolemic [x]Euvolemic [ ]Hypovolemic  Meds: midodrine 20 milliGRAM(s) Oral every 8 hours  phenylephrine    Infusion 0.1 MICROgram(s)/kG/Min IV Continuous <Continuous>      GI/NUTRITION  Exam: soft, nontender, nondistended  Diet: dysphagia       GENITOURINARY  I&O's Detail    11-18 @ 07:01  -  11-19 @ 07:00  --------------------------------------------------------  IN:    IV PiggyBack: 250 mL    Oral Fluid: 300 mL    phenylephrine   Infusion: 121.8 mL    sodium chloride 0.9%.: 1000 mL  Total IN: 1671.8 mL    OUT:    Indwelling Catheter - Urethral: 500 mL    Voided: 1000 mL  Total OUT: 1500 mL    Total NET: 171.8 mL      11-19 @ 07:01 - 11-20 @ 01:14  --------------------------------------------------------  IN:    IV PiggyBack: 250 mL    Lactated Ringers IV Bolus: 500 mL    Oral Fluid: 420 mL    phenylephrine   Infusion: 7.9 mL    phenylephrine   Infusion: 13.2 mL    sodium chloride 0.9%.: 650 mL  Total IN: 1841.1 mL    OUT:    Indwelling Catheter - Urethral: 1805 mL  Total OUT: 1805 mL    Total NET: 36.1 mL    11-19    139  |  107  |  11  ----------------------------<  83  3.8   |  24  |  1.04    Ca    7.8<L>      19 Nov 2018 02:25  Phos  1.7     11-19  Mg     2.2     11-19      [x ] Sandoval catheter, indication: N/A  Meds: sodium chloride 0.9%. 1000 milliLiter(s) IV Continuous <Continuous>      HEMATOLOGIC  Meds: clopidogrel Tablet 75 milliGRAM(s) Oral daily  heparin  Injectable 5000 Unit(s) SubCutaneous every 8 hours    [x] VTE Prophylaxis                        8.5    13.14 )-----------( 230      ( 19 Nov 2018 02:25 )             28.1       Transfusion     [0 ] PRBC   [0 ] Platelets   [0 ] FFP   [0 ] Cryoprecipitate    INFECTIOUS DISEASES  WBC Count: 13.14 K/uL (11-19 @ 02:25)          ENDOCRINE  Meds: atorvastatin 80 milliGRAM(s) Oral at bedtime  hydrocortisone sodium succinate Injectable 50 milliGRAM(s) IV Push every 6 hours      ACCESS DEVICES:  [X ] Peripheral IV  [ ] Central Venous Line	[ ] R	[ ] L	[ ] IJ	[ ] Fem	[ ] SC	Placed:   [ ] Arterial Line		[ ] R	[ ] L	[ ] Fem	[ ] Rad	[ ] Ax	Placed:   [ ] PICC:					[ ] Mediport  [X ] Urinary Catheter, Date Placed: 11/18/18  [x] Necessity of urinary, arterial, and venous catheters discussed    OTHER MEDICATIONS:  chlorhexidine 4% Liquid 1 Application(s) Topical <User Schedule>    CODE STATUS: FULL      IMAGING: 24 HOUR EVENTS: Yesterday given 500cc fluid bolus and started on 50mg q8h Solu-Cortef in addition to midodrine. Yesterday evening as per vascular team, pt was started back on Mariusz gtt with BP goal 140-160. This AM during rounds, pt was noted to have no movement RUE and worsening movement RLE (only wiggling toes). Code stroke called, urgent CT and CTA performed, seen by neurology. As per neurology, BP goal 160, cont mariusz gtt. CT c/w known L MCA infarct.    HISTORY  64y/o F w/ HTN, HLD, RA, Asthma  s/p elective left transcarotid arterial sten via right femoral vein access (11/13/18). course complicated by code stroke approximately 8 hours postop. CTA head and neck showed a distal M2 branch occlusion. She was deemed not a candidate for intracranial thrombectomy due to the location of the M2 occlusion at that time. Patient monitored in SICU, found to be stable, and sent to the floor. The following day patient was found with possible worsening right side weakness and worsening aphasia. CT showed no acute infarct and CTA showed ~75% stenosis of left carotid stent.    SUBJECTIVE/ROS:  [X ] A ten-point review of systems was otherwise negative except as noted.  [ ] Due to altered mental status/intubation, subjective information were not able to be obtained from the patient. History was obtained, to the extent possible, from review of the chart and collateral sources of information.    NEURO  RASS: 0  Exam: Expressive asphasia slurring of words, A&O, NAD  Meds:   [x] Adequacy of sedation and pain control has been assessed and adjusted    RESPIRATORY  RR: 17 (11-20-18 @ 00:00) (16 - 24)  SpO2: 97% (11-20-18 @ 00:00) (96% - 100%)  Exam: unlabored, clear to auscultation bilaterally    [N/A] Extubation Readiness Assessed  Meds: ALBUTerol    90 MICROgram(s) HFA Inhaler 2 Puff(s) Inhalation every 6 hours PRN Shortness of Breath and/or Wheezing    CARDIOVASCULAR  HR: 65 (11-20-18 @ 00:00) (57 - 101)  BP: 126/61 (11-20-18 @ 00:00) (92/44 - 150/65)  BP(mean): 78 (11-20-18 @ 00:00) (55 - 102)    Exam: regular rate and rhythm  Cardiac Rhythm: sinus  Perfusion     []Adequate   [X]Inadequate  Mentation   [x]Normal       [ ]Reduced  Extremities  [x]Warm         [ ]Cool  Volume Status [ ]Hypervolemic [x]Euvolemic [ ]Hypovolemic  Meds: midodrine 20 milliGRAM(s) Oral every 8 hours  phenylephrine    Infusion 0.1 MICROgram(s)/kG/Min IV Continuous <Continuous>      GI/NUTRITION  Exam: soft, nontender, nondistended  Diet: dysphagia       GENITOURINARY  I&O's Detail    11-18 @ 07:01  -  11-19 @ 07:00  --------------------------------------------------------  IN:    IV PiggyBack: 250 mL    Oral Fluid: 300 mL    phenylephrine   Infusion: 121.8 mL    sodium chloride 0.9%.: 1000 mL  Total IN: 1671.8 mL    OUT:    Indwelling Catheter - Urethral: 500 mL    Voided: 1000 mL  Total OUT: 1500 mL    Total NET: 171.8 mL      11-19 @ 07:01 - 11-20 @ 01:14  --------------------------------------------------------  IN:    IV PiggyBack: 250 mL    Lactated Ringers IV Bolus: 500 mL    Oral Fluid: 420 mL    phenylephrine   Infusion: 7.9 mL    phenylephrine   Infusion: 13.2 mL    sodium chloride 0.9%.: 650 mL  Total IN: 1841.1 mL    OUT:    Indwelling Catheter - Urethral: 1805 mL  Total OUT: 1805 mL    Total NET: 36.1 mL    11-19    139  |  107  |  11  ----------------------------<  83  3.8   |  24  |  1.04    Ca    7.8<L>      19 Nov 2018 02:25  Phos  1.7     11-19  Mg     2.2     11-19      [x ] Sandoval catheter, indication: N/A  Meds: sodium chloride 0.9%. 1000 milliLiter(s) IV Continuous <Continuous>      HEMATOLOGIC  Meds: clopidogrel Tablet 75 milliGRAM(s) Oral daily  heparin  Injectable 5000 Unit(s) SubCutaneous every 8 hours    [x] VTE Prophylaxis                        8.5    13.14 )-----------( 230      ( 19 Nov 2018 02:25 )             28.1       Transfusion     [0 ] PRBC   [0 ] Platelets   [0 ] FFP   [0 ] Cryoprecipitate    INFECTIOUS DISEASES  WBC Count: 13.14 K/uL (11-19 @ 02:25)          ENDOCRINE  Meds: atorvastatin 80 milliGRAM(s) Oral at bedtime  hydrocortisone sodium succinate Injectable 50 milliGRAM(s) IV Push every 6 hours      ACCESS DEVICES:  [X ] Peripheral IV  [ ] Central Venous Line	[ ] R	[ ] L	[ ] IJ	[ ] Fem	[ ] SC	Placed:   [ ] Arterial Line		[ ] R	[ ] L	[ ] Fem	[ ] Rad	[ ] Ax	Placed:   [ ] PICC:					[ ] Mediport  [X ] Urinary Catheter, Date Placed: 11/18/18  [x] Necessity of urinary, arterial, and venous catheters discussed    OTHER MEDICATIONS:  chlorhexidine 4% Liquid 1 Application(s) Topical <User Schedule>    CODE STATUS: FULL      IMAGING:

## 2018-11-20 NOTE — PROGRESS NOTE ADULT - ASSESSMENT
63F s/p l. TCARS with post op course c/b code stroke now in sicu with reduced neurologic status. Patient hemodynamically stable at present time with SBP low 100s, may need support to maintain adequate cerebral perfusion.    - recommend target bp 140-160  - neurology following, appreciate continued recommendations  - care per sicu team, appreciate continued management by sicu team    Surgery, C-Team   Pager: [phone] 63F s/p l. TCARS with post op course c/b code stroke now in sicu with reduced neurologic status. Patient hemodynamically stable at present time with SBP low 100s, may need support to maintain adequate cerebral perfusion.    - recommend target bp 140-160  - Recommend PT/OT  - Recommend esophogram   - neurology following, appreciate continued recommendations  - care per sicu team, appreciate continued management by sicu team    Surgery, C-Team   Pager: [phone]

## 2018-11-20 NOTE — STROKE CODE NOTE - NIH STROKE SCALE: 5B. MOTOR ARM, RIGHT, QM
(2) Some effort against gravity; limb cannot get to or maintain (if cued) 90 (or 45) degrees, drifts down to bed, but has some effort against gravity
(0) No drift; limb holds 90 (or 45) degrees for full 10 secs
(0) No drift; limb holds 90 (or 45) degrees for full 10 secs

## 2018-11-20 NOTE — PROGRESS NOTE ADULT - ASSESSMENT
62y/o F w/ HTN, HLD, RA, Asthma s/p elective left transcarotid arterial sten via right femoral vein access (11/13/18). course complicated by code stroke approximately 8 hours postop. CTA head and neck showed a distal M2 branch occlusion. She was deemed not a candidate for intracranial thrombectomy due to the location of the M2 occlusion at that time. Patient monitored in SICU, found to be stable, and sent to the floor. The following day patient was found with possible worsening right side weakness and worsening aphasia. Code stroke called. CT showed no acute infarct and CTA was stable as compared to prior study. Concern by neuro that worsening of right sided deficits and aphasia possibly secondary to hypoperfusion of area supplied by the L M2 high grade stenosis due to fluctuating blood pressure.    Code Status: full    PLAN:   Neurologic:   - CT: left M2 segment branch occlusion, Alternating areas of stenosis involving the distal left [place] branch vasculature  - repeat CT on 11/18  unchanged  - Q2H neurological exams  - MRI obtained - unchanged  - f/u neuro & vascular rec's                                                                                                                                                                                                                                                                                                                             Pulmonary: Hx asthma  - stable on RA  - proventil PRN    Cardiovascular:   - HR 60's  - BP goal 140-160, currently in 110's.   - C/w Midodrine 20mg q8h  - c/w statin    Gastrointestinal:  - dysphagia diet  - f/u esophagram today as patient refused study yesterday    Renal:  - monitor I/O  - replete lytes PRN  - cont w/ IVF 50cc until consistently eating  - c/w Sandoval given episode of retention     Hematologic:  - c/w plavix, SQH    Infectious Disease:  - Panculture if febrile      Endocrine:  - no issues    Dispo: Possible transfer to floors later today    [facility] PGY-2  SICU 62y/o F w/ HTN, HLD, RA, Asthma s/p elective left transcarotid arterial sten via right femoral vein access (11/13/18). course complicated by code stroke approximately 8 hours postop. CTA head and neck showed a distal M2 branch occlusion. She was deemed not a candidate for intracranial thrombectomy due to the location of the M2 occlusion at that time. Patient monitored in SICU, found to be stable, and sent to the floor. The following day patient was found with possible worsening right side weakness and worsening aphasia. Code stroke called. CT showed no acute infarct and CTA was stable as compared to prior study. Concern by neuro that worsening of right sided deficits and aphasia possibly secondary to hypoperfusion of area supplied by the L M2 high grade stenosis due to fluctuating blood pressure.    Code Status: full    PLAN:   Neurologic:   - Repeat CT 11/18: left M2 segment branch occlusion, Alternating areas of stenosis involving the distal left [place] branch vasculature (unchanged from prior). MRI consistent with these findings.  - code stroke called today for worsening weakness RLE & RLE. Repeat CT w/ similar L MCA stroke. Neuro re-examined patient. Recommend continuing Mariusz gtt with BP goal 160. If neuro exam stable can decrease BP goal to 140 in evening. Overall goal is to gradually decrease BP goals as long as neuro exam is stable.  - Q2H neurological exams  - f/u neuro & vascular rec's                                                                                                                                                                                                                                                                                                                             Pulmonary: Hx asthma  - stable on RA  - proventil PRN    Cardiovascular:   - HR 70-80s  - 's - achieved with Mariusz gtt -> offered A-line to pt for improved BP monitoring but pt refused  - C/w Midodrine 20mg q8h, solucortef 50mg q6h  - c/w statin    Gastrointestinal:  - dysphagia diet  - repeated S&S eval given new neuro findings today. Dysphagia diet still recommended.  - will postpone esophagram to tomorrow given today's events    Renal:  - monitor I/O  - replete lytes PRN  - cont w/ IVF 50cc until consistently eating  - c/w Sandoval given episode of retention     Hematologic:  - c/w plavix, SQH    Infectious Disease:  - Panculture if febrile      Endocrine:  - no issues    Dispo: SICU

## 2018-11-21 LAB
ALBUMIN SERPL ELPH-MCNC: 3.9 G/DL — SIGNIFICANT CHANGE UP (ref 3.3–5)
ALP SERPL-CCNC: 73 U/L — SIGNIFICANT CHANGE UP (ref 40–120)
ALT FLD-CCNC: 14 U/L — SIGNIFICANT CHANGE UP (ref 4–33)
APTT BLD: 30.5 SEC — SIGNIFICANT CHANGE UP (ref 27.5–36.3)
AST SERPL-CCNC: 16 U/L — SIGNIFICANT CHANGE UP (ref 4–32)
BILIRUB SERPL-MCNC: 0.6 MG/DL — SIGNIFICANT CHANGE UP (ref 0.2–1.2)
BUN SERPL-MCNC: 8 MG/DL — SIGNIFICANT CHANGE UP (ref 7–23)
CALCIUM SERPL-MCNC: 9.2 MG/DL — SIGNIFICANT CHANGE UP (ref 8.4–10.5)
CHLORIDE SERPL-SCNC: 102 MMOL/L — SIGNIFICANT CHANGE UP (ref 98–107)
CO2 SERPL-SCNC: 25 MMOL/L — SIGNIFICANT CHANGE UP (ref 22–31)
CREAT SERPL-MCNC: 0.88 MG/DL — SIGNIFICANT CHANGE UP (ref 0.5–1.3)
GLUCOSE SERPL-MCNC: 124 MG/DL — HIGH (ref 70–99)
HCT VFR BLD CALC: 31.9 % — LOW (ref 34.5–45)
HGB BLD-MCNC: 10.1 G/DL — LOW (ref 11.5–15.5)
INR BLD: 0.9 — SIGNIFICANT CHANGE UP (ref 0.88–1.17)
MAGNESIUM SERPL-MCNC: 2.3 MG/DL — SIGNIFICANT CHANGE UP (ref 1.6–2.6)
MCHC RBC-ENTMCNC: 23.5 PG — LOW (ref 27–34)
MCHC RBC-ENTMCNC: 31.7 % — LOW (ref 32–36)
MCV RBC AUTO: 74.4 FL — LOW (ref 80–100)
NRBC # FLD: 0 — SIGNIFICANT CHANGE UP
PHOSPHATE SERPL-MCNC: 2.5 MG/DL — SIGNIFICANT CHANGE UP (ref 2.5–4.5)
PLATELET # BLD AUTO: 342 K/UL — SIGNIFICANT CHANGE UP (ref 150–400)
PMV BLD: 10.3 FL — SIGNIFICANT CHANGE UP (ref 7–13)
POTASSIUM SERPL-MCNC: 3.4 MMOL/L — LOW (ref 3.5–5.3)
POTASSIUM SERPL-SCNC: 3.4 MMOL/L — LOW (ref 3.5–5.3)
PROT SERPL-MCNC: 7.3 G/DL — SIGNIFICANT CHANGE UP (ref 6–8.3)
PROTHROM AB SERPL-ACNC: 10.2 SEC — SIGNIFICANT CHANGE UP (ref 9.8–13.1)
RBC # BLD: 4.29 M/UL — SIGNIFICANT CHANGE UP (ref 3.8–5.2)
RBC # FLD: 16.9 % — HIGH (ref 10.3–14.5)
SODIUM SERPL-SCNC: 142 MMOL/L — SIGNIFICANT CHANGE UP (ref 135–145)
WBC # BLD: 14.19 K/UL — HIGH (ref 3.8–10.5)
WBC # FLD AUTO: 14.19 K/UL — HIGH (ref 3.8–10.5)

## 2018-11-21 PROCEDURE — 99233 SBSQ HOSP IP/OBS HIGH 50: CPT | Mod: GC

## 2018-11-21 PROCEDURE — 99291 CRITICAL CARE FIRST HOUR: CPT

## 2018-11-21 PROCEDURE — 74230 X-RAY XM SWLNG FUNCJ C+: CPT | Mod: 26

## 2018-11-21 RX ORDER — PHENYLEPHRINE HYDROCHLORIDE 10 MG/ML
0.1 INJECTION INTRAVENOUS
Qty: 40 | Refills: 0 | Status: DISCONTINUED | OUTPATIENT
Start: 2018-11-21 | End: 2018-11-21

## 2018-11-21 RX ORDER — ASPIRIN/CALCIUM CARB/MAGNESIUM 324 MG
81 TABLET ORAL DAILY
Qty: 0 | Refills: 0 | Status: DISCONTINUED | OUTPATIENT
Start: 2018-11-21 | End: 2018-11-28

## 2018-11-21 RX ORDER — PHENYLEPHRINE HYDROCHLORIDE 10 MG/ML
0.2 INJECTION INTRAVENOUS
Qty: 40 | Refills: 0 | Status: DISCONTINUED | OUTPATIENT
Start: 2018-11-21 | End: 2018-11-21

## 2018-11-21 RX ORDER — POTASSIUM CHLORIDE 20 MEQ
10 PACKET (EA) ORAL
Qty: 0 | Refills: 0 | Status: COMPLETED | OUTPATIENT
Start: 2018-11-21 | End: 2018-11-21

## 2018-11-21 RX ADMIN — MIDODRINE HYDROCHLORIDE 20 MILLIGRAM(S): 2.5 TABLET ORAL at 23:23

## 2018-11-21 RX ADMIN — HEPARIN SODIUM 5000 UNIT(S): 5000 INJECTION INTRAVENOUS; SUBCUTANEOUS at 21:00

## 2018-11-21 RX ADMIN — MIDODRINE HYDROCHLORIDE 20 MILLIGRAM(S): 2.5 TABLET ORAL at 14:27

## 2018-11-21 RX ADMIN — MIDODRINE HYDROCHLORIDE 20 MILLIGRAM(S): 2.5 TABLET ORAL at 05:25

## 2018-11-21 RX ADMIN — ATORVASTATIN CALCIUM 80 MILLIGRAM(S): 80 TABLET, FILM COATED ORAL at 23:23

## 2018-11-21 RX ADMIN — HEPARIN SODIUM 5000 UNIT(S): 5000 INJECTION INTRAVENOUS; SUBCUTANEOUS at 14:14

## 2018-11-21 RX ADMIN — Medication 100 MILLIEQUIVALENT(S): at 07:26

## 2018-11-21 RX ADMIN — SODIUM CHLORIDE 50 MILLILITER(S): 9 INJECTION INTRAMUSCULAR; INTRAVENOUS; SUBCUTANEOUS at 09:58

## 2018-11-21 RX ADMIN — Medication 100 MILLIEQUIVALENT(S): at 05:25

## 2018-11-21 RX ADMIN — HEPARIN SODIUM 5000 UNIT(S): 5000 INJECTION INTRAVENOUS; SUBCUTANEOUS at 04:13

## 2018-11-21 RX ADMIN — Medication 81 MILLIGRAM(S): at 14:17

## 2018-11-21 RX ADMIN — CHLORHEXIDINE GLUCONATE 1 APPLICATION(S): 213 SOLUTION TOPICAL at 23:23

## 2018-11-21 RX ADMIN — Medication 50 MILLIGRAM(S): at 05:24

## 2018-11-21 RX ADMIN — CLOPIDOGREL BISULFATE 75 MILLIGRAM(S): 75 TABLET, FILM COATED ORAL at 14:17

## 2018-11-21 RX ADMIN — Medication 50 MILLIGRAM(S): at 00:11

## 2018-11-21 RX ADMIN — Medication 100 MILLIEQUIVALENT(S): at 06:24

## 2018-11-21 NOTE — SWALLOW VFSS/MBS ASSESSMENT ADULT - DIAGNOSTIC IMPRESSIONS
Patient presents with Mild to Moderate Oral Stage and Mild to Moderate Pharyngeal Stage Dysphagia. The Oral Stage is characterized by reduced oral containment with anterior loss/spillage from the oral cavity on the right side due to reduced lip seal/closure & labial right sided weakness for cup sip drinking; slow/prolonged chewing for solid consistency, slow bolus manipulation for solid consistency, slow tongue motion with slow anterior to posterior transfer of the bolus for puree/solids; piecemeal deglutition; premature spillage/loss to the hypopharynx due to reduced oral control and reduced tongue to palate seal; with adequate oral clearance post swallow.   The Pharyngeal Stage is characterized by delayed initiation of the pharyngeal swallow (Bolus head is at the pyriforms for Thin Liquids), reduced laryngeal elevation, adequate tongue base retraction, and mildly reduced pharyngeal constriction resulting in trace/mild pyriform residue for solids post swallow.   There was Laryngeal Penetration for all Liquid Consistencies (deeper penetration for thin liquid vs nectar/honey consistencies) with retrieval and airway protection maintained.  There was No Aspiration observed before, during or after the swallow across all PO Trials.  Of Note: Patient is observed with trace/mild backflow with the initial first two trials of puree only; reswallow clear the residue.  Patient exhibited a cough in absence of contrast of the airway after initial Thin Liquid PO trial. Patient will benefit a Mechanical Soft with Nectar Thick Liquids. Patient presents with Mild to Moderate Oral Stage and Mild to Moderate Pharyngeal Stage Dysphagia. The Oral Stage is characterized by reduced oral containment with anterior loss/spillage from the oral cavity on the right side due to reduced lip seal/closure & labial right sided weakness for cup sip drinking; slow/prolonged chewing for solid consistency, slow bolus manipulation for solid consistency, slow tongue motion with slow anterior to posterior transfer of the bolus for puree/solids; piecemeal deglutition; premature spillage/loss to the hypopharynx due to reduced oral control and reduced tongue to palate seal; with adequate oral clearance post swallow.   The Pharyngeal Stage is characterized by delayed initiation of the pharyngeal swallow (Bolus head is at the pyriforms for Thin Liquids), reduced laryngeal elevation, adequate tongue base retraction, and mildly reduced pharyngeal constriction resulting in trace/mild pyriform residue for solids post swallow.   There was Laryngeal Penetration for all Liquid Consistencies (deeper penetration for thin liquid vs nectar/honey consistencies) with retrieval and airway protection maintained.  There was No Aspiration observed before, during or after the swallow across all PO Trials.  Of Note: Patient is observed with trace/mild backflow with the initial first two trials of puree only; reswallow clear the residue.  Patient exhibited a cough in absence of contrast of the airway after initial Thin Liquid PO trial. Patient will benefit a Mechanical Soft with Honey Thick Liquids.

## 2018-11-21 NOTE — PROGRESS NOTE ADULT - ASSESSMENT
62y/o F w/ HTN, HLD, RA, Asthma s/p elective left transcarotid arterial sten via right femoral vein access (11/13/18). course complicated by code stroke approximately 8 hours postop. CTA head and neck showed a distal M2 branch occlusion. She was deemed not a candidate for intracranial thrombectomy due to the location of the M2 occlusion at that time. Patient monitored in SICU, found to be stable, and sent to the floor. The following day patient was found with possible worsening right side weakness and worsening aphasia. Code stroke called. CT showed no acute infarct and CTA was stable as compared to prior study. Concern by neuro that worsening of right sided deficits and aphasia possibly secondary to hypoperfusion of area supplied by the L M2 high grade stenosis due to fluctuating blood pressure.    Code Status: full    PLAN:   Neurologic:   - Repeat CT 11/18: left M2 segment branch occlusion, Alternating areas of stenosis involving the distal left [place] branch vasculature (unchanged from prior). MRI consistent with these findings.  - code stroke called yesterday for worsening weakness right face RUE & RLE. Repeat CT w/ similar L MCA stroke. Neuro re-examined patient. Recommend continuing Mariusz gtt with BP goal 160. If neuro exam stable can decrease BP goal to 140. Overall goal is to gradually decrease BP goals as long as neuro exam is stable.  - Q2H neurological exams  - f/u neuro & vascular rec's                                                                                                                                                                                                                                                                                                                             Pulmonary: Hx asthma  - stable on RA  - proventil PRN    Cardiovascular:   - HR 70-80s  - 's - achieved with Mariusz gtt -> offered A-line to pt for improved BP monitoring but pt refused  - C/w Midodrine 20mg q8h, solucortef 50mg q6h  - c/w statin    Gastrointestinal:  - dysphagia diet  - repeated S&S eval given new neuro findings today. Dysphagia diet still recommended.  - will postpone esophagram to today given today's events    Renal:  - monitor I/O  - replete lytes PRN  - cont w/ IVF 50cc until consistently eating  - c/w Sandoval given episode of retention     Hematologic:  - c/w plavix, SQH    Infectious Disease:  - Panculture if febrile      Endocrine:  - no issues    Dispo: SICU 64y/o F w/ HTN, HLD, RA, Asthma s/p elective left transcarotid arterial sten via right femoral vein access (11/13/18). course complicated by code stroke approximately 8 hours postop. CTA head and neck showed a distal M2 branch occlusion. She was deemed not a candidate for intracranial thrombectomy due to the location of the M2 occlusion at that time. Patient monitored in SICU, found to be stable, and sent to the floor. The following day patient was found with possible worsening right side weakness and worsening aphasia. Code stroke called. CT showed no acute infarct and CTA was stable as compared to prior study. Concern by neuro that worsening of right sided deficits and aphasia possibly secondary to hypoperfusion of area supplied by the L M2 high grade stenosis due to fluctuating blood pressure.    Code Status: full    PLAN:   Neurologic:   - Repeat CT 11/18: left M2 segment branch occlusion, Alternating areas of stenosis involving the distal left [place] branch vasculature (unchanged from prior). MRI consistent with these findings.  - code stroke called 11/20 for worsening weakness right face, RUE & RLE. Repeat CT w/ similar L MCA stroke. Neuro re-examined patient. Recommended continuing Mariusz gtt with BP goal 160. Neuro exam stable and BP requirements now 130-150. Overall goal is to gradually decrease BP goals as long as neuro exam is stable.  - Will f/u vascular regarding DAPT - currently on plavix, may add ASA  - Q2H neurological exams  - f/u neuro & vascular rec's                                                                                                                                                                                                                                                                                                                             Pulmonary: Hx asthma  - stable on RA  - proventil PRN    Cardiovascular:   - HR 70-80s  - SBP at goal 130-150 - achieved with Mariusz gtt. Pt refusing A-line.  - C/w Midodrine 20mg q8h, solucortef 50mg q6h  - c/w statin    Gastrointestinal:  - dysphagia diet  - repeated S&S eval given new neuro findings. Dysphagia diet still recommended.  - esophagram today to further evaluate    Renal:  - monitor I/O  - replete lytes PRN  - cont w/ IVF 50cc until consistently eating  - c/w Sandoval given episode of retention     Hematologic:  - c/w plavix, SQH  - may add ASA for DAPT    Infectious Disease:  - afebrile, WBC 14  - no signs of infection, cont to monitor     Endocrine:  - no issues    Dispo: SICU 62y/o F w/ HTN, HLD, RA, Asthma s/p elective left transcarotid arterial sten via right femoral vein access (11/13/18). course complicated by code stroke approximately 8 hours postop. CTA head and neck showed a distal M2 branch occlusion. She was deemed not a candidate for intracranial thrombectomy due to the location of the M2 occlusion at that time. Patient monitored in SICU, found to be stable, and sent to the floor. The following day patient was found with possible worsening right side weakness and worsening aphasia. Code stroke called. CT showed no acute infarct and CTA was stable as compared to prior study. Concern by neuro that worsening of right sided deficits and aphasia possibly secondary to hypoperfusion of area supplied by the L M2 high grade stenosis due to fluctuating blood pressure.    Code Status: full    PLAN:   Neurologic:   - Repeat CT 11/18: left M2 segment branch occlusion, Alternating areas of stenosis involving the distal left [place] branch vasculature (unchanged from prior). MRI consistent with these findings.  - code stroke called 11/20 for worsening weakness right face, RUE & RLE. Repeat CT w/ similar L MCA stroke. Neuro re-examined patient. Recommended continuing Mariusz gtt with BP goal 160. Neuro exam stable and BP requirements now 130-150. Overall goal is to gradually decrease BP goals as long as neuro exam is stable.  - DAPT - ASA 81 & plavix  - Q2H neurological exams  - f/u neuro & vascular rec's                                                                                                                                                                                                                                                                                                                             Pulmonary: Hx asthma  - stable on RA  - proventil PRN    Cardiovascular:   - HR 70-80s  - SBP at goal 130-150 - achieved with Mariusz gtt. Pt refusing A-line.  - C/w Midodrine 20mg q8h, solucortef 50mg q6h  - c/w statin    Gastrointestinal:  - dysphagia diet  - repeated S&S eval given new neuro findings. Dysphagia diet still recommended.  - esophagram today to further evaluate    Renal:  - monitor I/O  - replete lytes PRN  - cont w/ IVF 50cc until consistently eating  - c/w Sandoval given episode of retention     Hematologic:  - SQH  - ASA, plavix    Infectious Disease:  - afebrile, WBC 14  - no signs of infection, cont to monitor     Endocrine:  - no issues    Dispo: SICU

## 2018-11-21 NOTE — PROGRESS NOTE ADULT - SUBJECTIVE AND OBJECTIVE BOX
Neurology Follow up note    Patient is a 63y old  Female who presents with a chief complaint of stroke (17 Nov 2018 10:43)    Subjective: Interval History -  Overnight exam remained stable and BP goal was titrated down to -150. She continued to require phenylephrine 0.4-0.5 to achieve this goal.    Objective:   Vital Signs Last 24 Hrs  T(C): 36.2 (21 Nov 2018 12:00), Max: 36.8 (20 Nov 2018 16:00)  T(F): 97.2 (21 Nov 2018 12:00), Max: 98.2 (20 Nov 2018 16:00)  HR: 79 (21 Nov 2018 13:00) (53 - 82)  BP: 137/69 (21 Nov 2018 13:00) (121/61 - 187/90)  BP(mean): 86 (21 Nov 2018 13:00) (72 - 118)  RR: 18 (21 Nov 2018 13:00) (14 - 24)  SpO2: 99% (21 Nov 2018 13:00) (91% - 100%)    General Exam:   General appearance: No acute distress                   Neurological Exam:    Mental Status: Orientated to self, date and place.  Attention intact.  Unable to produce speech. Able to identify pen, button, watch. able to identify sister     Cranial Nerves: VFF. CN V1-3 intact to light touch and pinprick. +dense right facial droop. Tongue midline.                  Strength: 0/5 RUE, 2/5 RLE. 5/5 LUE and LLE, no drift left side.  Pronator drift: none                 Dysmetria: None to finger-nose-finger  No truncal ataxia.    Tremor: No resting, postural or action tremor.  No myoclonus.  Sensation: intact to light touch  Gait: deferred    Other:    11-21    142  |  102  |  8   ----------------------------<  124<H>  3.4<L>   |  25  |  0.88    Ca    9.2      21 Nov 2018 03:40  Phos  2.5     11-21  Mg     2.3     11-21    TPro  7.3  /  Alb  3.9  /  TBili  0.6  /  DBili  x   /  AST  16  /  ALT  14  /  AlkPhos  73  11-21 11-21    142  |  102  |  8   ----------------------------<  124<H>  3.4<L>   |  25  |  0.88    Ca    9.2      21 Nov 2018 03:40  Phos  2.5     11-21  Mg     2.3     11-21    TPro  7.3  /  Alb  3.9  /  TBili  0.6  /  DBili  x   /  AST  16  /  ALT  14  /  AlkPhos  73  11-21    LIVER FUNCTIONS - ( 21 Nov 2018 03:40 )  Alb: 3.9 g/dL / Pro: 7.3 g/dL / ALK PHOS: 73 u/L / ALT: 14 u/L / AST: 16 u/L / GGT: x                                 10.1   14.19 )-----------( 342      ( 21 Nov 2018 03:40 )             31.9     Radiology    EKG:  tele:  TTE:  EEG:      MEDICATIONS  (STANDING):  aspirin  chewable 81 milliGRAM(s) Oral daily  atorvastatin 80 milliGRAM(s) Oral at bedtime  chlorhexidine 4% Liquid 1 Application(s) Topical <User Schedule>  clopidogrel Tablet 75 milliGRAM(s) Oral daily  heparin  Injectable 5000 Unit(s) SubCutaneous every 8 hours  midodrine 20 milliGRAM(s) Oral every 8 hours  phenylephrine    Infusion 0.1 MICROgram(s)/kG/Min (2.64 mL/Hr) IV Continuous <Continuous>  sodium chloride 0.9%. 1000 milliLiter(s) (50 mL/Hr) IV Continuous <Continuous>    MEDICATIONS  (PRN):  ALBUTerol    90 MICROgram(s) HFA Inhaler 2 Puff(s) Inhalation every 6 hours PRN Shortness of Breath and/or Wheezing Neurology Follow up note    Patient is a 63y old  Female who presents with a chief complaint of stroke (17 Nov 2018 10:43)    Subjective: Interval History -  Overnight exam remained stable and BP goal was titrated down to -150. She continued to require phenylephrine 0.4-0.5 to achieve this goal.    Objective:   Vital Signs Last 24 Hrs  T(C): 36.2 (21 Nov 2018 12:00), Max: 36.8 (20 Nov 2018 16:00)  T(F): 97.2 (21 Nov 2018 12:00), Max: 98.2 (20 Nov 2018 16:00)  HR: 79 (21 Nov 2018 13:00) (53 - 82)  BP: 137/69 (21 Nov 2018 13:00) (121/61 - 187/90)  BP(mean): 86 (21 Nov 2018 13:00) (72 - 118)  RR: 18 (21 Nov 2018 13:00) (14 - 24)  SpO2: 99% (21 Nov 2018 13:00) (91% - 100%)    General Exam:   General appearance: No acute distress                   Neurological Exam:    Mental Status: Orientated to self, date and place.  Attention intact. has verbal output but no intelligible speech. Able to identify pen, button, watch. able to identify sister     Cranial Nerves: VFF. CN V1-3 intact to light touch and pinprick. +dense right facial droop. Tongue midline.                  Strength: RUE drift but able to keep arm up for few seconds prior to hitting the bed, 2/5 RLE. 5/5 LUE and LLE, no drift left side.  Pronator drift: none                 Dysmetria: None to finger-nose-finger  No truncal ataxia.    Tremor: No resting, postural or action tremor.  No myoclonus.  Sensation: intact to light touch  Gait: deferred    Other:    11-21    142  |  102  |  8   ----------------------------<  124<H>  3.4<L>   |  25  |  0.88    Ca    9.2      21 Nov 2018 03:40  Phos  2.5     11-21  Mg     2.3     11-21    TPro  7.3  /  Alb  3.9  /  TBili  0.6  /  DBili  x   /  AST  16  /  ALT  14  /  AlkPhos  73  11-21 11-21    142  |  102  |  8   ----------------------------<  124<H>  3.4<L>   |  25  |  0.88    Ca    9.2      21 Nov 2018 03:40  Phos  2.5     11-21  Mg     2.3     11-21    TPro  7.3  /  Alb  3.9  /  TBili  0.6  /  DBili  x   /  AST  16  /  ALT  14  /  AlkPhos  73  11-21    LIVER FUNCTIONS - ( 21 Nov 2018 03:40 )  Alb: 3.9 g/dL / Pro: 7.3 g/dL / ALK PHOS: 73 u/L / ALT: 14 u/L / AST: 16 u/L / GGT: x                                 10.1   14.19 )-----------( 342      ( 21 Nov 2018 03:40 )             31.9     Radiology    EKG:  tele:  TTE:  EEG:      MEDICATIONS  (STANDING):  aspirin  chewable 81 milliGRAM(s) Oral daily  atorvastatin 80 milliGRAM(s) Oral at bedtime  chlorhexidine 4% Liquid 1 Application(s) Topical <User Schedule>  clopidogrel Tablet 75 milliGRAM(s) Oral daily  heparin  Injectable 5000 Unit(s) SubCutaneous every 8 hours  midodrine 20 milliGRAM(s) Oral every 8 hours  phenylephrine    Infusion 0.1 MICROgram(s)/kG/Min (2.64 mL/Hr) IV Continuous <Continuous>  sodium chloride 0.9%. 1000 milliLiter(s) (50 mL/Hr) IV Continuous <Continuous>    MEDICATIONS  (PRN):  ALBUTerol    90 MICROgram(s) HFA Inhaler 2 Puff(s) Inhalation every 6 hours PRN Shortness of Breath and/or Wheezing

## 2018-11-21 NOTE — PROGRESS NOTE ADULT - ASSESSMENT
63F s/p l. TCARS with post op course c/b code stroke now in sicu with reduced neurologic status. Patient hemodynamically stable, may need support to maintain adequate cerebral perfusion.    - no further vascular interventions for now  - Neurology rec---> plavix 75 qd, recommend target bp 140-160  - Recommend PT/OT  - neurology following, appreciate continued recommendations  - care per sicu team, appreciate continued management by sicu team    Surgery, C-Team   Pager: [phone]

## 2018-11-21 NOTE — SWALLOW VFSS/MBS ASSESSMENT ADULT - RECOMMENDED CONSISTENCY
1.) Mechanical Soft with Nectar Thick Liquids  2.) Feeding/Swallowing Guidelines: Sit upright, encourage/provide small bites, chew mechanical soft solids well, encourage/provide single cup sips of nectar thick liquids at a time; two swallows per bite/sip  3.) Aspiration Precautions  4.) Maintain Good Oral Hygiene Care  5.) Monitor PO tolerance/intake   6.) Monitor for any neurological/mental status change that may impact on oral intake program. 1.) Mechanical Soft with Nectar Thick Liquids  2.) Feeding/Swallowing Guidelines: Sit upright, encourage/provide small bites, chew mechanical soft solids well, encourage/provide single cup sips of nectar thick liquids at a time; two swallows per bite/sip  3.) Aspiration and Reflux Precautions  4.) Maintain Good Oral Hygiene Care  5.) Monitor PO tolerance/intake   6.) Continue to Monitor for any progression of neurological/mental status change that may impact on oral intake program. 1.) Mechanical Soft with Honey Thick Liquids  2.) Feeding/Swallowing Guidelines: Sit upright, encourage/provide small bites, chew mechanical soft solids well, encourage/provide single cup sips of nectar thick liquids at a time; two swallows per bite/sip  3.) Aspiration and Reflux Precautions  4.) Maintain Good Oral Hygiene Care  5.) Monitor PO tolerance/intake   6.) Continue to Monitor for any progression of neurological/mental status change that may impact on oral intake program. 1.) Mechanical Soft with Honey Thick Liquids  2.) Feeding/Swallowing Guidelines: Sit upright, encourage/provide small bites, chew mechanical soft solids well, encourage/provide single cup sips of nectar thick liquids at a time; two swallows per bite/sip  3.) Aspiration and Reflux Precautions  4.) Maintain Good Oral Hygiene Care  5.) Monitor PO tolerance/intake   6.) Continue to Monitor for any progression of neurological/mental status change that may impact on oral intake program.  7.) Consider GI Consultation to assess and rule out an esophageal dysphagia component given episode of backflow into the pyriform was noted on the first two PO trials of Puree at MD's discretion.

## 2018-11-21 NOTE — SWALLOW VFSS/MBS ASSESSMENT ADULT - CONSISTENCIES ADMINISTERED
puree Patient was unable to self feed due to right sided weakness/honey thick nectar thick thin liquid solid

## 2018-11-21 NOTE — SWALLOW VFSS/MBS ASSESSMENT ADULT - ORAL PHASE
Delayed oral transit time/Reduced anterior - posterior transport Reduced anterior - posterior transport/Delayed oral transit time within functional limits

## 2018-11-21 NOTE — PROGRESS NOTE ADULT - SUBJECTIVE AND OBJECTIVE BOX
VASCULAR SURGERY DAILY PROGRESS NOTE:       Subjective:  Yesterday AM during rounds, pt was noted to have no movement RUE and worsening movement RLE (only wiggling toes). Code stroke called, urgent CT and CTA performed. CTA showed acute MCA infarct, unchanged from prior imaging . As per neurology, BP goal 160, cont madai gtt. CT c/w known L MCA infarct. During the day speech and swallow was consulted to re-evaluate in setting of new/progressing stroke symptoms, no change and diet was resumed. Overnight exam remained stable and BP goal was titrated down to -150. She continued to require phenylephrine 0.4-0.5 to achieve this goal.      Objective:    PE:  Gen: NAD  LS: Respirations unlabored.   Card: on telemetry in sicu  Ext: 0/5 RUE, 0/5 RLE. Right groin puncture dressing c/d/i, right groin soft w/o evidence of hematoma.   Neuro: R. facial droop, follows some simple commands with LUE/LLE     Vital Signs Last 24 Hrs  T(C): 36.2 (2018 08:00), Max: 36.8 (2018 16:00)  T(F): 97.1 (2018 08:00), Max: 98.2 (2018 16:00)  HR: 73 (2018 11:00) (53 - 79)  BP: 147/98 (2018 11:00) (117/86 - 187/90)  BP(mean): 111 (2018 11:00) (72 - 118)  RR: 19 (2018 11:00) (14 - 24)  SpO2: 94% (2018 11:00) (91% - 100%)    I&O's Detail    2018 07:01  -  2018 07:00  --------------------------------------------------------  IN:    IV PiggyBack: 300 mL    Oral Fluid: 120 mL    phenylephrine   Infusion: 79.5 mL    phenylephrine   Infusion: 34.4 mL    phenylephrine   Infusion: 5 mL    phenylephrine   Infusion: 44.9 mL    sodium chloride 0.9%.: 1150 mL  Total IN: 1733.8 mL    OUT:    Indwelling Catheter - Urethral: 3015 mL  Total OUT: 3015 mL    Total NET: -1281.2 mL      2018 07:01  -  2018 12:03  --------------------------------------------------------  IN:    IV PiggyBack: 100 mL    sodium chloride 0.9%.: 150 mL  Total IN: 250 mL    OUT:    Indwelling Catheter - Urethral: 475 mL  Total OUT: 475 mL    Total NET: -225 mL          Daily     Daily Weight in k.5 (2018 06:00)    MEDICATIONS  (STANDING):  aspirin  chewable 81 milliGRAM(s) Oral daily  atorvastatin 80 milliGRAM(s) Oral at bedtime  chlorhexidine 4% Liquid 1 Application(s) Topical <User Schedule>  clopidogrel Tablet 75 milliGRAM(s) Oral daily  heparin  Injectable 5000 Unit(s) SubCutaneous every 8 hours  midodrine 20 milliGRAM(s) Oral every 8 hours  phenylephrine    Infusion 0.2 MICROgram(s)/kG/Min (5.28 mL/Hr) IV Continuous <Continuous>  sodium chloride 0.9%. 1000 milliLiter(s) (50 mL/Hr) IV Continuous <Continuous>    MEDICATIONS  (PRN):  ALBUTerol    90 MICROgram(s) HFA Inhaler 2 Puff(s) Inhalation every 6 hours PRN Shortness of Breath and/or Wheezing      LABS:                        10.1   14.19 )-----------( 342      ( 2018 03:40 )             31.9         142  |  102  |  8   ----------------------------<  124<H>  3.4<L>   |  25  |  0.88    Ca    9.2      2018 03:40  Phos  2.5       Mg     2.3         TPro  7.3  /  Alb  3.9  /  TBili  0.6  /  DBili  x   /  AST  16  /  ALT  14  /  AlkPhos  73      PT/INR - ( 2018 03:40 )   PT: 10.2 SEC;   INR: 0.90          PTT - ( 2018 03:40 )  PTT:30.5 SEC      RADIOLOGY & ADDITIONAL STUDIES:

## 2018-11-21 NOTE — PROGRESS NOTE ADULT - SUBJECTIVE AND OBJECTIVE BOX
Patient seen at bedside. She is a 63 year old female with PMHx significant for HTN, HLD, RA, Asthma and is s/p L TCAR complicated by L MCA stroke. Repeat CTA from yesterday unchanged. Today she is able to plantar flex the right foot but continues to have RUE weakness as well as complete aphasia, right tongue deviation, and right facial droop. She is able to follow commands and nods/shakes her head in response to questions.     Per nurse, patient has been off of madai gtt since 6am. SBP maintaining around 130s.     - no further vascular interventions for now  - neuro recs appreciated  - c/w care per SICU team     Case discussed with Dr. Heath

## 2018-11-21 NOTE — SWALLOW VFSS/MBS ASSESSMENT ADULT - PHARYNGEAL PHASE COMMENTS
adequate initiation of the pharyngeal swallow, adequate laryngeal elevation, adequate tongue base retraction, adequate pharyngeal constriction delayed initiation of the pharyngeal swallow, reduced laryngeal elevation, adequate tongue base retraction, adequate pharyngeal constriction adequate initiation of the pharyngeal swallow, adequate laryngeal elevation, adequate tongue base retraction, reduced pharyngeal constriction

## 2018-11-21 NOTE — SWALLOW VFSS/MBS ASSESSMENT ADULT - ORAL PREP COMMENTS
Anterior loss on the right side anterior loss/spillage on the right side slow/prolonged chewing for solid, slow bolus manipulation

## 2018-11-21 NOTE — PROGRESS NOTE ADULT - SUBJECTIVE AND OBJECTIVE BOX
24 HOUR EVENTS: Yesterday AM during rounds, pt was noted to have no movement RUE and worsening movement RLE (only wiggling toes). Code stroke called, urgent CT and CTA performed, seen by neurology. As per neurology, BP goal 160, cont madai gtt. CT c/w known L MCA infarct. During the day speech and swallow was consulted to re-evaluate in setting of new/progressing stroke symptoms, no change and diet was resumed.     HISTORY  64y/o F w/ HTN, HLD, RA, Asthma  s/p elective left transcarotid arterial stent via right femoral vein access (11/13/18). course complicated by code stroke approximately 8 hours postop. CTA head and neck showed a distal M2 branch occlusion. She was deemed not a candidate for intracranial thrombectomy due to the location of the M2 occlusion at that time. Patient monitored in SICU, found to be stable, and sent to the floor. The following day patient was found with possible worsening right side weakness and worsening aphasia. CT showed no acute infarct and CTA showed ~75% stenosis of left carotid stent.    SUBJECTIVE/ROS:  [X ] A ten-point review of systems was otherwise negative except as noted.  [ ] Due to altered mental status/intubation, subjective information were not able to be obtained from the patient. History was obtained, to the extent possible, from review of the chart and collateral sources of information.    NEURO  T(C): 36.7 (21 Nov 2018 00:00), Max: 36.8 (20 Nov 2018 04:00)  T(F): 98 (21 Nov 2018 00:00), Max: 98.2 (20 Nov 2018 04:00)    RASS: 0  Exam: Expressive asphasia, communicating with head shakes and nods, A&O, NAD  Meds:   [x] Adequacy of sedation and pain control has been assessed and adjusted      RESPIRATORY  RR: 17 (21 Nov 2018 00:00) (15 - 22)  SpO2: 100% (21 Nov 2018 00:00) (91% - 100%)  Exam: unlabored, clear to auscultation bilaterally    [N/A] Extubation Readiness Assessed  Meds: ALBUTerol    90 MICROgram(s) HFA Inhaler 2 Puff(s) Inhalation every 6 hours PRN Shortness of Breath and/or Wheezing    CARDIOVASCULAR  HR: 58 (21 Nov 2018 00:00) (53 - 84)  BP: 154/82 (21 Nov 2018 00:00) (109/56 - 187/90)  BP(mean): 99 (21 Nov 2018 00:00) (69 - 118)    Exam: regular rate and rhythm  Cardiac Rhythm: sinus  Perfusion     []Adequate   [X]Inadequate  Mentation   [x]Normal       [ ]Reduced  Extremities  [x]Warm         [ ]Cool  Volume Status [ ]Hypervolemic [x]Euvolemic [ ]Hypovolemic  Meds: midodrine 20 milliGRAM(s) Oral every 8 hours  phenylephrine    Infusion 0.1 MICROgram(s)/kG/Min IV Continuous <Continuous>      GI/NUTRITION  Exam: soft, nontender, nondistended  Diet: dysphagia       GENITOURINARY  IN:    IV PiggyBack: 250 mL    Lactated Ringers IV Bolus: 500 mL    Oral Fluid: 420 mL    phenylephrine   Infusion: 13.2 mL    phenylephrine   Infusion: 7.9 mL    sodium chloride 0.9%.: 1000 mL  Total IN: 2191.1 mL    OUT:    Indwelling Catheter - Urethral: 2185 mL  Total OUT: 2185 mL    Total NET: 6.1 mL      11-20 @ 07:01  -  11-21 @ 00:44  --------------------------------------------------------  IN:    Oral Fluid: 120 mL    phenylephrine   Infusion: 34.4 mL    phenylephrine   Infusion: 5 mL    phenylephrine   Infusion: 44.9 mL    phenylephrine   Infusion: 29.1 mL    sodium chloride 0.9%.: 850 mL  Total IN: 1083.4 mL    OUT:    Indwelling Catheter - Urethral: 2185 mL  Total OUT: 2185 mL    Total NET: -1101.6 mL    11-20    139  |  105  |  7   ----------------------------<  109<H>  3.9   |  21<L>  |  0.90    Ca    8.5      20 Nov 2018 02:27  Phos  2.5     11-20  Mg     2.2     11-20          [x ] Sandoval catheter, indication: N/A  Meds: sodium chloride 0.9%. 1000 milliLiter(s) IV Continuous <Continuous>      HEMATOLOGIC  Meds: clopidogrel Tablet 75 milliGRAM(s) Oral daily  heparin  Injectable 5000 Unit(s) SubCutaneous every 8 hours    [x] VTE Prophylaxis                                   8.7    10.29 )-----------( 238      ( 20 Nov 2018 02:27 )             28.2       Transfusion     [0 ] PRBC   [0 ] Platelets   [0 ] FFP   [0 ] Cryoprecipitate    INFECTIOUS DISEASES  WBC Count: 13.14 K/uL (11-19 @ 02:25)          ENDOCRINE  Meds: atorvastatin 80 milliGRAM(s) Oral at bedtime  hydrocortisone sodium succinate Injectable 50 milliGRAM(s) IV Push every 6 hours      ACCESS DEVICES:  [X ] Peripheral IV  [ ] Central Venous Line	[ ] R	[ ] L	[ ] IJ	[ ] Fem	[ ] SC	Placed:   [ ] Arterial Line		[ ] R	[ ] L	[ ] Fem	[ ] Rad	[ ] Ax	Placed:   [ ] PICC:					[ ] Mediport  [X ] Urinary Catheter, Date Placed: 11/18/18  [x] Necessity of urinary, arterial, and venous catheters discussed    OTHER MEDICATIONS:  chlorhexidine 4% Liquid 1 Application(s) Topical <User Schedule>    CODE STATUS: FULL      IMAGING:  < from: CT Brain Stroke Protocol (11.20.18 @ 10:14) >  Impression: Acute left MCA infarct.    Narrowing of the proximal portion of the carotid stent as described above.    Decreased caliber of the right vertebral artery which is more prominent   distally.    Multiple areas of stenosis involving the left distal M1 segment and   proximal posterior M2 segment with no flow related enhancement seen   throughout the left M2 segments.    Short segment of stenosis involving the posterior right M2 segment.                        STANLEY CHAVEZ M.D., ATTENDING RADIOLOGIST  This document has been electronically signed. Nov 20 2018 10:40AM    < end of copied text > 24 HOUR EVENTS: Yesterday AM during rounds, pt was noted to have no movement RUE and worsening movement RLE (only wiggling toes). Code stroke called, urgent CT and CTA performed, seen by neurology. As per neurology, BP goal 160, cont madai gtt. CT c/w known L MCA infarct. During the day speech and swallow was consulted to re-evaluate in setting of new/progressing stroke symptoms, no change and diet was resumed. Overnight exam remained stable and BP goal was titrated down to -150. She continued to require phenylephrine 0.4-0.5 to achieve this goal.    HISTORY  64y/o F w/ HTN, HLD, RA, Asthma  s/p elective left transcarotid arterial stent via right femoral vein access (11/13/18). course complicated by code stroke approximately 8 hours postop. CTA head and neck showed a distal M2 branch occlusion. She was deemed not a candidate for intracranial thrombectomy due to the location of the M2 occlusion at that time. Patient monitored in SICU, found to be stable, and sent to the floor. The following day patient was found with possible worsening right side weakness and worsening aphasia. CT showed no acute infarct and CTA showed ~75% stenosis of left carotid stent.    SUBJECTIVE/ROS:  [X ] A ten-point review of systems was otherwise negative except as noted.  [ ] Due to altered mental status/intubation, subjective information were not able to be obtained from the patient. History was obtained, to the extent possible, from review of the chart and collateral sources of information.    NEURO  T(C): 36.7 (21 Nov 2018 00:00), Max: 36.8 (20 Nov 2018 04:00)  T(F): 98 (21 Nov 2018 00:00), Max: 98.2 (20 Nov 2018 04:00)    RASS: 0  Exam: Expressive asphasia, communicating with head shakes and nods, A&O, NAD  Meds:   [x] Adequacy of sedation and pain control has been assessed and adjusted      RESPIRATORY  RR: 17 (21 Nov 2018 00:00) (15 - 22)  SpO2: 100% (21 Nov 2018 00:00) (91% - 100%)  Exam: unlabored, clear to auscultation bilaterally    [N/A] Extubation Readiness Assessed  Meds: ALBUTerol    90 MICROgram(s) HFA Inhaler 2 Puff(s) Inhalation every 6 hours PRN Shortness of Breath and/or Wheezing    CARDIOVASCULAR  HR: 58 (21 Nov 2018 00:00) (53 - 84)  BP: 154/82 (21 Nov 2018 00:00) (109/56 - 187/90)  BP(mean): 99 (21 Nov 2018 00:00) (69 - 118)    Exam: regular rate and rhythm  Cardiac Rhythm: sinus  Perfusion     []Adequate   [X]Inadequate  Mentation   [x]Normal       [ ]Reduced  Extremities  [x]Warm         [ ]Cool  Volume Status [ ]Hypervolemic [x]Euvolemic [ ]Hypovolemic  Meds: midodrine 20 milliGRAM(s) Oral every 8 hours  phenylephrine    Infusion 0.1 MICROgram(s)/kG/Min IV Continuous <Continuous>      GI/NUTRITION  Exam: soft, nontender, nondistended  Diet: dysphagia       GENITOURINARY  IN:    IV PiggyBack: 250 mL    Lactated Ringers IV Bolus: 500 mL    Oral Fluid: 420 mL    phenylephrine   Infusion: 13.2 mL    phenylephrine   Infusion: 7.9 mL    sodium chloride 0.9%.: 1000 mL  Total IN: 2191.1 mL    OUT:    Indwelling Catheter - Urethral: 2185 mL  Total OUT: 2185 mL    Total NET: 6.1 mL      11-20 @ 07:01  -  11-21 @ 00:44  --------------------------------------------------------  IN:    Oral Fluid: 120 mL    phenylephrine   Infusion: 34.4 mL    phenylephrine   Infusion: 5 mL    phenylephrine   Infusion: 44.9 mL    phenylephrine   Infusion: 29.1 mL    sodium chloride 0.9%.: 850 mL  Total IN: 1083.4 mL    OUT:    Indwelling Catheter - Urethral: 2185 mL  Total OUT: 2185 mL    Total NET: -1101.6 mL    11-20    139  |  105  |  7   ----------------------------<  109<H>  3.9   |  21<L>  |  0.90    Ca    8.5      20 Nov 2018 02:27  Phos  2.5     11-20  Mg     2.2     11-20          [x ] Sandoval catheter, indication: N/A  Meds: sodium chloride 0.9%. 1000 milliLiter(s) IV Continuous <Continuous>      HEMATOLOGIC  Meds: clopidogrel Tablet 75 milliGRAM(s) Oral daily  heparin  Injectable 5000 Unit(s) SubCutaneous every 8 hours    [x] VTE Prophylaxis                                   8.7    10.29 )-----------( 238      ( 20 Nov 2018 02:27 )             28.2       Transfusion     [0 ] PRBC   [0 ] Platelets   [0 ] FFP   [0 ] Cryoprecipitate    INFECTIOUS DISEASES  WBC Count: 13.14 K/uL (11-19 @ 02:25)          ENDOCRINE  Meds: atorvastatin 80 milliGRAM(s) Oral at bedtime  hydrocortisone sodium succinate Injectable 50 milliGRAM(s) IV Push every 6 hours      ACCESS DEVICES:  [X ] Peripheral IV  [ ] Central Venous Line	[ ] R	[ ] L	[ ] IJ	[ ] Fem	[ ] SC	Placed:   [ ] Arterial Line		[ ] R	[ ] L	[ ] Fem	[ ] Rad	[ ] Ax	Placed:   [ ] PICC:					[ ] Mediport  [X ] Urinary Catheter, Date Placed: 11/18/18  [x] Necessity of urinary, arterial, and venous catheters discussed    OTHER MEDICATIONS:  chlorhexidine 4% Liquid 1 Application(s) Topical <User Schedule>    CODE STATUS: FULL      IMAGING:  < from: CT Brain Stroke Protocol (11.20.18 @ 10:14) >  Impression: Acute left MCA infarct.    Narrowing of the proximal portion of the carotid stent as described above.    Decreased caliber of the right vertebral artery which is more prominent   distally.    Multiple areas of stenosis involving the left distal M1 segment and   proximal posterior M2 segment with no flow related enhancement seen   throughout the left M2 segments.    Short segment of stenosis involving the posterior right M2 segment.                        STANLEY CHAVEZ M.D., ATTENDING RADIOLOGIST  This document has been electronically signed. Nov 20 2018 10:40AM    < end of copied text > 24 HOUR EVENTS: Yesterday AM during rounds, pt was noted to have no movement RUE and worsening movement RLE (only wiggling toes). Code stroke called, urgent CT and CTA performed, seen by neurology. As per neurology, BP goal 160, cont madai gtt. CT c/w known L MCA infarct. During the day speech and swallow was consulted to re-evaluate in setting of new/progressing stroke symptoms, no change and diet was resumed. Overnight exam remained stable and BP goal was titrated down to -150. She continued to require phenylephrine 0.4-0.5 to achieve this goal.    HISTORY  64y/o F w/ HTN, HLD, RA, Asthma  s/p elective left transcarotid arterial stent via right femoral vein access (11/13/18). course complicated by code stroke approximately 8 hours postop. CTA head and neck showed a distal M2 branch occlusion. She was deemed not a candidate for intracranial thrombectomy due to the location of the M2 occlusion at that time. Patient monitored in SICU, found to be stable, and sent to the floor. The following day patient was found with possible worsening right side weakness and worsening aphasia. CT showed no acute infarct and CTA showed ~75% stenosis of left carotid stent.    SUBJECTIVE/ROS:  [X ] A ten-point review of systems was otherwise negative except as noted.  [ ] Due to altered mental status/intubation, subjective information were not able to be obtained from the patient. History was obtained, to the extent possible, from review of the chart and collateral sources of information.    NEURO  T(C): 36.7 (21 Nov 2018 00:00), Max: 36.8 (20 Nov 2018 04:00)  T(F): 98 (21 Nov 2018 00:00), Max: 98.2 (20 Nov 2018 04:00)    RASS: 0  Exam: Expressive asphasia, communicating with head shakes and nods, A&O. L sided facial droop o/w CN exam grossly intact. RUE weakness - only slight movement/effort. Can wiggle toes RLE but cannot lift off bed. Good strength LUE, LLE.    Meds:   [x] Adequacy of sedation and pain control has been assessed and adjusted      RESPIRATORY  RR: 17 (21 Nov 2018 00:00) (15 - 22)  SpO2: 100% (21 Nov 2018 00:00) (91% - 100%)  Exam: unlabored, clear to auscultation bilaterally    [N/A] Extubation Readiness Assessed  Meds: ALBUTerol    90 MICROgram(s) HFA Inhaler 2 Puff(s) Inhalation every 6 hours PRN Shortness of Breath and/or Wheezing    CARDIOVASCULAR  HR: 58 (21 Nov 2018 00:00) (53 - 84)  BP: 154/82 (21 Nov 2018 00:00) (109/56 - 187/90)  BP(mean): 99 (21 Nov 2018 00:00) (69 - 118)    Exam: regular rate and rhythm  Cardiac Rhythm: sinus  Perfusion     []Adequate   [X]Inadequate  Mentation   [x]Normal       [ ]Reduced  Extremities  [x]Warm         [ ]Cool  Volume Status [ ]Hypervolemic [x]Euvolemic [ ]Hypovolemic  Meds: midodrine 20 milliGRAM(s) Oral every 8 hours  phenylephrine    Infusion 0.1 MICROgram(s)/kG/Min IV Continuous <Continuous>      GI/NUTRITION  Exam: soft, nontender, nondistended  Diet: dysphagia       GENITOURINARY  IN:    IV PiggyBack: 250 mL    Lactated Ringers IV Bolus: 500 mL    Oral Fluid: 420 mL    phenylephrine   Infusion: 13.2 mL    phenylephrine   Infusion: 7.9 mL    sodium chloride 0.9%.: 1000 mL  Total IN: 2191.1 mL    OUT:    Indwelling Catheter - Urethral: 2185 mL  Total OUT: 2185 mL    Total NET: 6.1 mL      11-20 @ 07:01  -  11-21 @ 00:44  --------------------------------------------------------  IN:    Oral Fluid: 120 mL    phenylephrine   Infusion: 34.4 mL    phenylephrine   Infusion: 5 mL    phenylephrine   Infusion: 44.9 mL    phenylephrine   Infusion: 29.1 mL    sodium chloride 0.9%.: 850 mL  Total IN: 1083.4 mL    OUT:    Indwelling Catheter - Urethral: 2185 mL  Total OUT: 2185 mL    Total NET: -1101.6 mL    11-20    139  |  105  |  7   ----------------------------<  109<H>  3.9   |  21<L>  |  0.90    Ca    8.5      20 Nov 2018 02:27  Phos  2.5     11-20  Mg     2.2     11-20          [x ] Sandoval catheter, indication: N/A  Meds: sodium chloride 0.9%. 1000 milliLiter(s) IV Continuous <Continuous>      HEMATOLOGIC  Meds: clopidogrel Tablet 75 milliGRAM(s) Oral daily  heparin  Injectable 5000 Unit(s) SubCutaneous every 8 hours    [x] VTE Prophylaxis                                   8.7    10.29 )-----------( 238      ( 20 Nov 2018 02:27 )             28.2       Transfusion     [0 ] PRBC   [0 ] Platelets   [0 ] FFP   [0 ] Cryoprecipitate    INFECTIOUS DISEASES  WBC Count: 13.14 K/uL (11-19 @ 02:25)          ENDOCRINE  Meds: atorvastatin 80 milliGRAM(s) Oral at bedtime  hydrocortisone sodium succinate Injectable 50 milliGRAM(s) IV Push every 6 hours      ACCESS DEVICES:  [X ] Peripheral IV  [ ] Central Venous Line	[ ] R	[ ] L	[ ] IJ	[ ] Fem	[ ] SC	Placed:   [ ] Arterial Line		[ ] R	[ ] L	[ ] Fem	[ ] Rad	[ ] Ax	Placed:   [ ] PICC:					[ ] Mediport  [X ] Urinary Catheter, Date Placed: 11/18/18  [x] Necessity of urinary, arterial, and venous catheters discussed    OTHER MEDICATIONS:  chlorhexidine 4% Liquid 1 Application(s) Topical <User Schedule>    CODE STATUS: FULL      IMAGING:  < from: CT Brain Stroke Protocol (11.20.18 @ 10:14) >  Impression: Acute left MCA infarct.    Narrowing of the proximal portion of the carotid stent as described above.    Decreased caliber of the right vertebral artery which is more prominent   distally.    Multiple areas of stenosis involving the left distal M1 segment and   proximal posterior M2 segment with no flow related enhancement seen   throughout the left M2 segments.    Short segment of stenosis involving the posterior right M2 segment.    < end of copied text > 24 HOUR EVENTS: Yesterday AM during rounds, pt was noted to have no movement RUE and worsening movement RLE (only wiggling toes). Code stroke called, urgent CT and CTA performed, seen by neurology. As per neurology, BP goal 160, cont madai gtt. CT c/w known L MCA infarct. During the day speech and swallow was consulted to re-evaluate in setting of new/progressing stroke symptoms, no change and diet was resumed. Overnight exam remained stable and BP goal was titrated down to -150. She continued to require phenylephrine 0.4-0.5 to achieve this goal.    HISTORY  62y/o F w/ HTN, HLD, RA, Asthma  s/p elective left transcarotid arterial stent via right femoral vein access (11/13/18). course complicated by code stroke approximately 8 hours postop. CTA head and neck showed a distal M2 branch occlusion. She was deemed not a candidate for intracranial thrombectomy due to the location of the M2 occlusion at that time. Patient monitored in SICU, found to be stable, and sent to the floor. The following day patient was found with possible worsening right side weakness and worsening aphasia. CT showed no acute infarct and CTA showed ~75% stenosis of left carotid stent.    SUBJECTIVE/ROS:  [X ] A ten-point review of systems was otherwise negative except as noted.  [ ] Due to altered mental status/intubation, subjective information were not able to be obtained from the patient. History was obtained, to the extent possible, from review of the chart and collateral sources of information.    NEURO  T(C): 36.7 (21 Nov 2018 00:00), Max: 36.8 (20 Nov 2018 04:00)  T(F): 98 (21 Nov 2018 00:00), Max: 98.2 (20 Nov 2018 04:00)    RASS: 0  Exam: Expressive asphasia, communicating with head shakes and nods, A&O. R side facial droop, R tongue deviation o/w CN exam grossly intact. RUE weakness - only slight movement/effort. Can wiggle toes RLE but cannot lift off bed. Good strength LUE, LLE.    Meds:   [x] Adequacy of sedation and pain control has been assessed and adjusted      RESPIRATORY  RR: 17 (21 Nov 2018 00:00) (15 - 22)  SpO2: 100% (21 Nov 2018 00:00) (91% - 100%)  Exam: unlabored, clear to auscultation bilaterally    [N/A] Extubation Readiness Assessed  Meds: ALBUTerol    90 MICROgram(s) HFA Inhaler 2 Puff(s) Inhalation every 6 hours PRN Shortness of Breath and/or Wheezing    CARDIOVASCULAR  HR: 58 (21 Nov 2018 00:00) (53 - 84)  BP: 154/82 (21 Nov 2018 00:00) (109/56 - 187/90)  BP(mean): 99 (21 Nov 2018 00:00) (69 - 118)    Exam: regular rate and rhythm  Cardiac Rhythm: sinus  Perfusion     []Adequate   [X]Inadequate  Mentation   [x]Normal       [ ]Reduced  Extremities  [x]Warm         [ ]Cool  Volume Status [ ]Hypervolemic [x]Euvolemic [ ]Hypovolemic  Meds: midodrine 20 milliGRAM(s) Oral every 8 hours  phenylephrine    Infusion 0.1 MICROgram(s)/kG/Min IV Continuous <Continuous>      GI/NUTRITION  Exam: soft, nontender, nondistended  Diet: dysphagia       GENITOURINARY  IN:    IV PiggyBack: 250 mL    Lactated Ringers IV Bolus: 500 mL    Oral Fluid: 420 mL    phenylephrine   Infusion: 13.2 mL    phenylephrine   Infusion: 7.9 mL    sodium chloride 0.9%.: 1000 mL  Total IN: 2191.1 mL    OUT:    Indwelling Catheter - Urethral: 2185 mL  Total OUT: 2185 mL    Total NET: 6.1 mL      11-20 @ 07:01  - 11-21 @ 00:44  --------------------------------------------------------  IN:    Oral Fluid: 120 mL    phenylephrine   Infusion: 34.4 mL    phenylephrine   Infusion: 5 mL    phenylephrine   Infusion: 44.9 mL    phenylephrine   Infusion: 29.1 mL    sodium chloride 0.9%.: 850 mL  Total IN: 1083.4 mL    OUT:    Indwelling Catheter - Urethral: 2185 mL  Total OUT: 2185 mL    Total NET: -1101.6 mL    11-20    139  |  105  |  7   ----------------------------<  109<H>  3.9   |  21<L>  |  0.90    Ca    8.5      20 Nov 2018 02:27  Phos  2.5     11-20  Mg     2.2     11-20          [x ] Sandoval catheter, indication: N/A  Meds: sodium chloride 0.9%. 1000 milliLiter(s) IV Continuous <Continuous>      HEMATOLOGIC  Meds: clopidogrel Tablet 75 milliGRAM(s) Oral daily  heparin  Injectable 5000 Unit(s) SubCutaneous every 8 hours    [x] VTE Prophylaxis                                   8.7    10.29 )-----------( 238      ( 20 Nov 2018 02:27 )             28.2       Transfusion     [0 ] PRBC   [0 ] Platelets   [0 ] FFP   [0 ] Cryoprecipitate    INFECTIOUS DISEASES  WBC Count: 13.14 K/uL (11-19 @ 02:25)          ENDOCRINE  Meds: atorvastatin 80 milliGRAM(s) Oral at bedtime  hydrocortisone sodium succinate Injectable 50 milliGRAM(s) IV Push every 6 hours      ACCESS DEVICES:  [X ] Peripheral IV  [ ] Central Venous Line	[ ] R	[ ] L	[ ] IJ	[ ] Fem	[ ] SC	Placed:   [ ] Arterial Line		[ ] R	[ ] L	[ ] Fem	[ ] Rad	[ ] Ax	Placed:   [ ] PICC:					[ ] Mediport  [X ] Urinary Catheter, Date Placed: 11/18/18  [x] Necessity of urinary, arterial, and venous catheters discussed    OTHER MEDICATIONS:  chlorhexidine 4% Liquid 1 Application(s) Topical <User Schedule>    CODE STATUS: FULL      IMAGING:  < from: CT Brain Stroke Protocol (11.20.18 @ 10:14) >  Impression: Acute left MCA infarct.    Narrowing of the proximal portion of the carotid stent as described above.    Decreased caliber of the right vertebral artery which is more prominent   distally.    Multiple areas of stenosis involving the left distal M1 segment and   proximal posterior M2 segment with no flow related enhancement seen   throughout the left M2 segments.    Short segment of stenosis involving the posterior right M2 segment.    < end of copied text >

## 2018-11-21 NOTE — PROGRESS NOTE ADULT - ASSESSMENT
63 years old woman with multiple vascular risk factors including age, HTN and HPLD was admitted to [facility] for an elective left carotid revascularization of the left ICA stent placement. After the procedure, she was noted to have right-sided weakness and language disturbance in the form of global aphasia. CT brain on 11/13 did not show any evidence of acute infarct or hemorrhage. CTA head and neck on 11/13 to my eye showed multifocal intracranial atherosclerosis including severe stenosis of left MCA proximal and distal M1 segment, multifocal stenosis involving inferior M2 segment and occlusion of the superior M2 segment and left proximal ICA stent. CTA head and neck on 11/16 showed 75% stenosis of left ICA stent (in-stent stenosis), which appears to be same as compared to 11/13 to my eye. MRI brain on 11/17 showed acute infarct in the left MCA distribution. TTE did not show any obvious structural cardiac source of embolism. On 11/20, she was noted to have fluctuating right hemiparesis with respect to right arm weakness. CT brain on 11/20 showed expected evolution of left MCA distribution infarct and CTA head and neck showed stable findings. Now off vasopressors, neuro exam stable off pressors.    Impression:  Cerebral embolism with cerebral infarction. Left MCA distribution stroke in the periprocedural period after carotid revascularization with carotid artery stent placement     Plan:  - Agree to continue with clopidogrel 75 mg once a day for secondary stroke prevention in the setting of recent carotid artery stent placement. No absolute neurovascular contraindication to consider dual antiplatelet therapy if indicated from carotid artery stent standpoint.  - recommend starting fluoxetine for 3 months (not for antidepressive effects, but data shows improved motor function w/ fluoxetine post-stroke and TBI)  - Would optimal benefit from repeat conventional angiogram to better characterize the degree of in-stent stenosis; final decision is deferred to vascular surgeon.  - Atorvastatin 80 mg at bedtime considering likely atheroembolic etiology of her stroke.  - Continue with telemetry.  - HbA1c 6.0, consider checking LDL; continue with aggressive vascular risk factors modification  - PT/OT/speech and swallow evaluation.  - Stroke education. 63 years old woman with multiple vascular risk factors including age, HTN and HPLD was admitted to [facility] for an elective left carotid revascularization of the left ICA stent placement. After the procedure, she was noted to have right-sided weakness and language disturbance in the form of global aphasia. CT brain on 11/13 did not show any evidence of acute infarct or hemorrhage. CTA head and neck on 11/13 to my eye showed multifocal intracranial atherosclerosis including severe stenosis of left MCA proximal and distal M1 segment, multifocal stenosis involving inferior M2 segment and occlusion of the superior M2 segment and left proximal ICA stent. CTA head and neck on 11/16 showed 75% stenosis of left ICA stent (in-stent stenosis), which appears to be same as compared to 11/13 to my eye. MRI brain on 11/17 showed acute infarct in the left MCA distribution. TTE did not show any obvious structural cardiac source of embolism. On 11/20, she was noted to have fluctuating right hemiparesis with respect to right arm weakness. CT brain on 11/20 showed expected evolution of left MCA distribution infarct and CTA head and neck showed stable findings. Now off vasopressors, neuro exam stable off pressors.    Impression:  Cerebral embolism with cerebral infarction. Left MCA distribution stroke in the periprocedural period after carotid revascularization with carotid artery stent placement     Plan:  - Agree to continue with clopidogrel 75 mg once a day for secondary stroke prevention in the setting of recent carotid artery stent placement. No absolute neurovascular contraindication to consider dual antiplatelet therapy if indicated from carotid artery stent standpoint.  - Recommend starting fluoxetine 20 mg OD for 3 months (not for antidepressive effects, but data shows improved motor function w/ fluoxetine post-stroke)  - Would optimal benefit from repeat conventional angiogram to better characterize the degree of in-stent stenosis; final decision is deferred to vascular surgeon.  - Atorvastatin 80 mg at bedtime considering likely atheroembolic etiology of her stroke.  - Continue with telemetry.  - HbA1c 6.0, consider checking LDL; continue with aggressive vascular risk factors modification  - PT/OT/speech and swallow evaluation.  - Stroke education.    Above mentioned plan was discussed with patient and her family members at bedside in detail. All the questions were answered and concerns were addressed.

## 2018-11-22 LAB
ALBUMIN SERPL ELPH-MCNC: 3.3 G/DL — SIGNIFICANT CHANGE UP (ref 3.3–5)
ALP SERPL-CCNC: 60 U/L — SIGNIFICANT CHANGE UP (ref 40–120)
ALT FLD-CCNC: 16 U/L — SIGNIFICANT CHANGE UP (ref 4–33)
AST SERPL-CCNC: 24 U/L — SIGNIFICANT CHANGE UP (ref 4–32)
BILIRUB SERPL-MCNC: 0.4 MG/DL — SIGNIFICANT CHANGE UP (ref 0.2–1.2)
BUN SERPL-MCNC: 13 MG/DL — SIGNIFICANT CHANGE UP (ref 7–23)
BUN SERPL-MCNC: 14 MG/DL — SIGNIFICANT CHANGE UP (ref 7–23)
CALCIUM SERPL-MCNC: 8.5 MG/DL — SIGNIFICANT CHANGE UP (ref 8.4–10.5)
CALCIUM SERPL-MCNC: 9 MG/DL — SIGNIFICANT CHANGE UP (ref 8.4–10.5)
CHLORIDE SERPL-SCNC: 103 MMOL/L — SIGNIFICANT CHANGE UP (ref 98–107)
CHLORIDE SERPL-SCNC: 106 MMOL/L — SIGNIFICANT CHANGE UP (ref 98–107)
CO2 SERPL-SCNC: 28 MMOL/L — SIGNIFICANT CHANGE UP (ref 22–31)
CO2 SERPL-SCNC: 28 MMOL/L — SIGNIFICANT CHANGE UP (ref 22–31)
CREAT SERPL-MCNC: 1.05 MG/DL — SIGNIFICANT CHANGE UP (ref 0.5–1.3)
CREAT SERPL-MCNC: 1.07 MG/DL — SIGNIFICANT CHANGE UP (ref 0.5–1.3)
GLUCOSE SERPL-MCNC: 105 MG/DL — HIGH (ref 70–99)
GLUCOSE SERPL-MCNC: 81 MG/DL — SIGNIFICANT CHANGE UP (ref 70–99)
HCT VFR BLD CALC: 26.9 % — LOW (ref 34.5–45)
HGB BLD-MCNC: 8.4 G/DL — LOW (ref 11.5–15.5)
MAGNESIUM SERPL-MCNC: 2.2 MG/DL — SIGNIFICANT CHANGE UP (ref 1.6–2.6)
MAGNESIUM SERPL-MCNC: 2.3 MG/DL — SIGNIFICANT CHANGE UP (ref 1.6–2.6)
MCHC RBC-ENTMCNC: 23.4 PG — LOW (ref 27–34)
MCHC RBC-ENTMCNC: 31.2 % — LOW (ref 32–36)
MCV RBC AUTO: 74.9 FL — LOW (ref 80–100)
NRBC # FLD: 0 — SIGNIFICANT CHANGE UP
PHOSPHATE SERPL-MCNC: 2.1 MG/DL — LOW (ref 2.5–4.5)
PHOSPHATE SERPL-MCNC: 4 MG/DL — SIGNIFICANT CHANGE UP (ref 2.5–4.5)
PLATELET # BLD AUTO: 290 K/UL — SIGNIFICANT CHANGE UP (ref 150–400)
PMV BLD: 10.6 FL — SIGNIFICANT CHANGE UP (ref 7–13)
POTASSIUM SERPL-MCNC: 3.1 MMOL/L — LOW (ref 3.5–5.3)
POTASSIUM SERPL-MCNC: 3.7 MMOL/L — SIGNIFICANT CHANGE UP (ref 3.5–5.3)
POTASSIUM SERPL-SCNC: 3.1 MMOL/L — LOW (ref 3.5–5.3)
POTASSIUM SERPL-SCNC: 3.7 MMOL/L — SIGNIFICANT CHANGE UP (ref 3.5–5.3)
PROT SERPL-MCNC: 6 G/DL — SIGNIFICANT CHANGE UP (ref 6–8.3)
RBC # BLD: 3.59 M/UL — LOW (ref 3.8–5.2)
RBC # FLD: 17.2 % — HIGH (ref 10.3–14.5)
SODIUM SERPL-SCNC: 144 MMOL/L — SIGNIFICANT CHANGE UP (ref 135–145)
SODIUM SERPL-SCNC: 144 MMOL/L — SIGNIFICANT CHANGE UP (ref 135–145)
WBC # BLD: 12.35 K/UL — HIGH (ref 3.8–10.5)
WBC # FLD AUTO: 12.35 K/UL — HIGH (ref 3.8–10.5)

## 2018-11-22 PROCEDURE — 99233 SBSQ HOSP IP/OBS HIGH 50: CPT

## 2018-11-22 RX ORDER — SENNA PLUS 8.6 MG/1
2 TABLET ORAL AT BEDTIME
Qty: 0 | Refills: 0 | Status: DISCONTINUED | OUTPATIENT
Start: 2018-11-22 | End: 2018-11-28

## 2018-11-22 RX ORDER — DOCUSATE SODIUM 100 MG
100 CAPSULE ORAL THREE TIMES A DAY
Qty: 0 | Refills: 0 | Status: DISCONTINUED | OUTPATIENT
Start: 2018-11-22 | End: 2018-11-26

## 2018-11-22 RX ORDER — POTASSIUM CHLORIDE 20 MEQ
40 PACKET (EA) ORAL ONCE
Qty: 0 | Refills: 0 | Status: COMPLETED | OUTPATIENT
Start: 2018-11-22 | End: 2018-11-22

## 2018-11-22 RX ORDER — POTASSIUM PHOSPHATE, MONOBASIC POTASSIUM PHOSPHATE, DIBASIC 236; 224 MG/ML; MG/ML
30 INJECTION, SOLUTION INTRAVENOUS ONCE
Qty: 0 | Refills: 0 | Status: COMPLETED | OUTPATIENT
Start: 2018-11-22 | End: 2018-11-22

## 2018-11-22 RX ORDER — FLUOXETINE HCL 10 MG
20 CAPSULE ORAL DAILY
Qty: 0 | Refills: 0 | Status: DISCONTINUED | OUTPATIENT
Start: 2018-11-22 | End: 2018-11-22

## 2018-11-22 RX ORDER — FLUOXETINE HCL 10 MG
20 CAPSULE ORAL DAILY
Qty: 0 | Refills: 0 | Status: DISCONTINUED | OUTPATIENT
Start: 2018-11-22 | End: 2018-11-28

## 2018-11-22 RX ADMIN — MIDODRINE HYDROCHLORIDE 20 MILLIGRAM(S): 2.5 TABLET ORAL at 23:07

## 2018-11-22 RX ADMIN — Medication 100 MILLIGRAM(S): at 23:07

## 2018-11-22 RX ADMIN — Medication 81 MILLIGRAM(S): at 12:41

## 2018-11-22 RX ADMIN — HEPARIN SODIUM 5000 UNIT(S): 5000 INJECTION INTRAVENOUS; SUBCUTANEOUS at 12:41

## 2018-11-22 RX ADMIN — HEPARIN SODIUM 5000 UNIT(S): 5000 INJECTION INTRAVENOUS; SUBCUTANEOUS at 04:30

## 2018-11-22 RX ADMIN — SENNA PLUS 2 TABLET(S): 8.6 TABLET ORAL at 23:07

## 2018-11-22 RX ADMIN — MIDODRINE HYDROCHLORIDE 20 MILLIGRAM(S): 2.5 TABLET ORAL at 13:40

## 2018-11-22 RX ADMIN — Medication 40 MILLIEQUIVALENT(S): at 06:03

## 2018-11-22 RX ADMIN — ATORVASTATIN CALCIUM 80 MILLIGRAM(S): 80 TABLET, FILM COATED ORAL at 23:07

## 2018-11-22 RX ADMIN — ALBUTEROL 2 PUFF(S): 90 AEROSOL, METERED ORAL at 18:13

## 2018-11-22 RX ADMIN — HEPARIN SODIUM 5000 UNIT(S): 5000 INJECTION INTRAVENOUS; SUBCUTANEOUS at 20:45

## 2018-11-22 RX ADMIN — SODIUM CHLORIDE 50 MILLILITER(S): 9 INJECTION INTRAMUSCULAR; INTRAVENOUS; SUBCUTANEOUS at 18:13

## 2018-11-22 RX ADMIN — MIDODRINE HYDROCHLORIDE 20 MILLIGRAM(S): 2.5 TABLET ORAL at 05:49

## 2018-11-22 RX ADMIN — CLOPIDOGREL BISULFATE 75 MILLIGRAM(S): 75 TABLET, FILM COATED ORAL at 12:41

## 2018-11-22 RX ADMIN — POTASSIUM PHOSPHATE, MONOBASIC POTASSIUM PHOSPHATE, DIBASIC 85 MILLIMOLE(S): 236; 224 INJECTION, SOLUTION INTRAVENOUS at 05:48

## 2018-11-22 RX ADMIN — Medication 1 ENEMA: at 17:17

## 2018-11-22 NOTE — PROGRESS NOTE ADULT - ATTENDING COMMENTS
Attending Attestation:   I have personally interviewed and examined this patient, reviewed pertinent labs and imaging, and discussed the case with colleagues, residents, physician assistants, and nurses on SICU rounds.    30     minutes in total were spent in providing direct critical care for the diagnoses, assessment and plan outlined below.  These diagnoses are unrelated to the surgical procedure.  Additionally, time spent in the performance of separately billable procedures was not counted toward the critical care time.  There is no overlap.    The active critical care issues are:     I63.512 Cerebral infarction involving left middle cerebral artery   R33.9 Urinary retention   I95.9 Hypotension, unspecified hypotension type   I69.391 Dysphagia due to recent cerebral infarction        Neuro: neuro checks, does not appear to have improved exam with vasopressors, start fluoxetine  pulm: home nebs  cards:asa, midodrine, plavix, statin, starting asa, off  madai gtt    gi: dysphagia diet  gu: monitor urine output, voiding  heme: trend cbc, wbc downtrending  ID: no active issues  endo: monitor blood sugar   proph: sqh.

## 2018-11-22 NOTE — PROGRESS NOTE ADULT - SUBJECTIVE AND OBJECTIVE BOX
HISTORY  62y/o F w/ HTN, HLD, RA, Asthma  s/p elective left transcarotid arterial stent via right femoral vein access (11/13/18). course complicated by code stroke approximately 8 hours postop. CTA head and neck showed a distal M2 branch occlusion. She was deemed not a candidate for intracranial thrombectomy due to the location of the M2 occlusion at that time. Patient monitored in SICU, found to be stable, and sent to the floor. The following day patient was found with possible worsening right side weakness and worsening aphasia. CT showed no acute infarct and CTA showed ~75% stenosis of left carotid stent.    24 HOUR EVENTS:  Patient has had stable neurologic checks since the code stroke 2 days ago, and systolic blood pressure goal has been dropped to 120-130 SBP, and is now off madai gtt. Sandoval was pulled with successful voiding.     ***    SUBJECTIVE/ROS:  [ ] A ten-point review of systems was otherwise negative except as noted.  [ ] Due to altered mental status/intubation, subjective information were not able to be obtained from the patient. History was obtained, to the extent possible, from review of the chart and collateral sources of information.      NEURO  RASS:     GCS:     CAM ICU:  Exam: awake, alert, oriented  Meds:   [x] Adequacy of sedation and pain control has been assessed and adjusted      RESPIRATORY  RR: 16 (11-21-18 @ 23:00) (14 - 24)  SpO2: 98% (11-21-18 @ 23:00) (94% - 100%)  Wt(kg): --  Exam: unlabored, clear to auscultation bilaterally  Mechanical Ventilation:     [N/A] Extubation Readiness Assessed  Meds: ALBUTerol    90 MICROgram(s) HFA Inhaler 2 Puff(s) Inhalation every 6 hours PRN Shortness of Breath and/or Wheezing        CARDIOVASCULAR  HR: 82 (11-21-18 @ 23:00) (53 - 87)  BP: 125/75 (11-21-18 @ 23:00) (121/61 - 175/81)  BP(mean): 87 (11-21-18 @ 23:00) (72 - 111)  ABP: --  ABP(mean): --  Wt(kg): --  CVP(cm H2O): --      Exam: regular rate and rhythm  Cardiac Rhythm: sinus  Perfusion     [x]Adequate   [ ]Inadequate  Mentation   [x]Normal       [ ]Reduced  Extremities  [x]Warm         [ ]Cool  Volume Status [ ]Hypervolemic [x]Euvolemic [ ]Hypovolemic  Meds: midodrine 20 milliGRAM(s) Oral every 8 hours        GI/NUTRITION  Exam: soft, nontender, nondistended, incision C/D/I  Diet:  Meds:     GENITOURINARY  I&O's Detail    11-20 @ 07:01  -  11-21 @ 07:00  --------------------------------------------------------  IN:    IV PiggyBack: 300 mL    Oral Fluid: 120 mL    phenylephrine   Infusion: 34.4 mL    phenylephrine   Infusion: 5 mL    phenylephrine   Infusion: 44.9 mL    phenylephrine   Infusion: 79.5 mL    sodium chloride 0.9%.: 1150 mL  Total IN: 1733.8 mL    OUT:    Indwelling Catheter - Urethral: 3015 mL  Total OUT: 3015 mL    Total NET: -1281.2 mL      11-21 @ 07:01 - 11-22 @ 01:13  --------------------------------------------------------  IN:    IV PiggyBack: 100 mL    Oral Fluid: 200 mL    sodium chloride 0.9%.: 750 mL  Total IN: 1050 mL    OUT:    Indwelling Catheter - Urethral: 1120 mL    Voided: 410 mL  Total OUT: 1530 mL    Total NET: -480 mL          11-21    142  |  102  |  8   ----------------------------<  124<H>  3.4<L>   |  25  |  0.88    Ca    9.2      21 Nov 2018 03:40  Phos  2.5     11-21  Mg     2.3     11-21    TPro  7.3  /  Alb  3.9  /  TBili  0.6  /  DBili  x   /  AST  16  /  ALT  14  /  AlkPhos  73  11-21    [ ] Sandoval catheter, indication: N/A  Meds: sodium chloride 0.9%. 1000 milliLiter(s) IV Continuous <Continuous>        HEMATOLOGIC  Meds: aspirin  chewable 81 milliGRAM(s) Oral daily  clopidogrel Tablet 75 milliGRAM(s) Oral daily  heparin  Injectable 5000 Unit(s) SubCutaneous every 8 hours    [x] VTE Prophylaxis                        10.1   14.19 )-----------( 342      ( 21 Nov 2018 03:40 )             31.9     PT/INR - ( 21 Nov 2018 03:40 )   PT: 10.2 SEC;   INR: 0.90          PTT - ( 21 Nov 2018 03:40 )  PTT:30.5 SEC  Transfusion     [ ] PRBC   [ ] Platelets   [ ] FFP   [ ] Cryoprecipitate      INFECTIOUS DISEASES  WBC Count: 14.19 K/uL (11-21 @ 03:40)    RECENT CULTURES:    Meds:       ENDOCRINE  CAPILLARY BLOOD GLUCOSE        Meds: atorvastatin 80 milliGRAM(s) Oral at bedtime        ACCESS DEVICES:  [ ] Peripheral IV  [ ] Central Venous Line	[ ] R	[ ] L	[ ] IJ	[ ] Fem	[ ] SC	Placed:   [ ] Arterial Line		[ ] R	[ ] L	[ ] Fem	[ ] Rad	[ ] Ax	Placed:   [ ] PICC:					[ ] Mediport  [ ] Urinary Catheter, Date Placed:   [x] Necessity of urinary, arterial, and venous catheters discussed    OTHER MEDICATIONS:  chlorhexidine 4% Liquid 1 Application(s) Topical <User Schedule>      CODE STATUS:      IMAGING: HISTORY  62y/o F w/ HTN, HLD, RA, Asthma  s/p elective left transcarotid arterial stent via right femoral vein access (11/13/18). course complicated by code stroke approximately 8 hours postop. CTA head and neck showed a distal M2 branch occlusion. She was deemed not a candidate for intracranial thrombectomy due to the location of the M2 occlusion at that time. Patient monitored in SICU, found to be stable, and sent to the floor. The following day patient was found with possible worsening right side weakness and worsening aphasia. CT showed no acute infarct and CTA showed ~75% stenosis of left carotid stent.    24 HOUR EVENTS:  Patient has had stable neurologic checks since the code stroke 2 days ago, and systolic blood pressure goal has been dropped to 120-130 SBP, and is now off madai gtt. Sandoval was pulled with successful voiding.     SUBJECTIVE/ROS:  [x] A ten-point review of systems was otherwise negative except as noted.  [ ] Due to altered mental status/intubation, subjective information were not able to be obtained from the patient. History was obtained, to the extent possible, from review of the chart and collateral sources of information.      NEURO  Exam: awake, alert, oriented  Expressive asphasia, communicating with head shakes and nods, A&O. R side facial droop, R tongue deviation o/w CN exam grossly intact. RUE weakness - only slight movement/effort. Can wiggle toes RLE but cannot lift off bed. Good strength LUE, LLE.  [x] Adequacy of sedation and pain control has been assessed and adjusted      RESPIRATORY  RR: 16 (11-21-18 @ 23:00) (14 - 24)  SpO2: 98% (11-21-18 @ 23:00) (94% - 100%)  Exam: unlabored, clear to auscultation bilaterally  Meds: ALBUTerol    90 MICROgram(s) HFA Inhaler 2 Puff(s) Inhalation every 6 hours PRN Shortness of Breath and/or Wheezing        CARDIOVASCULAR  HR: 82 (11-21-18 @ 23:00) (53 - 87)  BP: 125/75 (11-21-18 @ 23:00) (121/61 - 175/81)  BP(mean): 87 (11-21-18 @ 23:00) (72 - 111)      Exam: regular rate and rhythm  Cardiac Rhythm: sinus  Perfusion     [x]Adequate   [ ]Inadequate  Mentation   [x]Normal       [ ]Reduced  Extremities  [x]Warm         [ ]Cool  Volume Status [ ]Hypervolemic [x]Euvolemic [ ]Hypovolemic  Meds: midodrine 20 milliGRAM(s) Oral every 8 hours        GI/NUTRITION  Exam: soft, nontender, nondistended, incision C/D/I  Diet: mechanical soft honey consistency (dysphagia 2)    GENITOURINARY  I&O's Detail    11-20 @ 07:01  -  11-21 @ 07:00  --------------------------------------------------------  IN:    IV PiggyBack: 300 mL    Oral Fluid: 120 mL    phenylephrine   Infusion: 34.4 mL    phenylephrine   Infusion: 5 mL    phenylephrine   Infusion: 44.9 mL    phenylephrine   Infusion: 79.5 mL    sodium chloride 0.9%.: 1150 mL  Total IN: 1733.8 mL    OUT:    Indwelling Catheter - Urethral: 3015 mL  Total OUT: 3015 mL    Total NET: -1281.2 mL      11-21 @ 07:01 - 11-22 @ 01:13  --------------------------------------------------------  IN:    IV PiggyBack: 100 mL    Oral Fluid: 200 mL    sodium chloride 0.9%.: 750 mL  Total IN: 1050 mL    OUT:    Indwelling Catheter - Urethral: 1120 mL    Voided: 410 mL  Total OUT: 1530 mL    Total NET: -480 mL          11-21    142  |  102  |  8   ----------------------------<  124<H>  3.4<L>   |  25  |  0.88    Ca    9.2      21 Nov 2018 03:40  Phos  2.5     11-21  Mg     2.3     11-21    TPro  7.3  /  Alb  3.9  /  TBili  0.6  /  DBili  x   /  AST  16  /  ALT  14  /  AlkPhos  73  11-21    Meds: sodium chloride 0.9%. 1000 milliLiter(s) IV Continuous <Continuous>        HEMATOLOGIC  Meds: aspirin  chewable 81 milliGRAM(s) Oral daily  clopidogrel Tablet 75 milliGRAM(s) Oral daily  heparin  Injectable 5000 Unit(s) SubCutaneous every 8 hours    [x] VTE Prophylaxis                        10.1   14.19 )-----------( 342      ( 21 Nov 2018 03:40 )             31.9     PT/INR - ( 21 Nov 2018 03:40 )   PT: 10.2 SEC;   INR: 0.90          PTT - ( 21 Nov 2018 03:40 )  PTT:30.5 SEC  Transfusion     [ ] PRBC   [ ] Platelets   [ ] FFP   [ ] Cryoprecipitate      INFECTIOUS DISEASES  WBC Count: 14.19 K/uL (11-21 @ 03:40), Afebrile    ACCESS DEVICES:  [x] Peripheral IV  [ ] Central Venous Line	[ ] R	[ ] L	[ ] IJ	[ ] Fem	[ ] SC	Placed:   [ ] Arterial Line		[ ] R	[ ] L	[ ] Fem	[ ] Rad	[ ] Ax	Placed:   [ ] PICC:					[ ] Mediport  [ ] Urinary Catheter, Date Placed:   [x] Necessity of urinary, arterial, and venous catheters discussed    OTHER MEDICATIONS:  chlorhexidine 4% Liquid 1 Application(s) Topical <User Schedule>      CODE STATUS: full

## 2018-11-22 NOTE — CHART NOTE - NSCHARTNOTEFT_GEN_A_CORE
GENERAL SURGERY FLOOR TRANSFER NOTE    63y Female transferred to floor from SICU    SUBJECTIVE:  Patient seen and examined.  No complaints.  Pain well controlled.     OBJECTIVE:    T(C): 36.7 (11-22-18 @ 18:00), Max: 36.9 (11-22-18 @ 00:00)  HR: 78 (11-22-18 @ 18:00) (54 - 87)  BP: 151/76 (11-22-18 @ 18:00) (111/55 - 160/77)  RR: 16 (11-22-18 @ 18:00) (14 - 20)  SpO2: 95% (11-22-18 @ 18:00) (95% - 99%)  Wt(kg): --      I&O's Summary    21 Nov 2018 07:01  -  22 Nov 2018 07:00  --------------------------------------------------------  IN: 1400 mL / OUT: 1830 mL / NET: -430 mL    22 Nov 2018 07:01  -  22 Nov 2018 21:45  --------------------------------------------------------  IN: 550 mL / OUT: 1400 mL / NET: -850 mL                              8.4    12.35 )-----------( 290      ( 22 Nov 2018 03:20 )             26.9       11-22    144  |  103  |  13  ----------------------------<  105<H>  3.7   |  28  |  1.05    Ca    9.0      22 Nov 2018 14:27  Phos  4.0     11-22  Mg     2.3     11-22    TPro  6.0  /  Alb  3.3  /  TBili  0.4  /  DBili  x   /  AST  24  /  ALT  16  /  AlkPhos  60  11-22      MEDICATIONS  (STANDING):  aspirin  chewable 81 milliGRAM(s) Oral daily  atorvastatin 80 milliGRAM(s) Oral at bedtime  clopidogrel Tablet 75 milliGRAM(s) Oral daily  docusate sodium 100 milliGRAM(s) Oral three times a day  FLUoxetine Solution 20 milliGRAM(s) Oral daily  heparin  Injectable 5000 Unit(s) SubCutaneous every 8 hours  midodrine 20 milliGRAM(s) Oral every 8 hours  senna 2 Tablet(s) Oral at bedtime  sodium chloride 0.9%. 1000 milliLiter(s) (50 mL/Hr) IV Continuous <Continuous>    MEDICATIONS  (PRN):  ALBUTerol    90 MICROgram(s) HFA Inhaler 2 Puff(s) Inhalation every 6 hours PRN Shortness of Breath and/or Wheezing        PHYSICAL EXAM:    Gen: Resting in bed, NAD, alert and oriented.   Resp: airway patent, respirations unlabored, no increased WOB  Neuro: Facial droop on R with tongue deviation. 0/5 strength on RUE and RLE b/l. Sensation to light touch intact bilaterally. Follows commands.        ASSESSMENT:   63F s/p l. TCARS with post op course c/b stroke initially managed by SICU, now stable for transfer to floor.     - Continue plavix 75 qd, recommend target bp 140-160  - Recommend PT/OT  - On ASA.   - Midodrine 20 q8 for tight BP control.   - Continue home meds.   - Follow up AM labs.       Surgery, C-Team   Pager: [phone]

## 2018-11-22 NOTE — PROGRESS NOTE ADULT - ASSESSMENT
63F s/p l. TCARS with post op course c/b code stroke now in sicu with reduced neurologic status. Patient hemodynamically stable, may need support to maintain adequate cerebral perfusion.    - no further vascular interventions for now  - Neurology rec---> plavix 75 qd, recommend target bp 140-160  - Recommend PT/OT  - neurology following, appreciate continued recommendations  - care per sicu team, appreciate continued management by sicu team    Surgery, C-Team   Pager: [phone]

## 2018-11-22 NOTE — PROGRESS NOTE ADULT - ASSESSMENT
64y/o F w/ HTN, HLD, RA, Asthma s/p elective left transcarotid arterial sten via right femoral vein access (11/13/18). course complicated by code stroke approximately 8 hours postop. CTA head and neck showed a distal M2 branch occlusion. She was deemed not a candidate for intracranial thrombectomy due to the location of the M2 occlusion at that time. Patient monitored in SICU, found to be stable, and sent to the floor. The following day patient was found with possible worsening right side weakness and worsening aphasia. Code stroke called. CT showed no acute infarct and CTA was stable as compared to prior study. Concern by neuro that worsening of right sided deficits and aphasia possibly secondary to hypoperfusion of area supplied by the L M2 high grade stenosis due to fluctuating blood pressure.    Code Status: full    PLAN:   Neurologic:   - Repeat CT 11/18: left M2 segment branch occlusion, Alternating areas of stenosis involving the distal left [place] branch vasculature (unchanged from prior). MRI consistent with these findings.  - code stroke called 11/20 for worsening weakness right face, RUE & RLE. Repeat CT w/ similar L MCA stroke. Neuro re-examined patient. Recommended continuing Mariusz gtt with BP goal 160. Neuro exam stable and BP requirements now 120-140. Overall goal is to gradually decrease BP goals as long as neuro exam is stable.  - c/w ASA 81 & plavix  - Q2H neurological exams  - f/u neuro & vascular rec's                                                                                                                                                                                                                                                                                                                             Pulmonary: Hx asthma  - stable on RA  - proventil PRN    Cardiovascular:   - HR 70-80s  - SBP at goal without Mariusz. Pt refusing A-line.  - C/w Midodrine 20mg q8h. Off steroids.  - c/w statin    Gastrointestinal:  - dysphagia diet, mechanical soft honey consistency    Renal:  - monitor I/O  - replete lytes PRN  - cont w/ IVF 50cc until consistently eating  - c/w Sandoval given episode of retention     Hematologic:  - SQH  - ASA, plavix    Infectious Disease:  - afebrile, WBC 12  - no signs of infection, cont to monitor     Endocrine:  - no issues    Dispo: SICU 64y/o F w/ HTN, HLD, RA, Asthma s/p elective left transcarotid arterial sten via right femoral vein access (11/13/18). course complicated by code stroke approximately 8 hours postop. CTA head and neck showed a distal M2 branch occlusion. She was deemed not a candidate for intracranial thrombectomy due to the location of the M2 occlusion at that time. Patient monitored in SICU, found to be stable, and sent to the floor. The following day patient was found with possible worsening right side weakness and worsening aphasia. Code stroke called. CT showed no acute infarct and CTA was stable as compared to prior study. Concern by neuro that worsening of right sided deficits and aphasia possibly secondary to hypoperfusion of area supplied by the L M2 high grade stenosis due to fluctuating blood pressure.    Code Status: full    PLAN:   Neurologic:   - Repeat CT 11/18: left M2 segment branch occlusion, Alternating areas of stenosis involving the distal left [place] branch vasculature (unchanged from prior). MRI consistent with these findings.  - code stroke called 11/20 for worsening weakness right face, RUE & RLE. Repeat CT w/ similar L MCA stroke  - now with consistent neuro exam across wide range of BPs  - c/w ASA 81 & plavix  - added Prozac  - Q4H neurological exams  - f/u neuro & vascular rec's                                                                                                                                                                                                                                                                                                                             Pulmonary: Hx asthma  - stable on RA  - proventil PRN    Cardiovascular:   - HR 70-80s  - BP stable  - Midodrine 20mg q8h  - high intensity statin    Gastrointestinal:  - dysphagia diet, mechanical soft honey consistency    Renal:  - monitor I/O  - replete lytes PRN  - cont w/ IVF 50cc until consistently eating    Hematologic:  - SQH  - ASA, plavix    Infectious Disease:  - afebrile, WBC 12  - no signs of infection, cont to monitor     Endocrine:  - no issues    Dispo: SICU

## 2018-11-22 NOTE — PROGRESS NOTE ADULT - SUBJECTIVE AND OBJECTIVE BOX
Surgery C-Team Daily Progress Note     RUPINDER TRIPP | [id number]    SUBJECTIVE / 24H EVENTS  Patient seen and examined on morning rounds. No acute events overnight.     OBJECTIVE:    VITAL SIGNS:  T(C): 36.7 (18 @ 04:00), Max: 37 (18 @ 16:00)  HR: 61 (18 @ 06:00) (54 - 87)  BP: 115/76 (18 @ 06:00) (111/55 - 156/77)  RR: 16 (18 @ 06:00) (14 - 22)  SpO2: 97% (18 @ 06:00) (94% - 100%)  Daily Weight in k.2 (2018 06:00)      PHYSICAL EXAM:  Gen: NAD  LS: Respirations unlabored.   Card: on telemetry in sicu  Ext: 0/5 RUE, 0/5 RLE. Right groin puncture dressing c/d/i, right groin soft w/o evidence of hematoma.   Neuro: R. facial droop, follows some simple commands with LUE/LLE       18 @ 07:01  -  18 @ 07:00  --------------------------------------------------------  IN:    IV PiggyBack: 100 mL    Oral Fluid: 200 mL    sodium chloride 0.9%.: 1100 mL  Total IN: 1400 mL    OUT:    Indwelling Catheter - Urethral: 1120 mL    Voided: 710 mL  Total OUT: 1830 mL    Total NET: -430 mL    LAB VALUES:      144  |  106  |  14  ----------------------------<  81  3.1<L>   |  28  |  1.07    Ca    8.5      2018 03:20  Phos  2.1       Mg     2.2         TPro  6.0  /  Alb  3.3  /  TBili  0.4  /  DBili  x   /  AST  24  /  ALT  16  /  AlkPhos  60                                 8.4    12.35 )-----------( 290      ( 2018 03:20 )             26.9     LIVER FUNCTIONS - ( 2018 03:20 )  Alb: 3.3 g/dL / Pro: 6.0 g/dL / ALK PHOS: 60 u/L / ALT: 16 u/L / AST: 24 u/L / GGT: x           PT/INR - ( 2018 03:40 )   PT: 10.2 SEC;   INR: 0.90     PTT - ( 2018 03:40 )  PTT:30.5 SEC    MICROBIOLOGY:    No new microbiology data for review.     RADIOLOGY:    < from: CT Brain Stroke Protocol (18 @ 10:14) >  Impression: Acute left MCA infarct.    Narrowing of the proximal portion of the carotid stent as described above.    Decreased caliber of the right vertebral artery which is more prominent   distally.    Multiple areas of stenosis involving the left distal M1 segment and   proximal posterior M2 segment with no flow related enhancement seen   throughout the left M2 segments.    Short segment of stenosis involving the posterior right M2 segment.    < end of copied text >      MEDICATIONS  (STANDING):  aspirin  chewable 81 milliGRAM(s) Oral daily  atorvastatin 80 milliGRAM(s) Oral at bedtime  chlorhexidine 4% Liquid 1 Application(s) Topical <User Schedule>  clopidogrel Tablet 75 milliGRAM(s) Oral daily  heparin  Injectable 5000 Unit(s) SubCutaneous every 8 hours  midodrine 20 milliGRAM(s) Oral every 8 hours  sodium chloride 0.9%. 1000 milliLiter(s) (50 mL/Hr) IV Continuous <Continuous>    MEDICATIONS  (PRN):  ALBUTerol    90 MICROgram(s) HFA Inhaler 2 Puff(s) Inhalation every 6 hours PRN Shortness of Breath and/or Wheezing

## 2018-11-23 LAB
BUN SERPL-MCNC: 12 MG/DL — SIGNIFICANT CHANGE UP (ref 7–23)
CALCIUM SERPL-MCNC: 9.1 MG/DL — SIGNIFICANT CHANGE UP (ref 8.4–10.5)
CHLORIDE SERPL-SCNC: 102 MMOL/L — SIGNIFICANT CHANGE UP (ref 98–107)
CO2 SERPL-SCNC: 28 MMOL/L — SIGNIFICANT CHANGE UP (ref 22–31)
CREAT SERPL-MCNC: 1.05 MG/DL — SIGNIFICANT CHANGE UP (ref 0.5–1.3)
GLUCOSE SERPL-MCNC: 96 MG/DL — SIGNIFICANT CHANGE UP (ref 70–99)
HCT VFR BLD CALC: 30.4 % — LOW (ref 34.5–45)
HGB BLD-MCNC: 9.5 G/DL — LOW (ref 11.5–15.5)
MAGNESIUM SERPL-MCNC: 2.1 MG/DL — SIGNIFICANT CHANGE UP (ref 1.6–2.6)
MCHC RBC-ENTMCNC: 23.6 PG — LOW (ref 27–34)
MCHC RBC-ENTMCNC: 31.3 % — LOW (ref 32–36)
MCV RBC AUTO: 75.6 FL — LOW (ref 80–100)
NRBC # FLD: 0 — SIGNIFICANT CHANGE UP
PHOSPHATE SERPL-MCNC: 3.2 MG/DL — SIGNIFICANT CHANGE UP (ref 2.5–4.5)
PLATELET # BLD AUTO: 298 K/UL — SIGNIFICANT CHANGE UP (ref 150–400)
PMV BLD: 9.8 FL — SIGNIFICANT CHANGE UP (ref 7–13)
POTASSIUM SERPL-MCNC: 3.5 MMOL/L — SIGNIFICANT CHANGE UP (ref 3.5–5.3)
POTASSIUM SERPL-SCNC: 3.5 MMOL/L — SIGNIFICANT CHANGE UP (ref 3.5–5.3)
RBC # BLD: 4.02 M/UL — SIGNIFICANT CHANGE UP (ref 3.8–5.2)
RBC # FLD: 16.9 % — HIGH (ref 10.3–14.5)
SODIUM SERPL-SCNC: 141 MMOL/L — SIGNIFICANT CHANGE UP (ref 135–145)
WBC # BLD: 13.2 K/UL — HIGH (ref 3.8–10.5)
WBC # FLD AUTO: 13.2 K/UL — HIGH (ref 3.8–10.5)

## 2018-11-23 PROCEDURE — 99233 SBSQ HOSP IP/OBS HIGH 50: CPT | Mod: GC

## 2018-11-23 PROCEDURE — 99222 1ST HOSP IP/OBS MODERATE 55: CPT | Mod: GC

## 2018-11-23 RX ORDER — POTASSIUM CHLORIDE 20 MEQ
40 PACKET (EA) ORAL ONCE
Qty: 0 | Refills: 0 | Status: COMPLETED | OUTPATIENT
Start: 2018-11-23 | End: 2018-11-23

## 2018-11-23 RX ADMIN — Medication 100 MILLIGRAM(S): at 14:58

## 2018-11-23 RX ADMIN — HEPARIN SODIUM 5000 UNIT(S): 5000 INJECTION INTRAVENOUS; SUBCUTANEOUS at 20:45

## 2018-11-23 RX ADMIN — MIDODRINE HYDROCHLORIDE 20 MILLIGRAM(S): 2.5 TABLET ORAL at 14:59

## 2018-11-23 RX ADMIN — CLOPIDOGREL BISULFATE 75 MILLIGRAM(S): 75 TABLET, FILM COATED ORAL at 12:38

## 2018-11-23 RX ADMIN — Medication 100 MILLIGRAM(S): at 06:11

## 2018-11-23 RX ADMIN — HEPARIN SODIUM 5000 UNIT(S): 5000 INJECTION INTRAVENOUS; SUBCUTANEOUS at 12:39

## 2018-11-23 RX ADMIN — HEPARIN SODIUM 5000 UNIT(S): 5000 INJECTION INTRAVENOUS; SUBCUTANEOUS at 06:11

## 2018-11-23 RX ADMIN — SODIUM CHLORIDE 50 MILLILITER(S): 9 INJECTION INTRAMUSCULAR; INTRAVENOUS; SUBCUTANEOUS at 12:39

## 2018-11-23 RX ADMIN — SODIUM CHLORIDE 50 MILLILITER(S): 9 INJECTION INTRAMUSCULAR; INTRAVENOUS; SUBCUTANEOUS at 17:57

## 2018-11-23 RX ADMIN — Medication 40 MILLIEQUIVALENT(S): at 12:36

## 2018-11-23 RX ADMIN — ATORVASTATIN CALCIUM 80 MILLIGRAM(S): 80 TABLET, FILM COATED ORAL at 22:48

## 2018-11-23 RX ADMIN — MIDODRINE HYDROCHLORIDE 20 MILLIGRAM(S): 2.5 TABLET ORAL at 06:11

## 2018-11-23 RX ADMIN — MIDODRINE HYDROCHLORIDE 20 MILLIGRAM(S): 2.5 TABLET ORAL at 22:48

## 2018-11-23 RX ADMIN — Medication 81 MILLIGRAM(S): at 12:38

## 2018-11-23 NOTE — DIETITIAN INITIAL EVALUATION ADULT. - PERTINENT MEDS FT
MEDICATIONS  (STANDING):  aspirin  chewable 81 milliGRAM(s) Oral daily  atorvastatin 80 milliGRAM(s) Oral at bedtime  clopidogrel Tablet 75 milliGRAM(s) Oral daily  docusate sodium 100 milliGRAM(s) Oral three times a day  FLUoxetine Solution 20 milliGRAM(s) Oral daily  heparin  Injectable 5000 Unit(s) SubCutaneous every 8 hours  midodrine 20 milliGRAM(s) Oral every 8 hours  senna 2 Tablet(s) Oral at bedtime  sodium chloride 0.9%. 1000 milliLiter(s) (50 mL/Hr) IV Continuous <Continuous>

## 2018-11-23 NOTE — PROGRESS NOTE ADULT - SUBJECTIVE AND OBJECTIVE BOX
Surgery C-Team Daily Progress Note     RUPINDER TRIPP | [id number]    SUBJECTIVE / 24H EVENTS  Patient seen and examined on morning rounds. No acute events overnight. No nausea / vomiting. Tolerating diet.     OBJECTIVE:    VITAL SIGNS:  T(C): 36.9 (11-23-18 @ 06:09), Max: 36.9 (11-22-18 @ 22:06)  HR: 88 (11-23-18 @ 06:09) (55 - 88)  BP: 133/77 (11-23-18 @ 06:09) (116/61 - 151/76)  RR: 18 (11-23-18 @ 06:09) (14 - 20)  SpO2: 97% (11-23-18 @ 06:09) (95% - 99%)    PHYSICAL EXAM:  Gen: NAD  LS: Respirations unlabored.   Ext: 0/5 RUE, 3/5 RLE. Right groin puncture dressing c/d/i, right groin soft w/o evidence of hematoma.   Neuro: R. facial droop, follows some simple commands with LUE/LLE       11-22-18 @ 07:01  -  11-23-18 @ 07:00  --------------------------------------------------------  IN:    sodium chloride 0.9%.: 1100 mL  Total IN: 1100 mL    OUT:    Voided: 2150 mL  Total OUT: 2150 mL    Total NET: -1050 mL      LAB VALUES:  11-23    141  |  102  |  12  ----------------------------<  96  3.5   |  28  |  1.05    Ca    9.1      23 Nov 2018 06:36  Phos  3.2     11-23  Mg     2.1     11-23    TPro  6.0  /  Alb  3.3  /  TBili  0.4  /  DBili  x   /  AST  24  /  ALT  16  /  AlkPhos  60  11-22                               9.5    13.20 )-----------( 298      ( 23 Nov 2018 06:36 )             30.4     LIVER FUNCTIONS - ( 22 Nov 2018 03:20 )  Alb: 3.3 g/dL / Pro: 6.0 g/dL / ALK PHOS: 60 u/L / ALT: 16 u/L / AST: 24 u/L / GGT: x             MICROBIOLOGY:    No new microbiology data for review.     RADIOLOGY:    No new radiographic images for review.    MEDICATIONS  (STANDING):  aspirin  chewable 81 milliGRAM(s) Oral daily  atorvastatin 80 milliGRAM(s) Oral at bedtime  clopidogrel Tablet 75 milliGRAM(s) Oral daily  docusate sodium 100 milliGRAM(s) Oral three times a day  FLUoxetine Solution 20 milliGRAM(s) Oral daily  heparin  Injectable 5000 Unit(s) SubCutaneous every 8 hours  midodrine 20 milliGRAM(s) Oral every 8 hours  potassium chloride   Powder 40 milliEquivalent(s) Oral once  senna 2 Tablet(s) Oral at bedtime  sodium chloride 0.9%. 1000 milliLiter(s) (50 mL/Hr) IV Continuous <Continuous>    MEDICATIONS  (PRN):  ALBUTerol    90 MICROgram(s) HFA Inhaler 2 Puff(s) Inhalation every 6 hours PRN Shortness of Breath and/or Wheezing

## 2018-11-23 NOTE — PROGRESS NOTE ADULT - ASSESSMENT
63F s/p l. TCARS with post op course c/b code stroke now in sicu with reduced neurologic status. Patient hemodynamically stable, now recovering on floor w/o ongoing critical care needs.     - no further vascular interventions for now  - Neurology rec---> plavix 75 qd, recommend target bp 140-160  - Recommend PT/OT  - neurology following, appreciate continued recommendations  - monitor po intake  - vte ppx  - pain control  - encourage cough / deep breathing / incentive spirometry    Surgery, C-Team   Pager: [phone]

## 2018-11-23 NOTE — CONSULT NOTE ADULT - ASSESSMENT
1. PT- bed mobility,transfers, gait and balance training  2. OT- ADL'S  3. CVA-plavix. statin   4. Patient would benefit from acute rehab, needs a multidisciplinary team including PT, OT and speech. Can tolerate 3 hours of therapy a day.  Will follow.

## 2018-11-23 NOTE — DIETITIAN INITIAL EVALUATION ADULT. - NS AS NUTRI INTERV MEALS SNACK
1. Continue current diet order. 2. Encourage PO intake and honor food preferences as able. 3. Monitor weights, labs, BM's, skin integrity, p.o. intake.

## 2018-11-23 NOTE — DIETITIAN INITIAL EVALUATION ADULT. - ENERGY NEEDS
Ht: 59 in Wt: (11/21) 142.1 pounds  BMI: 28.7  IBW: 98 pounds  Skin: No edema, no pressure injuries noted

## 2018-11-23 NOTE — PROGRESS NOTE ADULT - SUBJECTIVE AND OBJECTIVE BOX
Neurology Follow up note    Patient is a 63y old  Female who presents with a chief complaint of stroke (17 Nov 2018 10:43)      Subjective:Interval History - No events overnight    Objective:   Vital Signs Last 24 Hrs  T(C): 36.8 (23 Nov 2018 09:31), Max: 36.9 (22 Nov 2018 22:06)  T(F): 98.3 (23 Nov 2018 09:31), Max: 98.5 (22 Nov 2018 22:06)  HR: 60 (23 Nov 2018 09:31) (60 - 88)  BP: 142/68 (23 Nov 2018 09:31) (116/61 - 151/76)  BP(mean): 78 (22 Nov 2018 14:00) (75 - 78)  RR: 18 (23 Nov 2018 09:31) (16 - 20)  SpO2: 97% (23 Nov 2018 09:31) (95% - 98%)    General Exam:   General appearance: No acute distress                   Neurological Exam:    Mental Status: Orientated to self, date and place.  Attention intact. no verbal output. Able to identify pen, button, watch.     Cranial Nerves: VFF. CN V1-3 intact to light touch and pinprick. +dense right facial droop. Tongue midline.                  Strength: RUE drift but able to keep arm up for few seconds prior to hitting the bed, 2/5 RLE. 5/5 LUE and LLE, no drift left side.  Pronator drift: none                 Dysmetria: None to finger-nose-finger  No truncal ataxia.    Tremor: No resting, postural or action tremor.  No myoclonus.  Sensation: intact to light touch  Gait:     Other:    11-23    141  |  102  |  12  ----------------------------<  96  3.5   |  28  |  1.05    Ca    9.1      23 Nov 2018 06:36  Phos  3.2     11-23  Mg     2.1     11-23    TPro  6.0  /  Alb  3.3  /  TBili  0.4  /  DBili  x   /  AST  24  /  ALT  16  /  AlkPhos  60  11-22 11-23    141  |  102  |  12  ----------------------------<  96  3.5   |  28  |  1.05    Ca    9.1      23 Nov 2018 06:36  Phos  3.2     11-23  Mg     2.1     11-23    TPro  6.0  /  Alb  3.3  /  TBili  0.4  /  DBili  x   /  AST  24  /  ALT  16  /  AlkPhos  60  11-22    LIVER FUNCTIONS - ( 22 Nov 2018 03:20 )  Alb: 3.3 g/dL / Pro: 6.0 g/dL / ALK PHOS: 60 u/L / ALT: 16 u/L / AST: 24 u/L / GGT: x                                 9.5    13.20 )-----------( 298      ( 23 Nov 2018 06:36 )             30.4     Radiology    EKG:  tele:  TTE:  EEG:      MEDICATIONS  (STANDING):  aspirin  chewable 81 milliGRAM(s) Oral daily  atorvastatin 80 milliGRAM(s) Oral at bedtime  clopidogrel Tablet 75 milliGRAM(s) Oral daily  docusate sodium 100 milliGRAM(s) Oral three times a day  FLUoxetine Solution 20 milliGRAM(s) Oral daily  heparin  Injectable 5000 Unit(s) SubCutaneous every 8 hours  midodrine 20 milliGRAM(s) Oral every 8 hours  potassium chloride   Powder 40 milliEquivalent(s) Oral once  senna 2 Tablet(s) Oral at bedtime  sodium chloride 0.9%. 1000 milliLiter(s) (50 mL/Hr) IV Continuous <Continuous>    MEDICATIONS  (PRN):  ALBUTerol    90 MICROgram(s) HFA Inhaler 2 Puff(s) Inhalation every 6 hours PRN Shortness of Breath and/or Wheezing Neurology Follow up note    Patient is a 63y old  Female who presents with a chief complaint of stroke (17 Nov 2018 10:43)    Subjective: Interval History - No events overnight    Objective:   Vital Signs Last 24 Hrs  T(C): 36.8 (23 Nov 2018 09:31), Max: 36.9 (22 Nov 2018 22:06)  T(F): 98.3 (23 Nov 2018 09:31), Max: 98.5 (22 Nov 2018 22:06)  HR: 60 (23 Nov 2018 09:31) (60 - 88)  BP: 142/68 (23 Nov 2018 09:31) (116/61 - 151/76)  BP(mean): 78 (22 Nov 2018 14:00) (75 - 78)  RR: 18 (23 Nov 2018 09:31) (16 - 20)  SpO2: 97% (23 Nov 2018 09:31) (95% - 98%)    General Exam:   General appearance: No acute distress                   Neurological Exam:    Mental Status: Orientated to self, date and place.  Attention intact. no verbal output. Able to identify pen, button, watch.     Cranial Nerves: VFF. CN V1-3 intact to light touch and pinprick. +dense right facial droop. Tongue midline.                  Strength: RUE drift but able to keep arm up for few seconds prior to hitting the bed, 2/5 RLE. 5/5 LUE and LLE, no drift left side.  Pronator drift: none                 Dysmetria: None to finger-nose-finger  No truncal ataxia.    Tremor: No resting, postural or action tremor.  No myoclonus.  Sensation: intact to light touch  Gait:     Other:    11-23    141  |  102  |  12  ----------------------------<  96  3.5   |  28  |  1.05    Ca    9.1      23 Nov 2018 06:36  Phos  3.2     11-23  Mg     2.1     11-23    TPro  6.0  /  Alb  3.3  /  TBili  0.4  /  DBili  x   /  AST  24  /  ALT  16  /  AlkPhos  60  11-22    LIVER FUNCTIONS - ( 22 Nov 2018 03:20 )  Alb: 3.3 g/dL / Pro: 6.0 g/dL / ALK PHOS: 60 u/L / ALT: 16 u/L / AST: 24 u/L / GGT: x                                 9.5    13.20 )-----------( 298      ( 23 Nov 2018 06:36 )             30.4     Radiology    EKG:  tele:  TTE:  EEG:      MEDICATIONS  (STANDING):  aspirin  chewable 81 milliGRAM(s) Oral daily  atorvastatin 80 milliGRAM(s) Oral at bedtime  clopidogrel Tablet 75 milliGRAM(s) Oral daily  docusate sodium 100 milliGRAM(s) Oral three times a day  FLUoxetine Solution 20 milliGRAM(s) Oral daily  heparin  Injectable 5000 Unit(s) SubCutaneous every 8 hours  midodrine 20 milliGRAM(s) Oral every 8 hours  potassium chloride   Powder 40 milliEquivalent(s) Oral once  senna 2 Tablet(s) Oral at bedtime  sodium chloride 0.9%. 1000 milliLiter(s) (50 mL/Hr) IV Continuous <Continuous>    MEDICATIONS  (PRN):  ALBUTerol    90 MICROgram(s) HFA Inhaler 2 Puff(s) Inhalation every 6 hours PRN Shortness of Breath and/or Wheezing

## 2018-11-23 NOTE — PROGRESS NOTE ADULT - ASSESSMENT
63 years old woman with multiple vascular risk factors including age, HTN and HPLD was admitted to [facility] for an elective left carotid revascularization of the left ICA stent placement. After the procedure, she was noted to have right-sided weakness and language disturbance in the form of global aphasia. CT brain on 11/13 did not show any evidence of acute infarct or hemorrhage. CTA head and neck on 11/13 to my eye showed multifocal intracranial atherosclerosis including severe stenosis of left MCA proximal and distal M1 segment, multifocal stenosis involving inferior M2 segment and occlusion of the superior M2 segment and left proximal ICA stent. CTA head and neck on 11/16 showed 75% stenosis of left ICA stent (in-stent stenosis), which appears to be same as compared to 11/13 to my eye. MRI brain on 11/17 showed acute infarct in the left MCA distribution. TTE did not show any obvious structural cardiac source of embolism. On 11/20, she was noted to have fluctuating right hemiparesis with respect to right arm weakness. CT brain on 11/20 showed expected evolution of left MCA distribution infarct and CTA head and neck showed stable findings. Now off vasopressors, neuro exam stable off pressors.     Impression:  Cerebral embolism with cerebral infarction. Left MCA distribution stroke in the periprocedural period after carotid revascularization with carotid artery stent placement     Plan:  - Agree to continue with clopidogrel 75 mg once a day for secondary stroke prevention in the setting of recent carotid artery stent placement. No absolute neurovascular contraindication to consider dual antiplatelet therapy if indicated from carotid artery stent standpoint.  - c/w fluoxetine 20 mg OD for 3 months (not for antidepressive effects, but data shows improved motor function w/ fluoxetine post-stroke)  - Would optimal benefit from repeat conventional angiogram to better characterize the degree of in-stent stenosis; final decision is deferred to vascular surgeon.  - Atorvastatin 80 mg at bedtime considering likely atheroembolic etiology of her stroke.  - Continue with telemetry.  - HbA1c 6.0, consider checking LDL; continue with aggressive vascular risk factors modification  - PT/OT/speech and swallow evaluation.  - Stroke education.    Above mentioned plan was discussed with patient and her family members at bedside in detail. All the questions were answered and concerns were addressed. 63 years old woman with multiple vascular risk factors including age, HTN and HPLD was admitted to [facility] for an elective left carotid revascularization of the left ICA stent placement. After the procedure, she was noted to have right-sided weakness and language disturbance in the form of global aphasia. CT brain on 11/13 did not show any evidence of acute infarct or hemorrhage. CTA head and neck on 11/13 to my eye showed multifocal intracranial atherosclerosis including severe stenosis of left MCA proximal and distal M1 segment, multifocal stenosis involving inferior M2 segment and occlusion of the superior M2 segment and left proximal ICA stent. CTA head and neck on 11/16 showed 75% stenosis of left ICA stent (in-stent stenosis), which appears to be same as compared to 11/13 to my eye. MRI brain on 11/17 showed acute infarct in the left MCA distribution. TTE did not show any obvious structural cardiac source of embolism. On 11/20, she was noted to have fluctuating right hemiparesis with respect to right arm weakness. CT brain on 11/20 showed expected evolution of left MCA distribution infarct and CTA head and neck showed stable findings. Now off vasopressors, neuro exam stable off pressors.     Impression:  Cerebral embolism with cerebral infarction. Left MCA distribution stroke in the periprocedural period after carotid revascularization with carotid artery stent placement     Plan:  - Agree to continue with clopidogrel 75 mg once a day for secondary stroke prevention in the setting of recent carotid artery stent placement. No absolute neurovascular contraindication to consider dual antiplatelet therapy if indicated from carotid artery stent standpoint.  - c/w fluoxetine 20 mg OD for 3 months (not for antidepressive effects, but data shows improved motor function w/ fluoxetine post-stroke)  - Would optimal benefit from repeat conventional angiogram to better characterize the degree of in-stent stenosis; final decision is deferred to vascular surgeon.  - Atorvastatin 80 mg at bedtime considering likely atheroembolic etiology of her stroke.  - Continue with telemetry.  - HbA1c 6.0, consider checking LDL; continue with aggressive vascular risk factors modification  - PT/OT/speech and swallow evaluation.  - Stroke education.    Above mentioned plan was discussed with patient in detail. All the questions were answered and concerns were addressed.

## 2018-11-23 NOTE — OCCUPATIONAL THERAPY INITIAL EVALUATION ADULT - PLANNED THERAPY INTERVENTIONS, OT EVAL
ADL retraining/bed mobility training/motor coordination training/fine motor coordination training/parent/caregiver training.../ROM/strengthening/stretching/neuromuscular re-education/transfer training/balance training

## 2018-11-23 NOTE — CONSULT NOTE ADULT - SUBJECTIVE AND OBJECTIVE BOX
63 years old woman with multiple vascular risk factors including age, HTN and HPLD was admitted to [facility] for an elective left carotid revascularization of the left ICA stent placement. After the procedure, she was noted to have right-sided weakness and language disturbance in the form of global aphasia. CT brain on 11/13 did not show any evidence of acute infarct or hemorrhage.  MRI brain on 11/17 showed acute infarct in the left MCA distribution. TTE did not show any obvious structural cardiac source of embolism. On 11/20, she was noted to have fluctuating right hemiparesis with respect to right arm weakness. CT brain on 11/20 showed expected evolution of left MCA distribution infarct and CTA head and neck showed stable findings. Now off vasopressors, neuro exam stable off pressors.       REVIEW OF SYSTEMS: No chest pain, shortness of breath, nausea, vomiting or diarhea.      PAST MEDICAL & SURGICAL HISTORY  Osteoporosis  Eczema  Carotid artery stenosis  Rheumatoid arthritis  HLD (hyperlipidemia)  Carotid Artery Dissection  Eczema  Psoriasis  Asthma  HTN (Hypertension)  Myocardial Infarction  S/P lumbar fusion  Cataract Extraction Surgery  tonsillectomy      SOCIAL HISTORY  Smoking - Denied, EtOH - Denied, Drugs - Denied    FUNCTIONAL HISTORY:   Lives   Independent    CURRENT FUNCTIONAL STATUS: min/mod a       FAMILY HISTORY   Family history of diabetes mellitus in mother (Mother)  Family history of hypertension in mother (Mother)  No pertinent family history in first degree relatives      RECENT LABS/IMAGING  CBC Full  -  ( 23 Nov 2018 06:36 )  WBC Count : 13.20 K/uL  Hemoglobin : 9.5 g/dL  Hematocrit : 30.4 %  Platelet Count - Automated : 298 K/uL  Mean Cell Volume : 75.6 fL  Mean Cell Hemoglobin : 23.6 pg  Mean Cell Hemoglobin Concentration : 31.3 %  Auto Neutrophil # : x  Auto Lymphocyte # : x  Auto Monocyte # : x  Auto Eosinophil # : x  Auto Basophil # : x  Auto Neutrophil % : x  Auto Lymphocyte % : x  Auto Monocyte % : x  Auto Eosinophil % : x  Auto Basophil % : x    11-23    141  |  102  |  12  ----------------------------<  96  3.5   |  28  |  1.05    Ca    9.1      23 Nov 2018 06:36  Phos  3.2     11-23  Mg     2.1     11-23    TPro  6.0  /  Alb  3.3  /  TBili  0.4  /  DBili  x   /  AST  24  /  ALT  16  /  AlkPhos  60  11-22        VITALS  T(C): 36.8 (11-23-18 @ 09:31), Max: 36.9 (11-22-18 @ 22:06)  HR: 60 (11-23-18 @ 09:31) (60 - 88)  BP: 142/68 (11-23-18 @ 09:31) (133/77 - 151/76)  RR: 18 (11-23-18 @ 09:31) (16 - 18)  SpO2: 97% (11-23-18 @ 09:31) (95% - 98%)  Wt(kg): --    ALLERGIES  eggs (Anaphylaxis; Hives)  fish (Anaphylaxis; Hives)  lamb, tomatoes, milk (Anaphylaxis; Hives)  peanut butter (Anaphylaxis)  peanuts (Anaphylaxis; Hives)  penicillins (Anaphylaxis)  propofol (Angioedema)  shellfish (Anaphylaxis; Hives)      MEDICATIONS   ALBUTerol    90 MICROgram(s) HFA Inhaler 2 Puff(s) Inhalation every 6 hours PRN  aspirin  chewable 81 milliGRAM(s) Oral daily  atorvastatin 80 milliGRAM(s) Oral at bedtime  clopidogrel Tablet 75 milliGRAM(s) Oral daily  docusate sodium 100 milliGRAM(s) Oral three times a day  FLUoxetine Solution 20 milliGRAM(s) Oral daily  heparin  Injectable 5000 Unit(s) SubCutaneous every 8 hours  midodrine 20 milliGRAM(s) Oral every 8 hours  senna 2 Tablet(s) Oral at bedtime  sodium chloride 0.9%. 1000 milliLiter(s) IV Continuous <Continuous>      ----------------------------------------------------------------------------------------  PHYSICAL EXAM  Constitutional - NAD, Comfortable  HEENT - NCAT, EOMI  Neck - Supple, No limited ROM  Chest - CTA bilaterally, No wheeze, No rhonchi, No crackles  Cardiovascular - RRR, S1S2, No murmurs  Abdomen - BS+, Soft, NTND  Extremities - No C/C/E, No calf tenderness   Neurologic Exam -                    Cognitive - Awake, Alert, AAO to self, place, date, year, situation     Communication  aphasia     Cranial Nerves - + right facia      Motor - RUE/RLE 2/5,                        Sensory - Intact to LT     Reflexes - DTR Intact, No primitive reflexive     Balance - poor dynamic balance   Psychiatric - Mood stable, Affect WNL 63 years old woman with multiple vascular risk factors including age, HTN and HPLD was admitted to [facility] for an elective left carotid revascularization of the left ICA stent placement. After the procedure, she was noted to have right-sided weakness and language disturbance in the form of global aphasia. CT brain on 11/13 did not show any evidence of acute infarct or hemorrhage.  MRI brain on 11/17 showed acute infarct in the left MCA distribution. TTE did not show any obvious structural cardiac source of embolism. On 11/20, she was noted to have fluctuating right hemiparesis with respect to right arm weakness. CT brain on 11/20 showed expected evolution of left MCA distribution infarct and CTA head and neck showed stable findings.    REVIEW OF SYSTEMS: No chest pain, shortness of breath, nausea, vomiting or diarhea.      PAST MEDICAL & SURGICAL HISTORY  Osteoporosis  Eczema  Carotid artery stenosis  Rheumatoid arthritis  HLD (hyperlipidemia)  Carotid Artery Dissection  Eczema  Psoriasis  Asthma  HTN (Hypertension)  Myocardial Infarction  S/P lumbar fusion  Cataract Extraction Surgery  tonsillectomy      SOCIAL HISTORY  Smoking - Denied, EtOH - Denied, Drugs - Denied    FUNCTIONAL HISTORY:   Lives w2ith family   Independent PTA    CURRENT FUNCTIONAL STATUS: min/mod a       FAMILY HISTORY   Family history of diabetes mellitus in mother (Mother)  Family history of hypertension in mother (Mother)  No pertinent family history in first degree relatives      RECENT LABS/IMAGING  CBC Full  -  ( 23 Nov 2018 06:36 )  WBC Count : 13.20 K/uL  Hemoglobin : 9.5 g/dL  Hematocrit : 30.4 %  Platelet Count - Automated : 298 K/uL  Mean Cell Volume : 75.6 fL  Mean Cell Hemoglobin : 23.6 pg  Mean Cell Hemoglobin Concentration : 31.3 %  Auto Neutrophil # : x  Auto Lymphocyte # : x  Auto Monocyte # : x  Auto Eosinophil # : x  Auto Basophil # : x  Auto Neutrophil % : x  Auto Lymphocyte % : x  Auto Monocyte % : x  Auto Eosinophil % : x  Auto Basophil % : x    11-23    141  |  102  |  12  ----------------------------<  96  3.5   |  28  |  1.05    Ca    9.1      23 Nov 2018 06:36  Phos  3.2     11-23  Mg     2.1     11-23    TPro  6.0  /  Alb  3.3  /  TBili  0.4  /  DBili  x   /  AST  24  /  ALT  16  /  AlkPhos  60  11-22        VITALS  T(C): 36.8 (11-23-18 @ 09:31), Max: 36.9 (11-22-18 @ 22:06)  HR: 60 (11-23-18 @ 09:31) (60 - 88)  BP: 142/68 (11-23-18 @ 09:31) (133/77 - 151/76)  RR: 18 (11-23-18 @ 09:31) (16 - 18)  SpO2: 97% (11-23-18 @ 09:31) (95% - 98%)  Wt(kg): --    ALLERGIES  eggs (Anaphylaxis; Hives)  fish (Anaphylaxis; Hives)  lamb, tomatoes, milk (Anaphylaxis; Hives)  peanut butter (Anaphylaxis)  peanuts (Anaphylaxis; Hives)  penicillins (Anaphylaxis)  propofol (Angioedema)  shellfish (Anaphylaxis; Hives)      MEDICATIONS   ALBUTerol    90 MICROgram(s) HFA Inhaler 2 Puff(s) Inhalation every 6 hours PRN  aspirin  chewable 81 milliGRAM(s) Oral daily  atorvastatin 80 milliGRAM(s) Oral at bedtime  clopidogrel Tablet 75 milliGRAM(s) Oral daily  docusate sodium 100 milliGRAM(s) Oral three times a day  FLUoxetine Solution 20 milliGRAM(s) Oral daily  heparin  Injectable 5000 Unit(s) SubCutaneous every 8 hours  midodrine 20 milliGRAM(s) Oral every 8 hours  senna 2 Tablet(s) Oral at bedtime  sodium chloride 0.9%. 1000 milliLiter(s) IV Continuous <Continuous>      ----------------------------------------------------------------------------------------  PHYSICAL EXAM  Constitutional - NAD, Comfortable  HEENT - NCAT, EOMI  Neck - Supple, No limited ROM  Chest - CTA bilaterally, No wheeze, No rhonchi, No crackles  Cardiovascular - RRR, S1S2, No murmurs  Abdomen - BS+, Soft, NTND  Extremities - No C/C/E, No calf tenderness   Neurologic Exam -                    Cognitive - Awake, Alert, AAO to self, place, date, year, situation     Communication  aphasia     Cranial Nerves - + right facia      Motor - RUE/RLE 2/5,                        Sensory - Intact to LT     Reflexes - DTR Intact, No primitive reflexive     Balance - poor dynamic balance   Psychiatric - Mood stable, Affect WNL

## 2018-11-23 NOTE — DIETITIAN INITIAL EVALUATION ADULT. - FACTORS AFF FOOD INTAKE
difficulty swallowing/confirmed allergies to eggs, shellfish, peanuts and peanut products, tomatoes. The only fish pt eats is tuna fish.

## 2018-11-23 NOTE — DIETITIAN INITIAL EVALUATION ADULT. - OTHER INFO
Pt seen for Length Of Stay initial assessment. Pt is a 64 y/o F s/p elective left transcarotid arterial stent 11/13 complicated by stroke s/p SICU. MBS 11/21 recommend mechanical soft dysphagia 2 with honey thickened liquids. Pt with fair intake of foods 2/2 dislike of hospital foods. Per RN, pt did not eat anything for breakfast today, and for lunch she ate mashed potatoes and green beans. No swallowing difficulty with current diet order. No GI distress (nausea/vomiting/diarrhea/constipation.) Current weight trend: (11/15) 140.8 pounds, (11/21) 142.1 pounds, (11/22) 150.3 pounds. Last weight likely inaccurate.

## 2018-11-23 NOTE — OCCUPATIONAL THERAPY INITIAL EVALUATION ADULT - PERTINENT HX OF CURRENT PROBLEM, REHAB EVAL
63 years old woman with multiple vascular risk factors including age, HTN and HPLD was admitted to [facility] for an elective left carotid revascularization of the left ICA stent placement. After the procedure, she was noted to have right-sided weakness and language disturbance in the form of global aphasia. Head CT pt found to have L MCA infarct

## 2018-11-24 LAB
APPEARANCE UR: SIGNIFICANT CHANGE UP
BACTERIA # UR AUTO: SIGNIFICANT CHANGE UP
BILIRUB UR-MCNC: NEGATIVE — SIGNIFICANT CHANGE UP
BLOOD UR QL VISUAL: SIGNIFICANT CHANGE UP
BUN SERPL-MCNC: 10 MG/DL — SIGNIFICANT CHANGE UP (ref 7–23)
CALCIUM SERPL-MCNC: 9.3 MG/DL — SIGNIFICANT CHANGE UP (ref 8.4–10.5)
CHLORIDE SERPL-SCNC: 102 MMOL/L — SIGNIFICANT CHANGE UP (ref 98–107)
CO2 SERPL-SCNC: 27 MMOL/L — SIGNIFICANT CHANGE UP (ref 22–31)
COLOR SPEC: SIGNIFICANT CHANGE UP
CREAT SERPL-MCNC: 1.09 MG/DL — SIGNIFICANT CHANGE UP (ref 0.5–1.3)
GLUCOSE SERPL-MCNC: 87 MG/DL — SIGNIFICANT CHANGE UP (ref 70–99)
GLUCOSE UR-MCNC: NEGATIVE — SIGNIFICANT CHANGE UP
HCT VFR BLD CALC: 30.6 % — LOW (ref 34.5–45)
HGB BLD-MCNC: 9.4 G/DL — LOW (ref 11.5–15.5)
HYALINE CASTS # UR AUTO: SIGNIFICANT CHANGE UP
KETONES UR-MCNC: NEGATIVE — SIGNIFICANT CHANGE UP
LEUKOCYTE ESTERASE UR-ACNC: SIGNIFICANT CHANGE UP
MAGNESIUM SERPL-MCNC: 2.1 MG/DL — SIGNIFICANT CHANGE UP (ref 1.6–2.6)
MCHC RBC-ENTMCNC: 23.2 PG — LOW (ref 27–34)
MCHC RBC-ENTMCNC: 30.7 % — LOW (ref 32–36)
MCV RBC AUTO: 75.4 FL — LOW (ref 80–100)
NITRITE UR-MCNC: NEGATIVE — SIGNIFICANT CHANGE UP
NRBC # FLD: 0 — SIGNIFICANT CHANGE UP
PH UR: 6.5 — SIGNIFICANT CHANGE UP (ref 5–8)
PHOSPHATE SERPL-MCNC: 3.2 MG/DL — SIGNIFICANT CHANGE UP (ref 2.5–4.5)
PLATELET # BLD AUTO: 345 K/UL — SIGNIFICANT CHANGE UP (ref 150–400)
PMV BLD: 11.1 FL — SIGNIFICANT CHANGE UP (ref 7–13)
POTASSIUM SERPL-MCNC: 4 MMOL/L — SIGNIFICANT CHANGE UP (ref 3.5–5.3)
POTASSIUM SERPL-SCNC: 4 MMOL/L — SIGNIFICANT CHANGE UP (ref 3.5–5.3)
PROT UR-MCNC: 30 — SIGNIFICANT CHANGE UP
RBC # BLD: 4.06 M/UL — SIGNIFICANT CHANGE UP (ref 3.8–5.2)
RBC # FLD: 17.2 % — HIGH (ref 10.3–14.5)
RBC CASTS # UR COMP ASSIST: HIGH (ref 0–?)
SODIUM SERPL-SCNC: 140 MMOL/L — SIGNIFICANT CHANGE UP (ref 135–145)
SP GR SPEC: 1.01 — SIGNIFICANT CHANGE UP (ref 1–1.04)
SQUAMOUS # UR AUTO: SIGNIFICANT CHANGE UP
UROBILINOGEN FLD QL: NORMAL — SIGNIFICANT CHANGE UP
WBC # BLD: 13.04 K/UL — HIGH (ref 3.8–10.5)
WBC # FLD AUTO: 13.04 K/UL — HIGH (ref 3.8–10.5)
WBC UR QL: >50 — HIGH (ref 0–?)

## 2018-11-24 RX ORDER — CIPROFLOXACIN LACTATE 400MG/40ML
500 VIAL (ML) INTRAVENOUS EVERY 12 HOURS
Qty: 0 | Refills: 0 | Status: COMPLETED | OUTPATIENT
Start: 2018-11-24 | End: 2018-11-26

## 2018-11-24 RX ADMIN — MIDODRINE HYDROCHLORIDE 20 MILLIGRAM(S): 2.5 TABLET ORAL at 05:28

## 2018-11-24 RX ADMIN — HEPARIN SODIUM 5000 UNIT(S): 5000 INJECTION INTRAVENOUS; SUBCUTANEOUS at 13:24

## 2018-11-24 RX ADMIN — MIDODRINE HYDROCHLORIDE 20 MILLIGRAM(S): 2.5 TABLET ORAL at 21:30

## 2018-11-24 RX ADMIN — HEPARIN SODIUM 5000 UNIT(S): 5000 INJECTION INTRAVENOUS; SUBCUTANEOUS at 05:28

## 2018-11-24 RX ADMIN — CLOPIDOGREL BISULFATE 75 MILLIGRAM(S): 75 TABLET, FILM COATED ORAL at 13:23

## 2018-11-24 RX ADMIN — SODIUM CHLORIDE 50 MILLILITER(S): 9 INJECTION INTRAMUSCULAR; INTRAVENOUS; SUBCUTANEOUS at 13:26

## 2018-11-24 RX ADMIN — SODIUM CHLORIDE 50 MILLILITER(S): 9 INJECTION INTRAMUSCULAR; INTRAVENOUS; SUBCUTANEOUS at 21:30

## 2018-11-24 RX ADMIN — Medication 100 MILLIGRAM(S): at 13:25

## 2018-11-24 RX ADMIN — ATORVASTATIN CALCIUM 80 MILLIGRAM(S): 80 TABLET, FILM COATED ORAL at 21:30

## 2018-11-24 RX ADMIN — Medication 500 MILLIGRAM(S): at 19:02

## 2018-11-24 RX ADMIN — Medication 20 MILLIGRAM(S): at 13:24

## 2018-11-24 RX ADMIN — MIDODRINE HYDROCHLORIDE 20 MILLIGRAM(S): 2.5 TABLET ORAL at 13:25

## 2018-11-24 RX ADMIN — HEPARIN SODIUM 5000 UNIT(S): 5000 INJECTION INTRAVENOUS; SUBCUTANEOUS at 21:30

## 2018-11-24 RX ADMIN — Medication 81 MILLIGRAM(S): at 13:24

## 2018-11-24 RX ADMIN — Medication 100 MILLIGRAM(S): at 05:27

## 2018-11-24 NOTE — PROGRESS NOTE ADULT - SUBJECTIVE AND OBJECTIVE BOX
VASCULAR SURGERY DAILY PROGRESS NOTE:       Subjective:  Pt seen and examined at bedside. No acute events overnight. Pain controlled.        Objective:    PE:  PHYSICAL EXAM:  Gen: NAD  LS: Respirations unlabored.   Ext: 0/5 RUE, 3/5 RLE. Right groin puncture dressing c/d/i, right groin soft w/o evidence of hematoma.   Neuro: R. facial droop, follows some simple commands with LUE/LLE     Vital Signs Last 24 Hrs  T(C): 37.1 (2018 05:24), Max: 37.2 (2018 01:28)  T(F): 98.8 (2018 05:24), Max: 98.9 (2018 01:28)  HR: 80 (2018 05:24) (60 - 84)  BP: 114/63 (2018 05:24) (114/63 - 155/71)  BP(mean): --  RR: 16 (2018 05:24) (16 - 18)  SpO2: 99% (2018 05:24) (97% - 99%)    I&O's Detail    2018 07:01  -  2018 07:00  --------------------------------------------------------  IN:  Total IN: 0 mL    OUT:    Stool: 2 mL    Voided: 1750 mL  Total OUT: 1752 mL    Total NET: -1752 mL          Daily     Daily Weight in k.4 (2018 01:28)    MEDICATIONS  (STANDING):  aspirin  chewable 81 milliGRAM(s) Oral daily  atorvastatin 80 milliGRAM(s) Oral at bedtime  clopidogrel Tablet 75 milliGRAM(s) Oral daily  docusate sodium 100 milliGRAM(s) Oral three times a day  FLUoxetine Solution 20 milliGRAM(s) Oral daily  heparin  Injectable 5000 Unit(s) SubCutaneous every 8 hours  midodrine 20 milliGRAM(s) Oral every 8 hours  senna 2 Tablet(s) Oral at bedtime  sodium chloride 0.9%. 1000 milliLiter(s) (50 mL/Hr) IV Continuous <Continuous>    MEDICATIONS  (PRN):  ALBUTerol    90 MICROgram(s) HFA Inhaler 2 Puff(s) Inhalation every 6 hours PRN Shortness of Breath and/or Wheezing      LABS:                        9.4    13.04 )-----------( 345      ( 2018 05:17 )             30.6     11-24    140  |  102  |  10  ----------------------------<  87  4.0   |  27  |  1.09    Ca    9.3      2018 05:17  Phos  3.2     11-24  Mg     2.1     -24            RADIOLOGY & ADDITIONAL STUDIES: VASCULAR SURGERY DAILY PROGRESS NOTE:       Subjective:  Pt seen and examined at bedside. No acute events overnight. Pain controlled. Denies dysuria         Objective:    PE:  PHYSICAL EXAM:  Gen: NAD  LS: Respirations unlabored.   Ext: 0/5 RUE, 3/5 RLE. Right groin puncture dressing c/d/i, right groin soft w/o evidence of hematoma.   Neuro: R. facial droop, follows some simple commands with LUE/LLE     Vital Signs Last 24 Hrs  T(C): 37.1 (2018 05:24), Max: 37.2 (2018 01:28)  T(F): 98.8 (2018 05:24), Max: 98.9 (2018 01:28)  HR: 80 (2018 05:24) (60 - 84)  BP: 114/63 (2018 05:24) (114/63 - 155/71)  BP(mean): --  RR: 16 (2018 05:24) (16 - 18)  SpO2: 99% (2018 05:24) (97% - 99%)    I&O's Detail    2018 07:01  -  2018 07:00  --------------------------------------------------------  IN:  Total IN: 0 mL    OUT:    Stool: 2 mL    Voided: 1750 mL  Total OUT: 1752 mL    Total NET: -1752 mL          Daily     Daily Weight in k.4 (2018 01:28)    MEDICATIONS  (STANDING):  aspirin  chewable 81 milliGRAM(s) Oral daily  atorvastatin 80 milliGRAM(s) Oral at bedtime  clopidogrel Tablet 75 milliGRAM(s) Oral daily  docusate sodium 100 milliGRAM(s) Oral three times a day  FLUoxetine Solution 20 milliGRAM(s) Oral daily  heparin  Injectable 5000 Unit(s) SubCutaneous every 8 hours  midodrine 20 milliGRAM(s) Oral every 8 hours  senna 2 Tablet(s) Oral at bedtime  sodium chloride 0.9%. 1000 milliLiter(s) (50 mL/Hr) IV Continuous <Continuous>    MEDICATIONS  (PRN):  ALBUTerol    90 MICROgram(s) HFA Inhaler 2 Puff(s) Inhalation every 6 hours PRN Shortness of Breath and/or Wheezing      LABS:                        9.4    13.04 )-----------( 345      ( 2018 05:17 )             30.6     11-24    140  |  102  |  10  ----------------------------<  87  4.0   |  27  |  1.09    Ca    9.3      2018 05:17  Phos  3.2     11-24  Mg     2.1     -24            RADIOLOGY & ADDITIONAL STUDIES:

## 2018-11-24 NOTE — PROGRESS NOTE ADULT - ASSESSMENT
63F s/p l. TCARS with post op course c/b code stroke now in sicu with reduced neurologic status. Patient hemodynamically stable, now recovering on floor w/o ongoing critical care needs.     - no further vascular interventions for now  - Neurology rec---> plavix 75 qd, recommend target bp 140-160  - PT/OT  - neurology following, appreciate continued recommendations  - monitor po intake  - vte ppx  - pain control  - encourage cough / deep breathing / incentive spirometry    Surgery, C-Team   Pager: [phone] 63F s/p l. TCARS with post op course c/b code stroke now in sicu with reduced neurologic status. Patient hemodynamically stable, now recovering on floor w/o ongoing critical care needs.     - no further vascular interventions for now  - Neurology rec---> plavix 75 qd, recommend target bp 140-160  - PT/OT  - neurology following, appreciate continued recommendations  - monitor po intake  - vte ppx  - pain control  - encourage cough / deep breathing / incentive spirometry  - PT/OT rec---> acute rehab (stroke rehab),     Surgery, C-Team   Pager: [phone] 63F s/p l. TCARS with post op course c/b code stroke now in sicu with reduced neurologic status. Patient hemodynamically stable, now recovering on floor w/o ongoing critical care needs.     - Urine cloudy--> f/u UA  - no further vascular interventions for now  - Neurology rec---> plavix 75 qd, recommend target bp 140-160  - PT/OT  - neurology following, appreciate continued recommendations  - monitor po intake  - vte ppx  - pain control  - encourage cough / deep breathing / incentive spirometry  - PT/OT rec---> acute rehab (stroke rehab),     Surgery, C-Team   Pager: [phone]

## 2018-11-25 LAB
BUN SERPL-MCNC: 14 MG/DL — SIGNIFICANT CHANGE UP (ref 7–23)
CALCIUM SERPL-MCNC: 9.1 MG/DL — SIGNIFICANT CHANGE UP (ref 8.4–10.5)
CHLORIDE SERPL-SCNC: 102 MMOL/L — SIGNIFICANT CHANGE UP (ref 98–107)
CO2 SERPL-SCNC: 28 MMOL/L — SIGNIFICANT CHANGE UP (ref 22–31)
CREAT SERPL-MCNC: 1.19 MG/DL — SIGNIFICANT CHANGE UP (ref 0.5–1.3)
GLUCOSE SERPL-MCNC: 85 MG/DL — SIGNIFICANT CHANGE UP (ref 70–99)
HCT VFR BLD CALC: 30.2 % — LOW (ref 34.5–45)
HGB BLD-MCNC: 9.5 G/DL — LOW (ref 11.5–15.5)
MAGNESIUM SERPL-MCNC: 2.2 MG/DL — SIGNIFICANT CHANGE UP (ref 1.6–2.6)
MCHC RBC-ENTMCNC: 23.9 PG — LOW (ref 27–34)
MCHC RBC-ENTMCNC: 31.5 % — LOW (ref 32–36)
MCV RBC AUTO: 75.9 FL — LOW (ref 80–100)
NRBC # FLD: 0 — SIGNIFICANT CHANGE UP
PHOSPHATE SERPL-MCNC: 3.3 MG/DL — SIGNIFICANT CHANGE UP (ref 2.5–4.5)
PLATELET # BLD AUTO: 352 K/UL — SIGNIFICANT CHANGE UP (ref 150–400)
PMV BLD: 10.9 FL — SIGNIFICANT CHANGE UP (ref 7–13)
POTASSIUM SERPL-MCNC: 3.8 MMOL/L — SIGNIFICANT CHANGE UP (ref 3.5–5.3)
POTASSIUM SERPL-SCNC: 3.8 MMOL/L — SIGNIFICANT CHANGE UP (ref 3.5–5.3)
RBC # BLD: 3.98 M/UL — SIGNIFICANT CHANGE UP (ref 3.8–5.2)
RBC # FLD: 17.3 % — HIGH (ref 10.3–14.5)
SODIUM SERPL-SCNC: 140 MMOL/L — SIGNIFICANT CHANGE UP (ref 135–145)
SPECIMEN SOURCE: SIGNIFICANT CHANGE UP
WBC # BLD: 12.06 K/UL — HIGH (ref 3.8–10.5)
WBC # FLD AUTO: 12.06 K/UL — HIGH (ref 3.8–10.5)

## 2018-11-25 RX ORDER — MIDODRINE HYDROCHLORIDE 2.5 MG/1
10 TABLET ORAL ONCE
Qty: 0 | Refills: 0 | Status: COMPLETED | OUTPATIENT
Start: 2018-11-25 | End: 2018-11-25

## 2018-11-25 RX ORDER — DEXTROSE MONOHYDRATE, SODIUM CHLORIDE, AND POTASSIUM CHLORIDE 50; .745; 4.5 G/1000ML; G/1000ML; G/1000ML
1000 INJECTION, SOLUTION INTRAVENOUS
Qty: 0 | Refills: 0 | Status: DISCONTINUED | OUTPATIENT
Start: 2018-11-25 | End: 2018-11-28

## 2018-11-25 RX ADMIN — DEXTROSE MONOHYDRATE, SODIUM CHLORIDE, AND POTASSIUM CHLORIDE 50 MILLILITER(S): 50; .745; 4.5 INJECTION, SOLUTION INTRAVENOUS at 22:01

## 2018-11-25 RX ADMIN — Medication 500 MILLIGRAM(S): at 06:19

## 2018-11-25 RX ADMIN — CLOPIDOGREL BISULFATE 75 MILLIGRAM(S): 75 TABLET, FILM COATED ORAL at 13:51

## 2018-11-25 RX ADMIN — Medication 81 MILLIGRAM(S): at 13:51

## 2018-11-25 RX ADMIN — Medication 500 MILLIGRAM(S): at 20:02

## 2018-11-25 RX ADMIN — HEPARIN SODIUM 5000 UNIT(S): 5000 INJECTION INTRAVENOUS; SUBCUTANEOUS at 13:52

## 2018-11-25 RX ADMIN — HEPARIN SODIUM 5000 UNIT(S): 5000 INJECTION INTRAVENOUS; SUBCUTANEOUS at 05:50

## 2018-11-25 RX ADMIN — Medication 20 MILLIGRAM(S): at 13:51

## 2018-11-25 RX ADMIN — HEPARIN SODIUM 5000 UNIT(S): 5000 INJECTION INTRAVENOUS; SUBCUTANEOUS at 22:01

## 2018-11-25 RX ADMIN — Medication 100 MILLIGRAM(S): at 13:52

## 2018-11-25 RX ADMIN — MIDODRINE HYDROCHLORIDE 20 MILLIGRAM(S): 2.5 TABLET ORAL at 06:19

## 2018-11-25 RX ADMIN — MIDODRINE HYDROCHLORIDE 20 MILLIGRAM(S): 2.5 TABLET ORAL at 13:52

## 2018-11-25 RX ADMIN — SODIUM CHLORIDE 50 MILLILITER(S): 9 INJECTION INTRAMUSCULAR; INTRAVENOUS; SUBCUTANEOUS at 06:18

## 2018-11-25 RX ADMIN — DEXTROSE MONOHYDRATE, SODIUM CHLORIDE, AND POTASSIUM CHLORIDE 50 MILLILITER(S): 50; .745; 4.5 INJECTION, SOLUTION INTRAVENOUS at 09:27

## 2018-11-25 RX ADMIN — MIDODRINE HYDROCHLORIDE 10 MILLIGRAM(S): 2.5 TABLET ORAL at 22:01

## 2018-11-25 RX ADMIN — ATORVASTATIN CALCIUM 80 MILLIGRAM(S): 80 TABLET, FILM COATED ORAL at 22:02

## 2018-11-25 NOTE — CHART NOTE - NSCHARTNOTEFT_GEN_A_CORE
VASCULAR SURGERY CONTACT NOTE  ----------------------------------------------    Team notified by RN that patient with /80 VASCULAR SURGERY CONTACT NOTE  ----------------------------------------------    Team notified by RN that patient with /80. Patient asymptomatic at present time.    Patient with recent hx of CVA requiring goal -160 per neurology recommendations. Given recent hx of CVA will hold off on treating BP at present time.    Vital Signs Last 24 Hrs  T(C): 37.1 (25 Nov 2018 17:50), Max: 37.2 (25 Nov 2018 09:29)  T(F): 98.8 (25 Nov 2018 17:50), Max: 98.9 (25 Nov 2018 09:29)  HR: 84 (25 Nov 2018 17:50) (66 - 84)  BP: 180/80 (25 Nov 2018 18:00) (128/78 - 180/80)  RR: 18 (25 Nov 2018 17:50) (16 - 19)  SpO2: 98% (25 Nov 2018 17:50) (97% - 100%)    A/P:  63F s/p l. TCARS with post op course c/b code stroke now in sicu with reduced neurologic status. Patient hemodynamically stable, now with asymptomatic hypertension 180/80.     - Given recent hx of CVA will hold off on aggressive BP management at present time. Will reassess BP in 1hr and reassess possible management at that time.    MEENA Hogue MD, PGY-I  Surgery, C-Team   Pager: [phone]

## 2018-11-25 NOTE — PROGRESS NOTE ADULT - SUBJECTIVE AND OBJECTIVE BOX
GENERAL SURGERY DAILY PROGRESS NOTE:       Subjective:  Pt seen and examined at bedside. No acute events overnight. Pain controlled. Tolerating minimal PO.       Objective:    PHYSICAL EXAM:  Gen: NAD  LS: Respirations unlabored.   Ext: 0/5 RUE, 3/5 RLE. Right groin puncture dressing c/d/i, right groin soft w/o evidence of hematoma.   Neuro: R. facial droop, follows some simple commands with LUE/LLE     Vital Signs Last 24 Hrs  T(C): 36.9 (2018 21:19), Max: 37.2 (2018 01:28)  T(F): 98.5 (2018 21:19), Max: 98.9 (2018 01:28)  HR: 80 (2018 21:19) (60 - 80)  BP: 137/77 (2018 21:19) (114/63 - 155/80)  BP(mean): --  RR: 19 (2018 21:19) (16 - 19)  SpO2: 97% (2018 21:19) (96% - 99%)    I&O's Detail    2018 07:01  -  2018 07:00  --------------------------------------------------------  IN:  Total IN: 0 mL    OUT:    Stool: 2 mL    Voided: 1750 mL  Total OUT: 1752 mL    Total NET: -1752 mL      2018 07:01  -  2018 00:25  --------------------------------------------------------  IN:    sodium chloride 0.9%.: 600 mL  Total IN: 600 mL    OUT:    Voided: 200 mL  Total OUT: 200 mL    Total NET: 400 mL          Daily     Daily Weight in k.4 (2018 01:28)    MEDICATIONS  (STANDING):  aspirin  chewable 81 milliGRAM(s) Oral daily  atorvastatin 80 milliGRAM(s) Oral at bedtime  ciprofloxacin     Tablet 500 milliGRAM(s) Oral every 12 hours  clopidogrel Tablet 75 milliGRAM(s) Oral daily  docusate sodium 100 milliGRAM(s) Oral three times a day  FLUoxetine Solution 20 milliGRAM(s) Oral daily  heparin  Injectable 5000 Unit(s) SubCutaneous every 8 hours  midodrine 20 milliGRAM(s) Oral every 8 hours  senna 2 Tablet(s) Oral at bedtime  sodium chloride 0.9%. 1000 milliLiter(s) (50 mL/Hr) IV Continuous <Continuous>    MEDICATIONS  (PRN):  ALBUTerol    90 MICROgram(s) HFA Inhaler 2 Puff(s) Inhalation every 6 hours PRN Shortness of Breath and/or Wheezing      LABS:                        9.4    13.04 )-----------( 345      ( 2018 05:17 )             30.6     11-    140  |  102  |  10  ----------------------------<  87  4.0   |  27  |  1.09    Ca    9.3      2018 05:17  Phos  3.2     11-  Mg     2.1     24        Urinalysis Basic - ( 2018 10:05 )    Color: LIGHT YELLOW / Appearance: Lt TURBID / S.014 / pH: 6.5  Gluc: NEGATIVE / Ketone: NEGATIVE  / Bili: NEGATIVE / Urobili: NORMAL   Blood: SMALL / Protein: 30 / Nitrite: NEGATIVE   Leuk Esterase: LARGE / RBC: 11-25 / WBC >50   Sq Epi: OCC / Non Sq Epi: x / Bacteria: FEW        RADIOLOGY & ADDITIONAL STUDIES:

## 2018-11-25 NOTE — PROGRESS NOTE ADULT - ASSESSMENT
63F s/p l. TCARS with post op course c/b code stroke now in sicu with reduced neurologic status. Patient hemodynamically stable, now recovering on floor w/o ongoing critical care needs.     - +UA--->c/w cipro for UTI   - no further vascular interventions for now  - Neurology rec---> plavix 75 qd, recommend target bp 140-160  - PT/OT  - neurology following, appreciate continued recommendations  - monitor po intake  - vte ppx  - pain control  - encourage cough / deep breathing / incentive spirometry  - PT/OT rec---> acute rehab (stroke rehab),     Surgery, C-Team   Pager: [phone]

## 2018-11-26 LAB
BUN SERPL-MCNC: 12 MG/DL — SIGNIFICANT CHANGE UP (ref 7–23)
CALCIUM SERPL-MCNC: 9.5 MG/DL — SIGNIFICANT CHANGE UP (ref 8.4–10.5)
CHLORIDE SERPL-SCNC: 101 MMOL/L — SIGNIFICANT CHANGE UP (ref 98–107)
CO2 SERPL-SCNC: 25 MMOL/L — SIGNIFICANT CHANGE UP (ref 22–31)
CREAT SERPL-MCNC: 1.18 MG/DL — SIGNIFICANT CHANGE UP (ref 0.5–1.3)
GLUCOSE SERPL-MCNC: 121 MG/DL — HIGH (ref 70–99)
HCT VFR BLD CALC: 32.1 % — LOW (ref 34.5–45)
HGB BLD-MCNC: 10.1 G/DL — LOW (ref 11.5–15.5)
MAGNESIUM SERPL-MCNC: 2.1 MG/DL — SIGNIFICANT CHANGE UP (ref 1.6–2.6)
MCHC RBC-ENTMCNC: 23.9 PG — LOW (ref 27–34)
MCHC RBC-ENTMCNC: 31.5 % — LOW (ref 32–36)
MCV RBC AUTO: 75.9 FL — LOW (ref 80–100)
METHOD TYPE: SIGNIFICANT CHANGE UP
NRBC # FLD: 0 — SIGNIFICANT CHANGE UP
ORGANISM # SPEC MICROSCOPIC CNT: SIGNIFICANT CHANGE UP
PHOSPHATE SERPL-MCNC: 3.4 MG/DL — SIGNIFICANT CHANGE UP (ref 2.5–4.5)
PLATELET # BLD AUTO: 362 K/UL — SIGNIFICANT CHANGE UP (ref 150–400)
PMV BLD: 10.7 FL — SIGNIFICANT CHANGE UP (ref 7–13)
POTASSIUM SERPL-MCNC: 4.1 MMOL/L — SIGNIFICANT CHANGE UP (ref 3.5–5.3)
POTASSIUM SERPL-SCNC: 4.1 MMOL/L — SIGNIFICANT CHANGE UP (ref 3.5–5.3)
RBC # BLD: 4.23 M/UL — SIGNIFICANT CHANGE UP (ref 3.8–5.2)
RBC # FLD: 17.2 % — HIGH (ref 10.3–14.5)
SODIUM SERPL-SCNC: 137 MMOL/L — SIGNIFICANT CHANGE UP (ref 135–145)
WBC # BLD: 13.58 K/UL — HIGH (ref 3.8–10.5)
WBC # FLD AUTO: 13.58 K/UL — HIGH (ref 3.8–10.5)

## 2018-11-26 PROCEDURE — 99233 SBSQ HOSP IP/OBS HIGH 50: CPT

## 2018-11-26 PROCEDURE — 99232 SBSQ HOSP IP/OBS MODERATE 35: CPT | Mod: GC

## 2018-11-26 RX ORDER — DOCUSATE SODIUM 100 MG
100 CAPSULE ORAL THREE TIMES A DAY
Qty: 0 | Refills: 0 | Status: DISCONTINUED | OUTPATIENT
Start: 2018-11-26 | End: 2018-11-28

## 2018-11-26 RX ADMIN — MIDODRINE HYDROCHLORIDE 20 MILLIGRAM(S): 2.5 TABLET ORAL at 12:35

## 2018-11-26 RX ADMIN — Medication 500 MILLIGRAM(S): at 08:17

## 2018-11-26 RX ADMIN — ATORVASTATIN CALCIUM 80 MILLIGRAM(S): 80 TABLET, FILM COATED ORAL at 21:55

## 2018-11-26 RX ADMIN — CLOPIDOGREL BISULFATE 75 MILLIGRAM(S): 75 TABLET, FILM COATED ORAL at 12:33

## 2018-11-26 RX ADMIN — DEXTROSE MONOHYDRATE, SODIUM CHLORIDE, AND POTASSIUM CHLORIDE 50 MILLILITER(S): 50; .745; 4.5 INJECTION, SOLUTION INTRAVENOUS at 03:43

## 2018-11-26 RX ADMIN — DEXTROSE MONOHYDRATE, SODIUM CHLORIDE, AND POTASSIUM CHLORIDE 50 MILLILITER(S): 50; .745; 4.5 INJECTION, SOLUTION INTRAVENOUS at 20:34

## 2018-11-26 RX ADMIN — DEXTROSE MONOHYDRATE, SODIUM CHLORIDE, AND POTASSIUM CHLORIDE 50 MILLILITER(S): 50; .745; 4.5 INJECTION, SOLUTION INTRAVENOUS at 12:34

## 2018-11-26 RX ADMIN — DEXTROSE MONOHYDRATE, SODIUM CHLORIDE, AND POTASSIUM CHLORIDE 50 MILLILITER(S): 50; .745; 4.5 INJECTION, SOLUTION INTRAVENOUS at 05:18

## 2018-11-26 RX ADMIN — HEPARIN SODIUM 5000 UNIT(S): 5000 INJECTION INTRAVENOUS; SUBCUTANEOUS at 05:18

## 2018-11-26 RX ADMIN — Medication 20 MILLIGRAM(S): at 12:33

## 2018-11-26 RX ADMIN — MIDODRINE HYDROCHLORIDE 20 MILLIGRAM(S): 2.5 TABLET ORAL at 21:56

## 2018-11-26 RX ADMIN — HEPARIN SODIUM 5000 UNIT(S): 5000 INJECTION INTRAVENOUS; SUBCUTANEOUS at 12:34

## 2018-11-26 RX ADMIN — HEPARIN SODIUM 5000 UNIT(S): 5000 INJECTION INTRAVENOUS; SUBCUTANEOUS at 20:34

## 2018-11-26 RX ADMIN — Medication 81 MILLIGRAM(S): at 12:33

## 2018-11-26 RX ADMIN — Medication 500 MILLIGRAM(S): at 20:34

## 2018-11-26 RX ADMIN — MIDODRINE HYDROCHLORIDE 20 MILLIGRAM(S): 2.5 TABLET ORAL at 05:18

## 2018-11-26 NOTE — PROGRESS NOTE ADULT - NSHPATTENDINGPLANDISCUSS_GEN_ALL_CORE
SICU team
neurology resident Dr. Quiñones
Neurology resident
Neurology resident
SICU team
Neurology resident
Neurology resident and medical student
SICU team

## 2018-11-26 NOTE — PROGRESS NOTE ADULT - SUBJECTIVE AND OBJECTIVE BOX
Morning Surgical Progress Note    SUBJECTIVE: Patient seen and examined at bedside with surgical team, patient without complaints. Overnight midodrine dose decreased to 10 mg for hypertension.     Vital Signs Last 24 Hrs  T(C): 36.8 (2018 05:16), Max: 37.2 (2018 09:29)  T(F): 98.2 (2018 05:16), Max: 98.9 (2018 09:29)  HR: 89 (2018 05:16) (66 - 89)  BP: 145/85 (2018 05:16) (133/83 - 180/90)  RR: 16 (2018 05:16) (16 - 18)  SpO2: 98% (2018 05:16) (94% - 100%)I&O's Detail    2018 07:01  -  2018 07:00  --------------------------------------------------------  IN:    dextrose 5% + sodium chloride 0.45% with potassium chloride 20 mEq/L: 500 mL    Oral Fluid: 300 mL    sodium chloride 0.9%: 100 mL  Total IN: 900 mL    OUT:    Voided: 1300 mL  Total OUT: 1300 mL    Total NET: -400 mL      MEDICATIONS  (STANDING):  aspirin  chewable 81 milliGRAM(s) Oral daily  atorvastatin 80 milliGRAM(s) Oral at bedtime  ciprofloxacin     Tablet 500 milliGRAM(s) Oral every 12 hours  clopidogrel Tablet 75 milliGRAM(s) Oral daily  dextrose 5% + sodium chloride 0.45% with potassium chloride 20 mEq/L 1000 milliLiter(s) (50 mL/Hr) IV Continuous <Continuous>  docusate sodium 100 milliGRAM(s) Oral three times a day  FLUoxetine Solution 20 milliGRAM(s) Oral daily  heparin  Injectable 5000 Unit(s) SubCutaneous every 8 hours  midodrine 20 milliGRAM(s) Oral every 8 hours  senna 2 Tablet(s) Oral at bedtime    MEDICATIONS  (PRN):  ALBUTerol    90 MICROgram(s) HFA Inhaler 2 Puff(s) Inhalation every 6 hours PRN Shortness of Breath and/or Wheezing      Physical Exam  Gen: NAD, A&O  Neuro: R. facial droop, follows some simple commands with LUE/LLE   LS: Respirations unlabored  Ext: Strength 0/5 RUE, 3/5 RLE. Right groin dressing c/d/i, right groin soft w/o evidence of hematoma    LABS:                        10.1   13.58 )-----------( 362      ( 2018 06:15 )             32.1       137  |  101  |  12  ----------------------------<  121<H>  4.1   |  25  |  1.18    Ca    9.5      2018 06:15  Phos  3.4       Mg     2.1         Urinalysis Basic - ( 2018 10:05 )  Color: LIGHT YELLOW / Appearance: Lt TURBID / S.014 / pH: 6.5  Gluc: NEGATIVE / Ketone: NEGATIVE  / Bili: NEGATIVE / Urobili: NORMAL   Blood: SMALL / Protein: 30 / Nitrite: NEGATIVE   Leuk Esterase: LARGE / RBC: 11-25 / WBC >50   Sq Epi: OCC / Non Sq Epi: x / Bacteria: FEW

## 2018-11-26 NOTE — PROGRESS NOTE ADULT - SUBJECTIVE AND OBJECTIVE BOX
Neurology Follow up note    Name  RUPINDER TRIPP    Subjective: Resting comfortably in bed    Review of Systems:  As above    MEDICATIONS  (STANDING):  aspirin  chewable 81 milliGRAM(s) Oral daily  atorvastatin 80 milliGRAM(s) Oral at bedtime  ciprofloxacin     Tablet 500 milliGRAM(s) Oral every 12 hours  clopidogrel Tablet 75 milliGRAM(s) Oral daily  dextrose 5% + sodium chloride 0.45% with potassium chloride 20 mEq/L 1000 milliLiter(s) (50 mL/Hr) IV Continuous <Continuous>  docusate sodium Liquid 100 milliGRAM(s) Oral three times a day  FLUoxetine Solution 20 milliGRAM(s) Oral daily  heparin  Injectable 5000 Unit(s) SubCutaneous every 8 hours  midodrine 20 milliGRAM(s) Oral every 8 hours  senna 2 Tablet(s) Oral at bedtime    MEDICATIONS  (PRN):  ALBUTerol    90 MICROgram(s) HFA Inhaler 2 Puff(s) Inhalation every 6 hours PRN Shortness of Breath and/or Wheezing      Allergies    eggs (Anaphylaxis; Hives)  peanuts (Anaphylaxis; Hives)  penicillins (Anaphylaxis)  propofol (Angioedema)  shellfish (Anaphylaxis; Hives)  tomatoes (Anaphylaxis; Hives)    Intolerances    peanut butter (Anaphylaxis)      Objective:   Vital Signs Last 24 Hrs  T(C): 36.9 (26 Nov 2018 09:30), Max: 37.1 (25 Nov 2018 17:50)  T(F): 98.5 (26 Nov 2018 09:30), Max: 98.8 (25 Nov 2018 17:50)  HR: 77 (26 Nov 2018 09:30) (76 - 89)  BP: 127/69 (26 Nov 2018 09:30) (127/69 - 180/90)  BP(mean): --  RR: 18 (26 Nov 2018 09:30) (16 - 18)  SpO2: 98% (26 Nov 2018 09:30) (94% - 100%)    General Exam:   General appearance: No acute distress                 Cardiovascular: Pedal dorsalis pulses intact bilaterally    Neurological Exam:  Mental Status: Alert, following commands, non verbal    Cranial Nerves: CN I - not tested.  EOMI, VF intact, no nystagmus or diplopia. Fundi not visualized bilaterally.  Moderate to severe R facial palsy. Tongue, uvula and palate midline.      Motor:   Tone: normal.                  Strength:  R arm trace effort against gravity, falls to bed in 1 second. Almost no   R leg drift(maybe limited by pain)  Strength otherwise intact    Sensation: intact to noxious stimuli x4    Other:    11-26    137  |  101  |  12  ----------------------------<  121<H>  4.1   |  25  |  1.18    Ca    9.5      26 Nov 2018 06:15  Phos  3.4     11-26  Mg     2.1     11-26 11-26    137  |  101  |  12  ----------------------------<  121<H>  4.1   |  25  |  1.18    Ca    9.5      26 Nov 2018 06:15  Phos  3.4     11-26  Mg     2.1     11-26          Radiology    EKG:  tele:  TTE:  EEG:

## 2018-11-26 NOTE — PROGRESS NOTE ADULT - ASSESSMENT
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

## 2018-11-26 NOTE — PROGRESS NOTE ADULT - SUBJECTIVE AND OBJECTIVE BOX
63 years old woman with multiple vascular risk factors including age, HTN and HPLD was admitted to [facility] for an elective left carotid revascularization of the left ICA stent placement. After the procedure, she was noted to have right-sided weakness and language disturbance in the form of global aphasia. CT brain on 11/13 did not show any evidence of acute infarct or hemorrhage.  MRI brain on 11/17 showed acute infarct in the left MCA distribution. TTE did not show any obvious structural cardiac source of embolism. On 11/20, she was noted to have fluctuating right hemiparesis with respect to right arm weakness. CT brain on 11/20 showed expected evolution of left MCA distribution infarct and CTA head and neck showed stable findings.    Interval: Midodrine tapered to 5 mg TID   REVIEW OF SYSTEMS: No chest pain, shortness of breath, nausea, vomiting or diarhea.        CURRENT FUNCTIONAL STATUS: min/mod a     Vital Signs Last 24 Hrs  T(C): 36.8 (26 Nov 2018 14:45), Max: 37.1 (25 Nov 2018 17:50)  T(F): 98.2 (26 Nov 2018 14:45), Max: 98.8 (25 Nov 2018 17:50)  HR: 74 (26 Nov 2018 14:45) (74 - 89)  BP: 149/80 (26 Nov 2018 14:45) (127/69 - 180/90)  BP(mean): --  RR: 20 (26 Nov 2018 14:45) (16 - 20)  SpO2: 98% (26 Nov 2018 14:45) (94% - 98%)  ----------------------------------------------------------------------------------------  PHYSICAL EXAM  Constitutional - NAD, Comfortable  HEENT - NCAT, EOMI  Neck - Supple, No limited ROM  Chest - CTA bilaterally, No wheeze, No rhonchi, No crackles  Cardiovascular - RRR, S1S2, No murmurs  Abdomen - BS+, Soft, NTND  Extremities - No C/C/E, No calf tenderness   Neurologic Exam -                    Communication  aphasia     Cranial Nerves - + right facia      Motor - RUE 1/5, RLE 3/5                       Sensory - Intact to LT     Reflexes - DTR Intact, No primitive reflexive     Balance - poor dynamic balance   Psychiatric - Mood stable, Affect WNL

## 2018-11-26 NOTE — PROGRESS NOTE ADULT - ASSESSMENT
63F s/p l. TCARS with post op course c/b code stroke requiring SICU admission x 2 for reduced neurologic status and close neuro monitoring. Patient hemodynamically stable, now recovering on floor being treated for UTI    - No further vascular interventions for now  - +UA--->c/w cipro for UTI, awaiting sensitivies (E Coli)  - Neurology rec---> plavix 75 qd, recommend target bp 140-160  - PT/OT  - neurology following, appreciate continued recommendations  - monitor po intake  - vte ppx  - pain control  - encourage cough / deep breathing / incentive spirometry  - PT/OT rec---> acute rehab (stroke rehab)  - May decrease dose of midodrine as BP is increasing  - D/W C kortney Rodriguez PAC  Pager: [phone]

## 2018-11-27 LAB
-  AMIKACIN: SIGNIFICANT CHANGE UP
-  AMIKACIN: SIGNIFICANT CHANGE UP
-  AMPICILLIN/SULBACTAM: SIGNIFICANT CHANGE UP
-  AMPICILLIN: SIGNIFICANT CHANGE UP
-  AZTREONAM: SIGNIFICANT CHANGE UP
-  AZTREONAM: SIGNIFICANT CHANGE UP
-  CEFAZOLIN: SIGNIFICANT CHANGE UP
-  CEFEPIME: SIGNIFICANT CHANGE UP
-  CEFEPIME: SIGNIFICANT CHANGE UP
-  CEFOXITIN: SIGNIFICANT CHANGE UP
-  CEFTAZIDIME: SIGNIFICANT CHANGE UP
-  CEFTAZIDIME: SIGNIFICANT CHANGE UP
-  CEFTRIAXONE: SIGNIFICANT CHANGE UP
-  CIPROFLOXACIN: SIGNIFICANT CHANGE UP
-  CIPROFLOXACIN: SIGNIFICANT CHANGE UP
-  ERTAPENEM: SIGNIFICANT CHANGE UP
-  GENTAMICIN: SIGNIFICANT CHANGE UP
-  GENTAMICIN: SIGNIFICANT CHANGE UP
-  IMIPENEM: SIGNIFICANT CHANGE UP
-  IMIPENEM: SIGNIFICANT CHANGE UP
-  LEVOFLOXACIN: SIGNIFICANT CHANGE UP
-  LEVOFLOXACIN: SIGNIFICANT CHANGE UP
-  MEROPENEM: SIGNIFICANT CHANGE UP
-  MEROPENEM: SIGNIFICANT CHANGE UP
-  NITROFURANTOIN: SIGNIFICANT CHANGE UP
-  PIPERACILLIN/TAZOBACTAM: SIGNIFICANT CHANGE UP
-  PIPERACILLIN/TAZOBACTAM: SIGNIFICANT CHANGE UP
-  TIGECYCLINE: SIGNIFICANT CHANGE UP
-  TOBRAMYCIN: SIGNIFICANT CHANGE UP
-  TOBRAMYCIN: SIGNIFICANT CHANGE UP
-  TRIMETHOPRIM/SULFAMETHOXAZOLE: SIGNIFICANT CHANGE UP
BACTERIA UR CULT: SIGNIFICANT CHANGE UP
BUN SERPL-MCNC: 11 MG/DL — SIGNIFICANT CHANGE UP (ref 7–23)
CALCIUM SERPL-MCNC: 9.5 MG/DL — SIGNIFICANT CHANGE UP (ref 8.4–10.5)
CHLORIDE SERPL-SCNC: 102 MMOL/L — SIGNIFICANT CHANGE UP (ref 98–107)
CO2 SERPL-SCNC: 25 MMOL/L — SIGNIFICANT CHANGE UP (ref 22–31)
CREAT SERPL-MCNC: 1.14 MG/DL — SIGNIFICANT CHANGE UP (ref 0.5–1.3)
GLUCOSE SERPL-MCNC: 110 MG/DL — HIGH (ref 70–99)
HCT VFR BLD CALC: 33 % — LOW (ref 34.5–45)
HGB BLD-MCNC: 10.1 G/DL — LOW (ref 11.5–15.5)
MAGNESIUM SERPL-MCNC: 2 MG/DL — SIGNIFICANT CHANGE UP (ref 1.6–2.6)
MCHC RBC-ENTMCNC: 23.9 PG — LOW (ref 27–34)
MCHC RBC-ENTMCNC: 30.6 % — LOW (ref 32–36)
MCV RBC AUTO: 78 FL — LOW (ref 80–100)
METHOD TYPE: SIGNIFICANT CHANGE UP
NRBC # FLD: 0 — SIGNIFICANT CHANGE UP
PHOSPHATE SERPL-MCNC: 3.5 MG/DL — SIGNIFICANT CHANGE UP (ref 2.5–4.5)
PLATELET # BLD AUTO: 362 K/UL — SIGNIFICANT CHANGE UP (ref 150–400)
PMV BLD: 10.6 FL — SIGNIFICANT CHANGE UP (ref 7–13)
POTASSIUM SERPL-MCNC: 4.2 MMOL/L — SIGNIFICANT CHANGE UP (ref 3.5–5.3)
POTASSIUM SERPL-SCNC: 4.2 MMOL/L — SIGNIFICANT CHANGE UP (ref 3.5–5.3)
RBC # BLD: 4.23 M/UL — SIGNIFICANT CHANGE UP (ref 3.8–5.2)
RBC # FLD: 17.3 % — HIGH (ref 10.3–14.5)
SODIUM SERPL-SCNC: 139 MMOL/L — SIGNIFICANT CHANGE UP (ref 135–145)
WBC # BLD: 12.57 K/UL — HIGH (ref 3.8–10.5)
WBC # FLD AUTO: 12.57 K/UL — HIGH (ref 3.8–10.5)

## 2018-11-27 PROCEDURE — 99232 SBSQ HOSP IP/OBS MODERATE 35: CPT | Mod: GC

## 2018-11-27 RX ORDER — CIPROFLOXACIN LACTATE 400MG/40ML
500 VIAL (ML) INTRAVENOUS EVERY 12 HOURS
Qty: 0 | Refills: 0 | Status: DISCONTINUED | OUTPATIENT
Start: 2018-11-27 | End: 2018-11-28

## 2018-11-27 RX ADMIN — Medication 500 MILLIGRAM(S): at 14:04

## 2018-11-27 RX ADMIN — HEPARIN SODIUM 5000 UNIT(S): 5000 INJECTION INTRAVENOUS; SUBCUTANEOUS at 12:27

## 2018-11-27 RX ADMIN — MIDODRINE HYDROCHLORIDE 20 MILLIGRAM(S): 2.5 TABLET ORAL at 14:04

## 2018-11-27 RX ADMIN — Medication 20 MILLIGRAM(S): at 12:26

## 2018-11-27 RX ADMIN — ATORVASTATIN CALCIUM 80 MILLIGRAM(S): 80 TABLET, FILM COATED ORAL at 21:28

## 2018-11-27 RX ADMIN — DEXTROSE MONOHYDRATE, SODIUM CHLORIDE, AND POTASSIUM CHLORIDE 50 MILLILITER(S): 50; .745; 4.5 INJECTION, SOLUTION INTRAVENOUS at 17:25

## 2018-11-27 RX ADMIN — DEXTROSE MONOHYDRATE, SODIUM CHLORIDE, AND POTASSIUM CHLORIDE 50 MILLILITER(S): 50; .745; 4.5 INJECTION, SOLUTION INTRAVENOUS at 12:26

## 2018-11-27 RX ADMIN — Medication 81 MILLIGRAM(S): at 12:27

## 2018-11-27 RX ADMIN — HEPARIN SODIUM 5000 UNIT(S): 5000 INJECTION INTRAVENOUS; SUBCUTANEOUS at 05:58

## 2018-11-27 RX ADMIN — HEPARIN SODIUM 5000 UNIT(S): 5000 INJECTION INTRAVENOUS; SUBCUTANEOUS at 21:28

## 2018-11-27 RX ADMIN — CLOPIDOGREL BISULFATE 75 MILLIGRAM(S): 75 TABLET, FILM COATED ORAL at 12:27

## 2018-11-27 RX ADMIN — DEXTROSE MONOHYDRATE, SODIUM CHLORIDE, AND POTASSIUM CHLORIDE 50 MILLILITER(S): 50; .745; 4.5 INJECTION, SOLUTION INTRAVENOUS at 21:27

## 2018-11-27 RX ADMIN — MIDODRINE HYDROCHLORIDE 20 MILLIGRAM(S): 2.5 TABLET ORAL at 05:56

## 2018-11-27 RX ADMIN — MIDODRINE HYDROCHLORIDE 20 MILLIGRAM(S): 2.5 TABLET ORAL at 21:28

## 2018-11-27 NOTE — SWALLOW BEDSIDE ASSESSMENT ADULT - NS ASR SWALLOW FINDINGS DISCUS
Patient/Physician/Nursing
Physician/Nursing/Family
Nursing/Patient/Physician
Nursing/Patient/Physician

## 2018-11-27 NOTE — SWALLOW BEDSIDE ASSESSMENT ADULT - ORAL PHASE
Delayed anterior-posterior transfer Decreased anterior-posterior movement of the bolus/Delayed anterior-posterior transfer

## 2018-11-27 NOTE — PROVIDER CONTACT NOTE (OTHER) - ACTION/TREATMENT ORDERED:
C team made aware.
Patient transported to CT scan.
SBP parameter changed- see order
MD made aware. MD to assess pt.
Ronen GREWAL  and Mariam GREWAL at bedside assessing patient.
none at this time. MD request recheck in I hour.

## 2018-11-27 NOTE — SWALLOW BEDSIDE ASSESSMENT ADULT - SWALLOW EVAL: CURRENT DIET
NPO x meds
mechanical soft with honey-thick liquids
NPO pending formal speech and swallow evaluation, per MD order
NPO pending formal speech and swallow evaluation, per MD order due to code stroke this morning

## 2018-11-27 NOTE — PROGRESS NOTE ADULT - SUBJECTIVE AND OBJECTIVE BOX
VASCULAR SURGERY DAILY PROGRESS NOTE:       Subjective:  Pt seen and examined at bedside. No acute events overnight. Pain controlled.       Objective:    PE:  Gen: NAD, A&O  Neuro: R. facial droop, follows some simple commands with LUE/LLE   LS: Respirations unlabored  Ext: Strength 0/5 RUE, 3/5 RLE. Right groin dressing c/d/i, right groin soft w/o evidence of hematoma    Vital Signs Last 24 Hrs  T(C): 36.9 (27 Nov 2018 06:00), Max: 36.9 (26 Nov 2018 21:14)  T(F): 98.4 (27 Nov 2018 06:00), Max: 98.5 (26 Nov 2018 21:14)  HR: 84 (27 Nov 2018 06:00) (74 - 84)  BP: 131/71 (27 Nov 2018 06:00) (128/78 - 149/80)  BP(mean): --  RR: 18 (27 Nov 2018 06:00) (18 - 20)  SpO2: 97% (27 Nov 2018 06:00) (97% - 98%)    I&O's Detail    26 Nov 2018 07:01  -  27 Nov 2018 07:00  --------------------------------------------------------  IN:    dextrose 5% + sodium chloride 0.45% with potassium chloride 20 mEq/L: 600 mL    Oral Fluid: 300 mL  Total IN: 900 mL    OUT:    Stool: 1 mL    Voided: 1500 mL  Total OUT: 1501 mL    Total NET: -601 mL      27 Nov 2018 07:01  -  27 Nov 2018 09:39  --------------------------------------------------------  IN:  Total IN: 0 mL    OUT:    Voided: 400 mL  Total OUT: 400 mL    Total NET: -400 mL          Daily     Daily     MEDICATIONS  (STANDING):  aspirin  chewable 81 milliGRAM(s) Oral daily  atorvastatin 80 milliGRAM(s) Oral at bedtime  clopidogrel Tablet 75 milliGRAM(s) Oral daily  dextrose 5% + sodium chloride 0.45% with potassium chloride 20 mEq/L 1000 milliLiter(s) (50 mL/Hr) IV Continuous <Continuous>  docusate sodium Liquid 100 milliGRAM(s) Oral three times a day  FLUoxetine Solution 20 milliGRAM(s) Oral daily  heparin  Injectable 5000 Unit(s) SubCutaneous every 8 hours  midodrine 20 milliGRAM(s) Oral every 8 hours  senna 2 Tablet(s) Oral at bedtime    MEDICATIONS  (PRN):  ALBUTerol    90 MICROgram(s) HFA Inhaler 2 Puff(s) Inhalation every 6 hours PRN Shortness of Breath and/or Wheezing      LABS:                        10.1   12.57 )-----------( 362      ( 27 Nov 2018 06:10 )             33.0     11-27    139  |  102  |  11  ----------------------------<  110<H>  4.2   |  25  |  1.14    Ca    9.5      27 Nov 2018 06:10  Phos  3.5     11-27  Mg     2.0     11-27            RADIOLOGY & ADDITIONAL STUDIES:

## 2018-11-27 NOTE — PROGRESS NOTE ADULT - ASSESSMENT
1. PT- bed mobility,transfers, gait and balance training  2. OT- ADL'S  3. CVA-plavix. statin. MBS P  4. Patient would benefit from acute rehab, needs a multidisciplinary team including PT, OT and speech. Can tolerate 3 hours of therapy a day.  Will follow.

## 2018-11-27 NOTE — PROGRESS NOTE ADULT - ASSESSMENT
63F s/p l. TCARS with post op course c/b code stroke requiring SICU admission x 2 for reduced neurologic status and close neuro monitoring. Patient hemodynamically stable, now recovering on floor being treated for UTI    - No further vascular interventions for now  - +UA--->E. coli sensitive to cipro. Today cipro D3/3. DC cipro today   - Neurology rec---> plavix 75 qd, recommend target bp 140-160  - neurology following, appreciate continued recommendations  - monitor po intake  - vte ppx  - pain control  - encourage cough / deep breathing / incentive spirometry  - PT/OT rec---> acute rehab (stroke rehab)  - D/W C team    C team   Pager: [phone] 63F s/p l. TCARS with post op course c/b code stroke requiring SICU admission x 2 for reduced neurologic status and close neuro monitoring. Patient hemodynamically stable, now recovering on floor being treated for UTI    - Swallow re-eval today   - No further vascular interventions for now  - +UA--->E. coli sensitive to cipro. Today cipro D3/3. DC cipro today   - Neurology rec---> plavix 75 qd, recommend target bp 140-160  - neurology following, appreciate continued recommendations  - monitor po intake  - vte ppx  - pain control  - encourage cough / deep breathing / incentive spirometry  - PT/OT rec---> acute rehab (stroke rehab)  - D/W C team    C team   Pager: [phone] 63F s/p l. TCARS with post op course c/b code stroke requiring SICU admission x 2 for reduced neurologic status and close neuro monitoring. Patient hemodynamically stable, now recovering on floor being treated for UTI    - Swallow re-eval today   - No further vascular interventions for now  - +UA--->E. coli/pseudomonas sensitive to cipro. c/w cipro  - Neurology rec---> plavix 75 qd, recommend target bp 140-160  - neurology following, appreciate continued recommendations  - monitor po intake  - vte ppx  - pain control  - encourage cough / deep breathing / incentive spirometry  - PT/OT rec---> acute rehab (stroke rehab)  - D/W C team    C team   Pager: [phone]

## 2018-11-27 NOTE — SWALLOW BEDSIDE ASSESSMENT ADULT - ORAL PREPARATORY PHASE
Delayed bolus collection; Anterior spillage of thin liquid bolus Delayed bolus collection/Decreased mastication ability

## 2018-11-27 NOTE — SWALLOW BEDSIDE ASSESSMENT ADULT - COMMENTS
Patient is known to this service from this acute care stay. She was last seen for a cinesophagram on 11/21/18 at which time a mechanical soft diet with honey-thick liquids was recommended (see chart for full report). Patient was received awake, alert and cooperative this PM. Able to follow simple one-step directives. Severe expressive language deficits continue to be suspected upon informal assessment. Patient's nephew present at bedside.    Recommendations were discussed with patient, nephew, primary RN and medical team via pager #[phone].
63y Female  s/p left transcarotid arterial sten via right femoral vein access; course complicated by code stroke approximately 8 hours postop. Dx: CVA    Patient seen at bedside, alert and oriented state. Patient is able to follow commands. Patient's speech is slow to articulate but able to make simple needs known.
Pt was awake, cooperative alert and noted to use gestures to communicate.  MD team called code stroke this morning due to worsening neuro deficits, results were not available at this time.
Pt was awake, cooperative though lethargy noted for a clinical assessment of swallow function this PM. Pt presents with supplemental oxygen in place via nasal cannula. Per charting, pt is a 62 y/o female with PMHx significant for HTN, HLD, RA, asthma and MI who was recently admitted to [place] on 9/22/2018 for syncope at which time she was found to have a "blockage in left carotid artery". She is currently admitted to [facility] and s/p Left Transcarotid Artery revascularization. Procedure was complicated by code stroke called ~8 hours post op. Pt was d/c from SICU with worsening aphasia symptoms persisting, resulting in readmittance to SICU and recommendation for NPO. Today's reevaluation is being completed to reassess the swallow mechanism in attempt to initiate the least restrictive PO diet.     Recent head CT revealed "Microvascular disease. No evidence for acute intracranial hemorrhage or new acute transcortical territorial infarction. CT angiography of the extracranial circulation demonstrates no interval change. There is left internal carotid artery 75% in-stent stenosis. CT angiography of the intracranial circulation is unchanged. There are multiple tandem moderate to severe stenoses along bilateral [place], MCA, and PCA vessels as described above. Most significantly, is high-grade stenosis along the left M2 superior division, unchanged from prior imaging. " No recent chest films on file at this time.

## 2018-11-27 NOTE — SWALLOW BEDSIDE ASSESSMENT ADULT - ASR SWALLOW ASPIRATION MONITOR
pneumonia/throat clearing/upper respiratory infection/change of breathing pattern/cough/fever/oral hygiene/position upright (90Y)/gurgly voice
cough/upper respiratory infection/pneumonia/position upright (90Y)/change of breathing pattern/gurgly voice/fever/throat clearing/oral hygiene
upper respiratory infection/oral hygiene/position upright (90Y)/throat clearing/change of breathing pattern/cough/gurgly voice/fever/pneumonia

## 2018-11-27 NOTE — SWALLOW BEDSIDE ASSESSMENT ADULT - SWALLOW EVAL: DIAGNOSIS
Patient presents with OroPharyngeal Stage Dysphagia characterized by reduced oral containment with intermittent anterior loss/spillage on the right side of the oral cavity due to reduced lip seal/closure with cup sip self administered drinking, slow chewing for solid consistency, slow bolus manipulation for solid consistency; adequate bolus manipulation and transport for puree consistency with adequate oral clearance post swallow. There is laryngeal elevation upon palpation, initiation of the pharyngeal swallow. There were no overt signs of impaired airway protection across all PO Trials.
Patient demonstrated oropharyngeal dysphagia characterized by weak labial seal with subsequent anterior spillage from right side of oral cavity (increased spillage with thin liquid), decreased mastication of regular solid textures, delayed bolus collection and delayed anterior-posterior transfer. Pharyngeal stage was marked by a suspected delay in pharyngeal trigger with reduced hyolaryngeal elevation upon digital palpation. No overt, clinical s/s of laryngeal penetration/aspiration noted however given above clinical presentation and findings on most recent cinesophagram, patient would benefit from repeat objective testing to determine tolerance for diet texture advancement.
1- moderate oral dysphagia given puree, honey thick liquid and nectar thick liquids marked by delayed bolus collection, transfer and transport. trace lingual residue remained post swallow with puree texture which was reduced with honey thick liquid wash. 2- severe oral dysphagia given thin liquids marked by anterior loss from right oral cavity 2/2 reduced labial seal. delayed bolus collection, transfer and transport with suspected posterior loss over base of tongue noted with remaining bolus. 3- mild pharyngeal dysphagia given puree and honey thick liquids marked by delayed swallow trigger and reduced hyolaryngeal excursion without any overt s/s of penetration/aspiration. 4- moderate-severe pharyngeal dysphagia given nectar thick liquids marked by delayed swallow trigger and reduced hyolaryngeal excursion resulting in observed utilization of multiple swallows suggestive of pharyngeal stasis.
1.) Patient presents with adequate oral stage skills for puree, nectar thickened liquids, and honey thickened liquids marked by adequate bolus acceptance, formation, transfer and clearance.  2.) Pharyngeal stage of swallow for puree, honey thickened liquids and nectar thickened liquids marked by mildly delayed swallow trigger, mildly reduced hyolaryngeal elevation and no overt s/s of penetration/aspiration.

## 2018-11-27 NOTE — SWALLOW BEDSIDE ASSESSMENT ADULT - SWALLOW EVAL: RECOMMENDED FEEDING/EATING TECHNIQUES
allow for swallow between intakes/no straws/small sips/bites/position upright (90 degrees)/crush medication (when feasible)/maintain upright posture during/after eating for 30 mins
check mouth frequently for oral residue/pocketing/no straws/small sips/bites/crush medication (when feasible)/position upright (90 degrees)/allow for swallow between intakes/maintain upright posture during/after eating for 30 mins/oral hygiene/alternate food with liquid
allow for swallow between intakes/alternate food with liquid/position upright (90 degrees)/small sips/bites/no straws/oral hygiene/check mouth frequently for oral residue/pocketing/crush medication (when feasible)/maintain upright posture during/after eating for 30 mins

## 2018-11-27 NOTE — PROGRESS NOTE ADULT - SUBJECTIVE AND OBJECTIVE BOX
63 years old woman with multiple vascular risk factors including age, HTN and HPLD was admitted to [facility] for an elective left carotid revascularization of the left ICA stent placement. After the procedure, she was noted to have right-sided weakness and language disturbance in the form of global aphasia. CT brain on 11/13 did not show any evidence of acute infarct or hemorrhage.  MRI brain on 11/17 showed acute infarct in the left MCA distribution. TTE did not show any obvious structural cardiac source of embolism. On 11/20, she was noted to have fluctuating right hemiparesis with respect to right arm weakness. CT brain on 11/20 showed expected evolution of left MCA distribution infarct and CTA head and neck showed stable findings.    Interval: Midodrine tapered to 5 mg TID   Pending repeat MBS  REVIEW OF SYSTEMS: No chest pain, shortness of breath, nausea, vomiting or diarhea.        CURRENT FUNCTIONAL STATUS: min/mod a     Vital Signs Last 24 Hrs  T(C): 36.8 (26 Nov 2018 14:45), Max: 37.1 (25 Nov 2018 17:50)  T(F): 98.2 (26 Nov 2018 14:45), Max: 98.8 (25 Nov 2018 17:50)  HR: 74 (26 Nov 2018 14:45) (74 - 89)  BP: 149/80 (26 Nov 2018 14:45) (127/69 - 180/90)  BP(mean): --  RR: 20 (26 Nov 2018 14:45) (16 - 20)  SpO2: 98% (26 Nov 2018 14:45) (94% - 98%)  ----------------------------------------------------------------------------------------  PHYSICAL EXAM  Constitutional - NAD, Comfortable  HEENT - NCAT, EOMI  Neck - Supple, No limited ROM  Chest - CTA bilaterally, No wheeze, No rhonchi, No crackles  Cardiovascular - RRR, S1S2, No murmurs  Abdomen - BS+, Soft, NTND  Extremities - No C/C/E, No calf tenderness   Neurologic Exam -                    Communication  aphasia     Cranial Nerves - + right facia      Motor - RUE 1/5, RLE 3/5                       Sensory - Intact to LT     Reflexes - DTR Intact, No primitive reflexive     Balance - poor dynamic balance   Psychiatric - Mood stable, Affect WNL

## 2018-11-27 NOTE — SWALLOW BEDSIDE ASSESSMENT ADULT - SWALLOW EVAL: RECOMMENDED DIET
Mechanical Soft with Thin Liquids
1) Continue Mechanical Soft with Honey-Thick Liquids; 2) Cinesophagram to objectively assess the swallow mechanism and determine tolerance for diet texture advancement
dysphagia 1 with honey thick liquids
dysphagia 1 with honey thick liquids, as tolerated

## 2018-11-27 NOTE — PROVIDER CONTACT NOTE (OTHER) - ASSESSMENT
Patient displays no s/s acute distress, neuro checks consistent with past assessment except tongue deviation discrepancy. Upon handoff, RN told tongue did not deviate, was midline, 2200 assessment revealed tongue deviation approximately 45 degrees to right.

## 2018-11-27 NOTE — SWALLOW BEDSIDE ASSESSMENT ADULT - ADDITIONAL RECOMMENDATIONS
1.) Feeding/Swallowing Guidelines: Sit upright, encourage small bites, chew mechanical soft solids well, encourage single cup sips of thin liquids.   2.) Monitor for any evolving neurological changes that may impact on oral intake.  3.) Monitor for PO tolerance/intake.
Given suspect expressive language deficits, patient deemed candidate for a formal speech-language evaluation - inpatient vs outpatient upon discharge to appropriate setting (i.e. rehab vs home care vs outpatient services). [facility] can be reached at [phone].
consider speech-language evaluation given observed imprecise contact of the articulators during connected speech tasks as well as difficulty pt executing isolated and integrated oral movements during oral facial examination.
consider speech-language evaluation given observed imprecise contact of the articulators during connected speech tasks as well as difficulty pt executing isolated and integrated oral movements during oral facial examination.

## 2018-11-28 ENCOUNTER — TRANSCRIPTION ENCOUNTER (OUTPATIENT)
Age: 63
End: 2018-11-28

## 2018-11-28 ENCOUNTER — INPATIENT (INPATIENT)
Facility: HOSPITAL | Age: 63
LOS: 20 days | Discharge: NURSING, REHAB OR LONG-TERM CARE | DRG: 949 | End: 2018-12-19
Attending: STUDENT IN AN ORGANIZED HEALTH CARE EDUCATION/TRAINING PROGRAM | Admitting: STUDENT IN AN ORGANIZED HEALTH CARE EDUCATION/TRAINING PROGRAM
Payer: COMMERCIAL

## 2018-11-28 ENCOUNTER — APPOINTMENT (OUTPATIENT)
Dept: NEUROLOGY | Facility: CLINIC | Age: 63
End: 2018-11-28

## 2018-11-28 VITALS
OXYGEN SATURATION: 98 % | RESPIRATION RATE: 14 BRPM | HEIGHT: 55 IN | HEART RATE: 97 BPM | TEMPERATURE: 99 F | WEIGHT: 138.23 LBS | DIASTOLIC BLOOD PRESSURE: 83 MMHG | SYSTOLIC BLOOD PRESSURE: 159 MMHG

## 2018-11-28 VITALS
TEMPERATURE: 98 F | SYSTOLIC BLOOD PRESSURE: 137 MMHG | OXYGEN SATURATION: 100 % | RESPIRATION RATE: 18 BRPM | DIASTOLIC BLOOD PRESSURE: 82 MMHG | HEART RATE: 88 BPM

## 2018-11-28 DIAGNOSIS — I63.9 CEREBRAL INFARCTION, UNSPECIFIED: ICD-10-CM

## 2018-11-28 DIAGNOSIS — Z98.1 ARTHRODESIS STATUS: Chronic | ICD-10-CM

## 2018-11-28 LAB
BUN SERPL-MCNC: 7 MG/DL — SIGNIFICANT CHANGE UP (ref 7–23)
CALCIUM SERPL-MCNC: 9.8 MG/DL — SIGNIFICANT CHANGE UP (ref 8.4–10.5)
CHLORIDE SERPL-SCNC: 103 MMOL/L — SIGNIFICANT CHANGE UP (ref 98–107)
CO2 SERPL-SCNC: 22 MMOL/L — SIGNIFICANT CHANGE UP (ref 22–31)
CREAT SERPL-MCNC: 1.06 MG/DL — SIGNIFICANT CHANGE UP (ref 0.5–1.3)
GLUCOSE SERPL-MCNC: 98 MG/DL — SIGNIFICANT CHANGE UP (ref 70–99)
HCT VFR BLD CALC: 33.1 % — LOW (ref 34.5–45)
HGB BLD-MCNC: 10.1 G/DL — LOW (ref 11.5–15.5)
MAGNESIUM SERPL-MCNC: 2.1 MG/DL — SIGNIFICANT CHANGE UP (ref 1.6–2.6)
MCHC RBC-ENTMCNC: 23.6 PG — LOW (ref 27–34)
MCHC RBC-ENTMCNC: 30.5 % — LOW (ref 32–36)
MCV RBC AUTO: 77.3 FL — LOW (ref 80–100)
NRBC # FLD: 0 — SIGNIFICANT CHANGE UP
PHOSPHATE SERPL-MCNC: 3.8 MG/DL — SIGNIFICANT CHANGE UP (ref 2.5–4.5)
PLATELET # BLD AUTO: 412 K/UL — HIGH (ref 150–400)
PMV BLD: 11.2 FL — SIGNIFICANT CHANGE UP (ref 7–13)
POTASSIUM SERPL-MCNC: 4.6 MMOL/L — SIGNIFICANT CHANGE UP (ref 3.5–5.3)
POTASSIUM SERPL-SCNC: 4.6 MMOL/L — SIGNIFICANT CHANGE UP (ref 3.5–5.3)
RBC # BLD: 4.28 M/UL — SIGNIFICANT CHANGE UP (ref 3.8–5.2)
RBC # FLD: 17.5 % — HIGH (ref 10.3–14.5)
SODIUM SERPL-SCNC: 139 MMOL/L — SIGNIFICANT CHANGE UP (ref 135–145)
WBC # BLD: 14.53 K/UL — HIGH (ref 3.8–10.5)
WBC # FLD AUTO: 14.53 K/UL — HIGH (ref 3.8–10.5)

## 2018-11-28 PROCEDURE — 74230 X-RAY XM SWLNG FUNCJ C+: CPT | Mod: 26

## 2018-11-28 PROCEDURE — 99232 SBSQ HOSP IP/OBS MODERATE 35: CPT | Mod: GC

## 2018-11-28 RX ORDER — FLUOXETINE HCL 10 MG
5 CAPSULE ORAL
Qty: 0 | Refills: 0 | COMMUNITY
Start: 2018-11-28

## 2018-11-28 RX ORDER — CLOPIDOGREL BISULFATE 75 MG/1
75 TABLET, FILM COATED ORAL DAILY
Qty: 0 | Refills: 0 | Status: DISCONTINUED | OUTPATIENT
Start: 2018-11-28 | End: 2018-11-28

## 2018-11-28 RX ORDER — ASPIRIN/CALCIUM CARB/MAGNESIUM 324 MG
81 TABLET ORAL DAILY
Qty: 0 | Refills: 0 | Status: DISCONTINUED | OUTPATIENT
Start: 2018-11-28 | End: 2018-12-19

## 2018-11-28 RX ORDER — METHOTREXATE 2.5 MG/1
4 TABLET ORAL
Qty: 0 | Refills: 0 | COMMUNITY

## 2018-11-28 RX ORDER — DOCUSATE SODIUM 100 MG
10 CAPSULE ORAL
Qty: 0 | Refills: 0 | COMMUNITY
Start: 2018-11-28

## 2018-11-28 RX ORDER — ALBUTEROL 90 UG/1
2 AEROSOL, METERED ORAL EVERY 6 HOURS
Qty: 0 | Refills: 0 | Status: DISCONTINUED | OUTPATIENT
Start: 2018-11-28 | End: 2018-12-19

## 2018-11-28 RX ORDER — ACETAMINOPHEN 500 MG
650 TABLET ORAL EVERY 6 HOURS
Qty: 0 | Refills: 0 | Status: DISCONTINUED | OUTPATIENT
Start: 2018-11-28 | End: 2018-11-29

## 2018-11-28 RX ORDER — CIPROFLOXACIN LACTATE 400MG/40ML
500 VIAL (ML) INTRAVENOUS EVERY 12 HOURS
Qty: 0 | Refills: 0 | Status: COMPLETED | OUTPATIENT
Start: 2018-11-28 | End: 2018-12-03

## 2018-11-28 RX ORDER — DOCUSATE SODIUM 100 MG
100 CAPSULE ORAL THREE TIMES A DAY
Qty: 0 | Refills: 0 | Status: DISCONTINUED | OUTPATIENT
Start: 2018-11-28 | End: 2018-11-28

## 2018-11-28 RX ORDER — ASPIRIN/CALCIUM CARB/MAGNESIUM 324 MG
1 TABLET ORAL
Qty: 0 | Refills: 0 | COMMUNITY
Start: 2018-11-28

## 2018-11-28 RX ORDER — MIDODRINE HYDROCHLORIDE 2.5 MG/1
10 TABLET ORAL EVERY 8 HOURS
Qty: 0 | Refills: 0 | Status: DISCONTINUED | OUTPATIENT
Start: 2018-11-28 | End: 2018-11-30

## 2018-11-28 RX ORDER — DOCUSATE SODIUM 100 MG
100 CAPSULE ORAL THREE TIMES A DAY
Qty: 0 | Refills: 0 | Status: DISCONTINUED | OUTPATIENT
Start: 2018-11-28 | End: 2018-12-19

## 2018-11-28 RX ORDER — ENOXAPARIN SODIUM 100 MG/ML
40 INJECTION SUBCUTANEOUS EVERY 24 HOURS
Qty: 0 | Refills: 0 | Status: DISCONTINUED | OUTPATIENT
Start: 2018-11-28 | End: 2018-12-19

## 2018-11-28 RX ORDER — SENNA PLUS 8.6 MG/1
2 TABLET ORAL AT BEDTIME
Qty: 0 | Refills: 0 | Status: DISCONTINUED | OUTPATIENT
Start: 2018-11-28 | End: 2018-12-19

## 2018-11-28 RX ORDER — SENNA PLUS 8.6 MG/1
2 TABLET ORAL
Qty: 0 | Refills: 0 | COMMUNITY
Start: 2018-11-28

## 2018-11-28 RX ORDER — AMLODIPINE BESYLATE 2.5 MG/1
1 TABLET ORAL
Qty: 0 | Refills: 0 | COMMUNITY

## 2018-11-28 RX ORDER — CLOPIDOGREL BISULFATE 75 MG/1
75 TABLET, FILM COATED ORAL DAILY
Qty: 0 | Refills: 0 | Status: DISCONTINUED | OUTPATIENT
Start: 2018-11-29 | End: 2018-12-19

## 2018-11-28 RX ORDER — ATORVASTATIN CALCIUM 80 MG/1
80 TABLET, FILM COATED ORAL AT BEDTIME
Qty: 0 | Refills: 0 | Status: DISCONTINUED | OUTPATIENT
Start: 2018-11-28 | End: 2018-12-19

## 2018-11-28 RX ORDER — FLUOXETINE HCL 10 MG
20 CAPSULE ORAL DAILY
Qty: 0 | Refills: 0 | Status: DISCONTINUED | OUTPATIENT
Start: 2018-11-28 | End: 2018-12-19

## 2018-11-28 RX ORDER — MIDODRINE HYDROCHLORIDE 2.5 MG/1
2 TABLET ORAL
Qty: 0 | Refills: 0 | COMMUNITY
Start: 2018-11-28

## 2018-11-28 RX ADMIN — DEXTROSE MONOHYDRATE, SODIUM CHLORIDE, AND POTASSIUM CHLORIDE 50 MILLILITER(S): 50; .745; 4.5 INJECTION, SOLUTION INTRAVENOUS at 12:56

## 2018-11-28 RX ADMIN — Medication 81 MILLIGRAM(S): at 12:56

## 2018-11-28 RX ADMIN — ENOXAPARIN SODIUM 40 MILLIGRAM(S): 100 INJECTION SUBCUTANEOUS at 21:51

## 2018-11-28 RX ADMIN — Medication 100 MILLIGRAM(S): at 21:51

## 2018-11-28 RX ADMIN — Medication 650 MILLIGRAM(S): at 21:50

## 2018-11-28 RX ADMIN — Medication 500 MILLIGRAM(S): at 06:19

## 2018-11-28 RX ADMIN — Medication 20 MILLIGRAM(S): at 12:56

## 2018-11-28 RX ADMIN — ATORVASTATIN CALCIUM 80 MILLIGRAM(S): 80 TABLET, FILM COATED ORAL at 21:50

## 2018-11-28 RX ADMIN — CLOPIDOGREL BISULFATE 75 MILLIGRAM(S): 75 TABLET, FILM COATED ORAL at 12:55

## 2018-11-28 RX ADMIN — HEPARIN SODIUM 5000 UNIT(S): 5000 INJECTION INTRAVENOUS; SUBCUTANEOUS at 06:18

## 2018-11-28 RX ADMIN — MIDODRINE HYDROCHLORIDE 20 MILLIGRAM(S): 2.5 TABLET ORAL at 06:19

## 2018-11-28 RX ADMIN — MIDODRINE HYDROCHLORIDE 10 MILLIGRAM(S): 2.5 TABLET ORAL at 21:50

## 2018-11-28 RX ADMIN — SENNA PLUS 2 TABLET(S): 8.6 TABLET ORAL at 21:51

## 2018-11-28 RX ADMIN — MIDODRINE HYDROCHLORIDE 20 MILLIGRAM(S): 2.5 TABLET ORAL at 15:15

## 2018-11-28 RX ADMIN — HEPARIN SODIUM 5000 UNIT(S): 5000 INJECTION INTRAVENOUS; SUBCUTANEOUS at 12:57

## 2018-11-28 NOTE — H&P ADULT - ASSESSMENT
ASSESSMENT/PLAN  RUPINDER TRIPP is a 62yo Female with  Left MCA distribution stroke in the periprocedural period after carotid revascularization with carotid artery stent placement  now with decreased functional mobility, dysphagia, aphasia, hemiplegia, gait instability and ADL impairments.    COMORBIDITES/ACTIVE MEDICAL ISSUES     #s/p left MCA CVA  -start Comprehensive Rehab Program of PT/OT/SLP  -ASA/Plavix/Lipitor  -Fluoxetine for motor recovery    #Hypotension  -Midodrine with parameters (will taper as tolerated)  -monitor BP    #UTI  -c/w cipro 500mg q12 hours  -monitor CBC in AM  -monitor vitals and symptoms    #Dysphagia  -start SLP  -On Dysphagia 3, soft/thin lqs     Gait Instability, ADL impairments and Functional impairments: start Comprehensive Rehab Program of PT/OT     GI/Bowel Mgmt - Colace, Senna, Toileting program  /Bladder Mgmt - PVRs q8 hours, SC >350ccs, toileting program      Precautions / PROPHYLAXIS:   - Falls, Cardiac  - Lungs: Aspiration  - Pressure injury/Skin: Turn Q2hrs while in bed, OOB to Chair, PT/OT    - DVT: Lovenox, SCDs, TEDs     MEDICAL PROGNOSIS: GOOD            REHAB POTENTIAL: GOOD             ESTIMATED DISPOSITION: HOME WITH HOME CARE            ELOS: 14-21 Days   EXPECTED THERAPY:     P.T. 1hr/day       O.T. 1hr/day      S.L.P. 1hr/day     P&O Unnecessary     EXP FREQUENCY: 5 days per 7 day period     PRESCREEN COMPARISON   I have reviewed the prescreen information and I have found no relevant changes between the preadmission screening and my post admission evaluation     RATIONALE FOR INPATIENT ADMISSION - Patient demonstrates the following: (check all that apply)  [X] Medically appropriate for rehabilitation admission  [X] Has attainable rehab goals with an appropriate initial discharge plan  [X] Has rehabilitation potential (expected to make a significant improvement within a reasonable period of time)   [X] Requires close medical management by a rehab physician, rehab nursing care, Hospitalist and comprehensive interdisciplinary team (including PT, OT, & or SLP, Prosthetics and Orthotics) ASSESSMENT/PLAN  RUPINDER TRIPP is a 62yo Female with  Left MCA distribution stroke in the periprocedural period after carotid revascularization with carotid artery stent placement  now with decreased functional mobility, dysphagia, aphasia, hemiplegia, gait instability and ADL impairments.    COMORBIDITES/ACTIVE MEDICAL ISSUES     #s/p left MCA CVA  -start Comprehensive Rehab Program of PT/OT/SLP  -ASA/Plavix/Lipitor  -Fluoxetine for motor recovery    #Hypotension  -Midodrine with parameters (will taper as tolerated)  -monitor BP    #UTI  -c/w cipro 500mg q12 hours  -monitor CBC in AM  -monitor vitals and symptoms    #Dysphagia  -start SLP  -On Dysphagia 3, soft/thin lqs     Gait Instability, ADL impairments and Functional impairments: start Comprehensive Rehab Program of PT/OT     GI/Bowel Mgmt - Colace, Senna, Toileting program  /Bladder Mgmt - PVRs q8 hours, SC >350ccs, toileting program      Precautions / PROPHYLAXIS:   - Falls, Cardiac  - Lungs: Aspiration  - Pressure injury/Skin: Turn Q2hrs while in bed, OOB to Chair, PT/OT    - DVT: Lovenox, SCDs, TEDs     MEDICAL PROGNOSIS: GOOD            REHAB POTENTIAL: GOOD             ESTIMATED DISPOSITION: HOME WITH HOME CARE            ELOS: 14-21 Days   EXPECTED THERAPY:     P.T. 1hr/day       O.T. 1hr/day      S.L.P. 1hr/day     P&O Unnecessary     EXP FREQUENCY: 5 days per 7 day period     PRESCREEN COMPARISON   I have reviewed the prescreen information and I have found no relevant changes between the preadmission screening and my post admission evaluation     RATIONALE FOR INPATIENT ADMISSION - Patient demonstrates the following: (check all that apply)  [X] Medically appropriate for rehabilitation admission  [X] Has attainable rehab goals with an appropriate initial discharge plan  [X] Has rehabilitation potential (expected to make a significant improvement within a reasonable period of time)   [X] Requires close medical management by a rehab physician, rehab nursing care, Hospitalist and comprehensive interdisciplinary team (including PT, OT, & or SLP, Prosthetics and Orthotics)      Case discussed with Attending On-Call ASSESSMENT/PLAN  RUPINDER TRIPP is a 64yo Female with  Left MCA distribution stroke in the periprocedural period after carotid revascularization with carotid artery stent placement  now with decreased functional mobility, dysphagia, non-fluent aphasia, right hemiparesis, gait instability and ADL impairments.    COMORBIDITES/ACTIVE MEDICAL ISSUES     #s/p left MCA CVA  -start Comprehensive Rehab Program of PT/OT/SLP  -ASA/Plavix/Lipitor  -Fluoxetine for motor recovery    #Hypotension  -Midodrine with parameters (will taper as tolerated)  -monitor BP    #UTI  -c/w cipro 500mg q12 hours  -monitor CBC in AM  -monitor vitals and symptoms    #Dysphagia  -start SLP  -On Dysphagia 3, soft/thin lqs     Gait Instability, ADL impairments and Functional impairments: start Comprehensive Rehab Program of PT/OT     GI/Bowel Mgmt - Colace, Senna, Toileting program  /Bladder Mgmt - PVRs q8 hours, SC >350ccs, toileting program      Precautions / PROPHYLAXIS:   - Falls, Cardiac  - Lungs: Aspiration  - Pressure injury/Skin: Turn Q2hrs while in bed, OOB to Chair, PT/OT    - DVT: Lovenox, SCDs, TEDs     MEDICAL PROGNOSIS: GOOD            REHAB POTENTIAL: GOOD             ESTIMATED DISPOSITION: HOME WITH HOME CARE            ELOS: 14-21 Days   EXPECTED THERAPY:     P.T. 1hr/day       O.T. 1hr/day      S.L.P. 1hr/day     P&O Unnecessary     EXP FREQUENCY: 5 days per 7 day period     PRESCREEN COMPARISON   I have reviewed the prescreen information and I have found no relevant changes between the preadmission screening and my post admission evaluation     RATIONALE FOR INPATIENT ADMISSION - Patient demonstrates the following: (check all that apply)  [X] Medically appropriate for rehabilitation admission  [X] Has attainable rehab goals with an appropriate initial discharge plan  [X] Has rehabilitation potential (expected to make a significant improvement within a reasonable period of time)   [X] Requires close medical management by a rehab physician, rehab nursing care, Hospitalist and comprehensive interdisciplinary team (including PT, OT, & or SLP, Prosthetics and Orthotics)      Case discussed with Attending On-Call

## 2018-11-28 NOTE — H&P ADULT - NSHPREVIEWOFSYSTEMS_GEN_ALL_CORE
REVIEW OF SYSTEMS  Constitutional: No fever, No Chills, No fatigue  HEENT: No eye pain, No visual disturbances, No difficulty hearing  Pulm: No cough,  No shortness of breath  Cardio: No chest pain, No palpitations  GI:  No abdominal pain, No nausea, No vomiting, No diarrhea, No constipation  : No dysuria, No frequency, No hematuria  Neuro: No headaches, No memory loss, + loss of strength, +aphasia  Skin: No itching, No rashes, No lesions   Endo: No temperature intolerance  MSK: No joint pain, No joint swelling, No muscle pain, No Neck or back pain  Psych:  No depression, No anxiety REVIEW OF SYSTEMS  Constitutional: No fever  HEENT: No visual disturbances, No difficulty hearing  Pulm: No shortness of breath  Cardio: No chest pain, No palpitations  GI:  No abdominal pain, No constipation  : No dysuria  Neuro: No headaches, No memory loss, + loss of strength, +unable to speak  Skin: No itching, No rashes, No lesions   Endo: No temperature intolerance  MSK: No joint pain, No joint swelling, No muscle pain, No Neck or back pain  Psych:  No depression, No anxiety

## 2018-11-28 NOTE — PROGRESS NOTE ADULT - ASSESSMENT
63F s/p l. TCARS with post op course c/b code stroke requiring SICU admission x 2 for reduced neurologic status and close neuro monitoring. Patient hemodynamically stable, now recovering on floor being treated for UTI    - Swallow re-eval today   - No further vascular interventions for now  - +UA--->E. coli/pseudomonas sensitive to cipro. c/w cipro  - Neurology rec---> plavix 75 qd, recommend target bp 140-160  - neurology following, appreciate continued recommendations  - monitor po intake  - vte ppx  - pain control  - encourage cough / deep breathing / incentive spirometry  - PT/OT rec---> acute rehab (stroke rehab)  - D/W C team    C team   Pager: [phone] 63F s/p l. TCARS with post op course c/b code stroke requiring SICU admission x 2 for reduced neurologic status and close neuro monitoring. Patient hemodynamically stable, now recovering on floor being treated for UTI    - Cinoesophagram today   - No further vascular interventions for now  - +UA--->E. coli/pseudomonas sensitive to cipro. c/w cipro  - Neurology rec---> plavix 75 qd, recommend target bp 140-160  - neurology following, appreciate continued recommendations  - monitor po intake  - vte ppx  - pain control  - encourage cough / deep breathing / incentive spirometry  - PT/OT rec---> acute rehab (stroke rehab)  - D/W C team    C team   Pager: [phone]

## 2018-11-28 NOTE — DISCHARGE NOTE ADULT - MEDICATION SUMMARY - MEDICATIONS TO STOP TAKING
I will STOP taking the medications listed below when I get home from the hospital:    amLODIPine 5 mg oral tablet  -- 1 tab(s) by mouth once a day    hydroCHLOROthiazide 25 mg oral tablet  -- 1 tab(s) by mouth once a day    predniSONE 10 mg oral tablet  -- taper ; to complete 11/05/18

## 2018-11-28 NOTE — DISCHARGE NOTE ADULT - MEDICATION SUMMARY - MEDICATIONS TO TAKE
I will START or STAY ON the medications listed below when I get home from the hospital:    aspirin 81 mg oral tablet, chewable  -- 1 tab(s) by mouth once a day  -- Indication: For Antiplatelet    Tylenol Arthritis Caplet  -- 2 tab(s) by mouth , As Needed  -- Indication: For Pain    FLUoxetine 20 mg/5 mL oral solution  -- 5 milliliter(s) by mouth once a day  -- Indication: For Studies revealed improved motor function w/ fluoxetine post-stroke    atorvastatin 40 mg oral tablet  -- 2 tab(s) by mouth once a day  -- Indication: For Ischemic stroke    clopidogrel 75 mg oral tablet  -- 1 tab(s) by mouth once a day  -- Indication: For Antiplatelet    Proventil  -- 2 puff(s) inhaled , As Needed  -- Indication: For Asmtha    senna oral tablet  -- 2 tab(s) by mouth once a day (at bedtime)  -- Indication: For Constipation    docusate sodium 10 mg/mL oral liquid  -- 10 milliliter(s) by mouth 3 times a day  -- Indication: For Constipation    midodrine 10 mg oral tablet  -- 2 tab(s) by mouth every 8 hours  -- Indication: For Hypotension maintain BP between 140-160, wean as tolerated    folic acid 1 mg oral tablet  -- 1 tab(s) by mouth once a day  -- Indication: For Vitamin

## 2018-11-28 NOTE — DISCHARGE NOTE ADULT - HOSPITAL COURSE
64y/o F w/ HTN, HLD, RA, Asthma s/p elective left transcarotid arterial sten via right femoral vein access (11/13/18) course complicated by code stroke approximately 8 hours postop. CTA head and neck showed a distal M2 branch occlusion. She was deemed not a candidate for intracranial thrombectomy due to the location of the M2 occlusion at that time. Patient monitored in SICU, found to be stable, and sent to the floor. MRI brain on 11/17 showed acute infarct in the left MCA distribution. TTE did not show any obvious structural cardiac source of embolism. On 11/20, she was noted to have fluctuating right hemiparesis with respect to right arm weakness. CT brain on 11/20 showed expected evolution of left MCA distribution infarct and CTA head and neck showed stable findings. Right sided deficits and aphasia possibly secondary to hypoperfusion of area supplied by the L M2 high grade stenosis due to fluctuating blood pressure. Patient started on midodrine to keep a systolic pressure between 140-160. Patient was treated for E Coli UTI with cipro. At this time, pt is slowly improving. She was evaluated by PT and OT.  Pt has been deemed stable for discharge at this time. 62y/o F w/ HTN, HLD, RA, Asthma s/p elective left transcarotid arterial sten via right femoral vein access (11/13/18) course complicated by code stroke approximately 8 hours postop. CTA head and neck showed a distal M2 branch occlusion. She was deemed not a candidate for intracranial thrombectomy due to the location of the M2 occlusion at that time. Patient monitored in SICU, found to be stable, and sent to the floor. MRI brain on 11/17 showed acute infarct in the left MCA distribution. TTE did not show any obvious structural cardiac source of embolism. On 11/20, she was noted to have fluctuating right hemiparesis with respect to right arm weakness. CT brain on 11/20 showed expected evolution of left MCA distribution infarct and CTA head and neck showed stable findings. Right sided deficits and aphasia possibly secondary to hypoperfusion of area supplied by the L M2 high grade stenosis due to fluctuating blood pressure. Patient started on midodrine to keep a systolic pressure between 140-160. Patient was treated for E Coli UTI with cipro. Pt had a speech and swallow and was recommended to have a Mechanical Soft with Honey-Thick Liquids diet. At this time, pt is slowly improving. She was evaluated by PT and OT.  Pt has been deemed stable for discharge at this time. 64y/o F w/ HTN, HLD, RA, Asthma s/p elective left transcarotid arterial sten via right femoral vein access (11/13/18) course complicated by code stroke approximately 8 hours postop. CTA head and neck showed a distal M2 branch occlusion. She was deemed not a candidate for intracranial thrombectomy due to the location of the M2 occlusion at that time. Patient monitored in SICU, found to be stable, and sent to the floor. MRI brain on 11/17 showed acute infarct in the left MCA distribution. TTE did not show any obvious structural cardiac source of embolism. On 11/20, she was noted to have fluctuating right hemiparesis with respect to right arm weakness. CT brain on 11/20 showed expected evolution of left MCA distribution infarct and CTA head and neck showed stable findings. Right sided deficits and aphasia possibly secondary to hypoperfusion of area supplied by the L M2 high grade stenosis due to fluctuating blood pressure. Patient started on midodrine to keep a systolic pressure between 140-160. Patient was treated for E Coli UTI with cipro. Pt had a speech and swallow and was recommended to have a Mechanical Soft with Honey-Thick Liquids diet. At this time, pt is slowly improving. She was evaluated by PT and OT.  Pt has been deemed stable for discharge at this time on midodrine as per vascular team.

## 2018-11-28 NOTE — H&P ADULT - NSHPPHYSICALEXAM_GEN_ALL_CORE
PHYSICAL EXAM  Gen - NAD, Comfortable  HEENT - NCAT, EOMI, MMM  Neck - Supple, No limited ROM  Pulm - CTAB, No wheeze, No rhonchi, No crackles  Cardiovascular - RRR, S1S2, No murmurs  Abdomen - Soft, NT/ND, +BS  Extremities - No C/C/E, No calf tenderness  Neuro-     Cognitive - AAOx3     Communication - Fluent, No dysarthria     Attention: Intact      Judgement: Good evidence of judgement     Memory: Recall 3 objects immediate and 3 min later         Cranial Nerves - EOMI, VF intact, no nystagmus or diplopia.  Mild R facial palsy. Tongue, uvula and palate midline.     Motor -                     LEFT    UE - ShAB 5/5, EF 5/5, EE 5/5, WE 5/5,  5/5                    RIGHT UE - ShAB 5/5, EF 5/5, EE 5/5, WE 5/5,  5/5                    LEFT    LE - HF 5/5, KE 5/5, DF 5/5, PF 5/5                    RIGHT LE - HF 5/5, KE 5/5, DF 5/5, PF 5/5        Sensory - Intact to LT     Reflexes - DTR Intact, No primitive reflexive     Coordination - FTN intact     Tone - normal  Psychiatric - Mood stable, Affect WNL  Skin:  all skin intact    Wounds: None Present Vital Signs Last 24 Hrs  T(C): 37.1 (28 Nov 2018 20:26), Max: 37.1 (28 Nov 2018 20:26)  T(F): 98.8 (28 Nov 2018 20:26), Max: 98.8 (28 Nov 2018 20:26)  HR: 100 (28 Nov 2018 20:26) (63 - 100)  BP: 127/85 (28 Nov 2018 20:26) (111/69 - 159/83)  BP(mean): --  RR: 14 (28 Nov 2018 20:26) (14 - 20)  SpO2: 97% (28 Nov 2018 20:26) (94% - 100%)    PHYSICAL EXAM  Constitutional - NAD, Comfortable  HEENT - NCAT, EOMI  Neck - Supple, No limited ROM  Chest - CTA bilaterally, No wheeze, No rhonchi, No crackles  Cardiovascular - RRR, S1S2, No murmurs  Abdomen - BS+, Soft, NTND  Extremities - No C/C/E, No calf tenderness   Neurologic Exam -                    Communication expressive aphasia, follows commands, answers yes/no questions with nodding/shaking head with accuracy     Cranial Nerves - + right facial droop      Motor - RUE 1/5, RLE 3/5; LUE/LLE 5/5                        Sensory - Intact to LT     Reflexes - DTR Intact, No primitive reflexive     Balance - poor dynamic balance   Psychiatric - Mood stable, Affect WNL Vital Signs Last 24 Hrs  T(C): 37.1 (28 Nov 2018 20:26), Max: 37.1 (28 Nov 2018 20:26)  T(F): 98.8 (28 Nov 2018 20:26), Max: 98.8 (28 Nov 2018 20:26)  HR: 100 (28 Nov 2018 20:26) (63 - 100)  BP: 127/85 (28 Nov 2018 20:26) (111/69 - 159/83)  BP(mean): --  RR: 14 (28 Nov 2018 20:26) (14 - 20)  SpO2: 97% (28 Nov 2018 20:26) (94% - 100%)    PHYSICAL EXAM  Constitutional - NAD, Comfortable  HEENT - NCAT, EOMI  Neck - Supple,   Chest - CTA bilaterally, No wheeze, No rhonchi, No crackles  Cardiovascular - RRR, S1S2, No murmurs  Abdomen - BS+, Soft, NTND  Extremities - No C/C/E, No calf tenderness   Neurologic Exam -                    Communication expressive aphasia, follows commands, answers yes/no questions with nodding/shaking head with accuracy.  Non-fluent     Cranial Nerves - + right facial droop      Motor - RUE  1/5, RLE 3/5; LUE/LLE 5/5                        Sensory - allodynia on right foot, elbow/forearm.      Reflexes - DTR Intact, No primitive reflexive     Balance - poor   Psychiatric - Mood stable, Affect WNL

## 2018-11-28 NOTE — DISCHARGE NOTE ADULT - PATIENT PORTAL LINK FT
You can access the [facility] Patient Portal, offered by [facility], by registering with the following website: [email or web address]

## 2018-11-28 NOTE — PROGRESS NOTE ADULT - SUBJECTIVE AND OBJECTIVE BOX
Surgery C-Team Daily Progress Note     RUPINDER TRIPP | [id number]    SUBJECTIVE / 24H EVENTS  Patient seen and examined on morning rounds. No acute events overnight.     OBJECTIVE:    VITAL SIGNS:  T(C): 36.9 (11-28-18 @ 00:00), Max: 37.4 (11-27-18 @ 13:45)  HR: 68 (11-28-18 @ 00:00) (68 - 88)  BP: 139/71 (11-28-18 @ 00:00) (116/67 - 148/76)  RR: 18 (11-28-18 @ 00:00) (17 - 20)  SpO2: 94% (11-28-18 @ 00:00) (94% - 100%)      PHYSICAL EXAM:  Gen: NAD, A&O  Neuro: R. facial droop, follows some simple commands with LUE/LLE   LS: Respirations unlabored  Ext: Strength 0/5 RUE, 3/5 RLE. Right groin dressing c/d/i, right groin soft w/o evidence of hematoma      11-26-18 @ 07:01  -  11-27-18 @ 07:00  --------------------------------------------------------  IN:    dextrose 5% + sodium chloride 0.45% with potassium chloride 20 mEq/L: 600 mL    Oral Fluid: 300 mL  Total IN: 900 mL    OUT:    Stool: 1 mL    Voided: 1500 mL  Total OUT: 1501 mL    Total NET: -601 mL      11-27-18 @ 07:01  -  11-28-18 @ 04:42  --------------------------------------------------------  IN:    dextrose 5% + sodium chloride 0.45% with potassium chloride 20 mEq/L: 400 mL    Oral Fluid: 236 mL  Total IN: 636 mL    OUT:    Voided: 2150 mL  Total OUT: 2150 mL    Total NET: -1514 mL      LAB VALUES:  11-27    139  |  102  |  11  ----------------------------<  110<H>  4.2   |  25  |  1.14    Ca    9.5      27 Nov 2018 06:10  Phos  3.5     11-27  Mg     2.0     11-27                                 10.1   12.57 )-----------( 362      ( 27 Nov 2018 06:10 )             33.0       MICROBIOLOGY:    No new microbiology data for review.     RADIOLOGY:    No new radiographic images for review.    MEDICATIONS  (STANDING):  aspirin  chewable 81 milliGRAM(s) Oral daily  atorvastatin 80 milliGRAM(s) Oral at bedtime  ciprofloxacin     Tablet 500 milliGRAM(s) Oral every 12 hours  clopidogrel Tablet 75 milliGRAM(s) Oral daily  dextrose 5% + sodium chloride 0.45% with potassium chloride 20 mEq/L 1000 milliLiter(s) (50 mL/Hr) IV Continuous <Continuous>  docusate sodium Liquid 100 milliGRAM(s) Oral three times a day  FLUoxetine Solution 20 milliGRAM(s) Oral daily  heparin  Injectable 5000 Unit(s) SubCutaneous every 8 hours  midodrine 20 milliGRAM(s) Oral every 8 hours  senna 2 Tablet(s) Oral at bedtime    MEDICATIONS  (PRN):  ALBUTerol    90 MICROgram(s) HFA Inhaler 2 Puff(s) Inhalation every 6 hours PRN Shortness of Breath and/or Wheezing

## 2018-11-28 NOTE — H&P ADULT - ATTENDING COMMENTS
Pt. seen with resident 11/29/18 AM.  Agree with documentation above as per on-call resident with amendments made as appropriate. Patient medically stable.     Pt. with neuropathic pain on right --especially ankle/foot, elbow/forearm.  Slept last night,     Vital Signs Last 24 Hrs  T(C): 37.1 (29 Nov 2018 08:05), Max: 37.1 (28 Nov 2018 20:26)  T(F): 98.7 (29 Nov 2018 08:05), Max: 98.8 (28 Nov 2018 20:26)  HR: 107 (29 Nov 2018 08:05) (88 - 107)  BP: 123/83 (29 Nov 2018 08:05) (118/78 - 159/83)  BP(mean): --  RR: 14 (29 Nov 2018 08:05) (14 - 18)  SpO2: 98% (29 Nov 2018 08:05) (97% - 100%)    Physical Exam as amended in H&P above  Skin intact                          10.8   14.5  )-----------( 382      ( 29 Nov 2018 05:30 )             35.4   11-29    139  |  101  |  8   ----------------------------<  99  4.3   |  26  |  1.25    Ca    9.9      29 Nov 2018 05:30  Phos  3.8     11-28  Mg     2.1     11-28    TPro  7.4  /  Alb  3.1<L>  /  TBili  0.6  /  DBili  x   /  AST  19  /  ALT  28  /  AlkPhos  133<H>  11-29    62yo Female with  Left MCA distribution stroke in the periprocedural period after carotid revascularization with carotid artery stent placement  now with decreased functional mobility, dysphagia, non-fluent aphasia, right hemiparesis, gait instability and ADL impairments.    Neuropathic pain--start neurontin    Leukocytosis--stable,  on cipro for UTI.    BP controlled on midodrine.      Aphasia--will consider Namenda. Pt. seen with resident 11/29/18 AM.  Agree with documentation above as per on-call resident with amendments made as appropriate. Patient medically stable.     Pt. with neuropathic pain on right --especially ankle/foot, elbow/forearm.  Slept last night,     Vital Signs Last 24 Hrs  T(C): 37.1 (29 Nov 2018 08:05), Max: 37.1 (28 Nov 2018 20:26)  T(F): 98.7 (29 Nov 2018 08:05), Max: 98.8 (28 Nov 2018 20:26)  HR: 107 (29 Nov 2018 08:05) (88 - 107)  BP: 123/83 (29 Nov 2018 08:05) (118/78 - 159/83)  BP(mean): --  RR: 14 (29 Nov 2018 08:05) (14 - 18)  SpO2: 98% (29 Nov 2018 08:05) (97% - 100%)    Physical Exam as amended in H&P above  Skin intact                          10.8   14.5  )-----------( 382      ( 29 Nov 2018 05:30 )             35.4   11-29    139  |  101  |  8   ----------------------------<  99  4.3   |  26  |  1.25    Ca    9.9      29 Nov 2018 05:30  Phos  3.8     11-28  Mg     2.1     11-28    TPro  7.4  /  Alb  3.1<L>  /  TBili  0.6  /  DBili  x   /  AST  19  /  ALT  28  /  AlkPhos  133<H>  11-29    64yo Female with  Left MCA distribution stroke in the periprocedural period after carotid revascularization with carotid artery stent placement  now with decreased functional mobility, dysphagia, non-fluent aphasia, right hemiparesis, gait instability and ADL impairments.    Neuropathic pain--start neurontin    Leukocytosis--stable,  on cipro for UTI.    BP controlled on midodrine.      Aphasia--will consider Namenda.    Tried calling pt's contact, Elvia Salgado, but unable to reach at this time.  Left VM.

## 2018-11-28 NOTE — DISCHARGE NOTE ADULT - PLAN OF CARE
OT once to twice Weekly for about 6 weeks Continue Mechanical Soft with Honey-Thick Liquids OT once to twice Weekly for about 6 weeks  Given suspect expressive language deficits, patient deemed candidate for a formal speech-language evaluation - inpatient vs outpatient upon discharge to appropriate setting (i.e. rehab vs home care vs outpatient services). [facility] can be reached at [phone]. s/p elective left transcarotid arterial stent WOUND CARE:  Please keep incisions clean and dry. Please do not Scrub or rub incisions. Do not use lotion or powder on incisions.   BATHING: You may shower and/or sponge bathe. You may use warm soapy water in the shower and rinse, pat dry.  ACTIVITY: Continue PT and OT  DIET  1.) Soft Solids with Thin Liquids  2.) Feeding/Swallowing Guidelines: Sit upright, encourage/provide small bites, chew soft solids well, single cup sips of thin liquids; two swallows per bite/sip.   3.) Aspiration Precautions  4.) Maintain Good Oral Hygiene Care  NOTIFY YOUR SURGEON IF YOU HAVE: any bleeding that does not stop, any pus draining from your wound(s), any fever (over 100.4 F) persistent nausea/vomiting, or if your pain is not controlled on your discharge pain medications, unable to urinate.  Please follow up with your primary care physician in one week regarding your hospitalization, bring copies of your discharge paperwork.  Please follow up with your surgeon, Dr. Heath in 2 weeks Continue aspirin and plavix  Given suspect expressive language deficits, patient deemed candidate for a formal speech-language evaluation - inpatient vs outpatient upon discharge to appropriate setting (i.e. rehab vs home care vs outpatient services). [facility] can be reached at [phone]. Continue aspirin and plavix  Your Hydrochlorothiazide and amlodipine were stopped and you were started on midodrine to keep your blood pressure elevated, midodrine is to be tapered off as blood pressure tolerates.   Given suspect expressive language deficits, patient deemed candidate for a formal speech-language evaluation - inpatient vs outpatient upon discharge to appropriate setting (i.e. rehab vs home care vs outpatient services). [facility] can be reached at [phone].

## 2018-11-28 NOTE — PROGRESS NOTE ADULT - ASSESSMENT
1. PT- bed mobility,transfers, gait and balance training  2. OT- ADL'S  3. CVA-plavix. statin.   4. Patient would benefit from acute rehab, needs a multidisciplinary team including PT, OT and speech. Can tolerate 3 hours of therapy a day.  Will follow.

## 2018-11-28 NOTE — SWALLOW VFSS/MBS ASSESSMENT ADULT - ORAL PHASE
within functional limits Reduced anterior - posterior transport/Delayed oral transit time Within functional limits

## 2018-11-28 NOTE — DISCHARGE NOTE ADULT - CARE PLAN
Principal Discharge DX:	Carotid artery stenosis  Assessment and plan of treatment:	OT once to twice Weekly for about 6 weeks Principal Discharge DX:	Carotid artery stenosis  Assessment and plan of treatment:	Continue Mechanical Soft with Honey-Thick Liquids  Assessment and plan of treatment:	OT once to twice Weekly for about 6 weeks  Given suspect expressive language deficits, patient deemed candidate for a formal speech-language evaluation - inpatient vs outpatient upon discharge to appropriate setting (i.e. rehab vs home care vs outpatient services). [facility] can be reached at [phone]. Principal Discharge DX:	Carotid artery stenosis  Goal:	s/p elective left transcarotid arterial stent  Assessment and plan of treatment:	WOUND CARE:  Please keep incisions clean and dry. Please do not Scrub or rub incisions. Do not use lotion or powder on incisions.   BATHING: You may shower and/or sponge bathe. You may use warm soapy water in the shower and rinse, pat dry.  ACTIVITY: Continue PT and OT  DIET  1.) Soft Solids with Thin Liquids  2.) Feeding/Swallowing Guidelines: Sit upright, encourage/provide small bites, chew soft solids well, single cup sips of thin liquids; two swallows per bite/sip.   3.) Aspiration Precautions  4.) Maintain Good Oral Hygiene Care  NOTIFY YOUR SURGEON IF YOU HAVE: any bleeding that does not stop, any pus draining from your wound(s), any fever (over 100.4 F) persistent nausea/vomiting, or if your pain is not controlled on your discharge pain medications, unable to urinate.  Please follow up with your primary care physician in one week regarding your hospitalization, bring copies of your discharge paperwork.  Please follow up with your surgeon, Dr. Heath in 2 weeks  Secondary Diagnosis:	Ischemic cerebrovascular accident (CVA)  Assessment and plan of treatment:	Continue aspirin and plavix  Given suspect expressive language deficits, patient deemed candidate for a formal speech-language evaluation - inpatient vs outpatient upon discharge to appropriate setting (i.e. rehab vs home care vs outpatient services). [facility] can be reached at [phone]. Principal Discharge DX:	Carotid artery stenosis  Goal:	s/p elective left transcarotid arterial stent  Assessment and plan of treatment:	WOUND CARE:  Please keep incisions clean and dry. Please do not Scrub or rub incisions. Do not use lotion or powder on incisions.   BATHING: You may shower and/or sponge bathe. You may use warm soapy water in the shower and rinse, pat dry.  ACTIVITY: Continue PT and OT  DIET  1.) Soft Solids with Thin Liquids  2.) Feeding/Swallowing Guidelines: Sit upright, encourage/provide small bites, chew soft solids well, single cup sips of thin liquids; two swallows per bite/sip.   3.) Aspiration Precautions  4.) Maintain Good Oral Hygiene Care  NOTIFY YOUR SURGEON IF YOU HAVE: any bleeding that does not stop, any pus draining from your wound(s), any fever (over 100.4 F) persistent nausea/vomiting, or if your pain is not controlled on your discharge pain medications, unable to urinate.  Please follow up with your primary care physician in one week regarding your hospitalization, bring copies of your discharge paperwork.  Please follow up with your surgeon, Dr. Heath in 2 weeks  Secondary Diagnosis:	Ischemic cerebrovascular accident (CVA)  Assessment and plan of treatment:	Continue aspirin and plavix  Your Hydrochlorothiazide and amlodipine were stopped and you were started on midodrine to keep your blood pressure elevated, midodrine is to be tapered off as blood pressure tolerates.   Given suspect expressive language deficits, patient deemed candidate for a formal speech-language evaluation - inpatient vs outpatient upon discharge to appropriate setting (i.e. rehab vs home care vs outpatient services). [facility] can be reached at [phone].

## 2018-11-28 NOTE — SWALLOW VFSS/MBS ASSESSMENT ADULT - RECOMMENDED CONSISTENCY
1.) Soft Solids with Thin Liquids  2.) Feeding/Swallowing Guidelines: Sit upright, encourage/provide small bites, chew soft solids well, single cup sips of thin liquids; two swallows per bite/sip.   3.) Aspiration Precautions  4.) Maintain Good Oral Hygiene Care

## 2018-11-28 NOTE — H&P ADULT - NSHPSOCIALHISTORY_GEN_ALL_CORE
SOCIAL HISTORY  Smoking - Denied, EtOH - Denied, Drugs - Denied    Lives with family in home +steps   Independent PTA      CURRENT FUNCTIONAL STATUS: min/mod a SOCIAL HISTORY  Smoking - Denied, EtOH - Denied, Drugs - Denied    Lives with family in home 4 RL   Independent PTA; Not working     CURRENT FUNCTIONAL STATUS: min/mod a

## 2018-11-28 NOTE — PROGRESS NOTE ADULT - SUBJECTIVE AND OBJECTIVE BOX
63 years old woman with multiple vascular risk factors including age, HTN and HPLD was admitted to [facility] for an elective left carotid revascularization of the left ICA stent placement. After the procedure, she was noted to have right-sided weakness and language disturbance in the form of global aphasia. CT brain on 11/13 did not show any evidence of acute infarct or hemorrhage.  MRI brain on 11/17 showed acute infarct in the left MCA distribution. TTE did not show any obvious structural cardiac source of embolism. On 11/20, she was noted to have fluctuating right hemiparesis with respect to right arm weakness. CT brain on 11/20 showed expected evolution of left MCA distribution infarct and CTA head and neck showed stable findings.    Interval: Midodrine tapered to 5 mg TID    MBS- Soft Solids with Thin Liquids    REVIEW OF SYSTEMS: unable to obtain       CURRENT FUNCTIONAL STATUS: min/mod a     ICU Vital Signs Last 24 Hrs  T(C): 36.9 (28 Nov 2018 14:02), Max: 37 (28 Nov 2018 10:23)  T(F): 98.4 (28 Nov 2018 14:02), Max: 98.6 (28 Nov 2018 10:23)  HR: 88 (28 Nov 2018 14:02) (63 - 88)  BP: 137/82 (28 Nov 2018 14:02) (111/69 - 145/78)  BP(mean): --  ABP: --  ABP(mean): --  RR: 18 (28 Nov 2018 14:02) (18 - 20)  SpO2: 100% (28 Nov 2018 14:02) (94% - 100%)    ----------------------------------------------------------------------------------------  PHYSICAL EXAM  Constitutional - NAD, Comfortable  HEENT - NCAT, EOMI  Neck - Supple, No limited ROM  Chest - CTA bilaterally, No wheeze, No rhonchi, No crackles  Cardiovascular - RRR, S1S2, No murmurs  Abdomen - BS+, Soft, NTND  Extremities - No C/C/E, No calf tenderness   Neurologic Exam -                    Communication  aphasia     Cranial Nerves - + right facial droop      Motor - RUE 1/5, RLE 3/5                       Sensory - Intact to LT     Reflexes - DTR Intact, No primitive reflexive     Balance - poor dynamic balance   Psychiatric - Mood stable, Affect WNL

## 2018-11-28 NOTE — H&P ADULT - FAMILY HISTORY
Mother  Still living? Unknown  Family history of hypertension in mother, Age at diagnosis: Age Unknown  Family history of diabetes mellitus in mother, Age at diagnosis: Age Unknown

## 2018-11-28 NOTE — H&P ADULT - HISTORY OF PRESENT ILLNESS
62y/o F w/ HTN, HLD, RA, Asthma s/p elective left transcarotid arterial sten via right femoral vein access (11/13/18) course complicated by code stroke approximately 8 hours postop. CTA head and neck showed a distal M2 branch occlusion. She was deemed not a candidate for intracranial thrombectomy due to the location of the M2 occlusion at that time. Patient monitored in SICU, found to be stable, and sent to the floor. MRI brain on 11/17 showed acute infarct in the left MCA distribution. TTE did not show any obvious structural cardiac source of embolism. On 11/20, she was noted to have fluctuating right hemiparesis with respect to right arm weakness. CT brain on 11/20 showed expected evolution of left MCA distribution infarct and CTA head and neck showed stable findings. Right sided deficits and aphasia possibly secondary to hypoperfusion of area supplied by the L M2 high grade stenosis due to fluctuating blood pressure. Patient started on midodrine to keep a systolic pressure between 140-160. Patient is currently being treated for E Coli UTI with cipro. Pt had a speech and swallow and was recommended to have a Mechanical Soft with Honey-Thick Liquids diet.  Per vascular team, patient will be discharged on midodrine and tapered down as tolerated. Pt was medically optimized for acute rehab admission on 11/28.

## 2018-11-28 NOTE — SWALLOW VFSS/MBS ASSESSMENT ADULT - COMMENTS
64y/o F w/ HTN, HLD, RA, Asthma s/p elective left transcarotid arterial sten via right femoral vein access (11/13/18). course complicated by code stroke approximately 8 hours postop. CTA head and neck showed a distal M2 branch occlusion. She was deemed not a candidate for intracranial thrombectomy due to the location of the M2 occlusion at that time. Patient monitored in SICU, found to be stable, and sent to the floor. The following day patient was found with possible worsening right side weakness and worsening aphasia. Code stroke called. CT showed no acute infarct and CTA was stable as compared to prior study. Concern by neuro that worsening of right sided deficits and aphasia possibly secondary to hypoperfusion of area supplied by the L M2 high grade stenosis due to fluctuating blood pressure.    Of Note: Patient was seen for an initial Clinical Swallow Eval back on 11/15/2018 (See Consult) with recommendations for a Mechanical Soft with Thin LIquid at that time; Re-Eval Ordered due to change in mental status on 11/16/2018 (See ReConsult) with recommendations for Puree with Honey Thick Liquids and a Cinesophagram. Cinesophagram was scheduled for 11/19/2018 however it was rescheduled for 11/20/2018 and then Cinesophagram order was discontinued due to another Code Stroke early morning on 11/20/2018.   Team requested another Clinical Swallow Re-Evaluation in the afternoon on 11/20/2018 (See Consult) with recommendations for a Puree w/Honey Thick Liquid diet and a Cinesophagram.    Patient arrived to Radiology accompanied by SICU Nurse for medical monitoring.  Patient is transferred to a specialized seating unit with lateral view projection.
Cinesophagram is cancelled as per discussion with SICU MD Team Member given Patient is unstable and had a stroke code called today. Team will reconsult for a Clinical Swallow Evaluation when patient is stable and able to participate for an assessment for PO candidacy and/or Cinesophagram.
64y/o F w/ HTN, HLD, RA, Asthma s/p elective left transcarotid arterial sten via right femoral vein access (11/13/18). course complicated by code stroke approximately 8 hours postop. CTA head and neck showed a distal M2 branch occlusion. She was deemed not a candidate for intracranial thrombectomy due to the location of the M2 occlusion at that time. Patient monitored in SICU, found to be stable, and sent to the floor. The following day patient was found with possible worsening right side weakness and worsening aphasia. Code stroke called. CT showed no acute infarct and CTA was stable as compared to prior study. Concern by neuro that worsening of right sided deficits and aphasia possibly secondary to hypoperfusion of area supplied by the L M2 high grade stenosis due to fluctuating blood pressure. Patient transferred [facility] to [facility] for medical management.     Of Note: Patient is known to service on this admission as Patient was seen for an initial Clinical Swallow Eval back on 11/15/2018 (See Consult) with recommendations for a Mechanical Soft with Thin LIquid at that time; Re-Eval Ordered due to change in mental status on 11/16/2018 (See ReConsult) with recommendations for Puree with Honey Thick Liquids and a Cinesophagram. Cinesophagram was scheduled for 11/19/2018 however it was rescheduled for 11/20/2018 and then Cinesophagram order was discontinued due to another Code Stroke early morning on 11/20/2018.   Team requested another Clinical Swallow Re-Evaluation in the afternoon on 11/20/2018 (See Consult) with recommendations for a Puree w/Honey Thick Liquid diet. Cinesophagram completed on 11/21/2018 with recommendations for Mechanical Soft with Honey Thick Liquids.  A Clinical Reswallow Evaluation was completed on 11/27/2018 (See Consult).      Patient arrived to Radiology for a repeat Cinesophagram.

## 2018-11-28 NOTE — SWALLOW VFSS/MBS ASSESSMENT ADULT - PHARYNGEAL PHASE COMMENTS
delayed initiation of the pharyngeal swallow, adequate laryngeal elevation, adequate tongue base retraction, adequate pharyngeal constriction delayed initiation of the pharyngeal swallow, adequate laryngeal elevation, adequate tongue base retraction, reduced pharyngeal constriction

## 2018-11-28 NOTE — PROGRESS NOTE ADULT - SUBJECTIVE AND OBJECTIVE BOX
s/p L TCAR complicated by L MCA stroke  stable R sided weakness and aphasia  on cipro for UTI  L neck incision c/d/i, R groin puncture well healed   awaiting bed at rehab  f/u neuro recs  f/u in 2 weeks for post op check    Discussed with Dr. Heath

## 2018-11-28 NOTE — H&P ADULT - NSHPOUTPATIENTPROVIDERS_GEN_ALL_CORE
Dr valle pcp          Dr Cordova pulmonologist  Dr Sharee Jacobs cardiologist          Dr Montemayor rheumatologist  Moiz Heath [phone]  Vascular Surgeon

## 2018-11-28 NOTE — SWALLOW VFSS/MBS ASSESSMENT ADULT - DIAGNOSTIC IMPRESSIONS
Patient presents with Mild Oral Stage and Mild Pharyngeal Stage Dysphagia. The Oral Stage is characterized by adequate oral containment, slow chewing for solid consistency, slow bolus manipulation for solid consistency, slow tongue motion with slow anterior to posterior transfer of the bolus for solids; piecemeal deglutition for puree/solids; premature spillage/loss to the hypopharynx due to reduced tongue to palate seal; with adequate oral clearance post swallow.   The Pharyngeal Stage is characterized by delayed initiation of the pharyngeal swallow (Bolus head is at the pyriforms for Thin Liquids), adequate laryngeal elevation, adequate tongue base retraction, and mildly reduced pharyngeal constriction resulting in trace/mild pyriform residue for puree/solid post swallow.   There was Laryngeal Penetration for Nectar Thick Liquids and Thin Liquids with retrieval and airway protection maintained. all Liquid Consistencies.  There was No Aspiration observed before, during or after the swallow across all PO Trials. Patient presents with Mild Oral Stage and Mild Pharyngeal Stage Dysphagia. The Oral Stage is characterized by adequate oral containment, slow chewing for solid consistency, slow bolus manipulation for solid consistency, slow tongue motion with slow anterior to posterior transfer of the bolus for solids; piecemeal deglutition for puree/solids; trace/mild premature spillage/loss to the hypopharynx due to reduced tongue to palate seal; with adequate oral clearance post swallow.   The Pharyngeal Stage is characterized by delayed initiation of the pharyngeal swallow (Bolus head is at the pyriforms for Thin Liquids), adequate laryngeal elevation, adequate tongue base retraction, and mildly reduced pharyngeal constriction resulting in trace/mild pyriform residue for puree/solid post swallow.  There is trace/mild pharyngeal clearance located primarily in the pyriform post swallow. A reswallow and/or liquid wash benefits to assist with clearance.  There was Laryngeal Penetration for Nectar Thick Liquids and Thin Liquids with retrieval and airway protection maintained.  There was No Aspiration observed before, during or after the swallow across all PO Trials.

## 2018-11-29 LAB
ALBUMIN SERPL ELPH-MCNC: 3.1 G/DL — LOW (ref 3.3–5)
ALP SERPL-CCNC: 133 U/L — HIGH (ref 40–120)
ALT FLD-CCNC: 28 U/L DA — SIGNIFICANT CHANGE UP (ref 10–45)
ANION GAP SERPL CALC-SCNC: 12 MMOL/L — SIGNIFICANT CHANGE UP (ref 5–17)
AST SERPL-CCNC: 19 U/L — SIGNIFICANT CHANGE UP (ref 10–40)
BASOPHILS # BLD AUTO: 0.1 K/UL — SIGNIFICANT CHANGE UP (ref 0–0.2)
BASOPHILS NFR BLD AUTO: 0.6 % — SIGNIFICANT CHANGE UP (ref 0–2)
BILIRUB SERPL-MCNC: 0.6 MG/DL — SIGNIFICANT CHANGE UP (ref 0.2–1.2)
BUN SERPL-MCNC: 8 MG/DL — SIGNIFICANT CHANGE UP (ref 7–23)
CALCIUM SERPL-MCNC: 9.9 MG/DL — SIGNIFICANT CHANGE UP (ref 8.4–10.5)
CHLORIDE SERPL-SCNC: 101 MMOL/L — SIGNIFICANT CHANGE UP (ref 96–108)
CO2 SERPL-SCNC: 26 MMOL/L — SIGNIFICANT CHANGE UP (ref 22–31)
CREAT SERPL-MCNC: 1.25 MG/DL — SIGNIFICANT CHANGE UP (ref 0.5–1.3)
EOSINOPHIL # BLD AUTO: 0.4 K/UL — SIGNIFICANT CHANGE UP (ref 0–0.5)
EOSINOPHIL NFR BLD AUTO: 2.6 % — SIGNIFICANT CHANGE UP (ref 0–6)
GLUCOSE SERPL-MCNC: 99 MG/DL — SIGNIFICANT CHANGE UP (ref 70–99)
HCT VFR BLD CALC: 35.4 % — SIGNIFICANT CHANGE UP (ref 34.5–45)
HGB BLD-MCNC: 10.8 G/DL — LOW (ref 11.5–15.5)
LYMPHOCYTES # BLD AUTO: 20.4 % — SIGNIFICANT CHANGE UP (ref 13–44)
LYMPHOCYTES # BLD AUTO: 3 K/UL — SIGNIFICANT CHANGE UP (ref 1–3.3)
MCHC RBC-ENTMCNC: 23.6 PG — LOW (ref 27–34)
MCHC RBC-ENTMCNC: 30.4 GM/DL — LOW (ref 32–36)
MCV RBC AUTO: 77.5 FL — LOW (ref 80–100)
MONOCYTES # BLD AUTO: 1.4 K/UL — HIGH (ref 0–0.9)
MONOCYTES NFR BLD AUTO: 9.6 % — SIGNIFICANT CHANGE UP (ref 2–14)
NEUTROPHILS # BLD AUTO: 9.7 K/UL — HIGH (ref 1.8–7.4)
NEUTROPHILS NFR BLD AUTO: 66.7 % — SIGNIFICANT CHANGE UP (ref 43–77)
PLATELET # BLD AUTO: 382 K/UL — SIGNIFICANT CHANGE UP (ref 150–400)
POTASSIUM SERPL-MCNC: 4.3 MMOL/L — SIGNIFICANT CHANGE UP (ref 3.5–5.3)
POTASSIUM SERPL-SCNC: 4.3 MMOL/L — SIGNIFICANT CHANGE UP (ref 3.5–5.3)
PREALB SERPL-MCNC: 11 MG/DL — LOW (ref 20–40)
PROT SERPL-MCNC: 7.4 G/DL — SIGNIFICANT CHANGE UP (ref 6–8.3)
RBC # BLD: 4.56 M/UL — SIGNIFICANT CHANGE UP (ref 3.8–5.2)
RBC # FLD: 15.5 % — HIGH (ref 10.3–14.5)
SODIUM SERPL-SCNC: 139 MMOL/L — SIGNIFICANT CHANGE UP (ref 135–145)
WBC # BLD: 14.5 K/UL — HIGH (ref 3.8–10.5)
WBC # FLD AUTO: 14.5 K/UL — HIGH (ref 3.8–10.5)

## 2018-11-29 PROCEDURE — 93010 ELECTROCARDIOGRAM REPORT: CPT

## 2018-11-29 PROCEDURE — 73630 X-RAY EXAM OF FOOT: CPT | Mod: 26,LT,RT

## 2018-11-29 PROCEDURE — 99223 1ST HOSP IP/OBS HIGH 75: CPT

## 2018-11-29 PROCEDURE — 96116 NUBHVL XM PHYS/QHP 1ST HR: CPT

## 2018-11-29 RX ORDER — GABAPENTIN 400 MG/1
100 CAPSULE ORAL ONCE
Qty: 0 | Refills: 0 | Status: COMPLETED | OUTPATIENT
Start: 2018-11-29 | End: 2018-11-29

## 2018-11-29 RX ORDER — GABAPENTIN 400 MG/1
200 CAPSULE ORAL AT BEDTIME
Qty: 0 | Refills: 0 | Status: COMPLETED | OUTPATIENT
Start: 2018-11-29 | End: 2018-11-29

## 2018-11-29 RX ORDER — ACETAMINOPHEN 500 MG
650 TABLET ORAL EVERY 6 HOURS
Qty: 0 | Refills: 0 | Status: DISCONTINUED | OUTPATIENT
Start: 2018-11-29 | End: 2018-12-19

## 2018-11-29 RX ORDER — GABAPENTIN 400 MG/1
300 CAPSULE ORAL AT BEDTIME
Qty: 0 | Refills: 0 | Status: DISCONTINUED | OUTPATIENT
Start: 2018-11-30 | End: 2018-12-19

## 2018-11-29 RX ADMIN — Medication 650 MILLIGRAM(S): at 12:04

## 2018-11-29 RX ADMIN — Medication 500 MILLIGRAM(S): at 17:42

## 2018-11-29 RX ADMIN — Medication 500 MILLIGRAM(S): at 05:47

## 2018-11-29 RX ADMIN — GABAPENTIN 100 MILLIGRAM(S): 400 CAPSULE ORAL at 12:02

## 2018-11-29 RX ADMIN — MIDODRINE HYDROCHLORIDE 10 MILLIGRAM(S): 2.5 TABLET ORAL at 21:35

## 2018-11-29 RX ADMIN — Medication 650 MILLIGRAM(S): at 23:49

## 2018-11-29 RX ADMIN — MIDODRINE HYDROCHLORIDE 10 MILLIGRAM(S): 2.5 TABLET ORAL at 14:09

## 2018-11-29 RX ADMIN — ENOXAPARIN SODIUM 40 MILLIGRAM(S): 100 INJECTION SUBCUTANEOUS at 21:35

## 2018-11-29 RX ADMIN — Medication 81 MILLIGRAM(S): at 11:52

## 2018-11-29 RX ADMIN — Medication 650 MILLIGRAM(S): at 12:44

## 2018-11-29 RX ADMIN — MIDODRINE HYDROCHLORIDE 10 MILLIGRAM(S): 2.5 TABLET ORAL at 05:47

## 2018-11-29 RX ADMIN — ATORVASTATIN CALCIUM 80 MILLIGRAM(S): 80 TABLET, FILM COATED ORAL at 21:35

## 2018-11-29 RX ADMIN — GABAPENTIN 200 MILLIGRAM(S): 400 CAPSULE ORAL at 21:35

## 2018-11-29 RX ADMIN — Medication 1 TABLET(S): at 11:52

## 2018-11-29 RX ADMIN — Medication 100 MILLIGRAM(S): at 14:10

## 2018-11-29 RX ADMIN — CLOPIDOGREL BISULFATE 75 MILLIGRAM(S): 75 TABLET, FILM COATED ORAL at 11:52

## 2018-11-29 RX ADMIN — Medication 650 MILLIGRAM(S): at 18:21

## 2018-11-29 RX ADMIN — Medication 100 MILLIGRAM(S): at 05:47

## 2018-11-29 RX ADMIN — Medication 20 MILLIGRAM(S): at 11:52

## 2018-11-29 RX ADMIN — Medication 650 MILLIGRAM(S): at 00:00

## 2018-11-29 RX ADMIN — Medication 650 MILLIGRAM(S): at 17:42

## 2018-11-29 RX ADMIN — SENNA PLUS 2 TABLET(S): 8.6 TABLET ORAL at 21:35

## 2018-11-29 RX ADMIN — Medication 100 MILLIGRAM(S): at 21:35

## 2018-11-29 NOTE — CONSULT NOTE ADULT - ASSESSMENT
63 year old female with  Left MCA distribution stroke in the periprocedural period after carotid revascularization with carotid artery stent placement  now with decreased functional mobility, dysphagia, non-fluent aphasia, right hemiparesis, gait instability and ADL impairments.    >s/p left MCA CVA  -Comprehensive Rehab Program of PT/OT/rehab/SLP  -continue ASA/Plavix/Lipitor  -continue Fluoxetine for motor recovery    >right sided weakness secondary to above  -comprehensive active PT/OT/ rehab    >Hypotension  -BP currently stable  -Midodrine with parameters (taper as tolerated)  -monitor BP    >UTI  -c/w ciprofloxacin 500mg q12 hours  -monitor temp, wbc    >Dysphagia  -swallow therapy  -On Dysphagia 3, soft/thin lqs     >Feet pain- check xray r/o fracture, Tylenol, gabapentin    >Gait abnormality, with ADL impairments and Functional impairments: Comprehensive Rehab Program of PT/OT/ rehab    >Constipation - Bowel regimen, Colace, Senna, monitor BM    >DVT: Lovenox

## 2018-11-29 NOTE — CONSULT NOTE ADULT - SUBJECTIVE AND OBJECTIVE BOX
Patient is a 63 year old  Female who presents with a chief complaint of s/p left MCA CVA now with functional deficits (28 Nov 2018 15:17)    HPI: 63 year old female with history of HTN, HLD, RA, Asthma s/p elective left transcarotid arterial stent via right femoral vein access (11/13/2018) course complicated by code stroke approximately 8 hours postop. CTA head and neck showed a distal M2 branch occlusion. She was deemed not a candidate for intracranial thrombectomy due to the location of the M2 occlusion at that time. Patient monitored in SICU, found to be stable, and sent to the floor. MRI brain on 11/17/2018 showed acute infarct in the left MCA distribution. TTE did not show any obvious structural cardiac source of embolism. On 11/20/2018 she was noted to have fluctuating right hemiparesis with respect to right arm weakness. CT brain on 11/20/2018 showed expected evolution of left MCA distribution infarct and CTA head and neck showed stable findings. Right sided deficits and aphasia possibly secondary to hypoperfusion of area supplied by the left M2 high grade stenosis due to fluctuating blood pressure. Patient started on midodrine to keep a systolic pressure between 140-160. Patient is currently being treated for E Coli UTI with ciprofloxacin. Pt had a speech and swallow evaluation and was recommended to have a Mechanical Soft with Honey-Thick Liquids diet.  Per vascular team, patient will be discharged on midodrine and tapered down as tolerated. Pt was medically optimized for acute rehab admission. Patient with aphasia at time if assessment, unable to provide history/ ROS. History obtained from chart review.    Past Medical History:  Asthma    Carotid Artery Dissection    Carotid artery stenosis    Eczema  bilateral elbows  HLD (hyperlipidemia)    HTN (Hypertension)    Myocardial Infarction    Osteoporosis    Psoriasis    Rheumatoid arthritis  tx with  methotrexate and prednisone.    Past Surgical History:  Cataract Extraction Surgery    S/P lumbar fusion  h/o vertebral fracture with cement fusion 209  tonsillectomy  s/p Excision Right lymph node ; pt unclear.    Family History:  Mother  Still living? Unknown  Family history of hypertension in mother, Age at diagnosis: Age Unknown  Family history of diabetes mellitus in mother, Age at diagnosis: Age Unknown.     Social History:  No smoking, alcohol, illicit drug use per report	    Allergies: penicillins: Drug Category, Anaphylaxis  	peanuts: Food, Anaphylaxis, Hives  	shellfish: Food, Anaphylaxis, Hives  	tomatoes: Food, Anaphylaxis, Hives, tomatoes  	propofol: Drug, Angioedema  Intolerances: peanut butter: Food, Anaphylaxis, peanut butter    MEDICATIONS  (STANDING):  acetaminophen   Tablet .. 650 milliGRAM(s) Oral every 6 hours  aspirin  chewable 81 milliGRAM(s) Oral daily  atorvastatin 80 milliGRAM(s) Oral at bedtime  ciprofloxacin     Tablet 500 milliGRAM(s) Oral every 12 hours  clopidogrel Tablet 75 milliGRAM(s) Oral daily  docusate sodium 100 milliGRAM(s) Oral three times a day  enoxaparin Injectable 40 milliGRAM(s) SubCutaneous every 24 hours  FLUoxetine Solution 20 milliGRAM(s) Oral daily  gabapentin 200 milliGRAM(s) Oral at bedtime  midodrine 10 milliGRAM(s) Oral every 8 hours  multivitamin 1 Tablet(s) Oral daily  senna 2 Tablet(s) Oral at bedtime    MEDICATIONS  (PRN):  ALBUTerol    90 MICROgram(s) HFA Inhaler 2 Puff(s) Inhalation every 6 hours PRN Shortness of Breath and/or Wheezing      REVIEW OF SYSTEMS:  Patient unable to provide due to aphasia      PHYSICAL EXAM:  T(C): 37.1 (11-29-18 @ 08:05), Max: 37.1 (11-28-18 @ 20:26)  HR: 102 (11-29-18 @ 14:08) (91 - 110)  BP: 108/77 (11-29-18 @ 14:08) (108/77 - 159/83)  RR: 14 (11-29-18 @ 08:05) (14 - 14)  SpO2: 99% (11-29-18 @ 12:43) (97% - 99%)      GENERAL: NAD, well-groomed, well-developed  HEAD:  Atraumatic, Normocephalic  EYES: EOMI, PERRL, conjunctiva and sclera clear  ENMT:  Moist mucous membranes, Good dentition, No lesions  NECK: Supple, No JVD, Normal thyroid  NERVOUS SYSTEM:  Alert & Oriented X3, Good concentration; Motor Strength 5/5 B/L upper and lower extremities; DTRs 2+ intact and symmetric  CHEST/LUNG: Clear to ascultation bilaterally; No rales, rhonchi, wheezing, or rubs  HEART: Regular rate and rhythm; No murmurs, rubs, or gallops  ABDOMEN: Soft, Nontender, Nondistended; Bowel sounds present  EXTREMITIES:  2+ Peripheral Pulses, No clubbing, cyanosis, or edema  SKIN: No rash      LABS:                        10.8   14.5  )-----------( 382      ( 29 Nov 2018 05:30 )             35.4     11-29    139  |  101  |  8   ----------------------------<  99  4.3   |  26  |  1.25    Ca    9.9      29 Nov 2018 05:30  Phos  3.8     11-28  Mg     2.1     11-28    TPro  7.4  /  Alb  3.1<L>  /  TBili  0.6  /  DBili  x   /  AST  19  /  ALT  28  /  AlkPhos  133<H>  11-29      RADIOLOGY & ADDITIONAL TESTS:    Imaging Personally Reviewed:  [x] YES  [ ] NO    Consultant(s) Notes Reviewed:  [x] YES  [ ] NO    Care Discussed with Consultants/Other Providers [x] YES  [ ] NO Patient is a 63 year old  Female who presents with a chief complaint of s/p left MCA CVA now with functional deficits (28 Nov 2018 15:17)    HPI: 63 year old female with history of HTN, HLD, RA, Asthma s/p elective left transcarotid arterial stent via right femoral vein access (11/13/2018) course complicated by code stroke approximately 8 hours postop. CTA head and neck showed a distal M2 branch occlusion. She was deemed not a candidate for intracranial thrombectomy due to the location of the M2 occlusion at that time. Patient monitored in SICU, found to be stable, and sent to the floor. MRI brain on 11/17/2018 showed acute infarct in the left MCA distribution. TTE did not show any obvious structural cardiac source of embolism. On 11/20/2018 she was noted to have fluctuating right hemiparesis with respect to right arm weakness. CT brain on 11/20/2018 showed expected evolution of left MCA distribution infarct and CTA head and neck showed stable findings. Right sided deficits and aphasia possibly secondary to hypoperfusion of area supplied by the left M2 high grade stenosis due to fluctuating blood pressure. Patient started on midodrine to keep a systolic pressure between 140-160. Patient is currently being treated for E Coli UTI with ciprofloxacin. Pt had a speech and swallow evaluation and was recommended to have a Mechanical Soft with Honey-Thick Liquids diet.  Per vascular team, patient will be discharged on midodrine and tapered down as tolerated. Pt was medically optimized for acute rehab admission. Patient with aphasia at time if assessment, unable to provide history/ ROS. History obtained from chart review.    Past Medical History:  Asthma    Carotid Artery Dissection    Carotid artery stenosis    Eczema  bilateral elbows  HLD (hyperlipidemia)    HTN (Hypertension)    Myocardial Infarction    Osteoporosis    Psoriasis    Rheumatoid arthritis  tx with  methotrexate and prednisone.    Past Surgical History:  Cataract Extraction Surgery    S/P lumbar fusion  h/o vertebral fracture with cement fusion 209  tonsillectomy  s/p Excision Right lymph node ; pt unclear.    Family History:  Mother  Still living? Unknown  Family history of hypertension in mother, Age at diagnosis: Age Unknown  Family history of diabetes mellitus in mother, Age at diagnosis: Age Unknown.     Social History:  No smoking, alcohol, illicit drug use per report	    Allergies: penicillins: Drug Category, Anaphylaxis  	peanuts: Food, Anaphylaxis, Hives  	shellfish: Food, Anaphylaxis, Hives  	tomatoes: Food, Anaphylaxis, Hives, tomatoes  	propofol: Drug, Angioedema  Intolerances: peanut butter: Food, Anaphylaxis, peanut butter    MEDICATIONS  (STANDING):  acetaminophen   Tablet .. 650 milliGRAM(s) Oral every 6 hours  aspirin  chewable 81 milliGRAM(s) Oral daily  atorvastatin 80 milliGRAM(s) Oral at bedtime  ciprofloxacin     Tablet 500 milliGRAM(s) Oral every 12 hours  clopidogrel Tablet 75 milliGRAM(s) Oral daily  docusate sodium 100 milliGRAM(s) Oral three times a day  enoxaparin Injectable 40 milliGRAM(s) SubCutaneous every 24 hours  FLUoxetine Solution 20 milliGRAM(s) Oral daily  gabapentin 200 milliGRAM(s) Oral at bedtime  midodrine 10 milliGRAM(s) Oral every 8 hours  multivitamin 1 Tablet(s) Oral daily  senna 2 Tablet(s) Oral at bedtime    MEDICATIONS  (PRN):  ALBUTerol    90 MICROgram(s) HFA Inhaler 2 Puff(s) Inhalation every 6 hours PRN Shortness of Breath and/or Wheezing      REVIEW OF SYSTEMS:  Patient unable to provide due to aphasia      PHYSICAL EXAM:  T(C): 37.1 (11-29-18 @ 08:05), Max: 37.1 (11-28-18 @ 20:26)  HR: 102 (11-29-18 @ 14:08) (91 - 110)  BP: 108/77 (11-29-18 @ 14:08) (108/77 - 159/83)  RR: 14 (11-29-18 @ 08:05) (14 - 14)  SpO2: 99% (11-29-18 @ 12:43) (97% - 99%)      GENERAL: NAD, well-developed  HEAD:  Atraumatic, Normocephalic  EYES: EOMI, PERRL, conjunctiva and sclera clear  ENMT:  Moist mucous membranes  NECK: Supple, No JVD, Normal thyroid  NERVOUS SYSTEM:  Awake, Alert, has expressive aphasia, follows commands, nods yes or no to questions, non fluent, right facial droop,  right UE and LE weakness, points to feet for pain  CHEST/LUNG: Clear to ascultation bilaterally; No rales, rhonchi, wheezing, or rubs  HEART: S1 S2, Regular rate and rhythm; No murmurs, rubs, or gallops  ABDOMEN: Soft, Nontender, Nondistended; Bowel sounds present  EXTREMITIES:  2+ Peripheral Pulses, No clubbing, cyanosis, or edema  SKIN: No rash      LABS:                        10.8   14.5  )-----------( 382      ( 29 Nov 2018 05:30 )             35.4     11-29    139  |  101  |  8   ----------------------------<  99  4.3   |  26  |  1.25    Ca    9.9      29 Nov 2018 05:30  Phos  3.8     11-28  Mg     2.1     11-28    TPro  7.4  /  Alb  3.1<L>  /  TBili  0.6  /  DBili  x   /  AST  19  /  ALT  28  /  AlkPhos  133<H>  11-29      RADIOLOGY & ADDITIONAL TESTS:    Imaging Personally Reviewed:  [x] YES  [ ] NO    Consultant(s) Notes Reviewed:  [x] YES  [ ] NO    Care Discussed with Consultants/Other Providers [x] YES  [ ] NO

## 2018-11-29 NOTE — DIETITIAN INITIAL EVALUATION ADULT. - SOURCE
4y/o F w/ HTN, HLD, RA, Asthma s/p elective left transcarotid arterial sten via right femoral vein access (11/13/18) course complicated by code stroke approximately 8 hours postop.

## 2018-11-30 PROCEDURE — 99233 SBSQ HOSP IP/OBS HIGH 50: CPT

## 2018-11-30 PROCEDURE — 99232 SBSQ HOSP IP/OBS MODERATE 35: CPT

## 2018-11-30 RX ORDER — MIDODRINE HYDROCHLORIDE 2.5 MG/1
10 TABLET ORAL
Qty: 0 | Refills: 0 | Status: DISCONTINUED | OUTPATIENT
Start: 2018-12-01 | End: 2018-12-05

## 2018-11-30 RX ADMIN — Medication 100 MILLIGRAM(S): at 21:51

## 2018-11-30 RX ADMIN — Medication 500 MILLIGRAM(S): at 17:23

## 2018-11-30 RX ADMIN — Medication 1 TABLET(S): at 11:33

## 2018-11-30 RX ADMIN — Medication 650 MILLIGRAM(S): at 00:45

## 2018-11-30 RX ADMIN — Medication 650 MILLIGRAM(S): at 12:21

## 2018-11-30 RX ADMIN — Medication 650 MILLIGRAM(S): at 05:17

## 2018-11-30 RX ADMIN — Medication 20 MILLIGRAM(S): at 11:33

## 2018-11-30 RX ADMIN — GABAPENTIN 300 MILLIGRAM(S): 400 CAPSULE ORAL at 21:51

## 2018-11-30 RX ADMIN — Medication 500 MILLIGRAM(S): at 05:17

## 2018-11-30 RX ADMIN — SENNA PLUS 2 TABLET(S): 8.6 TABLET ORAL at 21:51

## 2018-11-30 RX ADMIN — MIDODRINE HYDROCHLORIDE 10 MILLIGRAM(S): 2.5 TABLET ORAL at 05:16

## 2018-11-30 RX ADMIN — Medication 81 MILLIGRAM(S): at 11:33

## 2018-11-30 RX ADMIN — Medication 650 MILLIGRAM(S): at 11:36

## 2018-11-30 RX ADMIN — Medication 100 MILLIGRAM(S): at 05:17

## 2018-11-30 RX ADMIN — ENOXAPARIN SODIUM 40 MILLIGRAM(S): 100 INJECTION SUBCUTANEOUS at 21:52

## 2018-11-30 RX ADMIN — Medication 100 MILLIGRAM(S): at 17:16

## 2018-11-30 RX ADMIN — ATORVASTATIN CALCIUM 80 MILLIGRAM(S): 80 TABLET, FILM COATED ORAL at 21:51

## 2018-11-30 RX ADMIN — CLOPIDOGREL BISULFATE 75 MILLIGRAM(S): 75 TABLET, FILM COATED ORAL at 11:33

## 2018-11-30 NOTE — PROGRESS NOTE ADULT - SUBJECTIVE AND OBJECTIVE BOX
Patient is a 63 year old female who presents with a chief complaint of s/p left MCA CVA now with functional deficits (29 Nov 2018 18:31)    Patient seen and examined. Patient with aphasia, unable to provide history/ ROS.      MEDICATIONS  (STANDING):  acetaminophen   Tablet .. 650 milliGRAM(s) Oral every 6 hours  aspirin  chewable 81 milliGRAM(s) Oral daily  atorvastatin 80 milliGRAM(s) Oral at bedtime  ciprofloxacin     Tablet 500 milliGRAM(s) Oral every 12 hours  clopidogrel Tablet 75 milliGRAM(s) Oral daily  docusate sodium 100 milliGRAM(s) Oral three times a day  enoxaparin Injectable 40 milliGRAM(s) SubCutaneous every 24 hours  FLUoxetine Solution 20 milliGRAM(s) Oral daily  gabapentin 300 milliGRAM(s) Oral at bedtime  midodrine 10 milliGRAM(s) Oral every 8 hours  multivitamin 1 Tablet(s) Oral daily  senna 2 Tablet(s) Oral at bedtime    MEDICATIONS  (PRN):  ALBUTerol    90 MICROgram(s) HFA Inhaler 2 Puff(s) Inhalation every 6 hours PRN Shortness of Breath and/or Wheezing      REVIEW OF SYSTEMS:  Patient with aphasia, unable to provide ROS    PHYSICAL EXAM:  T(C): 36.8 (11-30-18 @ 08:21), Max: 37.2 (11-29-18 @ 21:25)  HR: 76 (11-30-18 @ 08:21) (76 - 110)  BP: 133/87 (11-30-18 @ 08:21) (108/74 - 133/87)  RR: 14 (11-30-18 @ 08:21) (14 - 14)  SpO2: 96% (11-30-18 @ 08:21) (93% - 99%)      GENERAL: NAD, well-developed  HEAD:  Atraumatic, Normocephalic  EYES: EOMI, PERRL, conjunctiva and sclera clear  ENMT:  Moist mucous membranes  NECK: Supple, No JVD, Normal thyroid  NERVOUS SYSTEM:  Awake, Alert, has expressive aphasia, follows commands, nods yes or no to questions, non fluent, right facial droop,  right UE and LE weakness  CHEST/LUNG: Clear to ascultation bilaterally; No rales, rhonchi, wheezing, or rubs  HEART: S1 S2, Regular rate and rhythm; No murmurs, rubs, or gallops  ABDOMEN: Soft, Nontender, Nondistended; Bowel sounds present  EXTREMITIES:  2+ Peripheral Pulses, No clubbing, cyanosis, or edema  SKIN: No rash        LABS:                        10.8   14.5  )-----------( 382      ( 29 Nov 2018 05:30 )             35.4     11-29    139  |  101  |  8   ----------------------------<  99  4.3   |  26  |  1.25    Ca    9.9      29 Nov 2018 05:30    TPro  7.4  /  Alb  3.1<L>  /  TBili  0.6  /  DBili  x   /  AST  19  /  ALT  28  /  AlkPhos  133<H>  11-29            RADIOLOGY & ADDITIONAL TESTS:    Imaging Personally Reviewed:  [x] YES  [ ] NO    Consultant(s) Notes Reviewed:  [x] YES  [ ] NO    Care Discussed with Consultants/Other Providers [x] YES  [ ] NO

## 2018-11-30 NOTE — PROGRESS NOTE ADULT - ASSESSMENT
63 year old female with  Left MCA distribution stroke in the periprocedural period after carotid revascularization with carotid artery stent placement  now with decreased functional mobility, dysphagia, non-fluent aphasia, right hemiparesis, gait instability and ADL impairments.    >s/p left MCA CVA  -Comprehensive Rehab Program of PT/OT/rehab/SLP  -continue ASA/Plavix/Lipitor  -continue Fluoxetine for motor recovery    >right sided weakness secondary to above  -comprehensive active PT/OT/ rehab    >Hypotension  -BP currently stable  -Midodrine with parameters (taper as tolerated)  -monitor BP    >UTI  -c/w ciprofloxacin 500mg q12 hours  -monitor temp, wbc    >Dysphagia  -swallow therapy  -On Dysphagia 3, soft/thin liquid    >Feet pain- xrays no fracture, continue Tylenol, gabapentin    >Gait abnormality, with ADL impairments and Functional impairments: Comprehensive Rehab Program of PT/OT/ rehab    >Constipation - Bowel regimen, Colace, Senna, monitor BM    >DVT: Lovenox

## 2018-11-30 NOTE — PROGRESS NOTE ADULT - SUBJECTIVE AND OBJECTIVE BOX
History of Present Illness: 	  62y/o F w/ HTN, HLD, RA, Asthma s/p elective left transcarotid arterial sten via right femoral vein access (11/13/18) course complicated by code stroke approximately 8 hours postop. CTA head and neck showed a distal M2 branch occlusion. She was deemed not a candidate for intracranial thrombectomy due to the location of the M2 occlusion at that time. Patient monitored in SICU, found to be stable, and sent to the floor. MRI brain on 11/17 showed acute infarct in the left MCA distribution. TTE did not show any obvious structural cardiac source of embolism. On 11/20, she was noted to have fluctuating right hemiparesis with respect to right arm weakness. CT brain on 11/20 showed expected evolution of left MCA distribution infarct and CTA head and neck showed stable findings. Right sided deficits and aphasia possibly secondary to hypoperfusion of area supplied by the L M2 high grade stenosis due to fluctuating blood pressure. Patient started on midodrine to keep a systolic pressure between 140-160. Patient is currently being treated for E Coli UTI with cipro. Pt had a speech and swallow and was recommended to have a Mechanical Soft with Honey-Thick Liquids diet.  Per vascular team, patient will be discharged on midodrine and tapered down as tolerated. Pt was medically optimized for acute rehab admission on 11/28.        Review of Systems: REVIEW OF SYSTEMS  Constitutional: No fever  HEENT: No visual disturbances, No difficulty hearing  Pulm: No shortness of breath  Cardio: No chest pain, No palpitations  GI:  No abdominal pain, No constipation  : No dysuria  Neuro: No headaches, No memory loss, + loss of strength, +unable to speak  Skin: No itching, No rashes, No lesions   Endo: No temperature intolerance  MSK: No joint pain, No joint swelling, No muscle pain, No Neck or back pain Psych:  No depression, No anxiety   Patient seen and evaluated in . Patient shakes head yes and no with accuracy to answer questions. Patient shakes head "yes" when asked if she is still experiencing pain 'tingling' pain in the right hand and both feet.  Shakes head "no" when asked if she feels tired today-since gabapenin was started last evening. 	        Vital Signs Last 24 Hrs  T(C): 36.8 (30 Nov 2018 08:21), Max: 37.2 (29 Nov 2018 21:25)  T(F): 98.3 (30 Nov 2018 08:21), Max: 99 (29 Nov 2018 21:25)  HR: 76 (30 Nov 2018 08:21) (76 - 102)  BP: 133/87 (30 Nov 2018 08:21) (108/74 - 133/87)  RR: 14 (30 Nov 2018 08:21) (14 - 14)  SpO2: 96% (30 Nov 2018 08:21) (93% - 97%)                            10.8   14.5  )-----------( 382      ( 29 Nov 2018 05:30 )             35.4   11-29    139  |  101  |  8   ----------------------------<  99  4.3   |  26  |  1.25    Ca    9.9      29 Nov 2018 05:30    TPro  7.4  /  Alb  3.1<L>  /  TBili  0.6  /  DBili  x   /  AST  19  /  ALT  28  /  AlkPhos  133<H>  11-29      CT brain on 11/13 did not show any evidence of acute infarct or hemorrhage.   	CTA head and neck on 11/13 showed multifocal intracranial atherosclerosis including severe stenosis of left MCA proximal and distal M1 segment, multifocal stenosis involving inferior M2 segment and occlusion of the superior M2 segment and left proximal ICA stent.  	CTA head and neck on 11/16 showed 75% stenosis of left ICA stent (in-stent stenosis), which appears to be same as compared to 11/13 to my eye.   MRI brain on 11/17 showed acute infarct in the left MCA distribution. TTE did not show any obvious structural cardiac source of embolism. CT brain on 11/20 showed expected evolution of left MCA distribution infarct and CTA head and neck showed stable findings.     Physical Exam:   Constitutional - NAD, Comfortable  HEENT - NCAT, EOMI  Neck - Supple,   Chest - CTA bilaterally, No wheeze, No rhonchi, No crackles  Cardiovascular - RRR, S1S2, No murmurs  Abdomen - BS+, Soft, NTND  Extremities - No C/C/E, No calf tenderness   Neurologic Exam -                    Communication expressive aphasia, follows commands, answers yes/no questions with nodding/shaking head with accuracy.  Non-fluent     Cranial Nerves - + right facial droop      Motor - RUE  1/5, RLE 3/5; LUE/LLE 5/5                        Sensory - allodynia to bilat feet and right hand     Reflexes - DTR Intact, No primitive reflexive     Balance - poor  Psychiatric - Mood stable, Affect WNL	         Assessment and Plan:     		  ASSESSMENT/PLAN  RUPINDER TRIPP is a 62yo Female with  Left MCA distribution stroke in the periprocedural period after carotid revascularization with carotid artery stent placement  now with decreased functional mobility, dysphagia, non-fluent aphasia, right hemiparesis, gait instability and ADL impairments.    COMORBIDITES/ACTIVE MEDICAL ISSUES     #s/p left MCA CVA  -continue Comprehensive Rehab Program of PT/OT/SLP  -ASA/Plavix/Lipitor  -Fluoxetine for motor recovery    #Hypotension  -Midodrine with parameters (will taper as tolerated)  -monitor BP---130s    #UTI  -c/w cipro 500mg q12 hours  -monitor CBC-leukocytosis persistent on yesterdays labs, recheck tomorrow morning   -monitor vitals and symptoms    #Dysphagia  -continue SLP  -On Dysphagia 3, soft/thin lqs     #Nutrition: add ensure TID, discussed with RD     Gait Instability, ADL impairments and Functional impairments: start Comprehensive Rehab Program of PT/OT     GI/Bowel Mgmt - Colace, Senna, Toileting program  /Bladder Mgmt - PVRs q8 hours, SC >350ccs, toileting program      Precautions / PROPHYLAXIS:   - Falls, Cardiac  - Lungs: Aspiration  - Pressure injury/Skin: Turn Q2hrs while in bed, OOB to Chair, PT/OT    - DVT: Lovenox, SCDs, TEDs History of Present Illness: 	  64y/o F w/ HTN, HLD, RA, Asthma s/p elective left transcarotid arterial sten via right femoral vein access (11/13/18) course complicated by code stroke approximately 8 hours postop. CTA head and neck showed a distal M2 branch occlusion. She was deemed not a candidate for intracranial thrombectomy due to the location of the M2 occlusion at that time. Patient monitored in SICU, found to be stable, and sent to the floor. MRI brain on 11/17 showed acute infarct in the left MCA distribution. TTE did not show any obvious structural cardiac source of embolism. On 11/20, she was noted to have fluctuating right hemiparesis with respect to right arm weakness. CT brain on 11/20 showed expected evolution of left MCA distribution infarct and CTA head and neck showed stable findings. Right sided deficits and aphasia possibly secondary to hypoperfusion of area supplied by the L M2 high grade stenosis due to fluctuating blood pressure. Patient started on midodrine to keep a systolic pressure between 140-160. Patient is currently being treated for E Coli UTI with cipro. Pt had a speech and swallow and was recommended to have a Mechanical Soft with Honey-Thick Liquids diet.  Per vascular team, patient will be discharged on midodrine and tapered down as tolerated. Pt was medically optimized for acute rehab admission on 11/28.        Review of Systems:  REVIEW OF SYSTEMS  Constitutional: No fever  HEENT: No visual disturbances, No difficulty hearing  Pulm: No shortness of breath  Cardio: No chest pain, No palpitations  GI:  No abdominal pain, No constipation  : No dysuria  Neuro: No headaches, No memory loss, + loss of strength, +unable to speak  Skin: No itching, No rashes, No lesions   Endo: No temperature intolerance  MSK: No joint pain, No joint swelling, No muscle pain, No Neck or back pain Psych:  No depression, No anxiety   Patient seen and evaluated in . Patient shakes head yes and no with accuracy to answer questions. Patient shakes head "yes" when asked if she is still experiencing pain 'tingling' pain in the right hand and both feet.  Shakes head "no" when asked if she feels tired today-since gabapenin was started last evening. 	        Vital Signs Last 24 Hrs  T(C): 36.8 (30 Nov 2018 08:21), Max: 37.2 (29 Nov 2018 21:25)  T(F): 98.3 (30 Nov 2018 08:21), Max: 99 (29 Nov 2018 21:25)  HR: 76 (30 Nov 2018 08:21) (76 - 102)  BP: 133/87 (30 Nov 2018 08:21) (108/74 - 133/87)  RR: 14 (30 Nov 2018 08:21) (14 - 14)  SpO2: 96% (30 Nov 2018 08:21) (93% - 97%)                            10.8   14.5  )-----------( 382      ( 29 Nov 2018 05:30 )             35.4   11-29    139  |  101  |  8   ----------------------------<  99  4.3   |  26  |  1.25    Ca    9.9      29 Nov 2018 05:30    TPro  7.4  /  Alb  3.1<L>  /  TBili  0.6  /  DBili  x   /  AST  19  /  ALT  28  /  AlkPhos  133<H>  11-29      CT brain on 11/13 did not show any evidence of acute infarct or hemorrhage.   	CTA head and neck on 11/13 showed multifocal intracranial atherosclerosis including severe stenosis of left MCA proximal and distal M1 segment, multifocal stenosis involving inferior M2 segment and occlusion of the superior M2 segment and left proximal ICA stent.  	CTA head and neck on 11/16 showed 75% stenosis of left ICA stent (in-stent stenosis), which appears to be same as compared to 11/13 to my eye.   MRI brain on 11/17 showed acute infarct in the left MCA distribution. TTE did not show any obvious structural cardiac source of embolism. CT brain on 11/20 showed expected evolution of left MCA distribution infarct and CTA head and neck showed stable findings.     Physical Exam:   Constitutional - NAD, Comfortable  HEENT - NCAT, EOMI  Neck - Supple,   Chest - CTA bilaterally, No wheeze, No rhonchi, No crackles  Cardiovascular - RRR, S1S2, No murmurs  Abdomen - BS+, Soft, NTND  Extremities - No C/C/E, No calf tenderness   Neurologic Exam -                    Communication expressive aphasia, follows commands, answers yes/no questions with nodding/shaking head with accuracy.  Non-fluent     Cranial Nerves - + right facial droop      Motor - RUE  1/5, RLE 3/5; LUE/LLE 5/5                        Sensory - allodynia to bilat feet and right hand     Reflexes - DTR Intact, No primitive reflexive     Balance - poor  Psychiatric - Mood stable, Affect WNL	         Assessment and Plan:     		  ASSESSMENT/PLAN  RUPINDER TRIPP is a 64yo Female with  Left MCA distribution stroke in the periprocedural period after carotid revascularization with carotid artery stent placement  now with decreased functional mobility, dysphagia, non-fluent aphasia, right hemiparesis, gait instability and ADL impairments.    COMORBIDITES/ACTIVE MEDICAL ISSUES     #s/p left MCA CVA  -continue Comprehensive Rehab Program of PT/OT/SLP  -ASA/Plavix/Lipitor  -Fluoxetine for motor recovery    #Hypotension  -Midodrine with parameters (will taper as tolerated)  -monitor BP---130s    #UTI  -c/w cipro 500mg q12 hours  -monitor CBC-leukocytosis persistent on yesterdays labs, recheck tomorrow morning   -monitor vitals and symptoms    #Dysphagia  -continue SLP  -On Dysphagia 3, soft/thin lqs     #Nutrition: add ensure TID, discussed with RD     Gait Instability, ADL impairments and Functional impairments: start Comprehensive Rehab Program of PT/OT     GI/Bowel Mgmt - Colace, Senna, Toileting program  /Bladder Mgmt - PVRs q8 hours, SC >350ccs, toileting program      Precautions / PROPHYLAXIS:   - Falls, Cardiac  - Lungs: Aspiration  - Pressure injury/Skin: Turn Q2hrs while in bed, OOB to Chair, PT/OT    - DVT: Lovenox, SCDs, TEDs

## 2018-11-30 NOTE — CHART NOTE - NSCHARTNOTEFT_GEN_A_CORE
REHABILITATION DIAGNOSIS/IMPAIRMENT GROUP CODE:  Left MCA CVA    COMORBIDITIES/COMPLICATING CONDITIONS IMPACTING REHABILITATION:  HEALTH ISSUES - PROBLEM Dx:  Osteoporosis  Eczema: bilateral elbows  Carotid artery stenosis  Rheumatoid arthritis  HLD (hyperlipidemia)  Carotid Artery Dissection  Asthma  HTN (Hypertension)  s/p Cataract Extraction Surgery  dysphagia, non-fluent aphasia, right hemiparesis, gait instability and ADL impairments.      Based upon consideration of the patient's impairments, functional status, complicating conditions and any other contributing factors and after information garnered from the assessments of all therapy disciplines involved in treating the patient and other pertinent clinicians:    INTERDISCIPLINARY REHABILITATION INTERVENTIONS:    Transfer Training  Bed Mobility  Therapeutic Exercise  Balance/Coordination Exercises  Locomotion retraining  Stairs  Functional Transfer Training  Bowel/Bladder program  Pain Management  Skin/Wound Care  Visual/Perceptual Training  Therapeutic Recreation Activities  Neuromuscular Re-education  Activities of Daily Living  Speech Exercise  Swallowing Exercises  Vital Stim  Dietary Supplements  Calorie Count  Cognitive Exercises  Cognitive/Linguistic Treatment  Behavior Program  Neuropsych Therapy  Patient/Family Counseling  Family Training  Community Re-entry  Orthotic Evaluation  Prosthetic Eval/Training    MEDICAL PROGNOSIS:  Good    REHAB POTENTIAL:  Good  EXPECTED DAILY THERAPY:         PT: 1 hr       OT: 1 hr       ST: 1 hr       S&O:      EXPECTED FREQUENCY OF PROGRAM:  3 hours per day  and 5 days a week    ESTIMATED LOS:  14-20 days    ESTIMATED DISPOSITION:  home with home care    INTERDISCIPLINARY FUNCTIONAL OUTCOMES/GOALS:         Gait/Mobility: Min Assist       Transfers: Min Assist       ADLs: Supervision       Functional Transfers: Min Assist       Medication Management: Supervision       Communication: Supervision       Cognitive: Supervision       Dysphagia: Supervision       Bladder: Supervision       Bowel: Supervision

## 2018-12-01 LAB
ALBUMIN SERPL ELPH-MCNC: 3.1 G/DL — LOW (ref 3.3–5)
ALP SERPL-CCNC: 133 U/L — HIGH (ref 40–120)
ALT FLD-CCNC: 18 U/L DA — SIGNIFICANT CHANGE UP (ref 10–45)
ANION GAP SERPL CALC-SCNC: 11 MMOL/L — SIGNIFICANT CHANGE UP (ref 5–17)
ANION GAP SERPL CALC-SCNC: 14 MMOL/L — SIGNIFICANT CHANGE UP (ref 5–17)
AST SERPL-CCNC: 21 U/L — SIGNIFICANT CHANGE UP (ref 10–40)
BASOPHILS # BLD AUTO: 0.1 K/UL — SIGNIFICANT CHANGE UP (ref 0–0.2)
BASOPHILS NFR BLD AUTO: 0.7 % — SIGNIFICANT CHANGE UP (ref 0–2)
BILIRUB SERPL-MCNC: 0.4 MG/DL — SIGNIFICANT CHANGE UP (ref 0.2–1.2)
BUN SERPL-MCNC: 13 MG/DL — SIGNIFICANT CHANGE UP (ref 7–23)
BUN SERPL-MCNC: 13 MG/DL — SIGNIFICANT CHANGE UP (ref 7–23)
CALCIUM SERPL-MCNC: 9.6 MG/DL — SIGNIFICANT CHANGE UP (ref 8.4–10.5)
CALCIUM SERPL-MCNC: 9.7 MG/DL — SIGNIFICANT CHANGE UP (ref 8.4–10.5)
CHLORIDE SERPL-SCNC: 100 MMOL/L — SIGNIFICANT CHANGE UP (ref 96–108)
CHLORIDE SERPL-SCNC: 96 MMOL/L — SIGNIFICANT CHANGE UP (ref 96–108)
CO2 SERPL-SCNC: 24 MMOL/L — SIGNIFICANT CHANGE UP (ref 22–31)
CO2 SERPL-SCNC: 28 MMOL/L — SIGNIFICANT CHANGE UP (ref 22–31)
CREAT SERPL-MCNC: 1.45 MG/DL — HIGH (ref 0.5–1.3)
CREAT SERPL-MCNC: 1.45 MG/DL — HIGH (ref 0.5–1.3)
EOSINOPHIL # BLD AUTO: 0.4 K/UL — SIGNIFICANT CHANGE UP (ref 0–0.5)
EOSINOPHIL NFR BLD AUTO: 2.1 % — SIGNIFICANT CHANGE UP (ref 0–6)
GLUCOSE SERPL-MCNC: 85 MG/DL — SIGNIFICANT CHANGE UP (ref 70–99)
GLUCOSE SERPL-MCNC: 96 MG/DL — SIGNIFICANT CHANGE UP (ref 70–99)
HCT VFR BLD CALC: 32.3 % — LOW (ref 34.5–45)
HCT VFR BLD CALC: 32.7 % — LOW (ref 34.5–45)
HGB BLD-MCNC: 10 G/DL — LOW (ref 11.5–15.5)
HGB BLD-MCNC: 10.5 G/DL — LOW (ref 11.5–15.5)
LACTATE SERPL-SCNC: 0.9 MMOL/L — SIGNIFICANT CHANGE UP (ref 0.7–2)
LYMPHOCYTES # BLD AUTO: 11.6 % — LOW (ref 13–44)
LYMPHOCYTES # BLD AUTO: 2.1 K/UL — SIGNIFICANT CHANGE UP (ref 1–3.3)
MCHC RBC-ENTMCNC: 23.4 PG — LOW (ref 27–34)
MCHC RBC-ENTMCNC: 24.4 PG — LOW (ref 27–34)
MCHC RBC-ENTMCNC: 30.5 GM/DL — LOW (ref 32–36)
MCHC RBC-ENTMCNC: 32.4 GM/DL — SIGNIFICANT CHANGE UP (ref 32–36)
MCV RBC AUTO: 75.4 FL — LOW (ref 80–100)
MCV RBC AUTO: 76.6 FL — LOW (ref 80–100)
MONOCYTES # BLD AUTO: 1.9 K/UL — HIGH (ref 0–0.9)
MONOCYTES NFR BLD AUTO: 10.7 % — HIGH (ref 1–9)
NEUTROPHILS # BLD AUTO: 13.2 K/UL — HIGH (ref 1.8–7.4)
NEUTROPHILS NFR BLD AUTO: 74.9 % — SIGNIFICANT CHANGE UP (ref 43–77)
PLATELET # BLD AUTO: 370 K/UL — SIGNIFICANT CHANGE UP (ref 150–400)
PLATELET # BLD AUTO: 388 K/UL — SIGNIFICANT CHANGE UP (ref 150–400)
POTASSIUM SERPL-MCNC: 4 MMOL/L — SIGNIFICANT CHANGE UP (ref 3.5–5.3)
POTASSIUM SERPL-MCNC: 4.3 MMOL/L — SIGNIFICANT CHANGE UP (ref 3.5–5.3)
POTASSIUM SERPL-SCNC: 4 MMOL/L — SIGNIFICANT CHANGE UP (ref 3.5–5.3)
POTASSIUM SERPL-SCNC: 4.3 MMOL/L — SIGNIFICANT CHANGE UP (ref 3.5–5.3)
PROT SERPL-MCNC: 7.4 G/DL — SIGNIFICANT CHANGE UP (ref 6–8.3)
RBC # BLD: 4.27 M/UL — SIGNIFICANT CHANGE UP (ref 3.8–5.2)
RBC # BLD: 4.28 M/UL — SIGNIFICANT CHANGE UP (ref 3.8–5.2)
RBC # FLD: 14.8 % — HIGH (ref 10.3–14.5)
RBC # FLD: 14.9 % — HIGH (ref 10.3–14.5)
SODIUM SERPL-SCNC: 135 MMOL/L — SIGNIFICANT CHANGE UP (ref 135–145)
SODIUM SERPL-SCNC: 138 MMOL/L — SIGNIFICANT CHANGE UP (ref 135–145)
WBC # BLD: 17.2 K/UL — HIGH (ref 3.8–10.5)
WBC # BLD: 17.6 K/UL — HIGH (ref 3.8–10.5)
WBC # FLD AUTO: 17.2 K/UL — HIGH (ref 3.8–10.5)
WBC # FLD AUTO: 17.6 K/UL — HIGH (ref 3.8–10.5)

## 2018-12-01 PROCEDURE — 99233 SBSQ HOSP IP/OBS HIGH 50: CPT

## 2018-12-01 PROCEDURE — 93970 EXTREMITY STUDY: CPT | Mod: 26

## 2018-12-01 PROCEDURE — 71045 X-RAY EXAM CHEST 1 VIEW: CPT | Mod: 26

## 2018-12-01 RX ORDER — VANCOMYCIN HCL 1 G
750 VIAL (EA) INTRAVENOUS ONCE
Qty: 0 | Refills: 0 | Status: COMPLETED | OUTPATIENT
Start: 2018-12-01 | End: 2018-12-01

## 2018-12-01 RX ORDER — SODIUM CHLORIDE 9 MG/ML
1000 INJECTION INTRAMUSCULAR; INTRAVENOUS; SUBCUTANEOUS
Qty: 0 | Refills: 0 | Status: DISCONTINUED | OUTPATIENT
Start: 2018-12-01 | End: 2018-12-02

## 2018-12-01 RX ORDER — VANCOMYCIN HCL 1 G
750 VIAL (EA) INTRAVENOUS EVERY 12 HOURS
Qty: 0 | Refills: 0 | Status: DISCONTINUED | OUTPATIENT
Start: 2018-12-01 | End: 2018-12-01

## 2018-12-01 RX ADMIN — MIDODRINE HYDROCHLORIDE 10 MILLIGRAM(S): 2.5 TABLET ORAL at 05:52

## 2018-12-01 RX ADMIN — SODIUM CHLORIDE 70 MILLILITER(S): 9 INJECTION INTRAMUSCULAR; INTRAVENOUS; SUBCUTANEOUS at 20:41

## 2018-12-01 RX ADMIN — Medication 500 MILLIGRAM(S): at 05:51

## 2018-12-01 RX ADMIN — ENOXAPARIN SODIUM 40 MILLIGRAM(S): 100 INJECTION SUBCUTANEOUS at 21:24

## 2018-12-01 RX ADMIN — SODIUM CHLORIDE 70 MILLILITER(S): 9 INJECTION INTRAMUSCULAR; INTRAVENOUS; SUBCUTANEOUS at 23:18

## 2018-12-01 RX ADMIN — Medication 650 MILLIGRAM(S): at 23:19

## 2018-12-01 RX ADMIN — Medication 650 MILLIGRAM(S): at 18:25

## 2018-12-01 RX ADMIN — MIDODRINE HYDROCHLORIDE 10 MILLIGRAM(S): 2.5 TABLET ORAL at 13:30

## 2018-12-01 RX ADMIN — Medication 100 MILLIGRAM(S): at 21:25

## 2018-12-01 RX ADMIN — Medication 650 MILLIGRAM(S): at 13:26

## 2018-12-01 RX ADMIN — Medication 20 MILLIGRAM(S): at 11:49

## 2018-12-01 RX ADMIN — Medication 81 MILLIGRAM(S): at 11:45

## 2018-12-01 RX ADMIN — Medication 250 MILLIGRAM(S): at 12:09

## 2018-12-01 RX ADMIN — CLOPIDOGREL BISULFATE 75 MILLIGRAM(S): 75 TABLET, FILM COATED ORAL at 11:45

## 2018-12-01 RX ADMIN — ATORVASTATIN CALCIUM 80 MILLIGRAM(S): 80 TABLET, FILM COATED ORAL at 21:25

## 2018-12-01 RX ADMIN — Medication 650 MILLIGRAM(S): at 11:49

## 2018-12-01 RX ADMIN — Medication 650 MILLIGRAM(S): at 17:27

## 2018-12-01 RX ADMIN — Medication 500 MILLIGRAM(S): at 17:27

## 2018-12-01 RX ADMIN — GABAPENTIN 300 MILLIGRAM(S): 400 CAPSULE ORAL at 21:25

## 2018-12-01 RX ADMIN — Medication 1 TABLET(S): at 11:53

## 2018-12-01 RX ADMIN — SENNA PLUS 2 TABLET(S): 8.6 TABLET ORAL at 21:25

## 2018-12-01 RX ADMIN — SODIUM CHLORIDE 70 MILLILITER(S): 9 INJECTION INTRAMUSCULAR; INTRAVENOUS; SUBCUTANEOUS at 10:42

## 2018-12-01 RX ADMIN — Medication 100 MILLIGRAM(S): at 05:51

## 2018-12-01 RX ADMIN — Medication 100 MILLIGRAM(S): at 13:30

## 2018-12-01 NOTE — PROGRESS NOTE ADULT - ASSESSMENT
63 year old female with  Left MCA distribution stroke in the periprocedural period after carotid revascularization with carotid artery stent placement  now with decreased functional mobility, dysphagia, non-fluent aphasia, right hemiparesis, gait instability and ADL impairments.    >s/p left MCA CVA  -Comprehensive Rehab Program of PT/OT/rehab/SLP  -continue ASA/Plavix/Lipitor  -continue Fluoxetine for motor recovery    >right sided weakness secondary to above  -comprehensive active PT/OT/ rehab    >Sepsis, severe  -check lactate, cxr, blood cultures  -IV fluids, change oral antibiotic to IV  -monitor temp, wbc    >UTI  -change to IV antibiotics  -monitor temp, wbc    >History of Hypotension  -BP currently stable  -Midodrine with parameters (taper as tolerated)  -monitor BP    MARLY - start IVF, monitor BUN/Cr    >Dysphagia  -swallow therapy  -On Dysphagia 3, soft/thin liquid    >Feet pain- xrays no fracture, continue Tylenol, gabapentin, f/U LE dopplers    >Gait abnormality, with ADL impairments and Functional impairments: Comprehensive Rehab Program of PT/OT/ rehab    >Constipation - Bowel regimen, Colace, Senna, monitor BM    >DVT: Lovenox 63 year old female with  Left MCA distribution stroke in the periprocedural period after carotid revascularization with carotid artery stent placement  now with decreased functional mobility, dysphagia, non-fluent aphasia, right hemiparesis, gait instability and ADL impairments.    >s/p left MCA CVA  -Comprehensive Rehab Program of PT/OT/rehab/SLP  -continue ASA/Plavix/Lipitor  -continue Fluoxetine for motor recovery    >right sided weakness secondary to above  -comprehensive active PT/OT/ rehab    >Sepsis, severe  -check lactate, cxr, blood cultures  -IV fluids, IV antibotic oral antibiotic to IV  -monitor temp, wbc    >UTI  - currently on cipro  -monitor temp, wbc    >History of Hypotension  -BP currently stable  -Midodrine with parameters (taper as tolerated)  -monitor BP    MARLY - start IVF, monitor BUN/Cr    >Dysphagia  -swallow therapy  -On Dysphagia 3, soft/thin liquid    >Feet pain- xrays no fracture, continue Tylenol, gabapentin, f/U LE dopplers    >Gait abnormality, with ADL impairments and Functional impairments: Comprehensive Rehab Program of PT/OT/ rehab    >Constipation - Bowel regimen, Colace, Senna, monitor BM    >DVT: Lovenox 63 year old female with  Left MCA distribution stroke in the periprocedural period after carotid revascularization with carotid artery stent placement  now with decreased functional mobility, dysphagia, non-fluent aphasia, right hemiparesis, gait instability and ADL impairments.    >s/p left MCA CVA  -Comprehensive Rehab Program of PT/OT/rehab/SLP  -continue ASA/Plavix/Lipitor  -continue Fluoxetine for motor recovery    >right sided weakness secondary to above  -comprehensive active PT/OT/ rehab    >Sepsis, severe  -check lactate, cxr, blood cultures  -IV fluids, IV antibotic   -ID consult  -monitor temp, wbc    >UTI  - is currently on cipro  -monitor temp, wbc    >History of Hypotension  -BP currently stable  -Midodrine with parameters (taper as tolerated)  -monitor BP    >MARLY - start IVF, monitor BUN/Cr    >Dysphagia  -swallow therapy  -On Dysphagia 3, soft/thin liquid    >Feet pain- xrays no fracture, continue Tylenol, gabapentin, f/U LE dopplers    >Gait abnormality, with ADL impairments and Functional impairments: Comprehensive Rehab Program of PT/OT/ rehab    >Constipation - Bowel regimen, Colace, Senna, monitor BM    >DVT: Lovenox

## 2018-12-01 NOTE — PROGRESS NOTE ADULT - SUBJECTIVE AND OBJECTIVE BOX
Patient is a 63 year old  Female who presents with a chief complaint of s/p left MCA CVA now with functional deficits (30 Nov 2018 13:05)    Patient seen and examined. Patient with fever of 100.3F. Patient with aphasia, unable to provide history/ ROS.      MEDICATIONS  (STANDING):  acetaminophen   Tablet .. 650 milliGRAM(s) Oral every 6 hours  aspirin  chewable 81 milliGRAM(s) Oral daily  atorvastatin 80 milliGRAM(s) Oral at bedtime  ciprofloxacin     Tablet 500 milliGRAM(s) Oral every 12 hours  clopidogrel Tablet 75 milliGRAM(s) Oral daily  docusate sodium 100 milliGRAM(s) Oral three times a day  enoxaparin Injectable 40 milliGRAM(s) SubCutaneous every 24 hours  FLUoxetine Solution 20 milliGRAM(s) Oral daily  gabapentin 300 milliGRAM(s) Oral at bedtime  midodrine 10 milliGRAM(s) Oral <User Schedule>  multivitamin 1 Tablet(s) Oral daily  senna 2 Tablet(s) Oral at bedtime  sodium chloride 0.9%. 1000 milliLiter(s) (70 mL/Hr) IV Continuous <Continuous>    MEDICATIONS  (PRN):  ALBUTerol    90 MICROgram(s) HFA Inhaler 2 Puff(s) Inhalation every 6 hours PRN Shortness of Breath and/or Wheezing      REVIEW OF SYSTEMS:  +fever  Patient with aphasia, unable to provide ROS      PHYSICAL EXAM:  T(C): 37.9 (12-01-18 @ 09:05), Max: 37.9 (12-01-18 @ 09:05)  HR: 97 (12-01-18 @ 09:05) (94 - 104)  BP: 154/90 (12-01-18 @ 09:05) (127/78 - 154/90)  RR: 16 (12-01-18 @ 09:05) (14 - 16)  SpO2: 96% (12-01-18 @ 09:05) (96% - 98%)      GENERAL: NAD, well-developed  HEAD:  Atraumatic, Normocephalic  EYES: EOMI, PERRL, conjunctiva and sclera clear  ENMT:  Moist mucous membranes  NECK: Supple, No JVD, Normal thyroid  NERVOUS SYSTEM:  Awake, Alert, has expressive aphasia, follows commands, nods yes or no to questions, non fluent, right facial droop,  right UE and LE weakness  CHEST/LUNG: Clear to ascultation bilaterally; No rales, rhonchi, wheezing, or rubs  HEART: S1 S2, Regular rate and rhythm; No murmurs, rubs, or gallops  ABDOMEN: Soft, Nontender, Nondistended; Bowel sounds present  EXTREMITIES:  2+ Peripheral Pulses, No clubbing, cyanosis, or edema  SKIN: No rash          LABS:                        10.5   17.2  )-----------( 370      ( 01 Dec 2018 06:15 )             32.3     12-01    135  |  96  |  13  ----------------------------<  96  4.0   |  28  |  1.45<H>    Ca    9.7      01 Dec 2018 06:15        RADIOLOGY & ADDITIONAL TESTS:    Imaging Personally Reviewed:  [x] YES  [ ] NO    Consultant(s) Notes Reviewed:  [x] YES  [ ] NO    Care Discussed with Consultants/Other Providers [x] YES  [ ] NO Patient is a 63 year old  Female who presents with a chief complaint of s/p left MCA CVA now with functional deficits (30 Nov 2018 13:05)    Patient seen and examined. Patient with fever of 100.3F orally and 102F rectally. Patient with aphasia, unable to provide history/ ROS.      MEDICATIONS  (STANDING):  acetaminophen   Tablet .. 650 milliGRAM(s) Oral every 6 hours  aspirin  chewable 81 milliGRAM(s) Oral daily  atorvastatin 80 milliGRAM(s) Oral at bedtime  ciprofloxacin     Tablet 500 milliGRAM(s) Oral every 12 hours  clopidogrel Tablet 75 milliGRAM(s) Oral daily  docusate sodium 100 milliGRAM(s) Oral three times a day  enoxaparin Injectable 40 milliGRAM(s) SubCutaneous every 24 hours  FLUoxetine Solution 20 milliGRAM(s) Oral daily  gabapentin 300 milliGRAM(s) Oral at bedtime  midodrine 10 milliGRAM(s) Oral <User Schedule>  multivitamin 1 Tablet(s) Oral daily  senna 2 Tablet(s) Oral at bedtime  sodium chloride 0.9%. 1000 milliLiter(s) (70 mL/Hr) IV Continuous <Continuous>    MEDICATIONS  (PRN):  ALBUTerol    90 MICROgram(s) HFA Inhaler 2 Puff(s) Inhalation every 6 hours PRN Shortness of Breath and/or Wheezing      REVIEW OF SYSTEMS:  +fever  Patient with aphasia, unable to provide ROS      PHYSICAL EXAM:  T(C): 37.9 (12-01-18 @ 09:05), Max: 37.9 (12-01-18 @ 09:05)  HR: 97 (12-01-18 @ 09:05) (94 - 104)  BP: 154/90 (12-01-18 @ 09:05) (127/78 - 154/90)  RR: 16 (12-01-18 @ 09:05) (14 - 16)  SpO2: 96% (12-01-18 @ 09:05) (96% - 98%)      GENERAL: NAD, well-developed  HEAD:  Atraumatic, Normocephalic  EYES: EOMI, PERRL, conjunctiva and sclera clear  ENMT:  Moist mucous membranes  NECK: Supple, No JVD, Normal thyroid  NERVOUS SYSTEM:  Awake, Alert, has expressive aphasia, follows commands, nods yes or no to questions, non fluent, right facial droop,  right UE and LE weakness  CHEST/LUNG: Clear to ascultation bilaterally; No rales, rhonchi, wheezing, or rubs  HEART: S1 S2, Regular rate and rhythm; No murmurs, rubs, or gallops  ABDOMEN: Soft, Nontender, Nondistended; Bowel sounds present  EXTREMITIES:  2+ Peripheral Pulses, No clubbing, cyanosis, or edema  SKIN: No rash          LABS:                        10.5   17.2  )-----------( 370      ( 01 Dec 2018 06:15 )             32.3     12-01    135  |  96  |  13  ----------------------------<  96  4.0   |  28  |  1.45<H>    Ca    9.7      01 Dec 2018 06:15        RADIOLOGY & ADDITIONAL TESTS:    Imaging Personally Reviewed:  [x] YES  [ ] NO    Consultant(s) Notes Reviewed:  [x] YES  [ ] NO    Care Discussed with Consultants/Other Providers [x] YES  [ ] NO

## 2018-12-01 NOTE — PROGRESS NOTE ADULT - SUBJECTIVE AND OBJECTIVE BOX
History of Present Illness: 	  62y/o F w/ HTN, HLD, RA, Asthma s/p elective left transcarotid arterial sten via right femoral vein access (11/13/18) course complicated by code stroke approximately 8 hours postop. CTA head and neck showed a distal M2 branch occlusion. She was deemed not a candidate for intracranial thrombectomy due to the location of the M2 occlusion at that time. Patient monitored in SICU, found to be stable, and sent to the floor. MRI brain on 11/17 showed acute infarct in the left MCA distribution. TTE did not show any obvious structural cardiac source of embolism. On 11/20, she was noted to have fluctuating right hemiparesis with respect to right arm weakness. CT brain on 11/20 showed expected evolution of left MCA distribution infarct and CTA head and neck showed stable findings. Right sided deficits and aphasia possibly secondary to hypoperfusion of area supplied by the L M2 high grade stenosis due to fluctuating blood pressure. Patient started on midodrine to keep a systolic pressure between 140-160. Patient is currently being treated for E Coli UTI with cipro. Pt had a speech and swallow and was recommended to have a Mechanical Soft with Honey-Thick Liquids diet.  Per vascular team, patient will be discharged on midodrine and tapered down as tolerated. Pt was medically optimized for acute rehab admission on 11/28.        Review of Systems:  REVIEW OF SYSTEMS  Constitutional: No fever  HEENT: No visual disturbances, No difficulty hearing  Pulm: No shortness of breath  Cardio: No chest pain, No palpitations  GI:  No abdominal pain, No constipation  : No dysuria  Neuro: No headaches, No memory loss, + loss of strength, +unable to speak  Skin: No itching, No rashes, No lesions   Endo: No temperature intolerance  MSK: No joint pain, No joint swelling, No muscle pain, No Neck or back pain Psych:  No depression, No anxiety   Patient seen and evaluated in . Patient shakes head yes and no with accuracy to answer questions. Patient shakes head "yes" when asked if she is still experiencing pain 'tingling' pain in the right hand and both feet and shakes head "yes' when asked if pain is the same as it was two days ago.  Patient is more tired this morning.  Rectal temp of 100.3 this morning per nursing.  	        Vital Signs Last 24 Hrs  Vital Signs Last 24 Hrs  T(C): 37.5 (01 Dec 2018 18:26), Max: 38.9 (01 Dec 2018 09:05)  T(F): 99.5 (01 Dec 2018 18:26), Max: 102 (01 Dec 2018 09:05)  HR: 90 (01 Dec 2018 18:26) (90 - 104)  BP: 137/79 (01 Dec 2018 18:26) (127/78 - 154/90)  RR: 17 (01 Dec 2018 18:26) (16 - 17)  SpO2: 97% (01 Dec 2018 18:26) (96% - 97%)                            10.8   14.5  )-----------( 382      ( 29 Nov 2018 05:30 )             35.4   11-29    139  |  101  |  8   ----------------------------<  99  4.3   |  26  |  1.25    Ca    9.9      29 Nov 2018 05:30    TPro  7.4  /  Alb  3.1<L>  /  TBili  0.6  /  DBili  x   /  AST  19  /  ALT  28  /  AlkPhos  133<H>  11-29      CT brain on 11/13 did not show any evidence of acute infarct or hemorrhage.   	CTA head and neck on 11/13 showed multifocal intracranial atherosclerosis including severe stenosis of left MCA proximal and distal M1 segment, multifocal stenosis involving inferior M2 segment and occlusion of the superior M2 segment and left proximal ICA stent.  	CTA head and neck on 11/16 showed 75% stenosis of left ICA stent (in-stent stenosis), which appears to be same as compared to 11/13 to my eye.   MRI brain on 11/17 showed acute infarct in the left MCA distribution. TTE did not show any obvious structural cardiac source of embolism. CT brain on 11/20 showed expected evolution of left MCA distribution infarct and CTA head and neck showed stable findings.     Physical Exam:   Constitutional - NAD, Comfortable  HEENT - NCAT, EOMI  Neck - Supple,   Chest - CTA bilaterally, No wheeze, No rhonchi, No crackles  Cardiovascular - RRR, S1S2, No murmurs  Abdomen - BS+, Soft, NTND  Extremities - No C/C/E, No calf tenderness   Neurologic Exam -                    Communication expressive aphasia, follows commands, answers yes/no questions with nodding/shaking head with accuracy.  Non-fluent     Cranial Nerves - + right facial droop      Motor - RUE  1/5, RLE 3/5; LUE/LLE 5/5                        Sensory - allodynia to bilat feet and right hand     Reflexes - DTR Intact, No primitive reflexive     Balance - poor  Psychiatric - Mood stable, Affect WNL	         Assessment and Plan:     		  ASSESSMENT/PLAN  RUPINDER TRIPP is a 62yo Female with  Left MCA distribution stroke in the periprocedural period after carotid revascularization with carotid artery stent placement  now with decreased functional mobility, dysphagia, non-fluent aphasia, right hemiparesis, gait instability and ADL impairments.    COMORBIDITES/ACTIVE MEDICAL ISSUES     #s/p left MCA CVA  -continue Comprehensive Rehab Program of PT/OT/SLP  -ASA/Plavix/Lipitor  -Fluoxetine for motor recovery    #Hypotension  -Midodrine with parameters (will taper as tolerated)  -monitor BP---130s    #UTI  -c/w cipro 500mg q12 hours  -monitor CBC-leukocytosis with WBC 17 today   -given 1 x dose vanco   -blood cx pending   -repeat urine cx (collected via straight cath) sent   -lactate WNL  -IVF started   -Labs in the morning   -monitor vitals and symptoms    #Dysphagia  -continue SLP  -On Dysphagia 3, soft/thin lqs     #Nutrition: add ensure TID, discussed with RD     Gait Instability, ADL impairments and Functional impairments: start Comprehensive Rehab Program of PT/OT     GI/Bowel Mgmt - Colace, Senna, Toileting program  /Bladder Mgmt - PVRs q8 hours, SC >350ccs, toileting program      Precautions / PROPHYLAXIS:   - Falls, Cardiac  - Lungs: Aspiration  - Pressure injury/Skin: Turn Q2hrs while in bed, OOB to Chair, PT/OT    - DVT: Lovenox, SCDs, TEDs History of Present Illness: 	  64y/o F w/ HTN, HLD, RA, Asthma s/p elective left transcarotid arterial sten via right femoral vein access (11/13/18) course complicated by code stroke approximately 8 hours postop. CTA head and neck showed a distal M2 branch occlusion. She was deemed not a candidate for intracranial thrombectomy due to the location of the M2 occlusion at that time. Patient monitored in SICU, found to be stable, and sent to the floor. MRI brain on 11/17 showed acute infarct in the left MCA distribution. TTE did not show any obvious structural cardiac source of embolism. On 11/20, she was noted to have fluctuating right hemiparesis with respect to right arm weakness. CT brain on 11/20 showed expected evolution of left MCA distribution infarct and CTA head and neck showed stable findings. Right sided deficits and aphasia possibly secondary to hypoperfusion of area supplied by the L M2 high grade stenosis due to fluctuating blood pressure. Patient started on midodrine to keep a systolic pressure between 140-160. Patient is currently being treated for E Coli UTI with cipro. Pt had a speech and swallow and was recommended to have a Mechanical Soft with Honey-Thick Liquids diet.  Per vascular team, patient will be discharged on midodrine and tapered down as tolerated. Pt was medically optimized for acute rehab admission on 11/28.        Review of Systems:  REVIEW OF SYSTEMS  Constitutional: No fever  HEENT: No visual disturbances, No difficulty hearing  Pulm: No shortness of breath  Cardio: No chest pain, No palpitations  GI:  No abdominal pain, No constipation  : No dysuria  Neuro: No headaches, No memory loss, + loss of strength, +unable to speak  Skin: No itching, No rashes, No lesions   Endo: No temperature intolerance  MSK: No joint pain, No joint swelling, No muscle pain, No Neck or back pain Psych:  No depression, No anxiety   Patient seen and evaluated in . Patient shakes head yes and no with accuracy to answer questions. Patient shakes head "yes" when asked if she is still experiencing pain 'tingling' pain in the right hand and both feet and shakes head "yes' when asked if pain is the same as it was two days ago.  Patient is more tired this morning.  Rectal temp of 100.3 this morning per nursing.  	        Vital Signs Last 24 Hrs  Vital Signs Last 24 Hrs  T(C): 37.5 (01 Dec 2018 18:26), Max: 38.9 (01 Dec 2018 09:05)  T(F): 99.5 (01 Dec 2018 18:26), Max: 102 (01 Dec 2018 09:05)  HR: 90 (01 Dec 2018 18:26) (90 - 104)  BP: 137/79 (01 Dec 2018 18:26) (127/78 - 154/90)  RR: 17 (01 Dec 2018 18:26) (16 - 17)  SpO2: 97% (01 Dec 2018 18:26) (96% - 97%)                            10.8   14.5  )-----------( 382      ( 29 Nov 2018 05:30 )             35.4   11-29    139  |  101  |  8   ----------------------------<  99  4.3   |  26  |  1.25    Ca    9.9      29 Nov 2018 05:30    TPro  7.4  /  Alb  3.1<L>  /  TBili  0.6  /  DBili  x   /  AST  19  /  ALT  28  /  AlkPhos  133<H>  11-29      CT brain on 11/13 did not show any evidence of acute infarct or hemorrhage.   	CTA head and neck on 11/13 showed multifocal intracranial atherosclerosis including severe stenosis of left MCA proximal and distal M1 segment, multifocal stenosis involving inferior M2 segment and occlusion of the superior M2 segment and left proximal ICA stent.  	CTA head and neck on 11/16 showed 75% stenosis of left ICA stent (in-stent stenosis), which appears to be same as compared to 11/13 to my eye.   MRI brain on 11/17 showed acute infarct in the left MCA distribution. TTE did not show any obvious structural cardiac source of embolism. CT brain on 11/20 showed expected evolution of left MCA distribution infarct and CTA head and neck showed stable findings.     Physical Exam:   Constitutional - NAD, Comfortable  HEENT - NCAT, EOMI  Neck - Supple,   Chest - CTA bilaterally, No wheeze, No rhonchi, No crackles  Cardiovascular - RRR, S1S2, No murmurs  Abdomen - BS+, Soft, NTND  Extremities - No C/C/E, No calf tenderness   Neurologic Exam -                    Communication expressive aphasia, follows commands, answers yes/no questions with nodding/shaking head with accuracy.  Non-fluent     Cranial Nerves - + right facial droop      Motor - RUE  1/5, RLE 3/5; LUE/LLE 5/5                        Sensory - allodynia to bilat feet and right hand     Reflexes - DTR Intact, No primitive reflexive     Balance - poor  Psychiatric - Mood stable, Affect WNL	         Assessment and Plan:     		  ASSESSMENT/PLAN  RUPINDER TRIPP is a 62yo Female with  Left MCA distribution stroke in the periprocedural period after carotid revascularization with carotid artery stent placement  now with decreased functional mobility, dysphagia, non-fluent aphasia, right hemiparesis, gait instability and ADL impairments.    COMORBIDITES/ACTIVE MEDICAL ISSUES     #s/p left MCA CVA  -continue Comprehensive Rehab Program of PT/OT/SLP  -ASA/Plavix/Lipitor  -Fluoxetine for motor recovery    #Hypotension  -Midodrine with parameters (will taper as tolerated)  -monitor BP---130s    #UTI  -c/w cipro 500mg q12 hours  -monitor CBC-leukocytosis with WBC 17 today   -given 1 x dose vanco   -blood cx pending   -repeat urine cx (collected via straight cath) sent   -lactate WNL  -IVF started   -Labs in the morning   -monitor vitals and symptoms    #MARLY  -started on IVF    #Dysphagia  -continue SLP  -On Dysphagia 3, soft/thin lqs     #Nutrition: add ensure TID, discussed with RD     #Neuropathic pain to bilat feet and right hand   -salvador 300mg qHS   -add salvador 300mg at 1pm once cr improves     Gait Instability, ADL impairments and Functional impairments: start Comprehensive Rehab Program of PT/OT     GI/Bowel Mgmt - Colace, Senna, Toileting program  /Bladder Mgmt - PVRs q8 hours, SC >350ccs, toileting program      Precautions / PROPHYLAXIS:   - Falls, Cardiac  - Lungs: Aspiration  - Pressure injury/Skin: Turn Q2hrs while in bed, OOB to Chair, PT/OT    - DVT: Lovenox, SCDs, TEDs

## 2018-12-02 LAB
BASOPHILS # BLD AUTO: 0.1 K/UL — SIGNIFICANT CHANGE UP (ref 0–0.2)
BASOPHILS NFR BLD AUTO: 0.9 % — SIGNIFICANT CHANGE UP (ref 0–2)
CULTURE RESULTS: NO GROWTH — SIGNIFICANT CHANGE UP
EOSINOPHIL # BLD AUTO: 0.5 K/UL — SIGNIFICANT CHANGE UP (ref 0–0.5)
EOSINOPHIL NFR BLD AUTO: 4.2 % — SIGNIFICANT CHANGE UP (ref 0–6)
HCT VFR BLD CALC: 31 % — LOW (ref 34.5–45)
HGB BLD-MCNC: 9.5 G/DL — LOW (ref 11.5–15.5)
LACTATE SERPL-SCNC: 0.6 MMOL/L — LOW (ref 0.7–2)
LYMPHOCYTES # BLD AUTO: 18.8 % — SIGNIFICANT CHANGE UP (ref 13–44)
LYMPHOCYTES # BLD AUTO: 2.3 K/UL — SIGNIFICANT CHANGE UP (ref 1–3.3)
MAGNESIUM SERPL-MCNC: 1.9 MG/DL — SIGNIFICANT CHANGE UP (ref 1.6–2.6)
MCHC RBC-ENTMCNC: 23.4 PG — LOW (ref 27–34)
MCHC RBC-ENTMCNC: 30.5 GM/DL — LOW (ref 32–36)
MCV RBC AUTO: 76.6 FL — LOW (ref 80–100)
MONOCYTES # BLD AUTO: 1.1 K/UL — HIGH (ref 0–0.9)
MONOCYTES NFR BLD AUTO: 9.4 % — HIGH (ref 1–9)
NEUTROPHILS # BLD AUTO: 8.1 K/UL — HIGH (ref 1.8–7.4)
NEUTROPHILS NFR BLD AUTO: 66.8 % — SIGNIFICANT CHANGE UP (ref 43–77)
PHOSPHATE SERPL-MCNC: 3.2 MG/DL — SIGNIFICANT CHANGE UP (ref 2.5–4.5)
PLATELET # BLD AUTO: 355 K/UL — SIGNIFICANT CHANGE UP (ref 150–400)
RBC # BLD: 4.05 M/UL — SIGNIFICANT CHANGE UP (ref 3.8–5.2)
RBC # FLD: 14.7 % — HIGH (ref 10.3–14.5)
SPECIMEN SOURCE: SIGNIFICANT CHANGE UP
WBC # BLD: 12.1 K/UL — HIGH (ref 3.8–10.5)
WBC # FLD AUTO: 12.1 K/UL — HIGH (ref 3.8–10.5)

## 2018-12-02 PROCEDURE — 99233 SBSQ HOSP IP/OBS HIGH 50: CPT

## 2018-12-02 RX ADMIN — ATORVASTATIN CALCIUM 80 MILLIGRAM(S): 80 TABLET, FILM COATED ORAL at 21:02

## 2018-12-02 RX ADMIN — Medication 650 MILLIGRAM(S): at 17:09

## 2018-12-02 RX ADMIN — Medication 500 MILLIGRAM(S): at 05:56

## 2018-12-02 RX ADMIN — Medication 1 TABLET(S): at 11:25

## 2018-12-02 RX ADMIN — Medication 20 MILLIGRAM(S): at 11:25

## 2018-12-02 RX ADMIN — Medication 650 MILLIGRAM(S): at 11:25

## 2018-12-02 RX ADMIN — SENNA PLUS 2 TABLET(S): 8.6 TABLET ORAL at 21:02

## 2018-12-02 RX ADMIN — GABAPENTIN 300 MILLIGRAM(S): 400 CAPSULE ORAL at 21:02

## 2018-12-02 RX ADMIN — Medication 650 MILLIGRAM(S): at 12:35

## 2018-12-02 RX ADMIN — Medication 650 MILLIGRAM(S): at 05:56

## 2018-12-02 RX ADMIN — CLOPIDOGREL BISULFATE 75 MILLIGRAM(S): 75 TABLET, FILM COATED ORAL at 11:25

## 2018-12-02 RX ADMIN — MIDODRINE HYDROCHLORIDE 10 MILLIGRAM(S): 2.5 TABLET ORAL at 14:14

## 2018-12-02 RX ADMIN — Medication 100 MILLIGRAM(S): at 14:14

## 2018-12-02 RX ADMIN — Medication 500 MILLIGRAM(S): at 17:09

## 2018-12-02 RX ADMIN — Medication 100 MILLIGRAM(S): at 05:56

## 2018-12-02 RX ADMIN — Medication 650 MILLIGRAM(S): at 00:15

## 2018-12-02 RX ADMIN — Medication 81 MILLIGRAM(S): at 11:25

## 2018-12-02 RX ADMIN — Medication 650 MILLIGRAM(S): at 06:30

## 2018-12-02 RX ADMIN — Medication 650 MILLIGRAM(S): at 18:02

## 2018-12-02 RX ADMIN — Medication 100 MILLIGRAM(S): at 21:02

## 2018-12-02 RX ADMIN — MIDODRINE HYDROCHLORIDE 10 MILLIGRAM(S): 2.5 TABLET ORAL at 05:56

## 2018-12-02 RX ADMIN — ENOXAPARIN SODIUM 40 MILLIGRAM(S): 100 INJECTION SUBCUTANEOUS at 21:02

## 2018-12-02 NOTE — PROGRESS NOTE ADULT - ASSESSMENT
63 year old female with  Left MCA distribution stroke in the periprocedural period after carotid revascularization with carotid artery stent placement  now with decreased functional mobility, dysphagia, non-fluent aphasia, right hemiparesis, gait instability and ADL impairments.    >s/p left MCA CVA  -Comprehensive Rehab Program of PT/OT/rehab/SLP  -continue ASA/Plavix/Lipitor  -continue Fluoxetine for motor recovery    >right sided weakness secondary to above  -comprehensive active PT/OT/ rehab    >Sepsis, severe -resolved  -lactate WNL; cxr - negative  -f/u blood cultures  -s/p IV fluids, IV antibiotic   -wbc improving  -monitor temp, wbc    >UTI  - is currently on cipro  -monitor temp, wbc    >History of Hypotension  -BP currently stable  -Midodrine with parameters (taper as tolerated)  -monitor BP    >MARLY - IVF, monitor BUN/Cr    >Dysphagia  -swallow therapy  -On Dysphagia 3, soft/thin liquid    >Feet pain- xrays no fracture, continue Tylenol, gabapentin, b/l LE dopplers - negative for dvt    >Gait abnormality, with ADL impairments and Functional impairments: Comprehensive Rehab Program of PT/OT/ rehab    >Constipation - Bowel regimen, Colace, Senna, monitor BM    >DVT ppx: Lovenox

## 2018-12-02 NOTE — PROGRESS NOTE ADULT - SUBJECTIVE AND OBJECTIVE BOX
No overnight events.  Patient fatigued.  Limited verbalizations.  Follows some commands.   WBC decreased to 12.  UCx negative.  Doppler negative.  CXR NAPD.    REVIEW OF SYSTEMS  Constitutional - No fever,  +fatigue  Neurological - No headaches, +loss of strength    VITALS  T(C): 36.7 (12-02-18 @ 07:17), Max: 37.5 (12-01-18 @ 18:26)  HR: 85 (12-02-18 @ 07:17) (81 - 94)  BP: 151/82 (12-02-18 @ 07:17) (126/75 - 151/82)  RR: 13 (12-02-18 @ 07:17) (13 - 17)  SpO2: 99% (12-02-18 @ 07:17) (97% - 99%)  Wt(kg): --       MEDICATIONS   acetaminophen   Tablet .. 650 milliGRAM(s) every 6 hours  ALBUTerol    90 MICROgram(s) HFA Inhaler 2 Puff(s) every 6 hours PRN  aspirin  chewable 81 milliGRAM(s) daily  atorvastatin 80 milliGRAM(s) at bedtime  ciprofloxacin     Tablet 500 milliGRAM(s) every 12 hours  clopidogrel Tablet 75 milliGRAM(s) daily  docusate sodium 100 milliGRAM(s) three times a day  enoxaparin Injectable 40 milliGRAM(s) every 24 hours  FLUoxetine Solution 20 milliGRAM(s) daily  gabapentin 300 milliGRAM(s) at bedtime  midodrine 10 milliGRAM(s) <User Schedule>  multivitamin 1 Tablet(s) daily  senna 2 Tablet(s) at bedtime  sodium chloride 0.9%. 1000 milliLiter(s) <Continuous>      RECENT LABS/IMAGING  CBC Full  -  ( 02 Dec 2018 06:59 )  WBC Count : 12.1 K/uL  Hemoglobin : 9.5 g/dL  Hematocrit : 31.0 %  Platelet Count - Automated : 355 K/uL  Mean Cell Volume : 76.6 fl  Mean Cell Hemoglobin : 23.4 pg  Mean Cell Hemoglobin Concentration : 30.5 gm/dL  Auto Neutrophil # : 8.1 K/uL  Auto Lymphocyte # : 2.3 K/uL  Auto Monocyte # : 1.1 K/uL  Auto Eosinophil # : 0.5 K/uL  Auto Basophil # : 0.1 K/uL  Auto Neutrophil % : 66.8 %  Auto Lymphocyte % : 18.8 %  Auto Monocyte % : 9.4 %  Auto Eosinophil % : 4.2 %  Auto Basophil % : 0.9 %    12-01    138  |  100  |  13  ----------------------------<  85  4.3   |  24  |  1.45<H>    Ca    9.6      01 Dec 2018 12:00  Phos  3.2     12-02  Mg     1.9     12-02    TPro  7.4  /  Alb  3.1<L>  /  TBili  0.4  /  DBili  x   /  AST  21  /  ALT  18  /  AlkPhos  133<H>  12-01      	    ---------  PHYSICAL EXAM  Constitutional - NAD, Comfortable  Pulm - Breathing comfortably, No wheezing  Abd - Soft, NTND  Extremities - No calf tenderness  Neurologic Exam -                    Cognitive - Awake, Alert     Communication - Limited verbalizations     Motor - Right hemiparesis/plegia     Sensory - Intact to LT  Psychiatric - Mood depressed, Flat    ASSESSMENT/PLAN  63y Female with functional deficits after CVA  CVA - ASA, Plavix, Lipitor  Mood/motor Recovery - Prozac  UTi - Cirpo  Pain - Tylenol PRN, Neurontin  DVT PPX - Lovenox    Continue 3hrs a day of comprehensive rehab program.

## 2018-12-02 NOTE — PROGRESS NOTE ADULT - SUBJECTIVE AND OBJECTIVE BOX
Patient is a 63 year old  Female who presents with a chief complaint of s/p left MCA CVA now with functional deficits (01 Dec 2018 20:30)    Patient seen and examined, afebrile for 24h. Patient with aphasia, unable to provide history/ ROS.      MEDICATIONS  (STANDING):  acetaminophen   Tablet .. 650 milliGRAM(s) Oral every 6 hours  aspirin  chewable 81 milliGRAM(s) Oral daily  atorvastatin 80 milliGRAM(s) Oral at bedtime  ciprofloxacin     Tablet 500 milliGRAM(s) Oral every 12 hours  clopidogrel Tablet 75 milliGRAM(s) Oral daily  docusate sodium 100 milliGRAM(s) Oral three times a day  enoxaparin Injectable 40 milliGRAM(s) SubCutaneous every 24 hours  FLUoxetine Solution 20 milliGRAM(s) Oral daily  gabapentin 300 milliGRAM(s) Oral at bedtime  midodrine 10 milliGRAM(s) Oral <User Schedule>  multivitamin 1 Tablet(s) Oral daily  senna 2 Tablet(s) Oral at bedtime  sodium chloride 0.9%. 1000 milliLiter(s) (70 mL/Hr) IV Continuous <Continuous>    MEDICATIONS  (PRN):  ALBUTerol    90 MICROgram(s) HFA Inhaler 2 Puff(s) Inhalation every 6 hours PRN Shortness of Breath and/or Wheezing      REVIEW OF SYSTEMS:  CONSTITUTIONAL: No fever,   Patient with aphasia, unable to provide complete ROS    PHYSICAL EXAM:  T(C): 36.7 (12-02-18 @ 07:17), Max: 38.9 (12-01-18 @ 09:05)  HR: 85 (12-02-18 @ 07:17) (81 - 97)  BP: 151/82 (12-02-18 @ 07:17) (126/75 - 154/90)  RR: 13 (12-02-18 @ 07:17) (13 - 17)  SpO2: 99% (12-02-18 @ 07:17) (96% - 99%)  I&O's Summary    01 Dec 2018 07:01  -  02 Dec 2018 07:00  --------------------------------------------------------  IN: 910 mL / OUT: 0 mL / NET: 910 mL    GENERAL: NAD, well-developed  HEAD:  Atraumatic, Normocephalic  EYES: EOMI, PERRL, conjunctiva and sclera clear  ENMT:  Moist mucous membranes  NECK: Supple, No JVD, Normal thyroid  NERVOUS SYSTEM:  Awake, Alert, has expressive aphasia, follows commands, nods yes or no to questions, non fluent, right facial droop,  right UE and LE weakness  CHEST/LUNG: Clear to ascultation bilaterally; No rales, rhonchi, wheezing, or rubs  HEART: S1 S2, Regular rate and rhythm; No murmurs, rubs, or gallops  ABDOMEN: Soft, Nontender, Nondistended; Bowel sounds present  EXTREMITIES:  2+ Peripheral Pulses, No clubbing, cyanosis, or edema  SKIN: No rash      LABS:                        9.5    12.1  )-----------( 355      ( 02 Dec 2018 06:59 )             31.0     12-01    138  |  100  |  13  ----------------------------<  85  4.3   |  24  |  1.45<H>    Ca    9.6      01 Dec 2018 12:00  Phos  3.2     12-02  Mg     1.9     12-02    TPro  7.4  /  Alb  3.1<L>  /  TBili  0.4  /  DBili  x   /  AST  21  /  ALT  18  /  AlkPhos  133<H>  12-01        RADIOLOGY & ADDITIONAL TESTS:    Imaging Personally Reviewed:  [x] YES  [ ] NO    Consultant(s) Notes Reviewed:  [x] YES  [ ] NO    Care Discussed with Consultants/Other Providers [x] YES  [ ] NO

## 2018-12-03 DIAGNOSIS — I63.512 CEREBRAL INFARCTION DUE TO UNSPECIFIED OCCLUSION OR STENOSIS OF LEFT MIDDLE CEREBRAL ARTERY: ICD-10-CM

## 2018-12-03 LAB
ALBUMIN SERPL ELPH-MCNC: 2.8 G/DL — LOW (ref 3.3–5)
ALP SERPL-CCNC: 111 U/L — SIGNIFICANT CHANGE UP (ref 40–120)
ALT FLD-CCNC: 21 U/L DA — SIGNIFICANT CHANGE UP (ref 10–45)
ANION GAP SERPL CALC-SCNC: 6 MMOL/L — SIGNIFICANT CHANGE UP (ref 5–17)
AST SERPL-CCNC: 25 U/L — SIGNIFICANT CHANGE UP (ref 10–40)
BASOPHILS # BLD AUTO: 0.1 K/UL — SIGNIFICANT CHANGE UP (ref 0–0.2)
BASOPHILS NFR BLD AUTO: 0.9 % — SIGNIFICANT CHANGE UP (ref 0–2)
BILIRUB SERPL-MCNC: 0.3 MG/DL — SIGNIFICANT CHANGE UP (ref 0.2–1.2)
BUN SERPL-MCNC: 12 MG/DL — SIGNIFICANT CHANGE UP (ref 7–23)
CALCIUM SERPL-MCNC: 9.3 MG/DL — SIGNIFICANT CHANGE UP (ref 8.4–10.5)
CHLORIDE SERPL-SCNC: 103 MMOL/L — SIGNIFICANT CHANGE UP (ref 96–108)
CO2 SERPL-SCNC: 30 MMOL/L — SIGNIFICANT CHANGE UP (ref 22–31)
CREAT SERPL-MCNC: 1.12 MG/DL — SIGNIFICANT CHANGE UP (ref 0.5–1.3)
EOSINOPHIL # BLD AUTO: 0.6 K/UL — HIGH (ref 0–0.5)
EOSINOPHIL NFR BLD AUTO: 5.2 % — SIGNIFICANT CHANGE UP (ref 0–6)
GLUCOSE SERPL-MCNC: 86 MG/DL — SIGNIFICANT CHANGE UP (ref 70–99)
HCT VFR BLD CALC: 31.3 % — LOW (ref 34.5–45)
HGB BLD-MCNC: 9.7 G/DL — LOW (ref 11.5–15.5)
LYMPHOCYTES # BLD AUTO: 18.8 % — SIGNIFICANT CHANGE UP (ref 13–44)
LYMPHOCYTES # BLD AUTO: 2.2 K/UL — SIGNIFICANT CHANGE UP (ref 1–3.3)
MCHC RBC-ENTMCNC: 23.8 PG — LOW (ref 27–34)
MCHC RBC-ENTMCNC: 30.9 GM/DL — LOW (ref 32–36)
MCV RBC AUTO: 77.2 FL — LOW (ref 80–100)
MONOCYTES # BLD AUTO: 0.8 K/UL — SIGNIFICANT CHANGE UP (ref 0–0.9)
MONOCYTES NFR BLD AUTO: 7 % — SIGNIFICANT CHANGE UP (ref 1–9)
NEUTROPHILS # BLD AUTO: 8 K/UL — HIGH (ref 1.8–7.4)
NEUTROPHILS NFR BLD AUTO: 68.1 % — SIGNIFICANT CHANGE UP (ref 43–77)
PLATELET # BLD AUTO: 359 K/UL — SIGNIFICANT CHANGE UP (ref 150–400)
POTASSIUM SERPL-MCNC: 3.8 MMOL/L — SIGNIFICANT CHANGE UP (ref 3.5–5.3)
POTASSIUM SERPL-SCNC: 3.8 MMOL/L — SIGNIFICANT CHANGE UP (ref 3.5–5.3)
PROT SERPL-MCNC: 7 G/DL — SIGNIFICANT CHANGE UP (ref 6–8.3)
RBC # BLD: 4.06 M/UL — SIGNIFICANT CHANGE UP (ref 3.8–5.2)
RBC # FLD: 14.7 % — HIGH (ref 10.3–14.5)
SODIUM SERPL-SCNC: 139 MMOL/L — SIGNIFICANT CHANGE UP (ref 135–145)
WBC # BLD: 11.7 K/UL — HIGH (ref 3.8–10.5)
WBC # FLD AUTO: 11.7 K/UL — HIGH (ref 3.8–10.5)

## 2018-12-03 PROCEDURE — 99232 SBSQ HOSP IP/OBS MODERATE 35: CPT

## 2018-12-03 PROCEDURE — 99233 SBSQ HOSP IP/OBS HIGH 50: CPT

## 2018-12-03 RX ORDER — NYSTATIN CREAM 100000 [USP'U]/G
1 CREAM TOPICAL
Qty: 0 | Refills: 0 | Status: DISCONTINUED | OUTPATIENT
Start: 2018-12-03 | End: 2018-12-19

## 2018-12-03 RX ORDER — GABAPENTIN 400 MG/1
300 CAPSULE ORAL
Qty: 0 | Refills: 0 | Status: DISCONTINUED | OUTPATIENT
Start: 2018-12-03 | End: 2018-12-19

## 2018-12-03 RX ADMIN — ATORVASTATIN CALCIUM 80 MILLIGRAM(S): 80 TABLET, FILM COATED ORAL at 21:24

## 2018-12-03 RX ADMIN — Medication 650 MILLIGRAM(S): at 00:01

## 2018-12-03 RX ADMIN — Medication 81 MILLIGRAM(S): at 11:09

## 2018-12-03 RX ADMIN — ENOXAPARIN SODIUM 40 MILLIGRAM(S): 100 INJECTION SUBCUTANEOUS at 21:24

## 2018-12-03 RX ADMIN — SENNA PLUS 2 TABLET(S): 8.6 TABLET ORAL at 21:24

## 2018-12-03 RX ADMIN — Medication 1 TABLET(S): at 11:09

## 2018-12-03 RX ADMIN — Medication 650 MILLIGRAM(S): at 00:45

## 2018-12-03 RX ADMIN — GABAPENTIN 300 MILLIGRAM(S): 400 CAPSULE ORAL at 21:24

## 2018-12-03 RX ADMIN — Medication 650 MILLIGRAM(S): at 11:09

## 2018-12-03 RX ADMIN — MIDODRINE HYDROCHLORIDE 10 MILLIGRAM(S): 2.5 TABLET ORAL at 05:41

## 2018-12-03 RX ADMIN — CLOPIDOGREL BISULFATE 75 MILLIGRAM(S): 75 TABLET, FILM COATED ORAL at 11:09

## 2018-12-03 RX ADMIN — Medication 500 MILLIGRAM(S): at 05:41

## 2018-12-03 RX ADMIN — Medication 500 MILLIGRAM(S): at 17:05

## 2018-12-03 RX ADMIN — Medication 650 MILLIGRAM(S): at 17:43

## 2018-12-03 RX ADMIN — Medication 650 MILLIGRAM(S): at 17:05

## 2018-12-03 RX ADMIN — NYSTATIN CREAM 1 APPLICATION(S): 100000 CREAM TOPICAL at 17:05

## 2018-12-03 RX ADMIN — Medication 650 MILLIGRAM(S): at 05:41

## 2018-12-03 RX ADMIN — Medication 100 MILLIGRAM(S): at 15:18

## 2018-12-03 RX ADMIN — Medication 650 MILLIGRAM(S): at 12:45

## 2018-12-03 RX ADMIN — GABAPENTIN 300 MILLIGRAM(S): 400 CAPSULE ORAL at 12:58

## 2018-12-03 RX ADMIN — Medication 100 MILLIGRAM(S): at 05:41

## 2018-12-03 RX ADMIN — Medication 650 MILLIGRAM(S): at 06:22

## 2018-12-03 RX ADMIN — Medication 20 MILLIGRAM(S): at 11:09

## 2018-12-03 RX ADMIN — Medication 100 MILLIGRAM(S): at 21:24

## 2018-12-03 NOTE — PROGRESS NOTE ADULT - SUBJECTIVE AND OBJECTIVE BOX
Patient is a 63y old  Female who presents with a chief complaint of s/p left MCA CVA now with functional deficits (03 Dec 2018 11:38)    Patient seen and examined at bedside.    ALLERGIES:  peanut butter (Anaphylaxis)  peanuts (Anaphylaxis; Hives)  penicillins (Anaphylaxis)  propofol (Angioedema)  shellfish (Anaphylaxis; Hives)  tomatoes (Anaphylaxis; Hives)    MEDICATIONS  (STANDING):  acetaminophen   Tablet .. 650 milliGRAM(s) Oral every 6 hours  aspirin  chewable 81 milliGRAM(s) Oral daily  atorvastatin 80 milliGRAM(s) Oral at bedtime  ciprofloxacin     Tablet 500 milliGRAM(s) Oral every 12 hours  clopidogrel Tablet 75 milliGRAM(s) Oral daily  docusate sodium 100 milliGRAM(s) Oral three times a day  enoxaparin Injectable 40 milliGRAM(s) SubCutaneous every 24 hours  FLUoxetine Solution 20 milliGRAM(s) Oral daily  gabapentin 300 milliGRAM(s) Oral at bedtime  gabapentin 300 milliGRAM(s) Oral <User Schedule>  midodrine 10 milliGRAM(s) Oral <User Schedule>  multivitamin 1 Tablet(s) Oral daily  nystatin Powder 1 Application(s) Topical two times a day  senna 2 Tablet(s) Oral at bedtime    MEDICATIONS  (PRN):  ALBUTerol    90 MICROgram(s) HFA Inhaler 2 Puff(s) Inhalation every 6 hours PRN Shortness of Breath and/or Wheezing    Vital Signs Last 24 Hrs  T(F): 98.1 (03 Dec 2018 08:07), Max: 98.1 (02 Dec 2018 20:21)  HR: 84 (03 Dec 2018 08:07) (72 - 84)  BP: 144/83 (03 Dec 2018 08:07) (135/81 - 144/83)  RR: 15 (03 Dec 2018 08:07) (15 - 16)  SpO2: 97% (03 Dec 2018 08:07) (96% - 97%)  I&O's Summary    PHYSICAL EXAM:  General: NAD  ENT: MMM  Neck: Supple, No JVD  Lungs: Clear to auscultation bilaterally  Cardio: RRR, S1/S2, No murmurs  Abdomen: Soft, Nontender, Nondistended; Bowel sounds present  Extremities: No calf tenderness, No pitting edema  Neuro: Nonverbal, aphasia, right hemiparesis, follow commands    LABS:                        9.7    11.7  )-----------( 359      ( 03 Dec 2018 05:52 )             31.3     12-03    139  |  103  |  12  ----------------------------<  86  3.8   |  30  |  1.12    Ca    9.3      03 Dec 2018 05:52  Phos  3.2     12-02  Mg     1.9     12-02    TPro  7.0  /  Alb  2.8  /  TBili  0.3  /  DBili  x   /  AST  25  /  ALT  21  /  AlkPhos  111  12-03    eGFR if Non African American: 53 mL/min/1.73M2 (12-03-18 @ 05:52)  eGFR if African American: 61 mL/min/1.73M2 (12-03-18 @ 05:52)      Lactate, Blood: 0.6 mmol/L (12-02 @ 06:59)  Lactate, Blood: 0.9 mmol/L (12-01 @ 11:50)    11-01 [id number] 6.0        Culture - Blood (collected 01 Dec 2018 11:50)  Source: .Blood Blood  Preliminary Report (02 Dec 2018 22:01):    No growth to date.    Culture - Blood (collected 01 Dec 2018 11:50)  Source: .Blood Blood  Preliminary Report (02 Dec 2018 22:01):    No growth to date.    Culture - Urine (collected 01 Dec 2018 09:00)  Source: .Urine Catheterized  Final Report (02 Dec 2018 08:50):    No growth      RADIOLOGY & ADDITIONAL TESTS:  < from: Xray Chest 1 View- PORTABLE-Routine (12.01.18 @ 17:09) >  Impression: No evidence for pulmonary consolidation, pleural effusion or   pneumothorax.    Stable cardiac silhouette.    < end of copied text >      Care Discussed with Consultants/Other Providers: Dr. Pena

## 2018-12-03 NOTE — PROGRESS NOTE ADULT - SUBJECTIVE AND OBJECTIVE BOX
History of Present Illness: 	  62y/o F w/ HTN, HLD, RA, Asthma s/p elective left transcarotid arterial sten via right femoral vein access (11/13/18) course complicated by code stroke approximately 8 hours postop. CTA head and neck showed a distal M2 branch occlusion. She was deemed not a candidate for intracranial thrombectomy due to the location of the M2 occlusion at that time. Patient monitored in SICU, found to be stable, and sent to the floor. MRI brain on 11/17 showed acute infarct in the left MCA distribution. TTE did not show any obvious structural cardiac source of embolism. On 11/20, she was noted to have fluctuating right hemiparesis with respect to right arm weakness. CT brain on 11/20 showed expected evolution of left MCA distribution infarct and CTA head and neck showed stable findings. Right sided deficits and aphasia possibly secondary to hypoperfusion of area supplied by the L M2 high grade stenosis due to fluctuating blood pressure. Patient started on midodrine to keep a systolic pressure between 140-160. Patient is currently being treated for E Coli UTI with cipro. Pt had a speech and swallow and was recommended to have a Mechanical Soft with Honey-Thick Liquids diet.  Per vascular team, patient will be discharged on midodrine and tapered down as tolerated. Pt was medically optimized for acute rehab admission on 11/28.        Review of Systems:  TODAY'S REVIEW OF SYMPTOMS [ ] Constiutional WNL            [X] Cardio WNL               [X] Resp WNL [X] GI WNL                            [X]  WNL                    [X] Heme WNL [X] Endo WNL                       [X] Skin WNL                   [  ] MSK WNL [  ] Neuro WNL                     [X] Cognitive WNL           [X] Psych WNL   Patient seen and evaluated in . No acute events overnight. Patient shakes head yes and no with accuracy to answer questions. Uses speech picture board to answer questions appropriately as well. Patient shakes head "yes" when asked if she is still experiencing pain in both feet. Pt endorses that she does not like the food at breakfast and did not eat much. Denies having any other complaints. 	        Vital Signs Last 24 Hrs  T(C): 36.7 (12-03-18 @ 08:07)  T(F): 98.1 (12-03-18 @ 08:07), Max: 98.1 (12-02-18 @ 20:21)  HR: 84 (12-03-18 @ 08:07) (72 - 84)  BP: 144/83 (12-03-18 @ 08:07) (135/81 - 144/83)                            9.7    11.7  )-----------( 359      ( 03 Dec 2018 05:52 )             31.3     12-03    139  |  103  |  12  ----------------------------<  86  3.8   |  30  |  1.12    Ca    9.3      03 Dec 2018 05:52  Phos  3.2     12-02  Mg     1.9     12-02    TPro  7.0  /  Alb  2.8<L>  /  TBili  0.3  /  DBili  x   /  AST  25  /  ALT  21  /  AlkPhos  111  12-03         Physical Exam:   Constitutional - NAD, Comfortable  HEENT - NCAT, EOMI  Neck - Supple,   Chest - CTA bilaterally, No wheeze, No rhonchi, No crackles  Cardiovascular - RRR, S1S2, No murmurs  Abdomen - BS+, Soft, NTND  Extremities - No C/C/E, No calf tenderness   Neurologic Exam -                    Communication expressive aphasia, follows commands, answers yes/no questions with nodding/shaking head and using communication board with accuracy.  Non-fluent     Cranial Nerves - + right facial droop      Motor - RUE  2/5 EF/EE, Shoulder ext, RLE hip flex 2/5, 1/5 PF/DF 2/2 to pain ; LUE/LLE 5/5                Sensory - allodynia to bilat feet  Psychiatric - Mood stable, Affect WNL	         Assessment and Plan:     		  ASSESSMENT/PLAN  RUPINDER TRIPP is a 62yo Female with  Left MCA distribution stroke in the periprocedural period after carotid revascularization with carotid artery stent placement  now with decreased functional mobility, dysphagia, non-fluent aphasia, right hemiparesis, gait instability and ADL impairments.    COMORBIDITES/ACTIVE MEDICAL ISSUES     #s/p left MCA CVA  -continue Comprehensive Rehab Program of PT/OT/SLP  -ASA/Plavix/Lipitor  -Fluoxetine for motor recovery    #Hypotension  -Midodrine with parameters (will taper as tolerated)  -monitor BP    #UTI  -c/w cipro 500mg q12 hours (last dose 12/3)  -blood cx -no growth  -CBC 12/3 stable  -monitor vitals and symptoms    #MARLY  -BMP stable 12/3    #Dysphagia  -continue SLP  -On Dysphagia 3, soft/thin lqs     #Nutrition: ensure TID, discussed with RD     #Neuropathic pain to bilat feet and right hand   -salvador 300mg qHS, add 300mg q1300 (12/3)     #B/L Elongated Toe Nail Care  -podiatry c/s placed 12/3     Gait Instability, ADL impairments and Functional impairments: start Comprehensive Rehab Program of PT/OT     GI/Bowel Mgmt - Colace, Senna, Toileting program  /Bladder Mgmt - toileting program      Precautions / PROPHYLAXIS:   - Falls, Cardiac  - Lungs: Aspiration  - Pressure injury/Skin: Turn Q2hrs while in bed, OOB to Chair, PT/OT    - DVT: Lovenox, SCDs, TEDs

## 2018-12-03 NOTE — PROGRESS NOTE ADULT - ASSESSMENT
63 year old female with  Left MCA distribution stroke in the periprocedural period after carotid revascularization with carotid artery stent placement  now with decreased functional mobility, dysphagia, aphasia, right hemiparesis, gait instability and ADL impairments.    #left MCA CVA complicated by  #Right hemiparesis and  #Aphasia  -PT/OT/SLP - actively being treated  -continue ASA/Plavix/Lipitor  -continue Fluoxetine for motor recovery    #UTI  -last day of Cipro is today (started at OSH)    #Hypotension  -Midodrine (? taper)  -monitor BP    #MARLY  -improved with IVF    #Dysphagia  -secondary to #1 above  -dysphagia diet     #Microcytic Anemia  -Stable H/H  -Will check iron studies (ordered by me)    DVT ppx: Lovenox

## 2018-12-04 LAB
FERRITIN SERPL-MCNC: 242 NG/ML — HIGH (ref 15–150)
IRON SATN MFR SERPL: 11 % — LOW (ref 14–50)
IRON SATN MFR SERPL: 28 UG/DL — LOW (ref 30–160)
TIBC SERPL-MCNC: 246 UG/DL — SIGNIFICANT CHANGE UP (ref 220–430)
UIBC SERPL-MCNC: 218 UG/DL — SIGNIFICANT CHANGE UP (ref 110–370)

## 2018-12-04 PROCEDURE — 99232 SBSQ HOSP IP/OBS MODERATE 35: CPT

## 2018-12-04 PROCEDURE — 99233 SBSQ HOSP IP/OBS HIGH 50: CPT

## 2018-12-04 RX ADMIN — SENNA PLUS 2 TABLET(S): 8.6 TABLET ORAL at 21:03

## 2018-12-04 RX ADMIN — Medication 650 MILLIGRAM(S): at 23:25

## 2018-12-04 RX ADMIN — Medication 1 TABLET(S): at 12:05

## 2018-12-04 RX ADMIN — GABAPENTIN 300 MILLIGRAM(S): 400 CAPSULE ORAL at 12:06

## 2018-12-04 RX ADMIN — Medication 650 MILLIGRAM(S): at 05:29

## 2018-12-04 RX ADMIN — Medication 650 MILLIGRAM(S): at 06:37

## 2018-12-04 RX ADMIN — ATORVASTATIN CALCIUM 80 MILLIGRAM(S): 80 TABLET, FILM COATED ORAL at 21:03

## 2018-12-04 RX ADMIN — GABAPENTIN 300 MILLIGRAM(S): 400 CAPSULE ORAL at 21:03

## 2018-12-04 RX ADMIN — ENOXAPARIN SODIUM 40 MILLIGRAM(S): 100 INJECTION SUBCUTANEOUS at 20:59

## 2018-12-04 RX ADMIN — NYSTATIN CREAM 1 APPLICATION(S): 100000 CREAM TOPICAL at 05:30

## 2018-12-04 RX ADMIN — Medication 20 MILLIGRAM(S): at 12:04

## 2018-12-04 RX ADMIN — Medication 100 MILLIGRAM(S): at 05:29

## 2018-12-04 RX ADMIN — CLOPIDOGREL BISULFATE 75 MILLIGRAM(S): 75 TABLET, FILM COATED ORAL at 12:04

## 2018-12-04 RX ADMIN — MIDODRINE HYDROCHLORIDE 10 MILLIGRAM(S): 2.5 TABLET ORAL at 12:05

## 2018-12-04 RX ADMIN — Medication 100 MILLIGRAM(S): at 13:34

## 2018-12-04 RX ADMIN — Medication 100 MILLIGRAM(S): at 21:03

## 2018-12-04 RX ADMIN — Medication 81 MILLIGRAM(S): at 12:04

## 2018-12-04 RX ADMIN — NYSTATIN CREAM 1 APPLICATION(S): 100000 CREAM TOPICAL at 17:36

## 2018-12-04 NOTE — PROGRESS NOTE ADULT - ASSESSMENT
63 year old female with  Left MCA distribution stroke in the periprocedural period after carotid revascularization with carotid artery stent placement  now with decreased functional mobility, dysphagia, aphasia, right hemiparesis, gait instability and ADL impairments.    #left MCA CVA complicated by  #Right hemiparesis  #Dysphagia and  #Aphasia  -PT/OT/SLP - actively being treated  -continue ASA/Plavix/Lipitor  -continue Fluoxetine for motor recovery    #Hypotension  -Midodrine (? consider taper)  -monitor BP    #MARLY  -improved with IVF    #Microcytic Anemia  -Stable H/H  -consistent with anemia of chronic illness (high ferritin noted)    DVT ppx: Lovenox

## 2018-12-04 NOTE — PROGRESS NOTE ADULT - SUBJECTIVE AND OBJECTIVE BOX
History of Present Illness: 	  64y/o F w/ HTN, HLD, RA, Asthma s/p elective left transcarotid arterial sten via right femoral vein access (11/13/18) course complicated by code stroke approximately 8 hours postop. CTA head and neck showed a distal M2 branch occlusion. She was deemed not a candidate for intracranial thrombectomy due to the location of the M2 occlusion at that time. Patient monitored in SICU, found to be stable, and sent to the floor. MRI brain on 11/17 showed acute infarct in the left MCA distribution. TTE did not show any obvious structural cardiac source of embolism. On 11/20, she was noted to have fluctuating right hemiparesis with respect to right arm weakness. CT brain on 11/20 showed expected evolution of left MCA distribution infarct and CTA head and neck showed stable findings. Right sided deficits and aphasia possibly secondary to hypoperfusion of area supplied by the L M2 high grade stenosis due to fluctuating blood pressure. Patient started on midodrine to keep a systolic pressure between 140-160. Patient is currently being treated for E Coli UTI with cipro. Pt had a speech and swallow and was recommended to have a Mechanical Soft with Honey-Thick Liquids diet.  Per vascular team, patient will be discharged on midodrine and tapered down as tolerated. Pt was medically optimized for acute rehab admission on 11/28.        Review of Systems:  TODAY'S REVIEW OF SYMPTOMS [ ] Constiutional WNL            [X] Cardio WNL               [X] Resp WNL [X] GI WNL                            [X]  WNL                    [X] Heme WNL [X] Endo WNL                       [X] Skin WNL                   [  ] MSK WNL [  ] Neuro WNL                     [X] Cognitive WNL           [X] Psych WNL   Patient seen and evaluated in . No acute events overnight. Patient shakes head yes and no with accuracy to answer questions. States that her sister will be visiting ~1600. Denies having any other complaints. 	        Vital Signs Last 24 Hrs  T(C): 37.2 (12-04-18 @ 08:24)  T(F): 98.9 (12-04-18 @ 08:24), Max: 98.9 (12-04-18 @ 08:24)  HR: 94 (12-04-18 @ 08:24) (88 - 101)  BP: 144/87 (12-04-18 @ 08:24) (144/87 - 165/90)                        9.7    11.7  )-----------( 359      ( 03 Dec 2018 05:52 )             31.3     12-03    139  |  103  |  12  ----------------------------<  86  3.8   |  30  |  1.12    Ca    9.3      03 Dec 2018 05:52  Phos  3.2     12-02  Mg     1.9     12-02    TPro  7.0  /  Alb  2.8<L>  /  TBili  0.3  /  DBili  x   /  AST  25  /  ALT  21  /  AlkPhos  111  12-03         Physical Exam:   Constitutional - NAD, Comfortable  HEENT - NCAT, EOMI  Neck - Supple,   Chest - CTA bilaterally, No wheeze, No rhonchi, No crackles  Cardiovascular - RRR, S1S2, No murmurs  Abdomen - BS+, Soft, NTND  Extremities - No C/C/E, No calf tenderness   Neurologic Exam -                    Communication expressive aphasia, follows commands, answers yes/no questions with nodding/shaking head.  Non-fluent     Cranial Nerves - + right facial droop      Motor - RUE  2/5 EF/EE, Shoulder ext, RLE hip flex 2/5, 1/5 PF/DF 2/2 to pain ; LUE/LLE 5/5                Sensory - allodynia to bilat feet  Psychiatric - Mood stable, Affect WNL	         Assessment and Plan:     		  ASSESSMENT/PLAN  RUPINDER TRIPP is a 64yo Female with  Left MCA distribution stroke in the periprocedural period after carotid revascularization with carotid artery stent placement  now with decreased functional mobility, dysphagia, non-fluent aphasia, right hemiparesis, gait instability and ADL impairments.    COMORBIDITES/ACTIVE MEDICAL ISSUES     #s/p left MCA CVA  -continue Comprehensive Rehab Program of PT/OT/SLP  -ASA/Plavix/Lipitor  -Fluoxetine for motor recovery    #Hypotension  -Midodrine with parameters (will taper as tolerated)  -monitor BP    #UTI  -c/w cipro 500mg q12 hours (last dose 12/3)  -blood cx -no growth  -CBC 12/3 stable  -monitor vitals and symptoms    #MARLY  -BMP stable 12/3, f/u BMP 12/6    #Dysphagia  -continue SLP  -On Dysphagia 3, soft/thin lqs     #Nutrition: ensure TID, discussed with RD     #Neuropathic pain to bilat feet and right hand   -salvador 300mg qHS, 300mg q1300     #B/L Elongated Toe Nail Care  -podiatry c/s placed 12/3     Gait Instability, ADL impairments and Functional impairments: start Comprehensive Rehab Program of PT/OT     GI/Bowel Mgmt - Colace, Senna, Toileting program  /Bladder Mgmt - toileting program      Precautions / PROPHYLAXIS:   - Falls, Cardiac  - Lungs: Aspiration  - Pressure injury/Skin: Turn Q2hrs while in bed, OOB to Chair, PT/OT    - DVT: Lovenox, SCDs, TEDs     IDT Meeting 12/4 - Barriers to discharge include decreased rightsided coordination, decreased balance, right sided weakness, b/l feet pain, expressive aphasia, and decreased endurance. Currently, patient functional status is setup for eating, total assist for UB and LB dressing, max assist for toilet xfer, and mod-max A 15 ft with hemicane.  ADRIANO - 12/19 VANDANA History of Present Illness: 	  64y/o F w/ HTN, HLD, RA, Asthma s/p elective left transcarotid arterial sten via right femoral vein access (11/13/18) course complicated by code stroke approximately 8 hours postop. CTA head and neck showed a distal M2 branch occlusion. She was deemed not a candidate for intracranial thrombectomy due to the location of the M2 occlusion at that time. Patient monitored in SICU, found to be stable, and sent to the floor. MRI brain on 11/17 showed acute infarct in the left MCA distribution. TTE did not show any obvious structural cardiac source of embolism. On 11/20, she was noted to have fluctuating right hemiparesis with respect to right arm weakness. CT brain on 11/20 showed expected evolution of left MCA distribution infarct and CTA head and neck showed stable findings. Right sided deficits and aphasia possibly secondary to hypoperfusion of area supplied by the L M2 high grade stenosis due to fluctuating blood pressure. Patient started on midodrine to keep a systolic pressure between 140-160. Patient is currently being treated for E Coli UTI with cipro. Pt had a speech and swallow and was recommended to have a Mechanical Soft with Honey-Thick Liquids diet.  Per vascular team, patient will be discharged on midodrine and tapered down as tolerated. Pt was medically optimized for acute rehab admission on 11/28.        Review of Systems:  TODAY'S REVIEW OF SYMPTOMS [ ] Constiutional WNL            [X] Cardio WNL               [X] Resp WNL [X] GI WNL                            [X]  WNL                    [X] Heme WNL [X] Endo WNL                       [X] Skin WNL                   [  ] MSK WNL [  ] Neuro WNL                     [X] Cognitive WNL           [X] Psych WNL   Patient seen and evaluated in . No acute events overnight. Patient shakes head yes and no with accuracy to answer questions. States that her sister will be visiting ~1600. Denies having any other complaints.  Pain in left foot resolved.  Still has nerve pain in right foot. 	        Vital Signs Last 24 Hrs  T(C): 37.2 (12-04-18 @ 08:24)  T(F): 98.9 (12-04-18 @ 08:24), Max: 98.9 (12-04-18 @ 08:24)  HR: 94 (12-04-18 @ 08:24) (88 - 101)  BP: 144/87 (12-04-18 @ 08:24) (144/87 - 165/90)                        9.7    11.7  )-----------( 359      ( 03 Dec 2018 05:52 )             31.3     12-03    139  |  103  |  12  ----------------------------<  86  3.8   |  30  |  1.12    Ca    9.3      03 Dec 2018 05:52  Phos  3.2     12-02  Mg     1.9     12-02    TPro  7.0  /  Alb  2.8<L>  /  TBili  0.3  /  DBili  x   /  AST  25  /  ALT  21  /  AlkPhos  111  12-03         Physical Exam:   Constitutional - NAD, Comfortable  HEENT - NCAT, EOMI  Neck - Supple,   Chest - CTA bilaterally, No wheeze, No rhonchi, No crackles  Cardiovascular - RRR, S1S2, No murmurs  Abdomen - BS+, Soft, NTND  Extremities - No C/C/E, No calf tenderness   Neurologic Exam -                    Communication expressive aphasia, follows commands, answers yes/no questions with nodding/shaking head.  Non-fluent     Cranial Nerves - + right facial droop      Motor - RUE  2/5 EF/EE, Shoulder ext, RLE hip flex 2/5, 1/5 PF/DF 2/2 to pain ; LUE/LLE 5/5                Sensory - allodynia to right foot Psychiatric - Mood stable, Affect WNL	         Assessment and Plan:     		  ASSESSMENT/PLAN  RUPINDER TRIPP is a 62yo Female with  Left MCA distribution stroke in the periprocedural period after carotid revascularization with carotid artery stent placement  now with decreased functional mobility, dysphagia, non-fluent aphasia, right hemiparesis, gait instability and ADL impairments.    COMORBIDITES/ACTIVE MEDICAL ISSUES     #s/p left MCA CVA  -continue Comprehensive Rehab Program of PT/OT/SLP  -ASA/Plavix/Lipitor  -Fluoxetine for motor recovery    #Hypotension  -Midodrine with parameters (will taper as tolerated)  -monitor BP    #UTI  -c/w cipro 500mg q12 hours (last dose 12/3)  -blood cx -no growth  -CBC 12/3 stable  -monitor vitals and symptoms    #MARLY  -BMP stable 12/3, f/u BMP 12/6    #Dysphagia  -continue SLP  -On Dysphagia 3, soft/thin lqs     #Nutrition: ensure TID, discussed with RD     #Neuropathic pain to bilat feet and right hand   -salvador 300mg qHS, 300mg q1300     #B/L Elongated Toe Nail Care  -podiatry c/s placed 12/3     Gait Instability, ADL impairments and Functional impairments: start Comprehensive Rehab Program of PT/OT     GI/Bowel Mgmt - Colace, Senna, Toileting program  /Bladder Mgmt - toileting program      Precautions / PROPHYLAXIS:   - Falls, Cardiac  - Lungs: Aspiration  - Pressure injury/Skin: Turn Q2hrs while in bed, OOB to Chair, PT/OT    - DVT: Lovenox, SCDs, TEDs     IDT Meeting 12/4 - Barriers to discharge include decreased rightsided coordination, decreased balance, right sided weakness, b/l feet pain, expressive aphasia, and decreased endurance. Currently, patient functional status is setup for eating, total assist for UB and LB dressing, max assist for toilet xfer, and mod-max A 15 ft with arlette.  ADRIANO - 12/19 VANDANA

## 2018-12-04 NOTE — CHART NOTE - NSCHARTNOTEFT_GEN_A_CORE
NUTRITION FOLLOW UP    SOURCE: Patient(aphrasic, nods head to questions)/ Medical Record    DIET: Soft with thin liquids(food allergies noted)    Patient consuming % of meals , tolerating well. Patient receiving po supplement (Ensure Enlive) secondary to same. No GI distress noted.(+) BM (12/2)    CURRENT WEIGHT: no follow up weight since her admission.    PERTINENT MEDS:   Pertinent Medications: MEDICATIONS  (STANDING):  acetaminophen   Tablet .. 650 milliGRAM(s) Oral every 6 hours  aspirin  chewable 81 milliGRAM(s) Oral daily  atorvastatin 80 milliGRAM(s) Oral at bedtime  clopidogrel Tablet 75 milliGRAM(s) Oral daily  docusate sodium 100 milliGRAM(s) Oral three times a day  enoxaparin Injectable 40 milliGRAM(s) SubCutaneous every 24 hours  FLUoxetine Solution 20 milliGRAM(s) Oral daily  gabapentin 300 milliGRAM(s) Oral at bedtime  gabapentin 300 milliGRAM(s) Oral <User Schedule>  midodrine 10 milliGRAM(s) Oral <User Schedule>  multivitamin 1 Tablet(s) Oral daily  nystatin Powder 1 Application(s) Topical two times a day  senna 2 Tablet(s) Oral at bedtime    MEDICATIONS  (PRN):  ALBUTerol    90 MICROgram(s) HFA Inhaler 2 Puff(s) Inhalation every 6 hours PRN Shortness of Breath and/or Wheezing      PERTINENT LABS:  Date: 03 Dec 2018 05:52  Hemoglobin 9.7 (L)   Hematocrit 31.3 (L0    Date: 12-03  Sodium 139  Potassium 3.8  Glucose Serum 86  BUN 12  Creatinine 1.12        SKIN: intact    ESTIMATED NEEDS:   [X] no change since previous assessment  [ ] recalculated:     PREVIOUS NUTRITION DIAGNOSIS: addressed    NUTRITION DIAGNOSIS is   [X] on going , po intake still varies from fair-good RD will follow as per nutrition department protocol.      MONITORING AND EVALUATION:   Current diet order is appropriate and is well tolerated, but will monitor for any changes that may be needed        NUTRITION RECOMMENDATIONS: Continue current diet regime

## 2018-12-04 NOTE — PROGRESS NOTE ADULT - SUBJECTIVE AND OBJECTIVE BOX
Patient is a 63y old  Female who presents with a chief complaint of s/p left MCA CVA now with functional deficits (04 Dec 2018 11:49)    Patient seen and examined at bedside.    ALLERGIES:  peanut butter (Anaphylaxis)  peanuts (Anaphylaxis; Hives)  penicillins (Anaphylaxis)  propofol (Angioedema)  shellfish (Anaphylaxis; Hives)  tomatoes (Anaphylaxis; Hives)    MEDICATIONS  (STANDING):  acetaminophen   Tablet .. 650 milliGRAM(s) Oral every 6 hours  aspirin  chewable 81 milliGRAM(s) Oral daily  atorvastatin 80 milliGRAM(s) Oral at bedtime  clopidogrel Tablet 75 milliGRAM(s) Oral daily  docusate sodium 100 milliGRAM(s) Oral three times a day  enoxaparin Injectable 40 milliGRAM(s) SubCutaneous every 24 hours  FLUoxetine Solution 20 milliGRAM(s) Oral daily  gabapentin 300 milliGRAM(s) Oral at bedtime  gabapentin 300 milliGRAM(s) Oral <User Schedule>  midodrine 10 milliGRAM(s) Oral <User Schedule>  multivitamin 1 Tablet(s) Oral daily  nystatin Powder 1 Application(s) Topical two times a day  senna 2 Tablet(s) Oral at bedtime    MEDICATIONS  (PRN):  ALBUTerol    90 MICROgram(s) HFA Inhaler 2 Puff(s) Inhalation every 6 hours PRN Shortness of Breath and/or Wheezing    Vital Signs Last 24 Hrs  T(F): 98.9 (04 Dec 2018 08:24), Max: 98.9 (04 Dec 2018 08:24)  HR: 94 (04 Dec 2018 08:24) (94 - 101)  BP: 144/87 (04 Dec 2018 08:24) (144/87 - 149/77)  RR: 14 (04 Dec 2018 08:24) (14 - 14)  SpO2: 97% (04 Dec 2018 08:24) (96% - 97%)  I&O's Summary    PHYSICAL EXAM:  General: NAD, Nonverbal / Aphasic  ENT: MMM  Neck: Supple, No JVD  Lungs: Clear to auscultation bilaterally  Cardio: RRR, S1/S2  Abdomen: Soft, Nontender, Nondistended; Bowel sounds present  Extremities: No calf tenderness, No pitting edema, right hemiparesis    LABS:                        9.7    11.7  )-----------( 359      ( 03 Dec 2018 05:52 )             31.3     12-03    139  |  103  |  12  ----------------------------<  86  3.8   |  30  |  1.12    Ca    9.3      03 Dec 2018 05:52  Phos  3.2     12-02  Mg     1.9     12-02    TPro  7.0  /  Alb  2.8  /  TBili  0.3  /  DBili  x   /  AST  25  /  ALT  21  /  AlkPhos  111  12-03    eGFR if Non African American: 53 mL/min/1.73M2 (12-03-18 @ 05:52)  eGFR if African American: 61 mL/min/1.73M2 (12-03-18 @ 05:52)      Lactate, Blood: 0.6 mmol/L (12-02 @ 06:59)    CAPILLARY BLOOD GLUCOSE        11-01 [id number] 6.0        Culture - Blood (collected 01 Dec 2018 11:50)  Source: .Blood Blood  Preliminary Report (02 Dec 2018 22:01):    No growth to date.    Culture - Blood (collected 01 Dec 2018 11:50)  Source: .Blood Blood  Preliminary Report (02 Dec 2018 22:01):    No growth to date.    Culture - Urine (collected 01 Dec 2018 09:00)  Source: .Urine Catheterized  Final Report (02 Dec 2018 08:50):    No growth      Care Discussed with Consultants/Other Providers: Dr. Pena

## 2018-12-05 ENCOUNTER — RX RENEWAL (OUTPATIENT)
Age: 63
End: 2018-12-05

## 2018-12-05 PROCEDURE — 99232 SBSQ HOSP IP/OBS MODERATE 35: CPT

## 2018-12-05 RX ORDER — PETROLATUM,WHITE
1 JELLY (GRAM) TOPICAL
Qty: 0 | Refills: 0 | Status: DISCONTINUED | OUTPATIENT
Start: 2018-12-05 | End: 2018-12-19

## 2018-12-05 RX ORDER — MIDODRINE HYDROCHLORIDE 2.5 MG/1
10 TABLET ORAL
Qty: 0 | Refills: 0 | Status: DISCONTINUED | OUTPATIENT
Start: 2018-12-05 | End: 2018-12-10

## 2018-12-05 RX ORDER — SODIUM CHLORIDE 9 MG/ML
3 INJECTION INTRAMUSCULAR; INTRAVENOUS; SUBCUTANEOUS EVERY 8 HOURS
Qty: 0 | Refills: 0 | Status: DISCONTINUED | OUTPATIENT
Start: 2018-12-05 | End: 2018-12-06

## 2018-12-05 RX ADMIN — Medication 20 MILLIGRAM(S): at 12:10

## 2018-12-05 RX ADMIN — ENOXAPARIN SODIUM 40 MILLIGRAM(S): 100 INJECTION SUBCUTANEOUS at 21:16

## 2018-12-05 RX ADMIN — GABAPENTIN 300 MILLIGRAM(S): 400 CAPSULE ORAL at 12:10

## 2018-12-05 RX ADMIN — Medication 100 MILLIGRAM(S): at 21:16

## 2018-12-05 RX ADMIN — Medication 1 TABLET(S): at 12:10

## 2018-12-05 RX ADMIN — Medication 100 MILLIGRAM(S): at 06:03

## 2018-12-05 RX ADMIN — NYSTATIN CREAM 1 APPLICATION(S): 100000 CREAM TOPICAL at 06:03

## 2018-12-05 RX ADMIN — Medication 650 MILLIGRAM(S): at 06:01

## 2018-12-05 RX ADMIN — NYSTATIN CREAM 1 APPLICATION(S): 100000 CREAM TOPICAL at 16:38

## 2018-12-05 RX ADMIN — ATORVASTATIN CALCIUM 80 MILLIGRAM(S): 80 TABLET, FILM COATED ORAL at 21:16

## 2018-12-05 RX ADMIN — SENNA PLUS 2 TABLET(S): 8.6 TABLET ORAL at 21:16

## 2018-12-05 RX ADMIN — Medication 1 APPLICATION(S): at 16:38

## 2018-12-05 RX ADMIN — Medication 81 MILLIGRAM(S): at 12:10

## 2018-12-05 RX ADMIN — Medication 650 MILLIGRAM(S): at 07:46

## 2018-12-05 RX ADMIN — CLOPIDOGREL BISULFATE 75 MILLIGRAM(S): 75 TABLET, FILM COATED ORAL at 12:10

## 2018-12-05 RX ADMIN — Medication 650 MILLIGRAM(S): at 00:23

## 2018-12-05 RX ADMIN — Medication 650 MILLIGRAM(S): at 12:57

## 2018-12-05 RX ADMIN — GABAPENTIN 300 MILLIGRAM(S): 400 CAPSULE ORAL at 21:16

## 2018-12-05 RX ADMIN — Medication 650 MILLIGRAM(S): at 14:00

## 2018-12-05 RX ADMIN — Medication 100 MILLIGRAM(S): at 12:13

## 2018-12-05 RX ADMIN — SODIUM CHLORIDE 3 MILLILITER(S): 9 INJECTION INTRAMUSCULAR; INTRAVENOUS; SUBCUTANEOUS at 14:46

## 2018-12-05 NOTE — PROGRESS NOTE ADULT - ASSESSMENT
RUPINDER TRIPP is a 64yo Female with  Left MCA distribution stroke in the periprocedural period after carotid revascularization with carotid artery stent placement  now with decreased functional mobility, dysphagia, non-fluent aphasia, right hemiparesis, gait instability and ADL impairments.    COMORBIDITES/ACTIVE MEDICAL ISSUES     #s/p left MCA CVA  -continue Comprehensive Rehab Program of PT/OT/SLP  -ASA/Plavix/Lipitor  -Fluoxetine for motor recovery    #Hypotension  -Midodrine with parameters (will taper as tolerated)  -monitor BP    #UTI  -c/w cipro 500mg q12 hours (last dose 12/3)  -blood cx -no growth  -CBC 12/3 stable  -monitor vitals and symptoms    #MARLY  -BMP stable 12/3, f/u BMP 12/6    #Dysphagia  -continue SLP  -On Dysphagia 3, soft/thin lqs     #Nutrition: ensure TID, discussed with RD     #Neuropathic pain to bilat feet and right hand   -salvador 300mg qHS, 300mg q1300     #B/L Elongated Toe Nail Care, right foot bunion pain  -podiatry c/s placed 12/3   --spoke with nursing regarding dressing/padding for bunion to improve comfort    Gait Instability, ADL impairments and Functional impairments:  Comprehensive Rehab Program of PT/OT     GI/Bowel Mgmt - Colace, Senna, Toileting program  /Bladder Mgmt - toileting program      Precautions / PROPHYLAXIS:   - Falls, Cardiac  - Lungs: Aspiration  - Pressure injury/Skin: Turn Q2hrs while in bed, OOB to Chair, PT/OT    - DVT: Lovenox, SCDs, TEDs     IDT Meeting 12/4 - Barriers to discharge include decreased rightsided coordination, decreased balance, right sided weakness, b/l feet pain, expressive aphasia, and decreased endurance. Currently, patient functional status is setup for eating, total assist for UB and LB dressing, max assist for toilet xfer, and mod-max A 15 ft with arlette.  ADRIANO - 12/19 VANDANA

## 2018-12-05 NOTE — PROGRESS NOTE ADULT - SUBJECTIVE AND OBJECTIVE BOX
HPI:  64y/o F w/ HTN, HLD, RA, Asthma s/p elective left transcarotid arterial sten via right femoral vein access (11/13/18) course complicated by code stroke approximately 8 hours postop. CTA head and neck showed a distal M2 branch occlusion. She was deemed not a candidate for intracranial thrombectomy due to the location of the M2 occlusion at that time. Patient monitored in SICU, found to be stable, and sent to the floor. MRI brain on 11/17 showed acute infarct in the left MCA distribution. TTE did not show any obvious structural cardiac source of embolism. On 11/20, she was noted to have fluctuating right hemiparesis with respect to right arm weakness. CT brain on 11/20 showed expected evolution of left MCA distribution infarct and CTA head and neck showed stable findings. Right sided deficits and aphasia possibly secondary to hypoperfusion of area supplied by the L M2 high grade stenosis due to fluctuating blood pressure. Patient started on midodrine to keep a systolic pressure between 140-160. Patient is currently being treated for E Coli UTI with cipro. Pt had a speech and swallow and was recommended to have a Mechanical Soft with Honey-Thick Liquids diet.  Per vascular team, patient will be discharged on midodrine and tapered down as tolerated. Pt was medically optimized for acute rehab admission on 11/28. (28 Nov 2018 15:17)      PAST MEDICAL & SURGICAL HISTORY:  Osteoporosis  Eczema: bilateral elbows  Carotid artery stenosis  Rheumatoid arthritis: tx with  methotrexate and prednisone  HLD (hyperlipidemia)  Carotid Artery Dissection  Eczema  Psoriasis  Asthma  HTN (Hypertension)  Myocardial Infarction  S/P lumbar fusion: h/o vertral fracture with cement fusion 209  Cataract Extraction Surgery  tonsillectomy: s/p Excision Right lymph node ; pt unclear      Subjective:  Pain in right foot at 1st medial MTP joint.  Tender to palpation,  Hypersensitivity/allodynia in feet improved.  no pain with tactile stimuli to feet or with ankle rom.     REVIEW OF SYMPTOMS   ] Constiutional WNL            [X] Cardio WNL               [X] Resp WNL  [X] GI WNL                            [X]  WNL                    [X] Heme WNL  [X] Endo WNL                       [X] Skin WNL                   [  ] MSK WNL  [  ] Neuro WNL                     [X] Cognitive WNL           [X] Psych WNL        VITALS  Vital Signs Last 24 Hrs  T(C): 36.9 (05 Dec 2018 07:47), Max: 37.1 (04 Dec 2018 20:55)  T(F): 98.5 (05 Dec 2018 07:47), Max: 98.8 (04 Dec 2018 20:55)  HR: 86 (05 Dec 2018 12:30) (82 - 98)  BP: 156/99 (05 Dec 2018 12:30) (139/80 - 156/99)  BP(mean): --  RR: 14 (05 Dec 2018 07:47) (14 - 14)  SpO2: 96% (05 Dec 2018 07:47) (96% - 97%)      PHYSICAL EXAM  Constitutional - NAD, Comfortable  	HEENT - NCAT, EOMI  	Neck - Supple,   	Chest - CTA bilaterally, No wheeze, No rhonchi, No crackles  	Cardiovascular - RRR, S1S2, No murmurs  	Abdomen - BS+, Soft, NTND  	Extremities - No C/C/E, No calf tenderness   	Neurologic Exam -                 	   Communication expressive aphasia, follows commands, answers yes/no questions with nodding/shaking head.  Non-fluent  	   Cranial Nerves - + right facial droop   	   Motor -Stable exam-- RUE  2/5 EF/EE, Shoulder ext, RLE hip flex 2/5, 1/5 PF/DF 2/2 to pain ; LUE/LLE 5/5             	   Sensory - impaired on right  Psychiatric - Mood stable, Affect WNL    MSK --right foot bunion    RECENT LABS                  RADIOLOGY/OTHER RESULTS      MEDICATIONS  (STANDING):  acetaminophen   Tablet .. 650 milliGRAM(s) Oral every 6 hours  aspirin  chewable 81 milliGRAM(s) Oral daily  atorvastatin 80 milliGRAM(s) Oral at bedtime  clopidogrel Tablet 75 milliGRAM(s) Oral daily  docusate sodium 100 milliGRAM(s) Oral three times a day  enoxaparin Injectable 40 milliGRAM(s) SubCutaneous every 24 hours  FLUoxetine Solution 20 milliGRAM(s) Oral daily  gabapentin 300 milliGRAM(s) Oral at bedtime  gabapentin 300 milliGRAM(s) Oral <User Schedule>  midodrine 10 milliGRAM(s) Oral <User Schedule>  multivitamin 1 Tablet(s) Oral daily  nystatin Powder 1 Application(s) Topical two times a day  senna 2 Tablet(s) Oral at bedtime  sodium chloride 0.9% lock flush 3 milliLiter(s) IV Push every 8 hours    MEDICATIONS  (PRN):  ALBUTerol    90 MICROgram(s) HFA Inhaler 2 Puff(s) Inhalation every 6 hours PRN Shortness of Breath and/or Wheezing

## 2018-12-06 LAB
ALBUMIN SERPL ELPH-MCNC: 3.1 G/DL — LOW (ref 3.3–5)
ALP SERPL-CCNC: 120 U/L — SIGNIFICANT CHANGE UP (ref 40–120)
ALT FLD-CCNC: 36 U/L DA — SIGNIFICANT CHANGE UP (ref 10–45)
ANION GAP SERPL CALC-SCNC: 7 MMOL/L — SIGNIFICANT CHANGE UP (ref 5–17)
AST SERPL-CCNC: 42 U/L — HIGH (ref 10–40)
BASOPHILS # BLD AUTO: 0.1 K/UL — SIGNIFICANT CHANGE UP (ref 0–0.2)
BASOPHILS NFR BLD AUTO: 0.8 % — SIGNIFICANT CHANGE UP (ref 0–2)
BILIRUB SERPL-MCNC: 0.4 MG/DL — SIGNIFICANT CHANGE UP (ref 0.2–1.2)
BUN SERPL-MCNC: 12 MG/DL — SIGNIFICANT CHANGE UP (ref 7–23)
CALCIUM SERPL-MCNC: 9.4 MG/DL — SIGNIFICANT CHANGE UP (ref 8.4–10.5)
CHLORIDE SERPL-SCNC: 102 MMOL/L — SIGNIFICANT CHANGE UP (ref 96–108)
CO2 SERPL-SCNC: 31 MMOL/L — SIGNIFICANT CHANGE UP (ref 22–31)
CREAT SERPL-MCNC: 0.99 MG/DL — SIGNIFICANT CHANGE UP (ref 0.5–1.3)
CULTURE RESULTS: SIGNIFICANT CHANGE UP
CULTURE RESULTS: SIGNIFICANT CHANGE UP
EOSINOPHIL # BLD AUTO: 1.2 K/UL — HIGH (ref 0–0.5)
EOSINOPHIL NFR BLD AUTO: 9.6 % — HIGH (ref 0–6)
GLUCOSE SERPL-MCNC: 84 MG/DL — SIGNIFICANT CHANGE UP (ref 70–99)
HCT VFR BLD CALC: 33 % — LOW (ref 34.5–45)
HGB BLD-MCNC: 10 G/DL — LOW (ref 11.5–15.5)
LYMPHOCYTES # BLD AUTO: 17.7 % — SIGNIFICANT CHANGE UP (ref 13–44)
LYMPHOCYTES # BLD AUTO: 2.2 K/UL — SIGNIFICANT CHANGE UP (ref 1–3.3)
MCHC RBC-ENTMCNC: 23.3 PG — LOW (ref 27–34)
MCHC RBC-ENTMCNC: 30.4 GM/DL — LOW (ref 32–36)
MCV RBC AUTO: 76.7 FL — LOW (ref 80–100)
MONOCYTES # BLD AUTO: 0.9 K/UL — SIGNIFICANT CHANGE UP (ref 0–0.9)
MONOCYTES NFR BLD AUTO: 7.2 % — SIGNIFICANT CHANGE UP (ref 1–9)
NEUTROPHILS # BLD AUTO: 8.1 K/UL — HIGH (ref 1.8–7.4)
NEUTROPHILS NFR BLD AUTO: 64.7 % — SIGNIFICANT CHANGE UP (ref 43–77)
PLATELET # BLD AUTO: 360 K/UL — SIGNIFICANT CHANGE UP (ref 150–400)
POTASSIUM SERPL-MCNC: 3.9 MMOL/L — SIGNIFICANT CHANGE UP (ref 3.5–5.3)
POTASSIUM SERPL-SCNC: 3.9 MMOL/L — SIGNIFICANT CHANGE UP (ref 3.5–5.3)
PROT SERPL-MCNC: 7.3 G/DL — SIGNIFICANT CHANGE UP (ref 6–8.3)
RBC # BLD: 4.3 M/UL — SIGNIFICANT CHANGE UP (ref 3.8–5.2)
RBC # FLD: 14.9 % — HIGH (ref 10.3–14.5)
SODIUM SERPL-SCNC: 140 MMOL/L — SIGNIFICANT CHANGE UP (ref 135–145)
SPECIMEN SOURCE: SIGNIFICANT CHANGE UP
SPECIMEN SOURCE: SIGNIFICANT CHANGE UP
URATE SERPL-MCNC: 8.3 MG/DL — HIGH (ref 2.5–7)
WBC # BLD: 12.5 K/UL — HIGH (ref 3.8–10.5)
WBC # FLD AUTO: 12.5 K/UL — HIGH (ref 3.8–10.5)

## 2018-12-06 PROCEDURE — 99232 SBSQ HOSP IP/OBS MODERATE 35: CPT

## 2018-12-06 RX ORDER — PANTOPRAZOLE SODIUM 20 MG/1
40 TABLET, DELAYED RELEASE ORAL
Qty: 0 | Refills: 0 | Status: DISCONTINUED | OUTPATIENT
Start: 2018-12-06 | End: 2018-12-19

## 2018-12-06 RX ORDER — COLCHICINE 0.6 MG
0.6 TABLET ORAL
Qty: 0 | Refills: 0 | Status: DISCONTINUED | OUTPATIENT
Start: 2018-12-06 | End: 2018-12-07

## 2018-12-06 RX ADMIN — Medication 0.6 MILLIGRAM(S): at 17:14

## 2018-12-06 RX ADMIN — Medication 20 MILLIGRAM(S): at 12:11

## 2018-12-06 RX ADMIN — NYSTATIN CREAM 1 APPLICATION(S): 100000 CREAM TOPICAL at 05:08

## 2018-12-06 RX ADMIN — Medication 650 MILLIGRAM(S): at 08:10

## 2018-12-06 RX ADMIN — ATORVASTATIN CALCIUM 80 MILLIGRAM(S): 80 TABLET, FILM COATED ORAL at 21:22

## 2018-12-06 RX ADMIN — Medication 1 APPLICATION(S): at 05:08

## 2018-12-06 RX ADMIN — Medication 650 MILLIGRAM(S): at 08:49

## 2018-12-06 RX ADMIN — GABAPENTIN 300 MILLIGRAM(S): 400 CAPSULE ORAL at 09:59

## 2018-12-06 RX ADMIN — Medication 81 MILLIGRAM(S): at 12:11

## 2018-12-06 RX ADMIN — MIDODRINE HYDROCHLORIDE 10 MILLIGRAM(S): 2.5 TABLET ORAL at 12:12

## 2018-12-06 RX ADMIN — ENOXAPARIN SODIUM 40 MILLIGRAM(S): 100 INJECTION SUBCUTANEOUS at 21:22

## 2018-12-06 RX ADMIN — Medication 1 TABLET(S): at 12:11

## 2018-12-06 RX ADMIN — SENNA PLUS 2 TABLET(S): 8.6 TABLET ORAL at 21:22

## 2018-12-06 RX ADMIN — Medication 100 MILLIGRAM(S): at 21:22

## 2018-12-06 RX ADMIN — Medication 1 APPLICATION(S): at 17:15

## 2018-12-06 RX ADMIN — GABAPENTIN 300 MILLIGRAM(S): 400 CAPSULE ORAL at 21:22

## 2018-12-06 RX ADMIN — CLOPIDOGREL BISULFATE 75 MILLIGRAM(S): 75 TABLET, FILM COATED ORAL at 12:11

## 2018-12-06 NOTE — PROGRESS NOTE ADULT - ASSESSMENT
RUPINEDR TRIPP is a 64yo Female with  Left MCA distribution stroke in the periprocedural period after carotid revascularization with carotid artery stent placement  now with decreased functional mobility, dysphagia, non-fluent aphasia, right hemiparesis, gait instability and ADL impairments.    COMORBIDITES/ACTIVE MEDICAL ISSUES     #s/p left MCA CVA  -continue Comprehensive Rehab Program of PT/OT/SLP  -ASA/Plavix/Lipitor  -Fluoxetine for motor recovery    #Hypotension  -Midodrine with parameters (taper as tolerated)  -monitor BP    #UTI  -s/p course of cipro (completed 12/3)  -blood cx -no growth  -CBC 12/6 stable  -monitor vitals and symptoms    #MARLY  -BMP stable 12/6    #Dysphagia  -continue SLP  -On Dysphagia 3, soft/thin lqs     #Nutrition: ensure TID, discussed with RD     #Neuropathic pain to bilat feet and right hand   -salvador 300mg qHS, 300mg q1300     #B/L Elongated Toe Nail Care, right foot bunion pain  -podiatry c/s placed 12/3 (Dr. Giron called)   --c/w  dressing/padding for bunion to improve comfort    Gait Instability, ADL impairments and Functional impairments:  Comprehensive Rehab Program of PT/OT     GI/Bowel Mgmt - Colace, Senna, Toileting program  /Bladder Mgmt - toileting program      Precautions / PROPHYLAXIS:   - Falls, Cardiac  - Lungs: Aspiration  - Pressure injury/Skin: Turn Q2hrs while in bed, OOB to Chair, PT/OT    - DVT: Lovenox, SCDs, TEDs     IDT Meeting 12/4 - Barriers to discharge include decreased rightsided coordination, decreased balance, right sided weakness, b/l feet pain, expressive aphasia, and decreased endurance. Currently, patient functional status is setup for eating, total assist for UB and LB dressing, max assist for toilet xfer, and mod-max A 15 ft with hemicane.  ADRIANO - 12/19 VANDANA

## 2018-12-06 NOTE — PROGRESS NOTE ADULT - SUBJECTIVE AND OBJECTIVE BOX
HPI:  64y/o F w/ HTN, HLD, RA, Asthma s/p elective left transcarotid arterial sten via right femoral vein access (11/13/18) course complicated by code stroke approximately 8 hours postop. CTA head and neck showed a distal M2 branch occlusion. She was deemed not a candidate for intracranial thrombectomy due to the location of the M2 occlusion at that time. Patient monitored in SICU, found to be stable, and sent to the floor. MRI brain on 11/17 showed acute infarct in the left MCA distribution. TTE did not show any obvious structural cardiac source of embolism. On 11/20, she was noted to have fluctuating right hemiparesis with respect to right arm weakness. CT brain on 11/20 showed expected evolution of left MCA distribution infarct and CTA head and neck showed stable findings. Right sided deficits and aphasia possibly secondary to hypoperfusion of area supplied by the L M2 high grade stenosis due to fluctuating blood pressure. Patient started on midodrine to keep a systolic pressure between 140-160. Patient is currently being treated for E Coli UTI with cipro. Pt had a speech and swallow and was recommended to have a Mechanical Soft with Honey-Thick Liquids diet.  Per vascular team, patient will be discharged on midodrine and tapered down as tolerated. Pt was medically optimized for acute rehab admission on 11/28. (28 Nov 2018 15:17)      Subjective:  Patient was seen and examined. Pt had no acute events overnight. Pain in b/l foot at 1st medial MTP joint stable.  Tender to palpation.  No pain with tactile stimuli to feet or with ankle rom. Denies having any other complaints.     REVIEW OF SYMPTOMS   ] Constiutional WNL            [X] Cardio WNL               [X] Resp WNL  [X] GI WNL                            [X]  WNL                    [X] Heme WNL  [X] Endo WNL                       [X] Skin WNL                   [  ] MSK WNL  [  ] Neuro WNL                     [X] Cognitive WNL           [X] Psych WNL        VITALS  Vital Signs Last 24 Hrs  T(C): 36.8 (12-06-18 @ 07:41)  T(F): 98.2 (12-06-18 @ 07:41), Max: 98.2 (12-06-18 @ 07:41)  HR: 90 (12-06-18 @ 07:41) (86 - 96)  BP: 127/71 (12-06-18 @ 07:41) (127/71 - 156/99)  RR:  (14 - 14)  SpO2:  (96% - 98%)  Wt(kg): --    PHYSICAL EXAM  Constitutional - NAD, Comfortable  	HEENT - NCAT, EOMI  	Neck - Supple,   	Chest - CTA bilaterally, No wheeze, No rhonchi, No crackles  	Cardiovascular - RRR, S1S2, No murmurs  	Abdomen - BS+, Soft, NTND  	Extremities - No C/C/E, No calf tenderness   	Neurologic Exam -                 	   Communication expressive aphasia, follows commands, answers yes/no questions with nodding/shaking head.  Non-fluent  	   Cranial Nerves - + right facial droop   	   Motor -Stable exam-- RUE  2/5 EF/EE, Shoulder ext, RLE hip flex 2/5, 1/5 PF/DF 2/2 to pain ; LUE/LLE 5/5             	   Sensory - impaired on right  Psychiatric - Mood stable, Affect WNL    MSK --+TTP to right foot bunion and left lateral MTP joint    RECENT LABS                        10.0   12.5  )-----------( 360      ( 06 Dec 2018 06:00 )             33.0     12-06    140  |  102  |  12  ----------------------------<  84  3.9   |  31  |  0.99    Ca    9.4      06 Dec 2018 06:00    TPro  7.3  /  Alb  3.1<L>  /  TBili  0.4  /  DBili  x   /  AST  42<H>  /  ALT  36  /  AlkPhos  120  12-06    MEDICATIONS  (STANDING):  acetaminophen   Tablet .. 650 milliGRAM(s) Oral every 6 hours  AQUAPHOR (petrolatum Ointment) 1 Application(s) Topical two times a day  aspirin  chewable 81 milliGRAM(s) Oral daily  atorvastatin 80 milliGRAM(s) Oral at bedtime  clopidogrel Tablet 75 milliGRAM(s) Oral daily  docusate sodium 100 milliGRAM(s) Oral three times a day  enoxaparin Injectable 40 milliGRAM(s) SubCutaneous every 24 hours  FLUoxetine Solution 20 milliGRAM(s) Oral daily  gabapentin 300 milliGRAM(s) Oral at bedtime  gabapentin 300 milliGRAM(s) Oral <User Schedule>  midodrine 10 milliGRAM(s) Oral <User Schedule>  multivitamin 1 Tablet(s) Oral daily  nystatin Powder 1 Application(s) Topical two times a day  senna 2 Tablet(s) Oral at bedtime  sodium chloride 0.9% lock flush 3 milliLiter(s) IV Push every 8 hours    MEDICATIONS  (PRN):  ALBUTerol    90 MICROgram(s) HFA Inhaler 2 Puff(s) Inhalation every 6 hours PRN Shortness of Breath and/or Wheezing

## 2018-12-06 NOTE — CONSULT NOTE ADULT - SUBJECTIVE AND OBJECTIVE BOX
Patient is a 63y old  Female who presents with a chief complaint of B/L foot pain for several days, and painful nails.  Patient is medically managed  by Medicine/Hospitalists.  Patient denies any history o f trauma to both feet.  Patient has no other pedal complaints.  Patient is experiencing pain while standing, walking and in shoe gear. History of gout as per patient.   Admitted s/p left MCA CVA now with functional deficits (06 Dec 2018 11:47)      HPI:  64y/o F w/ HTN, HLD, RA, Asthma s/p elective left transcarotid arterial sten via right femoral vein access (11/13/18) course complicated by code stroke approximately 8 hours postop. CTA head and neck showed a distal M2 branch occlusion. She was deemed not a candidate for intracranial thrombectomy due to the location of the M2 occlusion at that time. Patient monitored in SICU, found to be stable, and sent to the floor. MRI brain on 11/17 showed acute infarct in the left MCA distribution. TTE did not show any obvious structural cardiac source of embolism. On 11/20, she was noted to have fluctuating right hemiparesis with respect to right arm weakness. CT brain on 11/20 showed expected evolution of left MCA distribution infarct and CTA head and neck showed stable findings. Right sided deficits and aphasia possibly secondary to hypoperfusion of area supplied by the L M2 high grade stenosis due to fluctuating blood pressure. Patient started on midodrine to keep a systolic pressure between 140-160. Patient is currently being treated for E Coli UTI with cipro. Pt had a speech and swallow and was recommended to have a Mechanical Soft with Honey-Thick Liquids diet.  Per vascular team, patient will be discharged on midodrine and tapered down as tolerated. Pt was medically optimized for acute rehab admission on 11/28. (28 Nov 2018 15:17)      peanut butter (Anaphylaxis)  peanuts (Anaphylaxis; Hives)  penicillins (Anaphylaxis)  propofol (Angioedema)  shellfish (Anaphylaxis; Hives)  tomatoes (Anaphylaxis; Hives)      PAST MEDICAL & SURGICAL HISTORY:  Osteoporosis  Eczema: bilateral elbows  Carotid artery stenosis  Rheumatoid arthritis: tx with  methotrexate and prednisone  HLD (hyperlipidemia)  Carotid Artery Dissection  Eczema  Psoriasis  Asthma  HTN (Hypertension)  Myocardial Infarction  S/P lumbar fusion: h/o vertral fracture with cement fusion 209  Cataract Extraction Surgery  tonsillectomy: s/p Excision Right lymph node ; pt unclear      MEDICATIONS  (STANDING):  acetaminophen   Tablet .. 650 milliGRAM(s) Oral every 6 hours  AQUAPHOR (petrolatum Ointment) 1 Application(s) Topical two times a day  aspirin  chewable 81 milliGRAM(s) Oral daily  atorvastatin 80 milliGRAM(s) Oral at bedtime  clopidogrel Tablet 75 milliGRAM(s) Oral daily  colchicine 0.6 milliGRAM(s) Oral two times a day  docusate sodium 100 milliGRAM(s) Oral three times a day  enoxaparin Injectable 40 milliGRAM(s) SubCutaneous every 24 hours  FLUoxetine Solution 20 milliGRAM(s) Oral daily  gabapentin 300 milliGRAM(s) Oral at bedtime  gabapentin 300 milliGRAM(s) Oral <User Schedule>  midodrine 10 milliGRAM(s) Oral <User Schedule>  multivitamin 1 Tablet(s) Oral daily  nystatin Powder 1 Application(s) Topical two times a day  pantoprazole    Tablet 40 milliGRAM(s) Oral before breakfast  senna 2 Tablet(s) Oral at bedtime  sodium chloride 0.9% lock flush 3 milliLiter(s) IV Push every 8 hours    MEDICATIONS  (PRN):  ALBUTerol    90 MICROgram(s) HFA Inhaler 2 Puff(s) Inhalation every 6 hours PRN Shortness of Breath and/or Wheezing      12-06    140  |  102  |  12  ----------------------------<  84  3.9   |  31  |  0.99                            10.0   12.5  )-----------( 360      ( 06 Dec 2018 06:00 )             33.0             T(F): 98.2 (12-06-18 @ 07:41), Max: 98.2 (12-06-18 @ 07:41)  HR: 98 (12-06-18 @ 12:09) (86 - 98)  BP: 123/89 (12-06-18 @ 12:09) (123/89 - 156/99)  RR: 14 (12-06-18 @ 07:41) (14 - 14)  SpO2: 96% (12-06-18 @ 07:41) (96% - 98%)    RADIOLOGY:  EXAM:  FOOT BILATERAL (3 VIEW)      PROCEDURE DATE:  11/29/2018        INTERPRETATION:  Bilateral feet. Patient has CVA and has bilateral foot   pain.    Right foot. 3 images obtained.    Arterial calcifications are noted.  There is a mild halluxvalgus.  Mild hammertoe deformity is noted.    Left foot. 3 views obtained.  There is a mild to moderate hallux valgus with degeneration.  There is similar hammertoe deformity of toes 2 through 5.  No bone destruction or fracture is evident.    IMPRESSION: Roughly symmetric appearance without valgus and arterial   calcification. Roughly symmetric hammertoe tendency of toes 2 through 5   bilaterally. No acute finding.    ARELY CROOKS M.D., ATTENDING RADIOLOGIST  This document has been electronically signed. Nov 29 2018  3:08PM      EXAM:   Integument:  Skin warm, dry and supple bilateral.  No open lesions or inter-digital macerations noted bilateral.   Toenails 1-5 Right and Left feet thickened, elongated, discolored, and dystrophic with subungual debris. There is pain upon palpation of all fungal and ingrowing nails 1-5 bilaterally.   Vascular: Dorsalis Pedis and Posterior Tibial pulses 1/4  Capillary re-fill time greater than 3 seconds digits 1-5 bilateral.  Neuro: Diminished protective sensation B/L to the level of the digits bilateral.  MSK: B/L mild bunions if +edema, +erythema +warmth Left > right.  Pain on ROM of 1st MPJ B/L.    ASSESSMENT:  1. Bilateral hypertrohic Onychomycosis digits 1-5 with pain and dystrophy.  2. Probable B/L acute gouty attack left > right.   3. Bilateral neuropathic pain.    PLAN:  1. Discussed diagnosis and treatment with patient and PMR team.  2. Start Colchicine 0.6mg BID.  3. Will obtain Uric acid level.  4. Aseptic debridement and curretage  of all fungal and ingrowing  nails 1-5 bilateral with sterile nail pack  5. Please re-consult as needed.

## 2018-12-07 LAB
GLUCOSE BLDC GLUCOMTR-MCNC: 136 MG/DL — HIGH (ref 70–99)
GLUCOSE BLDC GLUCOMTR-MCNC: 144 MG/DL — HIGH (ref 70–99)
GLUCOSE BLDC GLUCOMTR-MCNC: 164 MG/DL — HIGH (ref 70–99)

## 2018-12-07 PROCEDURE — 99232 SBSQ HOSP IP/OBS MODERATE 35: CPT

## 2018-12-07 RX ORDER — DEXTROSE 50 % IN WATER 50 %
25 SYRINGE (ML) INTRAVENOUS ONCE
Qty: 0 | Refills: 0 | Status: DISCONTINUED | OUTPATIENT
Start: 2018-12-07 | End: 2018-12-12

## 2018-12-07 RX ORDER — DEXTROSE 50 % IN WATER 50 %
12.5 SYRINGE (ML) INTRAVENOUS ONCE
Qty: 0 | Refills: 0 | Status: DISCONTINUED | OUTPATIENT
Start: 2018-12-07 | End: 2018-12-12

## 2018-12-07 RX ORDER — INSULIN LISPRO 100/ML
VIAL (ML) SUBCUTANEOUS
Qty: 0 | Refills: 0 | Status: DISCONTINUED | OUTPATIENT
Start: 2018-12-07 | End: 2018-12-10

## 2018-12-07 RX ORDER — TIZANIDINE 4 MG/1
4 TABLET ORAL AT BEDTIME
Qty: 0 | Refills: 0 | Status: DISCONTINUED | OUTPATIENT
Start: 2018-12-07 | End: 2018-12-19

## 2018-12-07 RX ORDER — INSULIN LISPRO 100/ML
VIAL (ML) SUBCUTANEOUS AT BEDTIME
Qty: 0 | Refills: 0 | Status: DISCONTINUED | OUTPATIENT
Start: 2018-12-07 | End: 2018-12-10

## 2018-12-07 RX ORDER — SODIUM CHLORIDE 9 MG/ML
1000 INJECTION, SOLUTION INTRAVENOUS
Qty: 0 | Refills: 0 | Status: DISCONTINUED | OUTPATIENT
Start: 2018-12-07 | End: 2018-12-19

## 2018-12-07 RX ORDER — GLUCAGON INJECTION, SOLUTION 0.5 MG/.1ML
1 INJECTION, SOLUTION SUBCUTANEOUS ONCE
Qty: 0 | Refills: 0 | Status: DISCONTINUED | OUTPATIENT
Start: 2018-12-07 | End: 2018-12-12

## 2018-12-07 RX ORDER — DEXTROSE 50 % IN WATER 50 %
15 SYRINGE (ML) INTRAVENOUS ONCE
Qty: 0 | Refills: 0 | Status: DISCONTINUED | OUTPATIENT
Start: 2018-12-07 | End: 2018-12-12

## 2018-12-07 RX ADMIN — Medication 1: at 16:40

## 2018-12-07 RX ADMIN — Medication 81 MILLIGRAM(S): at 12:03

## 2018-12-07 RX ADMIN — Medication 60 MILLIGRAM(S): at 10:12

## 2018-12-07 RX ADMIN — Medication 650 MILLIGRAM(S): at 12:40

## 2018-12-07 RX ADMIN — Medication 20 MILLIGRAM(S): at 12:03

## 2018-12-07 RX ADMIN — Medication 1 APPLICATION(S): at 05:17

## 2018-12-07 RX ADMIN — PANTOPRAZOLE SODIUM 40 MILLIGRAM(S): 20 TABLET, DELAYED RELEASE ORAL at 05:20

## 2018-12-07 RX ADMIN — GABAPENTIN 300 MILLIGRAM(S): 400 CAPSULE ORAL at 21:23

## 2018-12-07 RX ADMIN — SENNA PLUS 2 TABLET(S): 8.6 TABLET ORAL at 21:23

## 2018-12-07 RX ADMIN — MIDODRINE HYDROCHLORIDE 10 MILLIGRAM(S): 2.5 TABLET ORAL at 12:04

## 2018-12-07 RX ADMIN — ENOXAPARIN SODIUM 40 MILLIGRAM(S): 100 INJECTION SUBCUTANEOUS at 21:23

## 2018-12-07 RX ADMIN — Medication 100 MILLIGRAM(S): at 21:23

## 2018-12-07 RX ADMIN — Medication 1 TABLET(S): at 12:04

## 2018-12-07 RX ADMIN — GABAPENTIN 300 MILLIGRAM(S): 400 CAPSULE ORAL at 12:04

## 2018-12-07 RX ADMIN — ATORVASTATIN CALCIUM 80 MILLIGRAM(S): 80 TABLET, FILM COATED ORAL at 21:23

## 2018-12-07 RX ADMIN — Medication 0.6 MILLIGRAM(S): at 05:17

## 2018-12-07 RX ADMIN — NYSTATIN CREAM 1 APPLICATION(S): 100000 CREAM TOPICAL at 05:19

## 2018-12-07 RX ADMIN — Medication 650 MILLIGRAM(S): at 12:05

## 2018-12-07 RX ADMIN — Medication 100 MILLIGRAM(S): at 05:18

## 2018-12-07 RX ADMIN — NYSTATIN CREAM 1 APPLICATION(S): 100000 CREAM TOPICAL at 16:18

## 2018-12-07 RX ADMIN — Medication 1 APPLICATION(S): at 16:18

## 2018-12-07 RX ADMIN — TIZANIDINE 4 MILLIGRAM(S): 4 TABLET ORAL at 21:23

## 2018-12-07 RX ADMIN — CLOPIDOGREL BISULFATE 75 MILLIGRAM(S): 75 TABLET, FILM COATED ORAL at 12:03

## 2018-12-07 NOTE — PROGRESS NOTE ADULT - SUBJECTIVE AND OBJECTIVE BOX
cc: 64yo Female with  Left MCA distribution stroke in the periprocedural period after carotid revascularization with carotid artery stent placement  now with decreased functional mobility, dysphagia, non-fluent aphasia, right hemiparesis, gait instability and ADL impairments.  no new events noted, c/o pain in the foot ?gout    Vital Signs Last 24 Hrs  T(C): 36.8 (07 Dec 2018 08:07), Max: 37 (06 Dec 2018 20:04)  T(F): 98.2 (07 Dec 2018 08:07), Max: 98.6 (06 Dec 2018 20:04)  HR: 99 (07 Dec 2018 08:07) (97 - 103)  BP: 152/80 (07 Dec 2018 08:07) (142/82 - 152/80)  BP(mean): --  RR: 14 (07 Dec 2018 08:07) (14 - 14)  SpO2: 99% (07 Dec 2018 08:07) (95% - 99%)    nad, santos,mmm,ncat  supple  clear  s1s2  soft nt bs present  aao   no pedal edema  neuro- aphasia, right sided weakness                10.0                 140  | 31   | 12           12.5  >-----------< 360     ------------------------< 84                    33.0                 3.9  | 102  | 0.99                                         Ca 9.4   Mg x     Ph x        Uric Acid, Serum: 8.3 mg/dL (12.06.18 @ 06:00) cc: 64yo Female with  Left MCA distribution stroke in the periprocedural period after carotid revascularization with carotid artery stent placement  now with decreased functional mobility, dysphagia, non-fluent aphasia, right hemiparesis, gait instability and ADL impairments.  no new events noted, c/o pain in the foot ?gout    Vital Signs Last 24 Hrs  T(C): 36.8 (07 Dec 2018 08:07), Max: 37 (06 Dec 2018 20:04)  T(F): 98.2 (07 Dec 2018 08:07), Max: 98.6 (06 Dec 2018 20:04)  HR: 99 (07 Dec 2018 08:07) (97 - 103)  BP: 152/80 (07 Dec 2018 08:07) (142/82 - 152/80)  BP(mean): --  RR: 14 (07 Dec 2018 08:07) (14 - 14)  SpO2: 99% (07 Dec 2018 08:07) (95% - 99%)    nad, santos,mmm,ncat  supple  clear  s1s2  soft nt bs present  no pedal edema  neuro- aphasia, right sided weakness                10.0                 140  | 31   | 12           12.5  >-----------< 360     ------------------------< 84                    33.0                 3.9  | 102  | 0.99                                         Ca 9.4   Mg x     Ph x        Uric Acid, Serum: 8.3 mg/dL (12.06.18 @ 06:00)

## 2018-12-07 NOTE — PROGRESS NOTE ADULT - SUBJECTIVE AND OBJECTIVE BOX
HPI:  62y/o F w/ HTN, HLD, RA, Asthma s/p elective left transcarotid arterial sten via right femoral vein access (11/13/18) course complicated by code stroke approximately 8 hours postop. CTA head and neck showed a distal M2 branch occlusion. She was deemed not a candidate for intracranial thrombectomy due to the location of the M2 occlusion at that time. Patient monitored in SICU, found to be stable, and sent to the floor. MRI brain on 11/17 showed acute infarct in the left MCA distribution. TTE did not show any obvious structural cardiac source of embolism. On 11/20, she was noted to have fluctuating right hemiparesis with respect to right arm weakness. CT brain on 11/20 showed expected evolution of left MCA distribution infarct and CTA head and neck showed stable findings. Right sided deficits and aphasia possibly secondary to hypoperfusion of area supplied by the L M2 high grade stenosis due to fluctuating blood pressure. Patient started on midodrine to keep a systolic pressure between 140-160. Patient is currently being treated for E Coli UTI with cipro. Pt had a speech and swallow and was recommended to have a Mechanical Soft with Honey-Thick Liquids diet.  Per vascular team, patient will be discharged on midodrine and tapered down as tolerated. Pt was medically optimized for acute rehab admission on 11/28. (28 Nov 2018 15:17)      Subjective:  Patient was seen and examined. Pt had no acute events overnight. Pt is tearful due to pain in right foot at 1st medial MTP joint.  Tender to palpation.  No pain with tactile stimuli to feet or with ankle rom.    REVIEW OF SYMPTOMS   ] Constiutional WNL            [X] Cardio WNL               [X] Resp WNL  [X] GI WNL                            [X]  WNL                    [X] Heme WNL  [X] Endo WNL                       [X] Skin WNL                   [  ] MSK WNL  [  ] Neuro WNL                     [X] Cognitive WNL           [X] Psych WNL      VITALS  T(C): 36.8 (12-07-18 @ 08:07)  T(F): 98.2 (12-07-18 @ 08:07), Max: 98.6 (12-06-18 @ 20:04)  HR: 99 (12-07-18 @ 08:07) (97 - 103)  BP: 152/80 (12-07-18 @ 08:07) (123/89 - 152/80)  RR:  (14 - 14)  SpO2:  (95% - 99%)  Wt(kg): --    PHYSICAL EXAM  Constitutional - NAD, Comfortable  	HEENT - NCAT, EOMI  	Neck - Supple,   	Chest - CTA bilaterally, No wheeze, No rhonchi, No crackles  	Cardiovascular - RRR, S1S2, No murmurs  	Abdomen - BS+, Soft, NTND  	Extremities - No C/C, No calf tenderness, mild edema right foot   	Neurologic Exam -                 	   Communication expressive aphasia, follows commands, answers yes/no questions with nodding/shaking head.  Non-fluent  	   Cranial Nerves - + right facial droop   	   Motor -Stable exam-- RUE  2/5 EF/EE, Shoulder ext, RLE hip flex 2/5, 1/5 PF/DF 2/2 to pain ; LUE/LLE 5/5             	   Sensory - impaired on right  Psychiatric - Mood stable, Affect WNL    MSK --+TTP to right foot bunion, warm      RECENT LABS                        10.0   12.5  )-----------( 360      ( 06 Dec 2018 06:00 )             33.0     12-06    140  |  102  |  12  ----------------------------<  84  3.9   |  31  |  0.99    Ca    9.4      06 Dec 2018 06:00    TPro  7.3  /  Alb  3.1<L>  /  TBili  0.4  /  DBili  x   /  AST  42<H>  /  ALT  36  /  AlkPhos  120  12-06    Uric Acid 12/6: 8.3    MEDICATIONS  (STANDING):  acetaminophen   Tablet .. 650 milliGRAM(s) Oral every 6 hours  AQUAPHOR (petrolatum Ointment) 1 Application(s) Topical two times a day  aspirin  chewable 81 milliGRAM(s) Oral daily  atorvastatin 80 milliGRAM(s) Oral at bedtime  clopidogrel Tablet 75 milliGRAM(s) Oral daily  dextrose 5%. 1000 milliLiter(s) (50 mL/Hr) IV Continuous <Continuous>  dextrose 50% Injectable 12.5 Gram(s) IV Push once  dextrose 50% Injectable 25 Gram(s) IV Push once  dextrose 50% Injectable 25 Gram(s) IV Push once  docusate sodium 100 milliGRAM(s) Oral three times a day  enoxaparin Injectable 40 milliGRAM(s) SubCutaneous every 24 hours  FLUoxetine Solution 20 milliGRAM(s) Oral daily  gabapentin 300 milliGRAM(s) Oral at bedtime  gabapentin 300 milliGRAM(s) Oral <User Schedule>  insulin lispro (HumaLOG) corrective regimen sliding scale   SubCutaneous three times a day before meals  insulin lispro (HumaLOG) corrective regimen sliding scale   SubCutaneous at bedtime  midodrine 10 milliGRAM(s) Oral <User Schedule>  multivitamin 1 Tablet(s) Oral daily  nystatin Powder 1 Application(s) Topical two times a day  pantoprazole    Tablet 40 milliGRAM(s) Oral before breakfast  senna 2 Tablet(s) Oral at bedtime    MEDICATIONS  (PRN):  ALBUTerol    90 MICROgram(s) HFA Inhaler 2 Puff(s) Inhalation every 6 hours PRN Shortness of Breath and/or Wheezing  dextrose 40% Gel 15 Gram(s) Oral once PRN Blood Glucose LESS THAN 70 milliGRAM(s)/deciliter  glucagon  Injectable 1 milliGRAM(s) IntraMuscular once PRN Glucose LESS THAN 70 milligrams/deciliter HPI:  62y/o F w/ HTN, HLD, RA, Asthma s/p elective left transcarotid arterial sten via right femoral vein access (11/13/18) course complicated by code stroke approximately 8 hours postop. CTA head and neck showed a distal M2 branch occlusion. She was deemed not a candidate for intracranial thrombectomy due to the location of the M2 occlusion at that time. Patient monitored in SICU, found to be stable, and sent to the floor. MRI brain on 11/17 showed acute infarct in the left MCA distribution. TTE did not show any obvious structural cardiac source of embolism. On 11/20, she was noted to have fluctuating right hemiparesis with respect to right arm weakness. CT brain on 11/20 showed expected evolution of left MCA distribution infarct and CTA head and neck showed stable findings. Right sided deficits and aphasia possibly secondary to hypoperfusion of area supplied by the L M2 high grade stenosis due to fluctuating blood pressure. Patient started on midodrine to keep a systolic pressure between 140-160. Patient is currently being treated for E Coli UTI with cipro. Pt had a speech and swallow and was recommended to have a Mechanical Soft with Honey-Thick Liquids diet.  Per vascular team, patient will be discharged on midodrine and tapered down as tolerated. Pt was medically optimized for acute rehab admission on 11/28. (28 Nov 2018 15:17)      Subjective:  Patient was seen and examined. Pt had no acute events overnight. cont. to have bilateral MTP 1st joint pain and swelling.  Not improved since yesterday.  Pt is tearful due to pain in right foot at 1st medial MTP joint.  Tender to palpation.  No pain with tactile stimuli to feet or with ankle rom.  PT. reports pt. with "jessica horse" spasms in right knee flexors and PFs.  Pt. reports this occurs at night as well.     REVIEW OF SYMPTOMS   ] Constiutional WNL            [X] Cardio WNL               [X] Resp WNL  [X] GI WNL                            [X]  WNL                    [X] Heme WNL  [X] Endo WNL                       [X] Skin WNL                   [  ] MSK WNL  [  ] Neuro WNL                     [X] Cognitive WNL           [X] Psych WNL      VITALS  T(C): 36.8 (12-07-18 @ 08:07)  T(F): 98.2 (12-07-18 @ 08:07), Max: 98.6 (12-06-18 @ 20:04)  HR: 99 (12-07-18 @ 08:07) (97 - 103)  BP: 152/80 (12-07-18 @ 08:07) (123/89 - 152/80)  RR:  (14 - 14)  SpO2:  (95% - 99%)  Wt(kg): --    PHYSICAL EXAM  Constitutional - NAD, Comfortable  	HEENT - NCAT, EOMI  	Neck - Supple,   	Chest - CTA bilaterally, No wheeze, No rhonchi, No crackles  	Cardiovascular - RRR, S1S2, No murmurs  	Abdomen - BS+, Soft, NTND  	Extremities - No C/C, No calf tenderness, mild edema right foot   	Neurologic Exam -                 	   Communication expressive aphasia, follows commands, answers yes/no questions with nodding/shaking head.  Non-fluent  	   Cranial Nerves - + right facial droop   	   Motor -Stable exam-- RUE  2/5 EF/EE, Shoulder ext, RLE hip flex 2/5, 1/5 PF/DF 2/2 to pain ; LUE/LLE 5/5             	   Sensory - impaired on right  Psychiatric - Mood stable, Affect WNL    MSK --+TTP to bilat foot bunion, calor, erythema,       RECENT LABS                        10.0   12.5  )-----------( 360      ( 06 Dec 2018 06:00 )             33.0     12-06    140  |  102  |  12  ----------------------------<  84  3.9   |  31  |  0.99    Ca    9.4      06 Dec 2018 06:00    TPro  7.3  /  Alb  3.1<L>  /  TBili  0.4  /  DBili  x   /  AST  42<H>  /  ALT  36  /  AlkPhos  120  12-06    Uric Acid 12/6: 8.3    MEDICATIONS  (STANDING):  acetaminophen   Tablet .. 650 milliGRAM(s) Oral every 6 hours  AQUAPHOR (petrolatum Ointment) 1 Application(s) Topical two times a day  aspirin  chewable 81 milliGRAM(s) Oral daily  atorvastatin 80 milliGRAM(s) Oral at bedtime  clopidogrel Tablet 75 milliGRAM(s) Oral daily  dextrose 5%. 1000 milliLiter(s) (50 mL/Hr) IV Continuous <Continuous>  dextrose 50% Injectable 12.5 Gram(s) IV Push once  dextrose 50% Injectable 25 Gram(s) IV Push once  dextrose 50% Injectable 25 Gram(s) IV Push once  docusate sodium 100 milliGRAM(s) Oral three times a day  enoxaparin Injectable 40 milliGRAM(s) SubCutaneous every 24 hours  FLUoxetine Solution 20 milliGRAM(s) Oral daily  gabapentin 300 milliGRAM(s) Oral at bedtime  gabapentin 300 milliGRAM(s) Oral <User Schedule>  insulin lispro (HumaLOG) corrective regimen sliding scale   SubCutaneous three times a day before meals  insulin lispro (HumaLOG) corrective regimen sliding scale   SubCutaneous at bedtime  midodrine 10 milliGRAM(s) Oral <User Schedule>  multivitamin 1 Tablet(s) Oral daily  nystatin Powder 1 Application(s) Topical two times a day  pantoprazole    Tablet 40 milliGRAM(s) Oral before breakfast  senna 2 Tablet(s) Oral at bedtime    MEDICATIONS  (PRN):  ALBUTerol    90 MICROgram(s) HFA Inhaler 2 Puff(s) Inhalation every 6 hours PRN Shortness of Breath and/or Wheezing  dextrose 40% Gel 15 Gram(s) Oral once PRN Blood Glucose LESS THAN 70 milliGRAM(s)/deciliter  glucagon  Injectable 1 milliGRAM(s) IntraMuscular once PRN Glucose LESS THAN 70 milligrams/deciliter

## 2018-12-07 NOTE — PROGRESS NOTE ADULT - ASSESSMENT
RUPINDER TRIPP is a 64yo Female with  Left MCA distribution stroke in the periprocedural period after carotid revascularization with carotid artery stent placement  now with decreased functional mobility, dysphagia, non-fluent aphasia, right hemiparesis, gait instability and ADL impairments.    COMORBIDITES/ACTIVE MEDICAL ISSUES     #s/p left MCA CVA  -continue Comprehensive Rehab Program of PT/OT/SLP  -ASA/Plavix/Lipitor  -Fluoxetine for motor recovery    #Hypotension  -Midodrine with parameters (taper as tolerated)  -monitor BP    #UTI  -s/p course of cipro (completed 12/3)  -blood cx -no growth  -CBC 12/6 stable  -monitor vitals and symptoms    #MARLY  -BMP stable 12/6    #Dysphagia  -continue SLP  -On Dysphagia 3, soft/thin lqs     #Nutrition: ensure TID, discussed with RD     #Neuropathic pain to bilat feet and right hand   -salvador 300mg qHS, 300mg q1300     #Right 1st Toe Gout Flare  -podiatry c/s appreciated  -uric acid 8.3 (12/6) elevated  -begin steroid taper    Gait Instability, ADL impairments and Functional impairments:  Comprehensive Rehab Program of PT/OT     GI/Bowel Mgmt - Colace, Senna, Toileting program  /Bladder Mgmt - toileting program      Precautions / PROPHYLAXIS:   - Falls, Cardiac  - Lungs: Aspiration  - Pressure injury/Skin: Turn Q2hrs while in bed, OOB to Chair, PT/OT    - DVT: Lovenox, SCDs, TEDs     IDT Meeting 12/4 - Barriers to discharge include decreased rightsided coordination, decreased balance, right sided weakness, b/l feet pain, expressive aphasia, and decreased endurance. Currently, patient functional status is setup for eating, total assist for UB and LB dressing, max assist for toilet xfer, and mod-max A 15 ft with hemicane.  ADRIANO - 12/19 VANDANA RUPINDER TRIPP is a 62yo Female with  Left MCA distribution stroke in the periprocedural period after carotid revascularization with carotid artery stent placement  now with decreased functional mobility, dysphagia, non-fluent aphasia, right hemiparesis, gait instability and ADL impairments.    COMORBIDITES/ACTIVE MEDICAL ISSUES     #s/p left MCA CVA  -continue Comprehensive Rehab Program of PT/OT/SLP  -ASA/Plavix/Lipitor  -Fluoxetine for motor recovery    #Hypotension  -Midodrine with parameters (taper as tolerated)  -monitor BP    #UTI  -s/p course of cipro (completed 12/3)  -blood cx -no growth  -CBC 12/6 stable  -monitor vitals and symptoms    #MARLY  -BMP stable 12/6    #Dysphagia  -continue SLP  -On Dysphagia 3, soft/thin lqs     #Nutrition: ensure TID, discussed with RD     #Neuropathic pain to bilat feet and right hand   -salvador 300mg qHS, 300mg q1300     #Right 1st Toe Gout Flare  -podiatry c/s appreciated  -uric acid 8.3 (12/6) elevated  -begin steroid taper - received 60mg prednisone x1 (12/7)    Gait Instability, ADL impairments and Functional impairments:  Comprehensive Rehab Program of PT/OT     GI/Bowel Mgmt - Colace, Senna, Toileting program  /Bladder Mgmt - toileting program      Precautions / PROPHYLAXIS:   - Falls, Cardiac  - Lungs: Aspiration  - Pressure injury/Skin: Turn Q2hrs while in bed, OOB to Chair, PT/OT    - DVT: Lovenox, SCDs, TEDs     IDT Meeting 12/4 - Barriers to discharge include decreased rightsided coordination, decreased balance, right sided weakness, b/l feet pain, expressive aphasia, and decreased endurance. Currently, patient functional status is setup for eating, total assist for UB and LB dressing, max assist for toilet xfer, and mod-max A 15 ft with hemiccain.  ADRIANO - 12/19 VANDANA RUPINDER TRIPP is a 62yo Female with  Left MCA distribution stroke in the periprocedural period after carotid revascularization with carotid artery stent placement  now with decreased functional mobility, dysphagia, non-fluent aphasia, right hemiparesis, gait instability and ADL impairments.    COMORBIDITES/ACTIVE MEDICAL ISSUES     #s/p left MCA CVA  -continue Comprehensive Rehab Program of PT/OT/SLP  -ASA/Plavix/Lipitor  -Fluoxetine for motor recovery    #Bilat. 1st Toe Gout Flare  Not responding to colchicine.  Pt. states steroids helped her in past.   -podiatry c/s appreciated  -uric acid 8.3 (12/6) elevated  -d/c colchicine and begin steroid taper - received 60mg prednisone x1 (12/7)      #Hypotension  -Midodrine with parameters (taper as tolerated)  -monitor BP    #UTI  -s/p course of cipro (completed 12/3)  -blood cx -no growth  -CBC 12/6 stable  -monitor vitals and symptoms    #MARLY  -BMP stable 12/6    #Dysphagia  -continue SLP  -On Dysphagia 3, soft/thin lqs     #Nutrition: ensure TID, discussed with RD     #Neuropathic pain to bilat feet and right hand   -salvador 300mg qHS, 300mg q1300       Gait Instability, ADL impairments and Functional impairments:  Comprehensive Rehab Program of PT/OT     GI/Bowel Mgmt - Colace, Senna, Toileting program  /Bladder Mgmt - toileting program      Precautions / PROPHYLAXIS:   - Falls, Cardiac  - Lungs: Aspiration  - Pressure injury/Skin: Turn Q2hrs while in bed, OOB to Chair, PT/OT    - DVT: Lovenox, SCDs, TEDs     IDT Meeting 12/4 - Barriers to discharge include decreased rightsided coordination, decreased balance, right sided weakness, b/l feet pain, expressive aphasia, and decreased endurance. Currently, patient functional status is setup for eating, total assist for UB and LB dressing, max assist for toilet xfer, and mod-max A 15 ft with arlette.  ADRIANO - 12/19 VANDANA

## 2018-12-07 NOTE — PROGRESS NOTE ADULT - ASSESSMENT
62yo Female with  Left MCA distribution stroke in the periprocedural period after carotid revascularization with carotid artery stent placement  now with decreased functional mobility, dysphagia, non-fluent aphasia, right hemiparesis, gait instability and ADL impairments- PT/OT/SLP,-ASA/Plavix/Lipitor,Fluoxetine for motor recovery      Bilat. 1st Toe Gout Flare- agree with plan for steroids, per rehab team  consider allopurinol once acute phase is over, for hyperuricemia    Hypotension  -Midodrine with parameters (taper as tolerated)  -monitor BP    #UTI  -s/p course of cipro (completed 12/3)  -blood cx -no growth  LEUKOCYTOSIS - improving slowly, monitor labs    gerd- protonix    Neuropathic pain to bilat feet and right hand   -salvador 300mg qHS, 300mg q1300      DVT ppx : Lovenox, AMIRAs, TEDs

## 2018-12-08 LAB
GLUCOSE BLDC GLUCOMTR-MCNC: 101 MG/DL — HIGH (ref 70–99)
GLUCOSE BLDC GLUCOMTR-MCNC: 126 MG/DL — HIGH (ref 70–99)
GLUCOSE BLDC GLUCOMTR-MCNC: 188 MG/DL — HIGH (ref 70–99)
GLUCOSE BLDC GLUCOMTR-MCNC: 92 MG/DL — SIGNIFICANT CHANGE UP (ref 70–99)

## 2018-12-08 PROCEDURE — 99233 SBSQ HOSP IP/OBS HIGH 50: CPT

## 2018-12-08 PROCEDURE — 99232 SBSQ HOSP IP/OBS MODERATE 35: CPT

## 2018-12-08 RX ADMIN — Medication 1: at 12:01

## 2018-12-08 RX ADMIN — Medication 20 MILLIGRAM(S): at 12:02

## 2018-12-08 RX ADMIN — ATORVASTATIN CALCIUM 80 MILLIGRAM(S): 80 TABLET, FILM COATED ORAL at 21:46

## 2018-12-08 RX ADMIN — Medication 1 APPLICATION(S): at 06:10

## 2018-12-08 RX ADMIN — MIDODRINE HYDROCHLORIDE 10 MILLIGRAM(S): 2.5 TABLET ORAL at 12:04

## 2018-12-08 RX ADMIN — GABAPENTIN 300 MILLIGRAM(S): 400 CAPSULE ORAL at 12:04

## 2018-12-08 RX ADMIN — CLOPIDOGREL BISULFATE 75 MILLIGRAM(S): 75 TABLET, FILM COATED ORAL at 12:02

## 2018-12-08 RX ADMIN — NYSTATIN CREAM 1 APPLICATION(S): 100000 CREAM TOPICAL at 06:10

## 2018-12-08 RX ADMIN — MIDODRINE HYDROCHLORIDE 10 MILLIGRAM(S): 2.5 TABLET ORAL at 06:11

## 2018-12-08 RX ADMIN — TIZANIDINE 4 MILLIGRAM(S): 4 TABLET ORAL at 21:46

## 2018-12-08 RX ADMIN — Medication 60 MILLIGRAM(S): at 06:11

## 2018-12-08 RX ADMIN — Medication 81 MILLIGRAM(S): at 12:02

## 2018-12-08 RX ADMIN — SENNA PLUS 2 TABLET(S): 8.6 TABLET ORAL at 21:46

## 2018-12-08 RX ADMIN — Medication 100 MILLIGRAM(S): at 21:46

## 2018-12-08 RX ADMIN — PANTOPRAZOLE SODIUM 40 MILLIGRAM(S): 20 TABLET, DELAYED RELEASE ORAL at 06:11

## 2018-12-08 RX ADMIN — Medication 0: at 21:44

## 2018-12-08 RX ADMIN — NYSTATIN CREAM 1 APPLICATION(S): 100000 CREAM TOPICAL at 17:13

## 2018-12-08 RX ADMIN — Medication 1 TABLET(S): at 12:02

## 2018-12-08 RX ADMIN — ENOXAPARIN SODIUM 40 MILLIGRAM(S): 100 INJECTION SUBCUTANEOUS at 21:45

## 2018-12-08 RX ADMIN — Medication 1 APPLICATION(S): at 17:13

## 2018-12-08 RX ADMIN — Medication 100 MILLIGRAM(S): at 06:11

## 2018-12-08 RX ADMIN — GABAPENTIN 300 MILLIGRAM(S): 400 CAPSULE ORAL at 21:46

## 2018-12-08 NOTE — CHART NOTE - NSCHARTNOTEFT_GEN_A_CORE
NUTRITION FOLLOW UP    SOURCE: Medical Record    DIET: Soft with thins , food allergies/ intolerances noted + Ensure enlive TID.    Patient po intake has improved since admission, noted currently consuming 100% of meals. NO GI distress noted.(+) BM (12/7).    CURRENT WEIGHT:  - no recent weight noted since admission.No edema noted.    PERTINENT MEDS:   Pertinent Medications: MEDICATIONS  (STANDING):  acetaminophen   Tablet .. 650 milliGRAM(s) Oral every 6 hours  AQUAPHOR (petrolatum Ointment) 1 Application(s) Topical two times a day  aspirin  chewable 81 milliGRAM(s) Oral daily  atorvastatin 80 milliGRAM(s) Oral at bedtime  clopidogrel Tablet 75 milliGRAM(s) Oral daily  dextrose 5%. 1000 milliLiter(s) (50 mL/Hr) IV Continuous <Continuous>  dextrose 50% Injectable 12.5 Gram(s) IV Push once  dextrose 50% Injectable 25 Gram(s) IV Push once  dextrose 50% Injectable 25 Gram(s) IV Push once  docusate sodium 100 milliGRAM(s) Oral three times a day  enoxaparin Injectable 40 milliGRAM(s) SubCutaneous every 24 hours  FLUoxetine Solution 20 milliGRAM(s) Oral daily  gabapentin 300 milliGRAM(s) Oral at bedtime  gabapentin 300 milliGRAM(s) Oral <User Schedule>  insulin lispro (HumaLOG) corrective regimen sliding scale   SubCutaneous three times a day before meals  insulin lispro (HumaLOG) corrective regimen sliding scale   SubCutaneous at bedtime  midodrine 10 milliGRAM(s) Oral <User Schedule>  multivitamin 1 Tablet(s) Oral daily  nystatin Powder 1 Application(s) Topical two times a day  pantoprazole    Tablet 40 milliGRAM(s) Oral before breakfast  predniSONE   Tablet 60 milliGRAM(s) Oral daily  senna 2 Tablet(s) Oral at bedtime  tiZANidine 4 milliGRAM(s) Oral at bedtime    MEDICATIONS  (PRN):  ALBUTerol    90 MICROgram(s) HFA Inhaler 2 Puff(s) Inhalation every 6 hours PRN Shortness of Breath and/or Wheezing  dextrose 40% Gel 15 Gram(s) Oral once PRN Blood Glucose LESS THAN 70 milliGRAM(s)/deciliter  glucagon  Injectable 1 milliGRAM(s) IntraMuscular once PRN Glucose LESS THAN 70 milligrams/deciliter      PERTINENT LABS: (12/6) H/H 10.0/33.0(L), Na140, K3.9, BUN12, Cr0.99, Glucose 84        ACCUCHECK  POCT Blood Glucose.: 101 mg/dL (08 Dec 2018 07:49)  POCT Blood Glucose.: 136 mg/dL (07 Dec 2018 20:55)  POCT Blood Glucose.: 164 mg/dL (07 Dec 2018 16:13)  POCT Blood Glucose.: 144 mg/dL (07 Dec 2018 11:59)      SKIN:  intact    ESTIMATED NEEDS:   [X] no change since previous assessment  [ ] recalculated:     PREVIOUS NUTRITION DIAGNOSIS: addressed    NUTRITION DIAGNOSIS is   [X] resolved, patient with good po intake.        MONITORING AND EVALUATION:   Current diet order is appropriate and is well tolerated, but will monitor for any changes that may be needed. Will follow clinical course      NUTRITION RECOMMENDATIONS: Continue current diet, Request current weight from nursing.

## 2018-12-08 NOTE — PROGRESS NOTE ADULT - ASSESSMENT
63 year old female with  Left MCA distribution stroke in the periprocedural period after carotid revascularization with carotid artery stent placement  now with decreased functional mobility, dysphagia, aphasia, right hemiparesis, gait instability and ADL impairments.    #left MCA CVA  -continue ASA/Plavix/Lipitor    #ADL impairment with functional deficits with Dysphagia and Aphasia  -c/w PT/OT/SLP  -c/w Fluoxetine for motor recovery    #Hypotension- stable/resolved   -c/w Midodrine  -monitor BP    #MARLY- resolved  -monitor BUN/Cr  Avoid Nephrotoxic meds.     #Anemia  -Stable H/H  -consistent with anemia of chronic illness (high ferritin noted)    DVT ppx: Lovenox    # Gout Flare-   c/w Prednisone  consider allopurinol once acute phase is over, for hyperuricemia      #UTI- resolved   -s/p course of cipro (completed 12/3)  Leucocytosis - improving, afebrile , stable   Monitor     Pain management   -Gabapentin      DVT ppx : Lovenox, SCDs, TEDs

## 2018-12-08 NOTE — PROGRESS NOTE ADULT - SUBJECTIVE AND OBJECTIVE BOX
Chief complaint: no new complaints    Patient is a 63y old  Female who presents with a chief complaint of s/p left MCA CVA now with functional deficits (08 Dec 2018 10:10)    HEALTH ISSUES - PROBLEM Dx:              PAST MEDICAL & SURGICAL HISTORY:  Osteoporosis  Eczema: bilateral elbows  Carotid artery stenosis  Rheumatoid arthritis: tx with  methotrexate and prednisone  HLD (hyperlipidemia)  Carotid Artery Dissection  Eczema  Psoriasis  Asthma  HTN (Hypertension)  Myocardial Infarction  S/P lumbar fusion: h/o vertral fracture with cement fusion 209  Cataract Extraction Surgery  tonsillectomy: s/p Excision Right lymph node ; pt unclear      VITALS  Vital Signs Last 24 Hrs  T(C): 36.3 (08 Dec 2018 08:25), Max: 36.8 (07 Dec 2018 20:19)  T(F): 97.3 (08 Dec 2018 08:25), Max: 98.2 (07 Dec 2018 20:19)  HR: 78 (08 Dec 2018 11:42) (69 - 93)  BP: 124/72 (08 Dec 2018 11:42) (113/70 - 163/94)  BP(mean): --  RR: 12 (08 Dec 2018 08:25) (12 - 15)  SpO2: 95% (08 Dec 2018 08:25) (95% - 98%)      PHYSICAL EXAM  Constitutional - NAD, Comfortable  HEENT - NCAT, EOMI  Neck - Supple, No limited ROM  Chest - CTA bilaterally  Cardiovascular - RRR, S1S2,  Abdomen - BS+, Soft, NTND  Extremities - No C/C/E, No calf tenderness   Neurologic Exam -                    Cognitive - Awake, Alert,      No new focal deficits                 CURRENT MEDICATIONS    MEDICATIONS  (STANDING):  acetaminophen   Tablet .. 650 milliGRAM(s) Oral every 6 hours  AQUAPHOR (petrolatum Ointment) 1 Application(s) Topical two times a day  aspirin  chewable 81 milliGRAM(s) Oral daily  atorvastatin 80 milliGRAM(s) Oral at bedtime  clopidogrel Tablet 75 milliGRAM(s) Oral daily  dextrose 5%. 1000 milliLiter(s) (50 mL/Hr) IV Continuous <Continuous>  dextrose 50% Injectable 12.5 Gram(s) IV Push once  dextrose 50% Injectable 25 Gram(s) IV Push once  dextrose 50% Injectable 25 Gram(s) IV Push once  docusate sodium 100 milliGRAM(s) Oral three times a day  enoxaparin Injectable 40 milliGRAM(s) SubCutaneous every 24 hours  FLUoxetine Solution 20 milliGRAM(s) Oral daily  gabapentin 300 milliGRAM(s) Oral at bedtime  gabapentin 300 milliGRAM(s) Oral <User Schedule>  insulin lispro (HumaLOG) corrective regimen sliding scale   SubCutaneous three times a day before meals  insulin lispro (HumaLOG) corrective regimen sliding scale   SubCutaneous at bedtime  midodrine 10 milliGRAM(s) Oral <User Schedule>  multivitamin 1 Tablet(s) Oral daily  nystatin Powder 1 Application(s) Topical two times a day  pantoprazole    Tablet 40 milliGRAM(s) Oral before breakfast  predniSONE   Tablet 60 milliGRAM(s) Oral daily  senna 2 Tablet(s) Oral at bedtime  tiZANidine 4 milliGRAM(s) Oral at bedtime    MEDICATIONS  (PRN):  ALBUTerol    90 MICROgram(s) HFA Inhaler 2 Puff(s) Inhalation every 6 hours PRN Shortness of Breath and/or Wheezing  dextrose 40% Gel 15 Gram(s) Oral once PRN Blood Glucose LESS THAN 70 milliGRAM(s)/deciliter  glucagon  Injectable 1 milliGRAM(s) IntraMuscular once PRN Glucose LESS THAN 70 milligrams/deciliter    ASSESSMENT & PLAN          GI/Bowel Management - Dulcolax PRN, Fleet PRN   Management - Toilet Q2  Skin - Turn Q2  Pain - Tylenol PRN  DVT PPX - Lovenox       Continue comprehensive acute rehab program consisting of 3hrs/day of OT/PT and SLP.

## 2018-12-08 NOTE — PROGRESS NOTE ADULT - SUBJECTIVE AND OBJECTIVE BOX
Patient is a 63y old  Female who presents with a chief complaint of s/p left MCA CVA now with functional deficits (07 Dec 2018 13:02)      Patient seen and examined at bedside. Patient is aphasic, just nods and denies any new compliants.     ALLERGIES:  peanut butter (Anaphylaxis)  peanuts (Anaphylaxis; Hives)  penicillins (Anaphylaxis)  propofol (Angioedema)  shellfish (Anaphylaxis; Hives)  tomatoes (Anaphylaxis; Hives)    MEDICATIONS:  acetaminophen   Tablet .. 650 milliGRAM(s) Oral every 6 hours  AQUAPHOR (petrolatum Ointment) 1 Application(s) Topical two times a day  clopidogrel Tablet 75 milliGRAM(s) Oral daily  dextrose 40% Gel 15 Gram(s) Oral once PRN  dextrose 5%. 1000 milliLiter(s) IV Continuous <Continuous>  dextrose 50% Injectable 12.5 Gram(s) IV Push once  dextrose 50% Injectable 25 Gram(s) IV Push once  dextrose 50% Injectable 25 Gram(s) IV Push once  docusate sodium 100 milliGRAM(s) Oral three times a day  gabapentin 300 milliGRAM(s) Oral at bedtime  gabapentin 300 milliGRAM(s) Oral <User Schedule>  glucagon  Injectable 1 milliGRAM(s) IntraMuscular once PRN  insulin lispro (HumaLOG) corrective regimen sliding scale   SubCutaneous three times a day before meals  insulin lispro (HumaLOG) corrective regimen sliding scale   SubCutaneous at bedtime  midodrine 10 milliGRAM(s) Oral <User Schedule>  nystatin Powder 1 Application(s) Topical two times a day  pantoprazole    Tablet 40 milliGRAM(s) Oral before breakfast  predniSONE   Tablet 60 milliGRAM(s) Oral daily  tiZANidine 4 milliGRAM(s) Oral at bedtime    Vital Signs Last 24 Hrs  T(F): 97.3 (08 Dec 2018 08:25), Max: 98.2 (07 Dec 2018 20:19)  HR: 83 (08 Dec 2018 08:25) (69 - 93)  BP: 158/83 (08 Dec 2018 08:25) (113/70 - 163/94)  RR: 12 (08 Dec 2018 08:25) (12 - 15)  SpO2: 95% (08 Dec 2018 08:25) (95% - 98%)  I&O's Summary      PHYSICAL EXAM:  General: NAD, Aphasic   Neck: Supple, No JVD  Lungs: CTA, BLAE, No added sounds   Cardio: RRR, S1/S2, No murmurs  Abdomen: Soft, NT/ND   Extremities: No clubbing, cyanosis, or edema  Psych: A/O x 3    LABS:                        10.0   12.5  )-----------( 360      ( 06 Dec 2018 06:00 )             33.0     12-06    140  |  102  |  12  ----------------------------<  84  3.9   |  31  |  0.99    Ca    9.4      06 Dec 2018 06:00    TPro  7.3  /  Alb  3.1  /  TBili  0.4  /  DBili  x   /  AST  42  /  ALT  36  /  AlkPhos  120  12-06    eGFR if Non African American: 61 mL/min/1.73M2 (12-06-18 @ 06:00)  eGFR if : 71 mL/min/1.73M2 (12-06-18 @ 06:00)                    CAPILLARY BLOOD GLUCOSE      POCT Blood Glucose.: 101 mg/dL (08 Dec 2018 07:49)  POCT Blood Glucose.: 136 mg/dL (07 Dec 2018 20:55)  POCT Blood Glucose.: 164 mg/dL (07 Dec 2018 16:13)  POCT Blood Glucose.: 144 mg/dL (07 Dec 2018 11:59)    11-01 [id number] 6.0          RADIOLOGY & ADDITIONAL TESTS:    Care Discussed with Consultants/Other Providers:

## 2018-12-09 LAB
GLUCOSE BLDC GLUCOMTR-MCNC: 104 MG/DL — HIGH (ref 70–99)
GLUCOSE BLDC GLUCOMTR-MCNC: 138 MG/DL — HIGH (ref 70–99)
GLUCOSE BLDC GLUCOMTR-MCNC: 237 MG/DL — HIGH (ref 70–99)
GLUCOSE BLDC GLUCOMTR-MCNC: 96 MG/DL — SIGNIFICANT CHANGE UP (ref 70–99)

## 2018-12-09 PROCEDURE — 99232 SBSQ HOSP IP/OBS MODERATE 35: CPT

## 2018-12-09 PROCEDURE — 99233 SBSQ HOSP IP/OBS HIGH 50: CPT

## 2018-12-09 RX ADMIN — ATORVASTATIN CALCIUM 80 MILLIGRAM(S): 80 TABLET, FILM COATED ORAL at 21:29

## 2018-12-09 RX ADMIN — Medication 81 MILLIGRAM(S): at 11:32

## 2018-12-09 RX ADMIN — GABAPENTIN 300 MILLIGRAM(S): 400 CAPSULE ORAL at 13:17

## 2018-12-09 RX ADMIN — Medication 100 MILLIGRAM(S): at 05:54

## 2018-12-09 RX ADMIN — PANTOPRAZOLE SODIUM 40 MILLIGRAM(S): 20 TABLET, DELAYED RELEASE ORAL at 05:55

## 2018-12-09 RX ADMIN — MIDODRINE HYDROCHLORIDE 10 MILLIGRAM(S): 2.5 TABLET ORAL at 05:54

## 2018-12-09 RX ADMIN — TIZANIDINE 4 MILLIGRAM(S): 4 TABLET ORAL at 21:29

## 2018-12-09 RX ADMIN — Medication 1 APPLICATION(S): at 17:30

## 2018-12-09 RX ADMIN — CLOPIDOGREL BISULFATE 75 MILLIGRAM(S): 75 TABLET, FILM COATED ORAL at 11:32

## 2018-12-09 RX ADMIN — GABAPENTIN 300 MILLIGRAM(S): 400 CAPSULE ORAL at 21:29

## 2018-12-09 RX ADMIN — Medication 60 MILLIGRAM(S): at 05:54

## 2018-12-09 RX ADMIN — NYSTATIN CREAM 1 APPLICATION(S): 100000 CREAM TOPICAL at 05:54

## 2018-12-09 RX ADMIN — ENOXAPARIN SODIUM 40 MILLIGRAM(S): 100 INJECTION SUBCUTANEOUS at 21:28

## 2018-12-09 RX ADMIN — NYSTATIN CREAM 1 APPLICATION(S): 100000 CREAM TOPICAL at 17:29

## 2018-12-09 RX ADMIN — Medication 20 MILLIGRAM(S): at 11:32

## 2018-12-09 RX ADMIN — SENNA PLUS 2 TABLET(S): 8.6 TABLET ORAL at 21:29

## 2018-12-09 RX ADMIN — Medication 1 TABLET(S): at 11:32

## 2018-12-09 RX ADMIN — MIDODRINE HYDROCHLORIDE 10 MILLIGRAM(S): 2.5 TABLET ORAL at 13:18

## 2018-12-09 RX ADMIN — Medication 1 APPLICATION(S): at 05:55

## 2018-12-09 RX ADMIN — Medication 2: at 11:32

## 2018-12-09 NOTE — PROGRESS NOTE ADULT - SUBJECTIVE AND OBJECTIVE BOX
Chief complaint: no new complaints    Patient is a 63y old  Female who presents with a chief complaint of s/p left MCA CVA now with functional deficits (09 Dec 2018 10:1        PAST MEDICAL & SURGICAL HISTORY:  Osteoporosis  Eczema: bilateral elbows  Carotid artery stenosis  Rheumatoid arthritis: tx with  methotrexate and prednisone  HLD (hyperlipidemia)  Carotid Artery Dissection  Eczema  Psoriasis  Asthma  HTN (Hypertension)  Myocardial Infarction  S/P lumbar fusion: h/o vertral fracture with cement fusion 209  Cataract Extraction Surgery  tonsillectomy: s/p Excision Right lymph node ; pt unclear           VITALS  Vital Signs Last 24 Hrs  T(C): 36.6 (09 Dec 2018 08:43), Max: 36.7 (08 Dec 2018 22:25)  T(F): 97.9 (09 Dec 2018 08:43), Max: 98.1 (08 Dec 2018 22:25)  HR: 64 (09 Dec 2018 08:43) (64 - 82)  BP: 134/77 (09 Dec 2018 08:43) (112/70 - 134/77)  BP(mean): --  RR: 14 (09 Dec 2018 08:43) (14 - 14)  SpO2: 98% (09 Dec 2018 08:43) (97% - 98%)      PHYSICAL EXAM  Constitutional - NAD, Comfortable  HEENT - NCAT, EOMI  Neck - Supple, No limited ROM  Chest - CTA bilaterally  Cardiovascular - RRR, S1S2  Abdomen - BS+, Soft, NTND  Extremities - No C/C/E, No calf tenderness   Neurologic Exam -                    Cognitive - Awake, Alert, AAO X3     No new focal deficits                 CURRENT MEDICATIONS    MEDICATIONS  (STANDING):  acetaminophen   Tablet .. 650 milliGRAM(s) Oral every 6 hours  AQUAPHOR (petrolatum Ointment) 1 Application(s) Topical two times a day  aspirin  chewable 81 milliGRAM(s) Oral daily  atorvastatin 80 milliGRAM(s) Oral at bedtime  clopidogrel Tablet 75 milliGRAM(s) Oral daily  dextrose 5%. 1000 milliLiter(s) (50 mL/Hr) IV Continuous <Continuous>  dextrose 50% Injectable 12.5 Gram(s) IV Push once  dextrose 50% Injectable 25 Gram(s) IV Push once  dextrose 50% Injectable 25 Gram(s) IV Push once  docusate sodium 100 milliGRAM(s) Oral three times a day  enoxaparin Injectable 40 milliGRAM(s) SubCutaneous every 24 hours  FLUoxetine Solution 20 milliGRAM(s) Oral daily  gabapentin 300 milliGRAM(s) Oral at bedtime  gabapentin 300 milliGRAM(s) Oral <User Schedule>  insulin lispro (HumaLOG) corrective regimen sliding scale   SubCutaneous three times a day before meals  insulin lispro (HumaLOG) corrective regimen sliding scale   SubCutaneous at bedtime  midodrine 10 milliGRAM(s) Oral <User Schedule>  multivitamin 1 Tablet(s) Oral daily  nystatin Powder 1 Application(s) Topical two times a day  pantoprazole    Tablet 40 milliGRAM(s) Oral before breakfast  senna 2 Tablet(s) Oral at bedtime  tiZANidine 4 milliGRAM(s) Oral at bedtime    MEDICATIONS  (PRN):  ALBUTerol    90 MICROgram(s) HFA Inhaler 2 Puff(s) Inhalation every 6 hours PRN Shortness of Breath and/or Wheezing  dextrose 40% Gel 15 Gram(s) Oral once PRN Blood Glucose LESS THAN 70 milliGRAM(s)/deciliter  glucagon  Injectable 1 milliGRAM(s) IntraMuscular once PRN Glucose LESS THAN 70 milligrams/deciliter    ASSESSMENT & PLAN          GI/Bowel Management - Dulcolax PRN, Fleet PRN   Management - Toilet Q2  Skin - Turn Q2  Pain - Tylenol PRN  DVT PPX - Lovenox      Continue comprehensive acute rehab program consisting of 3hrs/day of OT/PT and SLP.

## 2018-12-09 NOTE — PROGRESS NOTE ADULT - SUBJECTIVE AND OBJECTIVE BOX
Patient is a 63y old  Female who presents with a chief complaint of s/p left MCA CVA now with functional deficits (08 Dec 2018 13:10)      Patient seen and examined at bedside.    ALLERGIES:  peanut butter (Anaphylaxis)  peanuts (Anaphylaxis; Hives)  penicillins (Anaphylaxis)  propofol (Angioedema)  shellfish (Anaphylaxis; Hives)  tomatoes (Anaphylaxis; Hives)    MEDICATIONS:  acetaminophen   Tablet .. 650 milliGRAM(s) Oral every 6 hours  AQUAPHOR (petrolatum Ointment) 1 Application(s) Topical two times a day  clopidogrel Tablet 75 milliGRAM(s) Oral daily  dextrose 40% Gel 15 Gram(s) Oral once PRN  dextrose 5%. 1000 milliLiter(s) IV Continuous <Continuous>  dextrose 50% Injectable 12.5 Gram(s) IV Push once  dextrose 50% Injectable 25 Gram(s) IV Push once  dextrose 50% Injectable 25 Gram(s) IV Push once  docusate sodium 100 milliGRAM(s) Oral three times a day  gabapentin 300 milliGRAM(s) Oral at bedtime  gabapentin 300 milliGRAM(s) Oral <User Schedule>  glucagon  Injectable 1 milliGRAM(s) IntraMuscular once PRN  insulin lispro (HumaLOG) corrective regimen sliding scale   SubCutaneous three times a day before meals  insulin lispro (HumaLOG) corrective regimen sliding scale   SubCutaneous at bedtime  midodrine 10 milliGRAM(s) Oral <User Schedule>  nystatin Powder 1 Application(s) Topical two times a day  pantoprazole    Tablet 40 milliGRAM(s) Oral before breakfast  tiZANidine 4 milliGRAM(s) Oral at bedtime    Vital Signs Last 24 Hrs  T(F): 97.9 (09 Dec 2018 08:43), Max: 98.1 (08 Dec 2018 22:25)  HR: 64 (09 Dec 2018 08:43) (64 - 82)  BP: 134/77 (09 Dec 2018 08:43) (112/70 - 134/77)  RR: 14 (09 Dec 2018 08:43) (14 - 14)  SpO2: 98% (09 Dec 2018 08:43) (97% - 98%)  I&O's Summary      PHYSICAL EXAM:  General: NAD  Neck: Supple, No JVD  Lungs: CTA, BLAE, No added sounds   Cardio: RRR, S1/S2, No murmurs  Abdomen: Soft, NT/ND   Extremities: No clubbing, cyanosis, or edema  CNS: Aphasic     LABS:                            CAPILLARY BLOOD GLUCOSE      POCT Blood Glucose.: 96 mg/dL (09 Dec 2018 08:10)  POCT Blood Glucose.: 92 mg/dL (08 Dec 2018 21:43)  POCT Blood Glucose.: 126 mg/dL (08 Dec 2018 16:42)  POCT Blood Glucose.: 188 mg/dL (08 Dec 2018 11:43)    11-01 [id number] 6.0          RADIOLOGY & ADDITIONAL TESTS:    Care Discussed with Consultants/Other Providers:

## 2018-12-09 NOTE — PROGRESS NOTE ADULT - ASSESSMENT
63 year old female with  Left MCA distribution stroke in the periprocedural period after carotid revascularization with carotid artery stent placement  now with decreased functional mobility, dysphagia, aphasia, right hemiparesis, gait instability and ADL impairments.    # Left MCA CVA  -c/w ASA, Plavix, Lipitor    # ADL impairment with functional deficits with Dysphagia and Aphasia  -c/w PT/OT/SLP as tolerated   -c/w Fluoxetine for motor recovery    # Gout Flare- Stable   c/w Prednisone    # Hypotension- stable  -c/w Midodrine  -monitor BP    # MARLY- resolved  -monitor BUN/Cr  Avoid Nephrotoxic meds.     # Anemia ? ACD   -Stable H/H      DVT ppx: Lovenox      #UTI- resolved   -s/p course of cipro (completed 12/3)  Leucocytosis - improving, afebrile , stable   Monitor     Pain management   -Gabapentin      DVT ppx : Lovenox

## 2018-12-10 LAB
ALBUMIN SERPL ELPH-MCNC: 3.2 G/DL — LOW (ref 3.3–5)
ALP SERPL-CCNC: 101 U/L — SIGNIFICANT CHANGE UP (ref 40–120)
ALT FLD-CCNC: 37 U/L DA — SIGNIFICANT CHANGE UP (ref 10–45)
ANION GAP SERPL CALC-SCNC: 9 MMOL/L — SIGNIFICANT CHANGE UP (ref 5–17)
AST SERPL-CCNC: 24 U/L — SIGNIFICANT CHANGE UP (ref 10–40)
BASOPHILS # BLD AUTO: 0.1 K/UL — SIGNIFICANT CHANGE UP (ref 0–0.2)
BASOPHILS NFR BLD AUTO: 0.8 % — SIGNIFICANT CHANGE UP (ref 0–2)
BILIRUB SERPL-MCNC: 0.3 MG/DL — SIGNIFICANT CHANGE UP (ref 0.2–1.2)
BUN SERPL-MCNC: 24 MG/DL — HIGH (ref 7–23)
CALCIUM SERPL-MCNC: 9.2 MG/DL — SIGNIFICANT CHANGE UP (ref 8.4–10.5)
CHLORIDE SERPL-SCNC: 104 MMOL/L — SIGNIFICANT CHANGE UP (ref 96–108)
CO2 SERPL-SCNC: 30 MMOL/L — SIGNIFICANT CHANGE UP (ref 22–31)
CREAT SERPL-MCNC: 1.15 MG/DL — SIGNIFICANT CHANGE UP (ref 0.5–1.3)
EOSINOPHIL # BLD AUTO: 0.4 K/UL — SIGNIFICANT CHANGE UP (ref 0–0.5)
EOSINOPHIL NFR BLD AUTO: 2.1 % — SIGNIFICANT CHANGE UP (ref 0–6)
GLUCOSE BLDC GLUCOMTR-MCNC: 102 MG/DL — HIGH (ref 70–99)
GLUCOSE BLDC GLUCOMTR-MCNC: 116 MG/DL — HIGH (ref 70–99)
GLUCOSE SERPL-MCNC: 79 MG/DL — SIGNIFICANT CHANGE UP (ref 70–99)
HCT VFR BLD CALC: 34.7 % — SIGNIFICANT CHANGE UP (ref 34.5–45)
HGB BLD-MCNC: 10.3 G/DL — LOW (ref 11.5–15.5)
LYMPHOCYTES # BLD AUTO: 26.3 % — SIGNIFICANT CHANGE UP (ref 13–44)
LYMPHOCYTES # BLD AUTO: 4.3 K/UL — HIGH (ref 1–3.3)
MCHC RBC-ENTMCNC: 22.8 PG — LOW (ref 27–34)
MCHC RBC-ENTMCNC: 29.8 GM/DL — LOW (ref 32–36)
MCV RBC AUTO: 76.5 FL — LOW (ref 80–100)
MONOCYTES # BLD AUTO: 1.2 K/UL — HIGH (ref 0–0.9)
MONOCYTES NFR BLD AUTO: 7.3 % — SIGNIFICANT CHANGE UP (ref 2–14)
NEUTROPHILS # BLD AUTO: 10.4 K/UL — HIGH (ref 1.8–7.4)
NEUTROPHILS NFR BLD AUTO: 63.5 % — SIGNIFICANT CHANGE UP (ref 43–77)
PLATELET # BLD AUTO: 392 K/UL — SIGNIFICANT CHANGE UP (ref 150–400)
POTASSIUM SERPL-MCNC: 3.8 MMOL/L — SIGNIFICANT CHANGE UP (ref 3.5–5.3)
POTASSIUM SERPL-SCNC: 3.8 MMOL/L — SIGNIFICANT CHANGE UP (ref 3.5–5.3)
PROT SERPL-MCNC: 7.3 G/DL — SIGNIFICANT CHANGE UP (ref 6–8.3)
RBC # BLD: 4.53 M/UL — SIGNIFICANT CHANGE UP (ref 3.8–5.2)
RBC # FLD: 15.3 % — HIGH (ref 10.3–14.5)
SODIUM SERPL-SCNC: 143 MMOL/L — SIGNIFICANT CHANGE UP (ref 135–145)
WBC # BLD: 16.4 K/UL — HIGH (ref 3.8–10.5)
WBC # FLD AUTO: 16.4 K/UL — HIGH (ref 3.8–10.5)

## 2018-12-10 PROCEDURE — 99233 SBSQ HOSP IP/OBS HIGH 50: CPT

## 2018-12-10 PROCEDURE — 99232 SBSQ HOSP IP/OBS MODERATE 35: CPT

## 2018-12-10 RX ORDER — INSULIN LISPRO 100/ML
VIAL (ML) SUBCUTANEOUS
Qty: 0 | Refills: 0 | Status: DISCONTINUED | OUTPATIENT
Start: 2018-12-10 | End: 2018-12-12

## 2018-12-10 RX ORDER — MIDODRINE HYDROCHLORIDE 2.5 MG/1
5 TABLET ORAL
Qty: 0 | Refills: 0 | Status: DISCONTINUED | OUTPATIENT
Start: 2018-12-10 | End: 2018-12-12

## 2018-12-10 RX ORDER — MEMANTINE HYDROCHLORIDE 10 MG/1
5 TABLET ORAL
Qty: 0 | Refills: 0 | Status: DISCONTINUED | OUTPATIENT
Start: 2018-12-10 | End: 2018-12-17

## 2018-12-10 RX ADMIN — CLOPIDOGREL BISULFATE 75 MILLIGRAM(S): 75 TABLET, FILM COATED ORAL at 12:06

## 2018-12-10 RX ADMIN — NYSTATIN CREAM 1 APPLICATION(S): 100000 CREAM TOPICAL at 17:19

## 2018-12-10 RX ADMIN — PANTOPRAZOLE SODIUM 40 MILLIGRAM(S): 20 TABLET, DELAYED RELEASE ORAL at 05:21

## 2018-12-10 RX ADMIN — Medication 1 APPLICATION(S): at 05:22

## 2018-12-10 RX ADMIN — Medication 650 MILLIGRAM(S): at 19:11

## 2018-12-10 RX ADMIN — Medication 100 MILLIGRAM(S): at 13:33

## 2018-12-10 RX ADMIN — Medication 650 MILLIGRAM(S): at 23:30

## 2018-12-10 RX ADMIN — ENOXAPARIN SODIUM 40 MILLIGRAM(S): 100 INJECTION SUBCUTANEOUS at 21:08

## 2018-12-10 RX ADMIN — Medication 650 MILLIGRAM(S): at 17:17

## 2018-12-10 RX ADMIN — ATORVASTATIN CALCIUM 80 MILLIGRAM(S): 80 TABLET, FILM COATED ORAL at 21:08

## 2018-12-10 RX ADMIN — GABAPENTIN 300 MILLIGRAM(S): 400 CAPSULE ORAL at 12:06

## 2018-12-10 RX ADMIN — Medication 1 TABLET(S): at 12:06

## 2018-12-10 RX ADMIN — Medication 81 MILLIGRAM(S): at 12:06

## 2018-12-10 RX ADMIN — NYSTATIN CREAM 1 APPLICATION(S): 100000 CREAM TOPICAL at 05:22

## 2018-12-10 RX ADMIN — TIZANIDINE 4 MILLIGRAM(S): 4 TABLET ORAL at 21:08

## 2018-12-10 RX ADMIN — GABAPENTIN 300 MILLIGRAM(S): 400 CAPSULE ORAL at 21:08

## 2018-12-10 RX ADMIN — Medication 40 MILLIGRAM(S): at 05:21

## 2018-12-10 RX ADMIN — MEMANTINE HYDROCHLORIDE 5 MILLIGRAM(S): 10 TABLET ORAL at 17:17

## 2018-12-10 RX ADMIN — Medication 20 MILLIGRAM(S): at 12:07

## 2018-12-10 RX ADMIN — Medication 1 APPLICATION(S): at 17:13

## 2018-12-10 NOTE — PROGRESS NOTE ADULT - SUBJECTIVE AND OBJECTIVE BOX
Patient is a 63y old  Female who presents with a chief complaint of s/p left MCA CVA now with functional deficits (10 Dec 2018 10:36)      Patient seen and examined at bedside. feels well, no complaints     ALLERGIES:  peanut butter (Anaphylaxis)  peanuts (Anaphylaxis; Hives)  penicillins (Anaphylaxis)  propofol (Angioedema)  shellfish (Anaphylaxis; Hives)  tomatoes (Anaphylaxis; Hives)    MEDICATIONS:  acetaminophen   Tablet .. 650 milliGRAM(s) Oral every 6 hours  AQUAPHOR (petrolatum Ointment) 1 Application(s) Topical two times a day  clopidogrel Tablet 75 milliGRAM(s) Oral daily  dextrose 40% Gel 15 Gram(s) Oral once PRN  dextrose 5%. 1000 milliLiter(s) IV Continuous <Continuous>  dextrose 50% Injectable 12.5 Gram(s) IV Push once  dextrose 50% Injectable 25 Gram(s) IV Push once  dextrose 50% Injectable 25 Gram(s) IV Push once  docusate sodium 100 milliGRAM(s) Oral three times a day  gabapentin 300 milliGRAM(s) Oral at bedtime  gabapentin 300 milliGRAM(s) Oral <User Schedule>  glucagon  Injectable 1 milliGRAM(s) IntraMuscular once PRN  insulin lispro (HumaLOG) corrective regimen sliding scale   SubCutaneous two times a day before meals  memantine 5 milliGRAM(s) Oral two times a day  midodrine 5 milliGRAM(s) Oral <User Schedule>  nystatin Powder 1 Application(s) Topical two times a day  pantoprazole    Tablet 40 milliGRAM(s) Oral before breakfast  predniSONE   Tablet 40 milliGRAM(s) Oral daily  tiZANidine 4 milliGRAM(s) Oral at bedtime    Vital Signs Last 24 Hrs  T(F): 98.2 (10 Dec 2018 08:49), Max: 98.2 (10 Dec 2018 08:49)  HR: 74 (10 Dec 2018 08:49) (69 - 74)  BP: 143/93 (10 Dec 2018 08:49) (143/93 - 154/82)  RR: 15 (10 Dec 2018 08:49) (14 - 15)  SpO2: 99% (10 Dec 2018 08:49) (95% - 99%)  I&O's Summary    09 Dec 2018 07:01  -  10 Dec 2018 07:00  --------------------------------------------------------  IN: 720 mL / OUT: 0 mL / NET: 720 mL    10 Dec 2018 07:01  -  10 Dec 2018 12:18  --------------------------------------------------------  IN: 240 mL / OUT: 0 mL / NET: 240 mL        PHYSICAL EXAM:  General: NAD, Alert oriented  Neck: Supple, No JVD  Lungs: Clear to auscultation bilaterally  Cardio: RRR, S1/S2, No murmurs  Abdomen: Soft, Nontender, Nondistended; Bowel sounds present  Extremities: No clubbing, cyanosis, or edema      LABS:                        10.3   16.4  )-----------( 392      ( 10 Dec 2018 05:30 )             34.7     12-10    143  |  104  |  24  ----------------------------<  79  3.8   |  30  |  1.15    Ca    9.2      10 Dec 2018 05:30    TPro  7.3  /  Alb  3.2  /  TBili  0.3  /  DBili  x   /  AST  24  /  ALT  37  /  AlkPhos  101  12-10    eGFR if Non African American: 51 mL/min/1.73M2 (12-10-18 @ 05:30)  eGFR if : 59 mL/min/1.73M2 (12-10-18 @ 05:30)      CAPILLARY BLOOD GLUCOSE    POCT Blood Glucose.: 102 mg/dL (10 Dec 2018 08:42)  POCT Blood Glucose.: 104 mg/dL (09 Dec 2018 20:16)  POCT Blood Glucose.: 138 mg/dL (09 Dec 2018 16:35)    11-01 [id number] 6.0      RADIOLOGY & ADDITIONAL TESTS:    Care Discussed with Consultants/Other Providers:

## 2018-12-10 NOTE — PROGRESS NOTE ADULT - ASSESSMENT
RUPINDER TRIPP is a 62yo Female with  Left MCA distribution stroke in the periprocedural period after carotid revascularization with carotid artery stent placement  now with decreased functional mobility, dysphagia, non-fluent aphasia, right hemiparesis, gait instability and ADL impairments.    COMORBIDITES/ACTIVE MEDICAL ISSUES     #s/p left MCA CVA  -continue Comprehensive Rehab Program of PT/OT/SLP  -ASA/Plavix/Lipitor  -Fluoxetine for motor recovery    #Bilat. 1st Toe Gout Flare  - c/w steroid taper, monitor accuchecks   -podiatry c/s appreciated  -uric acid 8.3 (12/6) elevated    #Hypotension  -Midodrine with parameters (taper as tolerated)  -monitor BP    #UTI  -s/p course of cipro (completed 12/3)  -blood cx -no growth  -CBC 12/10 stable  -monitor vitals and symptoms    #MARLY  -BMP stable 12/10    #Dysphagia  -continue SLP  -On Dysphagia 3, soft/thin lqs     #Nutrition: ensure TID, discussed with RD     #Neuropathic pain to bilat feet and right hand   -salvador 300mg qHS, 300mg q1300       Gait Instability, ADL impairments and Functional impairments:  Comprehensive Rehab Program of PT/OT     GI/Bowel Mgmt - Colace, Senna, Toileting program  /Bladder Mgmt - toileting program      Precautions / PROPHYLAXIS:   - Falls, Cardiac  - Lungs: Aspiration  - Pressure injury/Skin: Turn Q2hrs while in bed, OOB to Chair, PT/OT    - DVT: Lovenox, SCDs, TEDs     IDT Meeting 12/4 - Barriers to discharge include decreased rightsided coordination, decreased balance, right sided weakness, b/l feet pain, expressive aphasia, and decreased endurance. Currently, patient functional status is setup for eating, total assist for UB and LB dressing, max assist for toilet xfer, and mod-max A 15 ft with hemicane.  ADRIANO - 12/19 VANDANA RUPINDER TRIPP is a 62yo Female with  Left MCA distribution stroke in the periprocedural period after carotid revascularization with carotid artery stent placement  now with decreased functional mobility, dysphagia, non-fluent aphasia, right hemiparesis, gait instability and ADL impairments.    COMORBIDITES/ACTIVE MEDICAL ISSUES     #s/p left MCA CVA  -continue Comprehensive Rehab Program of PT/OT/SLP  -ASA/Plavix/Lipitor  -Fluoxetine for motor recovery    #Bilat. 1st Toe Gout Flare  - c/w steroid taper, monitor accuchecks   -podiatry c/s appreciated  -uric acid 8.3 (12/6) elevated    #Hypotension  -Midodrine with parameters (taper as tolerated)--decreased to 5mg/d  -monitor BP    #UTI  -s/p course of cipro (completed 12/3)  -blood cx -no growth  -CBC 12/10 stable  -monitor vitals and symptoms    #MARLY  -BMP stable 12/10    #Dysphagia  -continue SLP  -On Dysphagia 3, soft/thin lqs     #Nutrition: ensure TID, discussed with RD     #Neuropathic pain to bilat feet and right hand   -salvador 300mg qHS, 300mg q1300       Gait Instability, ADL impairments and Functional impairments:  Comprehensive Rehab Program of PT/OT     GI/Bowel Mgmt - Colace, Senna, Toileting program  /Bladder Mgmt - toileting program      Precautions / PROPHYLAXIS:   - Falls, Cardiac  - Lungs: Aspiration  - Pressure injury/Skin: Turn Q2hrs while in bed, OOB to Chair, PT/OT    - DVT: Lovenox, SCDs, TEDs     IDT Meeting 12/4 - Barriers to discharge include decreased rightsided coordination, decreased balance, right sided weakness, b/l feet pain, expressive aphasia, and decreased endurance. Currently, patient functional status is setup for eating, total assist for UB and LB dressing, max assist for toilet xfer, and mod-max A 15 ft with hemicane.  ADRIANO - 12/19 VANDANA

## 2018-12-10 NOTE — PROGRESS NOTE ADULT - ASSESSMENT
63 year old female with  Left MCA distribution stroke in the periprocedural period after carotid revascularization with carotid artery stent placement  now with decreased functional mobility, dysphagia, aphasia, right hemiparesis, gait instability and ADL impairments.    # Left MCA CVA  -c/w ASA, Plavix, Lipitor    # ADL impairment with functional deficits with Dysphagia and Aphasia  -c/w PT/OT/SLP as tolerated   -c/w Fluoxetine for motor recovery    # Gout Flare- Stable   c/w Prednisone    # Hypotension- stable  -c/w Midodrine  -monitor BP    # MARLY- resolved  -monitor BUN/Cr  Avoid Nephrotoxic meds.     # Anemia ? ACD   -Stable H/H    #UTI- resolved   -s/p course of cipro (completed 12/3)  Leucocytosis -? steroid induced improving, afebrile , stable   Monitor     #Gout flare, on prednisone taper    Pain management   -Gabapentin      DVT ppx : Lovenox

## 2018-12-10 NOTE — PROGRESS NOTE ADULT - SUBJECTIVE AND OBJECTIVE BOX
HPI:  62y/o F w/ HTN, HLD, RA, Asthma s/p elective left transcarotid arterial sten via right femoral vein access (11/13/18) course complicated by code stroke approximately 8 hours postop. CTA head and neck showed a distal M2 branch occlusion. She was deemed not a candidate for intracranial thrombectomy due to the location of the M2 occlusion at that time. Patient monitored in SICU, found to be stable, and sent to the floor. MRI brain on 11/17 showed acute infarct in the left MCA distribution. TTE did not show any obvious structural cardiac source of embolism. On 11/20, she was noted to have fluctuating right hemiparesis with respect to right arm weakness. CT brain on 11/20 showed expected evolution of left MCA distribution infarct and CTA head and neck showed stable findings. Right sided deficits and aphasia possibly secondary to hypoperfusion of area supplied by the L M2 high grade stenosis due to fluctuating blood pressure. Patient started on midodrine to keep a systolic pressure between 140-160. Patient is currently being treated for E Coli UTI with cipro. Pt had a speech and swallow and was recommended to have a Mechanical Soft with Honey-Thick Liquids diet.  Per vascular team, patient will be discharged on midodrine and tapered down as tolerated. Pt was medically optimized for acute rehab admission on 11/28. (28 Nov 2018 15:17)      Subjective:  Patient was seen and examined. Pt had no acute events overnight. Endorses that Right 1st MTP joint pain and swelling has improved with steroids. Denies having any other complaints.     REVIEW OF SYMPTOMS   ] Constiutional WNL            [X] Cardio WNL               [X] Resp WNL  [X] GI WNL                            [X]  WNL                    [X] Heme WNL  [X] Endo WNL                       [X] Skin WNL                   [  ] MSK WNL  [  ] Neuro WNL                     [X] Cognitive WNL           [X] Psych WNL      VITALS  T(C): 36.8 (12-10-18 @ 08:49)  T(F): 98.2 (12-10-18 @ 08:49), Max: 98.2 (12-10-18 @ 08:49)  HR: 74 (12-10-18 @ 08:49) (69 - 74)  BP: 143/93 (12-10-18 @ 08:49) (143/93 - 154/82)    Constitutional - NAD, Comfortable  	HEENT - NCAT, EOMI  	Neck - Supple,   	Chest - CTA bilaterally, No wheeze, No rhonchi, No crackles  	Cardiovascular - RRR, S1S2, No murmurs  	Abdomen - BS+, Soft, NTND  	Extremities - No C/C, No calf tenderness, no edema right foot   	Neurologic Exam -                 	   Communication expressive aphasia, follows commands, Non-fluent  	   Cranial Nerves - + right facial droop   	   Motor -Stable exam-- RUE  2/5 EF/EE, Shoulder ext, RLE hip flex 2/5, 1/5 PF/DF 2/2 to pain ; LUE/LLE 5/5             	   Sensory - impaired on right  Psychiatric - Mood stable, Affect WNL    MSK --+TTP to bilat foot bunion     RECENT LABS                        10.3   16.4  )-----------( 392      ( 10 Dec 2018 05:30 )             34.7     12-10    143  |  104  |  24<H>  ----------------------------<  79  3.8   |  30  |  1.15    Ca    9.2      10 Dec 2018 05:30    TPro  7.3  /  Alb  3.2<L>  /  TBili  0.3  /  DBili  x   /  AST  24  /  ALT  37  /  AlkPhos  101  12-10      Uric Acid 12/6: 8.3    MEDICATIONS  (STANDING):  acetaminophen   Tablet .. 650 milliGRAM(s) Oral every 6 hours  AQUAPHOR (petrolatum Ointment) 1 Application(s) Topical two times a day  aspirin  chewable 81 milliGRAM(s) Oral daily  atorvastatin 80 milliGRAM(s) Oral at bedtime  clopidogrel Tablet 75 milliGRAM(s) Oral daily  dextrose 5%. 1000 milliLiter(s) (50 mL/Hr) IV Continuous <Continuous>  dextrose 50% Injectable 12.5 Gram(s) IV Push once  dextrose 50% Injectable 25 Gram(s) IV Push once  dextrose 50% Injectable 25 Gram(s) IV Push once  docusate sodium 100 milliGRAM(s) Oral three times a day  enoxaparin Injectable 40 milliGRAM(s) SubCutaneous every 24 hours  FLUoxetine Solution 20 milliGRAM(s) Oral daily  gabapentin 300 milliGRAM(s) Oral at bedtime  gabapentin 300 milliGRAM(s) Oral <User Schedule>  insulin lispro (HumaLOG) corrective regimen sliding scale   SubCutaneous two times a day before meals  midodrine 10 milliGRAM(s) Oral <User Schedule>  multivitamin 1 Tablet(s) Oral daily  nystatin Powder 1 Application(s) Topical two times a day  pantoprazole    Tablet 40 milliGRAM(s) Oral before breakfast  predniSONE   Tablet 40 milliGRAM(s) Oral daily  senna 2 Tablet(s) Oral at bedtime  tiZANidine 4 milliGRAM(s) Oral at bedtime    MEDICATIONS  (PRN):  ALBUTerol    90 MICROgram(s) HFA Inhaler 2 Puff(s) Inhalation every 6 hours PRN Shortness of Breath and/or Wheezing  dextrose 40% Gel 15 Gram(s) Oral once PRN Blood Glucose LESS THAN 70 milliGRAM(s)/deciliter  glucagon  Injectable 1 milliGRAM(s) IntraMuscular once PRN Glucose LESS THAN 70 milligrams/deciliter HPI:  64y/o F w/ HTN, HLD, RA, Asthma s/p elective left transcarotid arterial sten via right femoral vein access (11/13/18) course complicated by code stroke approximately 8 hours postop. CTA head and neck showed a distal M2 branch occlusion. She was deemed not a candidate for intracranial thrombectomy due to the location of the M2 occlusion at that time. Patient monitored in SICU, found to be stable, and sent to the floor. MRI brain on 11/17 showed acute infarct in the left MCA distribution. TTE did not show any obvious structural cardiac source of embolism. On 11/20, she was noted to have fluctuating right hemiparesis with respect to right arm weakness. CT brain on 11/20 showed expected evolution of left MCA distribution infarct and CTA head and neck showed stable findings. Right sided deficits and aphasia possibly secondary to hypoperfusion of area supplied by the L M2 high grade stenosis due to fluctuating blood pressure. Patient started on midodrine to keep a systolic pressure between 140-160. Patient is currently being treated for E Coli UTI with cipro. Pt had a speech and swallow and was recommended to have a Mechanical Soft with Honey-Thick Liquids diet.  Per vascular team, patient will be discharged on midodrine and tapered down as tolerated. Pt was medically optimized for acute rehab admission on 11/28. (28 Nov 2018 15:17)      Subjective:  Patient was seen and examined. Pt had no acute events overnight. Endorses that Right 1st MTP joint pain and swelling has improved with steroids. Denies having any other complaints.     REVIEW OF SYMPTOMS   ] Constiutional WNL            [X] Cardio WNL               [X] Resp WNL  [X] GI WNL                            [X]  WNL                    [X] Heme WNL  [X] Endo WNL                       [X] Skin WNL                   [  ] MSK WNL  [  ] Neuro WNL                     [X] Cognitive WNL           [X] Psych WNL      VITALS  T(C): 36.8 (12-10-18 @ 08:49)  T(F): 98.2 (12-10-18 @ 08:49), Max: 98.2 (12-10-18 @ 08:49)  HR: 74 (12-10-18 @ 08:49) (69 - 74)  BP: 143/93 (12-10-18 @ 08:49) (143/93 - 154/82)    Constitutional - NAD, Comfortable  	HEENT - NCAT, EOMI  	Neck - Supple,   	Chest - CTA bilaterally, No wheeze, No rhonchi, No crackles  	Cardiovascular - RRR, S1S2, No murmurs  	Abdomen - BS+, Soft, NTND  	Extremities - No C/C, No calf tenderness, no edema right foot   	Neurologic Exam -                 	   Communication expressive aphasia, follows commands, Non-fluent  	   Cranial Nerves - + right facial droop   	   Motor -Stable exam-- RUE  2/5 EF/EE, Shoulder ext, RLE hip flex 2/5, 1/5 PF/DF 2/2 to pain ; LUE/LLE 5/5             	   Sensory - impaired on right  Psychiatric - Mood stable, Affect WNL    MSK --+TTP to bilat foot bunion --worse on right.  erythema and swelling improved significantly from last week    RECENT LABS                        10.3   16.4  )-----------( 392      ( 10 Dec 2018 05:30 )             34.7     12-10    143  |  104  |  24<H>  ----------------------------<  79  3.8   |  30  |  1.15    Ca    9.2      10 Dec 2018 05:30    TPro  7.3  /  Alb  3.2<L>  /  TBili  0.3  /  DBili  x   /  AST  24  /  ALT  37  /  AlkPhos  101  12-10      Uric Acid 12/6: 8.3    MEDICATIONS  (STANDING):  acetaminophen   Tablet .. 650 milliGRAM(s) Oral every 6 hours  AQUAPHOR (petrolatum Ointment) 1 Application(s) Topical two times a day  aspirin  chewable 81 milliGRAM(s) Oral daily  atorvastatin 80 milliGRAM(s) Oral at bedtime  clopidogrel Tablet 75 milliGRAM(s) Oral daily  dextrose 5%. 1000 milliLiter(s) (50 mL/Hr) IV Continuous <Continuous>  dextrose 50% Injectable 12.5 Gram(s) IV Push once  dextrose 50% Injectable 25 Gram(s) IV Push once  dextrose 50% Injectable 25 Gram(s) IV Push once  docusate sodium 100 milliGRAM(s) Oral three times a day  enoxaparin Injectable 40 milliGRAM(s) SubCutaneous every 24 hours  FLUoxetine Solution 20 milliGRAM(s) Oral daily  gabapentin 300 milliGRAM(s) Oral at bedtime  gabapentin 300 milliGRAM(s) Oral <User Schedule>  insulin lispro (HumaLOG) corrective regimen sliding scale   SubCutaneous two times a day before meals  midodrine 10 milliGRAM(s) Oral <User Schedule>  multivitamin 1 Tablet(s) Oral daily  nystatin Powder 1 Application(s) Topical two times a day  pantoprazole    Tablet 40 milliGRAM(s) Oral before breakfast  predniSONE   Tablet 40 milliGRAM(s) Oral daily  senna 2 Tablet(s) Oral at bedtime  tiZANidine 4 milliGRAM(s) Oral at bedtime    MEDICATIONS  (PRN):  ALBUTerol    90 MICROgram(s) HFA Inhaler 2 Puff(s) Inhalation every 6 hours PRN Shortness of Breath and/or Wheezing  dextrose 40% Gel 15 Gram(s) Oral once PRN Blood Glucose LESS THAN 70 milliGRAM(s)/deciliter  glucagon  Injectable 1 milliGRAM(s) IntraMuscular once PRN Glucose LESS THAN 70 milligrams/deciliter

## 2018-12-11 ENCOUNTER — RX RENEWAL (OUTPATIENT)
Age: 63
End: 2018-12-11

## 2018-12-11 LAB
GLUCOSE BLDC GLUCOMTR-MCNC: 101 MG/DL — HIGH (ref 70–99)
GLUCOSE BLDC GLUCOMTR-MCNC: 107 MG/DL — HIGH (ref 70–99)

## 2018-12-11 PROCEDURE — 99232 SBSQ HOSP IP/OBS MODERATE 35: CPT

## 2018-12-11 PROCEDURE — 99233 SBSQ HOSP IP/OBS HIGH 50: CPT

## 2018-12-11 RX ORDER — LANOLIN ALCOHOL/MO/W.PET/CERES
3 CREAM (GRAM) TOPICAL AT BEDTIME
Qty: 0 | Refills: 0 | Status: DISCONTINUED | OUTPATIENT
Start: 2018-12-11 | End: 2018-12-19

## 2018-12-11 RX ADMIN — Medication 1 APPLICATION(S): at 05:31

## 2018-12-11 RX ADMIN — NYSTATIN CREAM 1 APPLICATION(S): 100000 CREAM TOPICAL at 05:31

## 2018-12-11 RX ADMIN — MIDODRINE HYDROCHLORIDE 5 MILLIGRAM(S): 2.5 TABLET ORAL at 05:31

## 2018-12-11 RX ADMIN — TIZANIDINE 4 MILLIGRAM(S): 4 TABLET ORAL at 21:26

## 2018-12-11 RX ADMIN — Medication 650 MILLIGRAM(S): at 12:09

## 2018-12-11 RX ADMIN — MEMANTINE HYDROCHLORIDE 5 MILLIGRAM(S): 10 TABLET ORAL at 18:04

## 2018-12-11 RX ADMIN — NYSTATIN CREAM 1 APPLICATION(S): 100000 CREAM TOPICAL at 18:04

## 2018-12-11 RX ADMIN — Medication 81 MILLIGRAM(S): at 12:09

## 2018-12-11 RX ADMIN — CLOPIDOGREL BISULFATE 75 MILLIGRAM(S): 75 TABLET, FILM COATED ORAL at 12:09

## 2018-12-11 RX ADMIN — Medication 1 TABLET(S): at 12:09

## 2018-12-11 RX ADMIN — Medication 650 MILLIGRAM(S): at 18:04

## 2018-12-11 RX ADMIN — GABAPENTIN 300 MILLIGRAM(S): 400 CAPSULE ORAL at 13:16

## 2018-12-11 RX ADMIN — MEMANTINE HYDROCHLORIDE 5 MILLIGRAM(S): 10 TABLET ORAL at 05:31

## 2018-12-11 RX ADMIN — ATORVASTATIN CALCIUM 80 MILLIGRAM(S): 80 TABLET, FILM COATED ORAL at 21:26

## 2018-12-11 RX ADMIN — Medication 20 MILLIGRAM(S): at 12:09

## 2018-12-11 RX ADMIN — Medication 650 MILLIGRAM(S): at 18:05

## 2018-12-11 RX ADMIN — GABAPENTIN 300 MILLIGRAM(S): 400 CAPSULE ORAL at 21:26

## 2018-12-11 RX ADMIN — Medication 650 MILLIGRAM(S): at 12:13

## 2018-12-11 RX ADMIN — Medication 40 MILLIGRAM(S): at 05:31

## 2018-12-11 RX ADMIN — PANTOPRAZOLE SODIUM 40 MILLIGRAM(S): 20 TABLET, DELAYED RELEASE ORAL at 05:31

## 2018-12-11 RX ADMIN — Medication 1 APPLICATION(S): at 18:04

## 2018-12-11 RX ADMIN — ENOXAPARIN SODIUM 40 MILLIGRAM(S): 100 INJECTION SUBCUTANEOUS at 21:26

## 2018-12-11 NOTE — PROGRESS NOTE ADULT - SUBJECTIVE AND OBJECTIVE BOX
Patient is a 63y old  Female who presents with a chief complaint of s/p left MCA CVA now with functional deficits (10 Dec 2018 12:18)      Patient seen and examined at bedside.      ALLERGIES:  peanut butter (Anaphylaxis)  peanuts (Anaphylaxis; Hives)  penicillins (Anaphylaxis)  propofol (Angioedema)  shellfish (Anaphylaxis; Hives)  tomatoes (Anaphylaxis; Hives)    MEDICATIONS:  acetaminophen   Tablet .. 650 milliGRAM(s) Oral every 6 hours  AQUAPHOR (petrolatum Ointment) 1 Application(s) Topical two times a day  clopidogrel Tablet 75 milliGRAM(s) Oral daily  dextrose 40% Gel 15 Gram(s) Oral once PRN  dextrose 5%. 1000 milliLiter(s) IV Continuous <Continuous>  dextrose 50% Injectable 12.5 Gram(s) IV Push once  dextrose 50% Injectable 25 Gram(s) IV Push once  dextrose 50% Injectable 25 Gram(s) IV Push once  docusate sodium 100 milliGRAM(s) Oral three times a day  gabapentin 300 milliGRAM(s) Oral at bedtime  gabapentin 300 milliGRAM(s) Oral <User Schedule>  glucagon  Injectable 1 milliGRAM(s) IntraMuscular once PRN  insulin lispro (HumaLOG) corrective regimen sliding scale   SubCutaneous two times a day before meals  memantine 5 milliGRAM(s) Oral two times a day  midodrine 5 milliGRAM(s) Oral <User Schedule>  nystatin Powder 1 Application(s) Topical two times a day  pantoprazole    Tablet 40 milliGRAM(s) Oral before breakfast  predniSONE   Tablet 40 milliGRAM(s) Oral daily  tiZANidine 4 milliGRAM(s) Oral at bedtime    Vital Signs Last 24 Hrs  T(F): 98.1 (11 Dec 2018 07:55), Max: 98.4 (10 Dec 2018 20:51)  HR: 78 (11 Dec 2018 08:14) (62 - 78)  BP: 148/93 (11 Dec 2018 07:55) (132/86 - 155/76)  RR: 14 (11 Dec 2018 07:55) (14 - 14)  SpO2: 98% (11 Dec 2018 08:14) (95% - 99%)  I&O's Summary    10 Dec 2018 07:01  -  11 Dec 2018 07:00  --------------------------------------------------------  IN: 480 mL / OUT: 0 mL / NET: 480 mL        PHYSICAL EXAM:  General: NAD, alert, aphasic   Neck: Supple, No JVD  Lungs: Clear to auscultation bilaterally  Cardio: RRR, S1/S2, No murmurs  Abdomen: Soft, Nontender, Nondistended; Bowel sounds present  Extremities: No clubbing, cyanosis, or edema      LABS:                        10.3   16.4  )-----------( 392      ( 10 Dec 2018 05:30 )             34.7     12-10    143  |  104  |  24  ----------------------------<  79  3.8   |  30  |  1.15    Ca    9.2      10 Dec 2018 05:30    TPro  7.3  /  Alb  3.2  /  TBili  0.3  /  DBili  x   /  AST  24  /  ALT  37  /  AlkPhos  101  12-10    eGFR if Non African American: 51 mL/min/1.73M2 (12-10-18 @ 05:30)  eGFR if : 59 mL/min/1.73M2 (12-10-18 @ 05:30)      CAPILLARY BLOOD GLUCOSE      POCT Blood Glucose.: 101 mg/dL (11 Dec 2018 07:54)  POCT Blood Glucose.: 116 mg/dL (10 Dec 2018 17:16)    11-01 [id number] 6.0          RADIOLOGY & ADDITIONAL TESTS:    Care Discussed with Consultants/Other Providers:

## 2018-12-11 NOTE — PROGRESS NOTE ADULT - SUBJECTIVE AND OBJECTIVE BOX
HPI:  64y/o F w/ HTN, HLD, RA, Asthma s/p elective left transcarotid arterial sten via right femoral vein access (11/13/18) course complicated by code stroke approximately 8 hours postop. CTA head and neck showed a distal M2 branch occlusion. She was deemed not a candidate for intracranial thrombectomy due to the location of the M2 occlusion at that time. Patient monitored in SICU, found to be stable, and sent to the floor. MRI brain on 11/17 showed acute infarct in the left MCA distribution. TTE did not show any obvious structural cardiac source of embolism. On 11/20, she was noted to have fluctuating right hemiparesis with respect to right arm weakness. CT brain on 11/20 showed expected evolution of left MCA distribution infarct and CTA head and neck showed stable findings. Right sided deficits and aphasia possibly secondary to hypoperfusion of area supplied by the L M2 high grade stenosis due to fluctuating blood pressure. Patient started on midodrine to keep a systolic pressure between 140-160. Patient is currently being treated for E Coli UTI with cipro. Pt had a speech and swallow and was recommended to have a Mechanical Soft with Honey-Thick Liquids diet.  Per vascular team, patient will be discharged on midodrine and tapered down as tolerated. Pt was medically optimized for acute rehab admission on 11/28. (28 Nov 2018 15:17)      Subjective:  Patient was seen and examined. Pt had no acute events overnight. Endorses that Right 1st MTP joint pain and swelling has improved with steroids. Denies having any other complaints.     REVIEW OF SYMPTOMS   ] Constiutional WNL            [X] Cardio WNL               [X] Resp WNL  [X] GI WNL                            [X]  WNL                    [X] Heme WNL  [X] Endo WNL                       [X] Skin WNL                   [  ] MSK WNL  [  ] Neuro WNL                     [X] Cognitive WNL           [X] Psych WNL      VITALS  T(C): 36.7 (12-11-18 @ 07:55)  T(F): 98.1 (12-11-18 @ 07:55), Max: 98.4 (12-10-18 @ 20:51)  HR: 78 (12-11-18 @ 08:14) (62 - 78)  BP: 148/93 (12-11-18 @ 07:55) (132/86 - 155/76)  RR:  (14 - 14)  SpO2:  (95% - 99%)  Wt(kg): --    Constitutional - NAD, Comfortable  	HEENT - NCAT, EOMI  	Neck - Supple,   	Chest - CTA bilaterally, No wheeze, No rhonchi, No crackles  	Cardiovascular - RRR, S1S2, No murmurs  	Abdomen - BS+, Soft, NTND  	Extremities - No C/C, No calf tenderness, no edema right foot   	Neurologic Exam -                 	   Communication expressive aphasia, follows commands, Non-fluent  	   Cranial Nerves - + right facial droop   	   Motor -Stable exam-- RUE  2/5 EF/EE, Shoulder ext, RLE hip flex 2/5, 1/5 PF/DF 2/2 to pain ; LUE/LLE 5/5             	   Sensory - impaired on right  Psychiatric - Mood stable, Affect WNL    MSK --+TTP to bilat foot bunion --worse on right.  erythema and swelling improved significantly from last week    RECENT LABS                        10.3   16.4  )-----------( 392      ( 10 Dec 2018 05:30 )             34.7     12-10    143  |  104  |  24<H>  ----------------------------<  79  3.8   |  30  |  1.15    Ca    9.2      10 Dec 2018 05:30    TPro  7.3  /  Alb  3.2<L>  /  TBili  0.3  /  DBili  x   /  AST  24  /  ALT  37  /  AlkPhos  101  12-10      Uric Acid 12/6: 8.3    MEDICATIONS  (STANDING):  acetaminophen   Tablet .. 650 milliGRAM(s) Oral every 6 hours  AQUAPHOR (petrolatum Ointment) 1 Application(s) Topical two times a day  aspirin  chewable 81 milliGRAM(s) Oral daily  atorvastatin 80 milliGRAM(s) Oral at bedtime  clopidogrel Tablet 75 milliGRAM(s) Oral daily  dextrose 5%. 1000 milliLiter(s) (50 mL/Hr) IV Continuous <Continuous>  dextrose 50% Injectable 12.5 Gram(s) IV Push once  dextrose 50% Injectable 25 Gram(s) IV Push once  dextrose 50% Injectable 25 Gram(s) IV Push once  docusate sodium 100 milliGRAM(s) Oral three times a day  enoxaparin Injectable 40 milliGRAM(s) SubCutaneous every 24 hours  FLUoxetine Solution 20 milliGRAM(s) Oral daily  gabapentin 300 milliGRAM(s) Oral at bedtime  gabapentin 300 milliGRAM(s) Oral <User Schedule>  insulin lispro (HumaLOG) corrective regimen sliding scale   SubCutaneous two times a day before meals  memantine 5 milliGRAM(s) Oral two times a day  midodrine 5 milliGRAM(s) Oral <User Schedule>  multivitamin 1 Tablet(s) Oral daily  nystatin Powder 1 Application(s) Topical two times a day  pantoprazole    Tablet 40 milliGRAM(s) Oral before breakfast  predniSONE   Tablet 40 milliGRAM(s) Oral daily  senna 2 Tablet(s) Oral at bedtime  tiZANidine 4 milliGRAM(s) Oral at bedtime    MEDICATIONS  (PRN):  ALBUTerol    90 MICROgram(s) HFA Inhaler 2 Puff(s) Inhalation every 6 hours PRN Shortness of Breath and/or Wheezing  dextrose 40% Gel 15 Gram(s) Oral once PRN Blood Glucose LESS THAN 70 milliGRAM(s)/deciliter  glucagon  Injectable 1 milliGRAM(s) IntraMuscular once PRN Glucose LESS THAN 70 milligrams/deciliter HPI:  62y/o F w/ HTN, HLD, RA, Asthma s/p elective left transcarotid arterial sten via right femoral vein access (11/13/18) course complicated by code stroke approximately 8 hours postop. CTA head and neck showed a distal M2 branch occlusion. She was deemed not a candidate for intracranial thrombectomy due to the location of the M2 occlusion at that time. Patient monitored in SICU, found to be stable, and sent to the floor. MRI brain on 11/17 showed acute infarct in the left MCA distribution. TTE did not show any obvious structural cardiac source of embolism. On 11/20, she was noted to have fluctuating right hemiparesis with respect to right arm weakness. CT brain on 11/20 showed expected evolution of left MCA distribution infarct and CTA head and neck showed stable findings. Right sided deficits and aphasia possibly secondary to hypoperfusion of area supplied by the L M2 high grade stenosis due to fluctuating blood pressure. Patient started on midodrine to keep a systolic pressure between 140-160. Patient is currently being treated for E Coli UTI with cipro. Pt had a speech and swallow and was recommended to have a Mechanical Soft with Honey-Thick Liquids diet.  Per vascular team, patient will be discharged on midodrine and tapered down as tolerated. Pt was medically optimized for acute rehab admission on 11/28. (28 Nov 2018 15:17)      Subjective:  Patient was seen and examined. Pt had no acute events overnight. Endorses that Right toe pain is bothering her less during therapies and is improving. Per sleep logs, pt slept from [id number]. Does endorse some difficulty with sleep.      REVIEW OF SYMPTOMS   ] Constiutional WNL            [X] Cardio WNL               [X] Resp WNL  [X] GI WNL                            [X]  WNL                    [X] Heme WNL  [X] Endo WNL                       [X] Skin WNL                   [  ] MSK WNL  [  ] Neuro WNL                     [X] Cognitive WNL           [X] Psych WNL      VITALS  T(C): 36.7 (12-11-18 @ 07:55)  T(F): 98.1 (12-11-18 @ 07:55), Max: 98.4 (12-10-18 @ 20:51)  HR: 78 (12-11-18 @ 08:14) (62 - 78)  BP: 148/93 (12-11-18 @ 07:55) (132/86 - 155/76)  RR:  (14 - 14)  SpO2:  (95% - 99%)  Wt(kg): --    Constitutional - NAD, Comfortable  	HEENT - NCAT, EOMI  	Neck - Supple,   	Chest - CTA bilaterally, No wheeze, No rhonchi, No crackles  	Cardiovascular - RRR, S1S2, No murmurs  	Abdomen - BS+, Soft, NTND  	Extremities - No C/C, No calf tenderness, no edema right foot   	Neurologic Exam -                 	   Communication expressive aphasia, follows commands, Non-fluent  	   Cranial Nerves - + right facial droop   	   Motor -Stable exam-- RUE  2/5 EF/EE, Shoulder ext, RLE hip flex 2/5, 1/5 PF/DF 2/2 to pain ; LUE/LLE 5/5             	   Sensory - impaired on right  Psychiatric - Mood stable, Affect WNL    MSK --+TTP to bilat foot bunion --worse on right.  erythema and swelling improved significantly from last week    RECENT LABS                        10.3   16.4  )-----------( 392      ( 10 Dec 2018 05:30 )             34.7     12-10    143  |  104  |  24<H>  ----------------------------<  79  3.8   |  30  |  1.15    Ca    9.2      10 Dec 2018 05:30    TPro  7.3  /  Alb  3.2<L>  /  TBili  0.3  /  DBili  x   /  AST  24  /  ALT  37  /  AlkPhos  101  12-10      Uric Acid 12/6: 8.3    MEDICATIONS  (STANDING):  acetaminophen   Tablet .. 650 milliGRAM(s) Oral every 6 hours  AQUAPHOR (petrolatum Ointment) 1 Application(s) Topical two times a day  aspirin  chewable 81 milliGRAM(s) Oral daily  atorvastatin 80 milliGRAM(s) Oral at bedtime  clopidogrel Tablet 75 milliGRAM(s) Oral daily  dextrose 5%. 1000 milliLiter(s) (50 mL/Hr) IV Continuous <Continuous>  dextrose 50% Injectable 12.5 Gram(s) IV Push once  dextrose 50% Injectable 25 Gram(s) IV Push once  dextrose 50% Injectable 25 Gram(s) IV Push once  docusate sodium 100 milliGRAM(s) Oral three times a day  enoxaparin Injectable 40 milliGRAM(s) SubCutaneous every 24 hours  FLUoxetine Solution 20 milliGRAM(s) Oral daily  gabapentin 300 milliGRAM(s) Oral at bedtime  gabapentin 300 milliGRAM(s) Oral <User Schedule>  insulin lispro (HumaLOG) corrective regimen sliding scale   SubCutaneous two times a day before meals  memantine 5 milliGRAM(s) Oral two times a day  midodrine 5 milliGRAM(s) Oral <User Schedule>  multivitamin 1 Tablet(s) Oral daily  nystatin Powder 1 Application(s) Topical two times a day  pantoprazole    Tablet 40 milliGRAM(s) Oral before breakfast  predniSONE   Tablet 40 milliGRAM(s) Oral daily  senna 2 Tablet(s) Oral at bedtime  tiZANidine 4 milliGRAM(s) Oral at bedtime    MEDICATIONS  (PRN):  ALBUTerol    90 MICROgram(s) HFA Inhaler 2 Puff(s) Inhalation every 6 hours PRN Shortness of Breath and/or Wheezing  dextrose 40% Gel 15 Gram(s) Oral once PRN Blood Glucose LESS THAN 70 milliGRAM(s)/deciliter  glucagon  Injectable 1 milliGRAM(s) IntraMuscular once PRN Glucose LESS THAN 70 milligrams/deciliter  melatonin 3 milliGRAM(s) Oral at bedtime PRN Insomnia

## 2018-12-11 NOTE — PROGRESS NOTE ADULT - ASSESSMENT
RUPINDER TRIPP is a 64yo Female with  Left MCA distribution stroke in the periprocedural period after carotid revascularization with carotid artery stent placement  now with decreased functional mobility, dysphagia, non-fluent aphasia, right hemiparesis, gait instability and ADL impairments.    COMORBIDITES/ACTIVE MEDICAL ISSUES     #s/p left MCA CVA  -continue Comprehensive Rehab Program of PT/OT/SLP  -ASA/Plavix/Lipitor  -Fluoxetine for motor recovery    #Bilat. 1st Toe Gout Flare  - c/w steroid taper, monitor accuchecks   -podiatry c/s appreciated  -uric acid 8.3 (12/6) elevated    #Hypotension  -Midodrine with parameters (taper as tolerated)--decreased to 5mg/d  -monitor BP    #UTI  -s/p course of cipro (completed 12/3)  -blood cx -no growth  -CBC 12/10 stable  -monitor vitals and symptoms    #MARLY  -BMP stable 12/10    #Dysphagia  -continue SLP  -On Dysphagia 3, soft/thin lqs     #Nutrition: ensure TID, discussed with RD     #Neuropathic pain to bilat feet and right hand   -salvador 300mg qHS, 300mg q1300       Gait Instability, ADL impairments and Functional impairments:  Comprehensive Rehab Program of PT/OT     GI/Bowel Mgmt - Colace, Senna, Toileting program  /Bladder Mgmt - toileting program      Precautions / PROPHYLAXIS:   - Falls, Cardiac  - Lungs: Aspiration  - Pressure injury/Skin: Turn Q2hrs while in bed, OOB to Chair, PT/OT    - DVT: Lovenox, SCDs, TEDs     IDT Meeting 12/4 - Barriers to discharge include decreased rightsided coordination, decreased balance, right sided weakness, b/l feet pain, expressive aphasia, and decreased endurance. Currently, patient functional status is setup for eating, total assist for UB and LB dressing, max assist for toilet xfer, and mod-max A 15 ft with hemicane.  ADRIANO - 12/19 VANDANA RUPINDER TRIPP is a 64yo Female with  Left MCA distribution stroke in the periprocedural period after carotid revascularization with carotid artery stent placement  now with decreased functional mobility, dysphagia, non-fluent aphasia, right hemiparesis, gait instability and ADL impairments.    COMORBIDITES/ACTIVE MEDICAL ISSUES     #s/p left MCA CVA  -continue Comprehensive Rehab Program of PT/OT/SLP  -ASA/Plavix/Lipitor  -Fluoxetine for motor recovery    #Bilat. 1st Toe Gout Flare  - c/w steroid taper, monitor accuchecks   -podiatry c/s appreciated  -uric acid 8.3 (12/6) elevated    #Hypotension  -Midodrine with parameters (taper as tolerated)--decreased to 5mg/d  -monitor BP    #UTI  -s/p course of cipro (completed 12/3)  -blood cx -no growth  -CBC 12/10 stable  -monitor vitals and symptoms    #MARLY  -BMP stable 12/10    #Dysphagia  -continue SLP  -On Dysphagia 3, soft/thin lqs     #Nutrition: ensure TID, discussed with RD     #Neuropathic pain to bilat feet and right hand   -salvador 300mg qHS, 300mg q1300     #Insomnia  -monitor sleep logs, melatonin 3mg prn    Gait Instability, ADL impairments and Functional impairments:  Comprehensive Rehab Program of PT/OT     GI/Bowel Mgmt - Colace, Senna, Toileting program  /Bladder Mgmt - toileting program      Precautions / PROPHYLAXIS:   - Falls, Cardiac  - Lungs: Aspiration  - Pressure injury/Skin: Turn Q2hrs while in bed, OOB to Chair, PT/OT    - DVT: Lovenox, SCDs, TEDs     IDT Meeting 12/11 - Barriers to discharge include decreased right sided coordination, toe pain, decreased balance, right sided weakness, expressive aphasia, and decreased endurance. Currently, patient functional status is setup for eating, min assist for UB, mod assist for grooming, max assist for shower xfer, and mod A 4 steps.  ADRIANO - 12/19 VANDANA

## 2018-12-12 LAB
GLUCOSE BLDC GLUCOMTR-MCNC: 101 MG/DL — HIGH (ref 70–99)
GLUCOSE BLDC GLUCOMTR-MCNC: 112 MG/DL — HIGH (ref 70–99)

## 2018-12-12 PROCEDURE — 99233 SBSQ HOSP IP/OBS HIGH 50: CPT

## 2018-12-12 PROCEDURE — 99232 SBSQ HOSP IP/OBS MODERATE 35: CPT

## 2018-12-12 RX ORDER — LIDOCAINE 4 G/100G
1 CREAM TOPICAL
Qty: 0 | Refills: 0 | Status: DISCONTINUED | OUTPATIENT
Start: 2018-12-12 | End: 2018-12-19

## 2018-12-12 RX ADMIN — Medication 1 APPLICATION(S): at 05:45

## 2018-12-12 RX ADMIN — Medication 20 MILLIGRAM(S): at 11:49

## 2018-12-12 RX ADMIN — MEMANTINE HYDROCHLORIDE 5 MILLIGRAM(S): 10 TABLET ORAL at 17:55

## 2018-12-12 RX ADMIN — CLOPIDOGREL BISULFATE 75 MILLIGRAM(S): 75 TABLET, FILM COATED ORAL at 11:48

## 2018-12-12 RX ADMIN — ENOXAPARIN SODIUM 40 MILLIGRAM(S): 100 INJECTION SUBCUTANEOUS at 21:35

## 2018-12-12 RX ADMIN — GABAPENTIN 300 MILLIGRAM(S): 400 CAPSULE ORAL at 13:11

## 2018-12-12 RX ADMIN — GABAPENTIN 300 MILLIGRAM(S): 400 CAPSULE ORAL at 21:40

## 2018-12-12 RX ADMIN — Medication 40 MILLIGRAM(S): at 05:44

## 2018-12-12 RX ADMIN — Medication 650 MILLIGRAM(S): at 11:49

## 2018-12-12 RX ADMIN — Medication 81 MILLIGRAM(S): at 11:48

## 2018-12-12 RX ADMIN — Medication 650 MILLIGRAM(S): at 20:09

## 2018-12-12 RX ADMIN — Medication 1 TABLET(S): at 12:02

## 2018-12-12 RX ADMIN — ATORVASTATIN CALCIUM 80 MILLIGRAM(S): 80 TABLET, FILM COATED ORAL at 21:35

## 2018-12-12 RX ADMIN — NYSTATIN CREAM 1 APPLICATION(S): 100000 CREAM TOPICAL at 05:45

## 2018-12-12 RX ADMIN — NYSTATIN CREAM 1 APPLICATION(S): 100000 CREAM TOPICAL at 17:55

## 2018-12-12 RX ADMIN — Medication 1 APPLICATION(S): at 17:56

## 2018-12-12 RX ADMIN — SENNA PLUS 2 TABLET(S): 8.6 TABLET ORAL at 21:36

## 2018-12-12 RX ADMIN — Medication 650 MILLIGRAM(S): at 17:55

## 2018-12-12 RX ADMIN — Medication 650 MILLIGRAM(S): at 11:52

## 2018-12-12 RX ADMIN — MEMANTINE HYDROCHLORIDE 5 MILLIGRAM(S): 10 TABLET ORAL at 05:44

## 2018-12-12 RX ADMIN — PANTOPRAZOLE SODIUM 40 MILLIGRAM(S): 20 TABLET, DELAYED RELEASE ORAL at 05:44

## 2018-12-12 RX ADMIN — TIZANIDINE 4 MILLIGRAM(S): 4 TABLET ORAL at 21:36

## 2018-12-12 NOTE — PROGRESS NOTE ADULT - SUBJECTIVE AND OBJECTIVE BOX
HPI:  62y/o F w/ HTN, HLD, RA, Asthma s/p elective left transcarotid arterial sten via right femoral vein access (11/13/18) course complicated by code stroke approximately 8 hours postop. CTA head and neck showed a distal M2 branch occlusion. She was deemed not a candidate for intracranial thrombectomy due to the location of the M2 occlusion at that time. Patient monitored in SICU, found to be stable, and sent to the floor. MRI brain on 11/17 showed acute infarct in the left MCA distribution. TTE did not show any obvious structural cardiac source of embolism. On 11/20, she was noted to have fluctuating right hemiparesis with respect to right arm weakness. CT brain on 11/20 showed expected evolution of left MCA distribution infarct and CTA head and neck showed stable findings. Right sided deficits and aphasia possibly secondary to hypoperfusion of area supplied by the L M2 high grade stenosis due to fluctuating blood pressure. Patient started on midodrine to keep a systolic pressure between 140-160. Patient is currently being treated for E Coli UTI with cipro. Pt had a speech and swallow and was recommended to have a Mechanical Soft with Honey-Thick Liquids diet.  Per vascular team, patient will be discharged on midodrine and tapered down as tolerated. Pt was medically optimized for acute rehab admission on 11/28. (28 Nov 2018 15:17)      Subjective:  Patient was seen and examined. Pt had no acute events overnight.   Pt denied right toe pain at rest although has persistent pain with palpation and with ROM.  Denies SOB, cough, N/V, dysuria, constipation.  +BM yesterday.     REVIEW OF SYMPTOMS   ] Constiutional WNL            [X] Cardio WNL               [X] Resp WNL  [X] GI WNL                            [X]  WNL                    [X] Heme WNL  [X] Endo WNL                       [X] Skin WNL                   [  ] MSK WNL  [  ] Neuro WNL                     [X] Cognitive WNL           [X] Psych WNL    Vital Signs Last 24 Hrs  T(C): 36.7 (12 Dec 2018 08:37), Max: 36.7 (12 Dec 2018 08:37)  T(F): 98.1 (12 Dec 2018 08:37), Max: 98.1 (12 Dec 2018 08:37)  HR: 87 (12 Dec 2018 08:37) (69 - 87)  BP: 160/92 (12 Dec 2018 08:37) (149/88 - 160/92)  BP(mean): --  RR: 14 (12 Dec 2018 08:37) (14 - 14)  SpO2: 98% (12 Dec 2018 08:37) (98% - 98%)    Constitutional - NAD, Comfortable  	HEENT - NCAT, EOMI  	Neck - Supple,   	Chest - CTA bilaterally, No wheeze, No rhonchi, No crackles  	Cardiovascular - RRR, S1S2, No murmurs  	Abdomen - BS+, Soft, NTND  	Extremities - No C/C, No calf tenderness, no edema right foot    	Neurologic Exam -                 	   Communication expressive aphasia, follows commands, Non-fluent  	   Cranial Nerves - + right facial droop   	   Motor -Stable exam-- RUE  2/5 EF/EE, Shoulder ext, RLE hip flex 2/5, 1/5 PF/DF 2/2 to pain ; LUE/LLE 5/5             	   Sensory - impaired on right  Psychiatric - Mood stable, Affect WNL  MSK --+TTP to bilat foot bunion --worse on right.  No erythema or edema noted  Right medial forefoot TTP with pain on ROM of great toe    Functional status:  Bed mobility: min/mod A  Transfers: Min A  Gait: Amb 50' WBQC and NBQC min A WC follow  ADLs: UE dress min A, LE dress Min A        RECENT LABS                        10.3   16.4  )-----------( 392      ( 10 Dec 2018 05:30 )             34.7     12-10    143  |  104  |  24<H>  ----------------------------<  79  3.8   |  30  |  1.15    Ca    9.2      10 Dec 2018 05:30    TPro  7.3  /  Alb  3.2<L>  /  TBili  0.3  /  DBili  x   /  AST  24  /  ALT  37  /  AlkPhos  101  12-10    CAPILLARY BLOOD GLUCOSE      POCT Blood Glucose.: 101 mg/dL (12 Dec 2018 08:02)  POCT Blood Glucose.: 107 mg/dL (11 Dec 2018 16:31)        Uric Acid 12/6: 8.3    MEDICATIONS  (STANDING):  acetaminophen   Tablet .. 650 milliGRAM(s) Oral every 6 hours  AQUAPHOR (petrolatum Ointment) 1 Application(s) Topical two times a day  aspirin  chewable 81 milliGRAM(s) Oral daily  atorvastatin 80 milliGRAM(s) Oral at bedtime  clopidogrel Tablet 75 milliGRAM(s) Oral daily  dextrose 5%. 1000 milliLiter(s) (50 mL/Hr) IV Continuous <Continuous>  docusate sodium 100 milliGRAM(s) Oral three times a day  enoxaparin Injectable 40 milliGRAM(s) SubCutaneous every 24 hours  FLUoxetine Solution 20 milliGRAM(s) Oral daily  gabapentin 300 milliGRAM(s) Oral at bedtime  gabapentin 300 milliGRAM(s) Oral <User Schedule>  lidocaine 5% Ointment 1 Application(s) Topical four times a day  memantine 5 milliGRAM(s) Oral two times a day  multivitamin 1 Tablet(s) Oral daily  nystatin Powder 1 Application(s) Topical two times a day  pantoprazole    Tablet 40 milliGRAM(s) Oral before breakfast  senna 2 Tablet(s) Oral at bedtime  tiZANidine 4 milliGRAM(s) Oral at bedtime    MEDICATIONS  (PRN):  ALBUTerol    90 MICROgram(s) HFA Inhaler 2 Puff(s) Inhalation every 6 hours PRN Shortness of Breath and/or Wheezing  melatonin 3 milliGRAM(s) Oral at bedtime PRN Insomnia

## 2018-12-12 NOTE — PROGRESS NOTE ADULT - SUBJECTIVE AND OBJECTIVE BOX
Patient is a 63y old  Female who presents with a chief complaint of s/p left MCA CVA now with functional deficits (11 Dec 2018 08:53)      Patient seen and examined at bedside. feels well, no complaints     ALLERGIES:  peanut butter (Anaphylaxis)  peanuts (Anaphylaxis; Hives)  penicillins (Anaphylaxis)  propofol (Angioedema)  shellfish (Anaphylaxis; Hives)  tomatoes (Anaphylaxis; Hives)    MEDICATIONS:  acetaminophen   Tablet .. 650 milliGRAM(s) Oral every 6 hours  AQUAPHOR (petrolatum Ointment) 1 Application(s) Topical two times a day  clopidogrel Tablet 75 milliGRAM(s) Oral daily  dextrose 40% Gel 15 Gram(s) Oral once PRN  dextrose 5%. 1000 milliLiter(s) IV Continuous <Continuous>  dextrose 50% Injectable 12.5 Gram(s) IV Push once  dextrose 50% Injectable 25 Gram(s) IV Push once  dextrose 50% Injectable 25 Gram(s) IV Push once  docusate sodium 100 milliGRAM(s) Oral three times a day  gabapentin 300 milliGRAM(s) Oral at bedtime  gabapentin 300 milliGRAM(s) Oral <User Schedule>  glucagon  Injectable 1 milliGRAM(s) IntraMuscular once PRN  insulin lispro (HumaLOG) corrective regimen sliding scale   SubCutaneous two times a day before meals  melatonin 3 milliGRAM(s) Oral at bedtime PRN  memantine 5 milliGRAM(s) Oral two times a day  midodrine 5 milliGRAM(s) Oral <User Schedule>  nystatin Powder 1 Application(s) Topical two times a day  pantoprazole    Tablet 40 milliGRAM(s) Oral before breakfast  tiZANidine 4 milliGRAM(s) Oral at bedtime    Vital Signs Last 24 Hrs  T(F): 98.1 (12 Dec 2018 08:37), Max: 98.1 (12 Dec 2018 08:37)  HR: 87 (12 Dec 2018 08:37) (69 - 87)  BP: 160/92 (12 Dec 2018 08:37) (149/88 - 160/92)  RR: 14 (12 Dec 2018 08:37) (14 - 14)  SpO2: 98% (12 Dec 2018 08:37) (98% - 98%)  I&O's Summary      PHYSICAL EXAM:  General: NAD, alert   Neck: Supple, No JVD  Lungs: Clear to auscultation bilaterally  Cardio: RRR, S1/S2, No murmurs  Abdomen: Soft, Nontender, Nondistended; Bowel sounds present  Extremities: No clubbing, cyanosis, or edema    LABS:                        10.3   16.4  )-----------( 392      ( 10 Dec 2018 05:30 )             34.7     12-10    143  |  104  |  24  ----------------------------<  79  3.8   |  30  |  1.15    Ca    9.2      10 Dec 2018 05:30    TPro  7.3  /  Alb  3.2  /  TBili  0.3  /  DBili  x   /  AST  24  /  ALT  37  /  AlkPhos  101  12-10    eGFR if Non African American: 51 mL/min/1.73M2 (12-10-18 @ 05:30)  eGFR if : 59 mL/min/1.73M2 (12-10-18 @ 05:30)    CAPILLARY BLOOD GLUCOSE    POCT Blood Glucose.: 101 mg/dL (12 Dec 2018 08:02)  POCT Blood Glucose.: 107 mg/dL (11 Dec 2018 16:31)    11-01 [id number] 6.0    RADIOLOGY & ADDITIONAL TESTS:    Care Discussed with Consultants/Other Providers:

## 2018-12-12 NOTE — PROGRESS NOTE ADULT - ASSESSMENT
RUPINDER TRIPP is a 64yo Female with  Left MCA distribution stroke in the periprocedural period after carotid revascularization with carotid artery stent placement  now with decreased functional mobility, dysphagia, non-fluent aphasia, right hemiparesis, gait instability and ADL impairments.    COMORBIDITES/ACTIVE MEDICAL ISSUES     #s/p left MCA CVA  -continue Comprehensive Rehab Program of PT/OT/SLP  -ASA/Plavix/Lipitor  -Fluoxetine for motor recovery    #Bilat. 1st Toe Gout Flare  - c/w steroid taper, monitor accuchecks-FS WNL.  DC Accuchecks   -podiatry c/s appreciated  -uric acid 8.3 (12/6) elevated  -Add Lidocaine ointment    #Hypotension  -Midodrine with parameters (taper as tolerated)--decreased to 5mg/d  -monitor BP-No further episodes of hypotension.  SBPs>140 and Midodrine has been held last several days.  DC Midodrine    #UTI  -s/p course of cipro (completed 12/3)  -blood cx -no growth  -CBC 12/10 stable  -monitor vitals and symptoms    #MARLY  -BMP stable 12/10    #Dysphagia  -continue SLP  -On Dysphagia 3, soft/thin lqs     #Nutrition: ensure TID, discussed with RD     #Neuropathic pain to bilat feet and right hand   -salvador 300mg qHS, 300mg q1300     #Insomnia  -monitor sleep logs, melatonin 3mg prn    Gait Instability, ADL impairments and Functional impairments:  Comprehensive Rehab Program of PT/OT     GI/Bowel Mgmt - Colace, Senna, Toileting program  /Bladder Mgmt - toileting program      Precautions / PROPHYLAXIS:   - Falls, Cardiac  - Lungs: Aspiration  - Pressure injury/Skin: Turn Q2hrs while in bed, OOB to Chair, PT/OT    - DVT: Lovenox, SCDs, TEDs     IDT Meeting 12/11 - Barriers to discharge include decreased right sided coordination, toe pain, decreased balance, right sided weakness, expressive aphasia, and decreased endurance. Currently, patient functional status is setup for eating, min assist for UB, mod assist for grooming, max assist for shower xfer, and mod A 4 steps.  ADRIANO - 12/19 VANDANA

## 2018-12-12 NOTE — PROVIDER CONTACT NOTE (OTHER) - SITUATION
Patient observed in dinning room eating lunch patient began to cough while eating. roast beef was coughed up and expelled

## 2018-12-12 NOTE — PROGRESS NOTE ADULT - ASSESSMENT
63 year old female with  Left MCA distribution stroke in the periprocedural period after carotid revascularization with carotid artery stent placement  now with decreased functional mobility, dysphagia, aphasia, right hemiparesis, gait instability and ADL impairments.    # Left MCA CVA  -c/w ASA, Plavix, Lipitor    # ADL impairment with functional deficits with Dysphagia and Aphasia  -c/w PT/OT/SLP as tolerated   -c/w Fluoxetine for motor recovery    # Gout Flare- Stable   c/w Prednisone    # Hypotension- stable  -c/w Midodrine  -monitor BP    # MARLY- resolved  -monitor BUN/Cr  Avoid Nephrotoxic meds.     # Anemia ? ACD   -Stable H/H    #UTI- resolved   -s/p course of cipro (completed 12/3)  Leucocytosis -? steroid induced improving, afebrile , stable   repeat CBC in am     #Gout flare, on prednisone taper    Pain management   -Gabapentin      DVT ppx : Lovenox

## 2018-12-13 LAB
ALBUMIN SERPL ELPH-MCNC: 3.2 G/DL — LOW (ref 3.3–5)
ALP SERPL-CCNC: 88 U/L — SIGNIFICANT CHANGE UP (ref 40–120)
ALT FLD-CCNC: 31 U/L DA — SIGNIFICANT CHANGE UP (ref 10–45)
ANION GAP SERPL CALC-SCNC: 10 MMOL/L — SIGNIFICANT CHANGE UP (ref 5–17)
APPEARANCE UR: CLEAR — SIGNIFICANT CHANGE UP
AST SERPL-CCNC: 20 U/L — SIGNIFICANT CHANGE UP (ref 10–40)
BASOPHILS # BLD AUTO: 0.1 K/UL — SIGNIFICANT CHANGE UP (ref 0–0.2)
BASOPHILS NFR BLD AUTO: 0.6 % — SIGNIFICANT CHANGE UP (ref 0–2)
BILIRUB SERPL-MCNC: 0.3 MG/DL — SIGNIFICANT CHANGE UP (ref 0.2–1.2)
BILIRUB UR-MCNC: NEGATIVE — SIGNIFICANT CHANGE UP
BUN SERPL-MCNC: 29 MG/DL — HIGH (ref 7–23)
CALCIUM SERPL-MCNC: 9.4 MG/DL — SIGNIFICANT CHANGE UP (ref 8.4–10.5)
CHLORIDE SERPL-SCNC: 103 MMOL/L — SIGNIFICANT CHANGE UP (ref 96–108)
CO2 SERPL-SCNC: 26 MMOL/L — SIGNIFICANT CHANGE UP (ref 22–31)
COLOR SPEC: YELLOW — SIGNIFICANT CHANGE UP
CREAT SERPL-MCNC: 1.04 MG/DL — SIGNIFICANT CHANGE UP (ref 0.5–1.3)
DIFF PNL FLD: NEGATIVE — SIGNIFICANT CHANGE UP
EOSINOPHIL # BLD AUTO: 0.6 K/UL — HIGH (ref 0–0.5)
EOSINOPHIL NFR BLD AUTO: 3 % — SIGNIFICANT CHANGE UP (ref 0–6)
GLUCOSE SERPL-MCNC: 76 MG/DL — SIGNIFICANT CHANGE UP (ref 70–99)
GLUCOSE UR QL: NEGATIVE — SIGNIFICANT CHANGE UP
HCT VFR BLD CALC: 34.5 % — SIGNIFICANT CHANGE UP (ref 34.5–45)
HGB BLD-MCNC: 10.6 G/DL — LOW (ref 11.5–15.5)
KETONES UR-MCNC: NEGATIVE — SIGNIFICANT CHANGE UP
LEUKOCYTE ESTERASE UR-ACNC: ABNORMAL
LYMPHOCYTES # BLD AUTO: 17.3 % — SIGNIFICANT CHANGE UP (ref 13–44)
LYMPHOCYTES # BLD AUTO: 3.7 K/UL — HIGH (ref 1–3.3)
MCHC RBC-ENTMCNC: 23.8 PG — LOW (ref 27–34)
MCHC RBC-ENTMCNC: 30.9 GM/DL — LOW (ref 32–36)
MCV RBC AUTO: 77 FL — LOW (ref 80–100)
MONOCYTES # BLD AUTO: 1.6 K/UL — HIGH (ref 0–0.9)
MONOCYTES NFR BLD AUTO: 7.3 % — SIGNIFICANT CHANGE UP (ref 2–14)
NEUTROPHILS # BLD AUTO: 15.5 K/UL — HIGH (ref 1.8–7.4)
NEUTROPHILS NFR BLD AUTO: 71.8 % — SIGNIFICANT CHANGE UP (ref 43–77)
NITRITE UR-MCNC: NEGATIVE — SIGNIFICANT CHANGE UP
PH UR: 5 — SIGNIFICANT CHANGE UP (ref 5–8)
PLATELET # BLD AUTO: 340 K/UL — SIGNIFICANT CHANGE UP (ref 150–400)
POTASSIUM SERPL-MCNC: 4 MMOL/L — SIGNIFICANT CHANGE UP (ref 3.5–5.3)
POTASSIUM SERPL-SCNC: 4 MMOL/L — SIGNIFICANT CHANGE UP (ref 3.5–5.3)
PROT SERPL-MCNC: 7 G/DL — SIGNIFICANT CHANGE UP (ref 6–8.3)
PROT UR-MCNC: NEGATIVE — SIGNIFICANT CHANGE UP
RBC # BLD: 4.48 M/UL — SIGNIFICANT CHANGE UP (ref 3.8–5.2)
RBC # FLD: 15.9 % — HIGH (ref 10.3–14.5)
SODIUM SERPL-SCNC: 139 MMOL/L — SIGNIFICANT CHANGE UP (ref 135–145)
SP GR SPEC: 1.01 — SIGNIFICANT CHANGE UP (ref 1.01–1.02)
UROBILINOGEN FLD QL: NEGATIVE — SIGNIFICANT CHANGE UP
WBC # BLD: 21.6 K/UL — HIGH (ref 3.8–10.5)
WBC # FLD AUTO: 21.6 K/UL — HIGH (ref 3.8–10.5)

## 2018-12-13 PROCEDURE — 99232 SBSQ HOSP IP/OBS MODERATE 35: CPT

## 2018-12-13 PROCEDURE — 71045 X-RAY EXAM CHEST 1 VIEW: CPT | Mod: 26

## 2018-12-13 RX ORDER — ASCORBIC ACID 60 MG
500 TABLET,CHEWABLE ORAL
Qty: 0 | Refills: 0 | Status: DISCONTINUED | OUTPATIENT
Start: 2018-12-13 | End: 2018-12-19

## 2018-12-13 RX ORDER — ZINC SULFATE TAB 220 MG (50 MG ZINC EQUIVALENT) 220 (50 ZN) MG
220 TAB ORAL DAILY
Qty: 0 | Refills: 0 | Status: DISCONTINUED | OUTPATIENT
Start: 2018-12-13 | End: 2018-12-19

## 2018-12-13 RX ORDER — CHLORHEXIDINE GLUCONATE 213 G/1000ML
15 SOLUTION TOPICAL
Qty: 0 | Refills: 0 | Status: DISCONTINUED | OUTPATIENT
Start: 2018-12-13 | End: 2018-12-19

## 2018-12-13 RX ADMIN — CLOPIDOGREL BISULFATE 75 MILLIGRAM(S): 75 TABLET, FILM COATED ORAL at 12:01

## 2018-12-13 RX ADMIN — LIDOCAINE 1 APPLICATION(S): 4 CREAM TOPICAL at 05:20

## 2018-12-13 RX ADMIN — Medication 650 MILLIGRAM(S): at 12:02

## 2018-12-13 RX ADMIN — CHLORHEXIDINE GLUCONATE 15 MILLILITER(S): 213 SOLUTION TOPICAL at 18:02

## 2018-12-13 RX ADMIN — MEMANTINE HYDROCHLORIDE 5 MILLIGRAM(S): 10 TABLET ORAL at 18:02

## 2018-12-13 RX ADMIN — Medication 650 MILLIGRAM(S): at 18:01

## 2018-12-13 RX ADMIN — Medication 81 MILLIGRAM(S): at 12:01

## 2018-12-13 RX ADMIN — Medication 100 MILLIGRAM(S): at 20:57

## 2018-12-13 RX ADMIN — SENNA PLUS 2 TABLET(S): 8.6 TABLET ORAL at 20:59

## 2018-12-13 RX ADMIN — GABAPENTIN 300 MILLIGRAM(S): 400 CAPSULE ORAL at 20:58

## 2018-12-13 RX ADMIN — Medication 500 MILLIGRAM(S): at 18:02

## 2018-12-13 RX ADMIN — Medication 1 APPLICATION(S): at 05:21

## 2018-12-13 RX ADMIN — Medication 1 TABLET(S): at 12:01

## 2018-12-13 RX ADMIN — Medication 650 MILLIGRAM(S): at 12:29

## 2018-12-13 RX ADMIN — MEMANTINE HYDROCHLORIDE 5 MILLIGRAM(S): 10 TABLET ORAL at 05:19

## 2018-12-13 RX ADMIN — ATORVASTATIN CALCIUM 80 MILLIGRAM(S): 80 TABLET, FILM COATED ORAL at 22:25

## 2018-12-13 RX ADMIN — NYSTATIN CREAM 1 APPLICATION(S): 100000 CREAM TOPICAL at 05:21

## 2018-12-13 RX ADMIN — Medication 30 MILLIGRAM(S): at 05:19

## 2018-12-13 RX ADMIN — ENOXAPARIN SODIUM 40 MILLIGRAM(S): 100 INJECTION SUBCUTANEOUS at 20:55

## 2018-12-13 RX ADMIN — Medication 100 MILLIGRAM(S): at 12:01

## 2018-12-13 RX ADMIN — LIDOCAINE 1 APPLICATION(S): 4 CREAM TOPICAL at 12:03

## 2018-12-13 RX ADMIN — Medication 20 MILLIGRAM(S): at 12:02

## 2018-12-13 RX ADMIN — Medication 650 MILLIGRAM(S): at 17:51

## 2018-12-13 RX ADMIN — LIDOCAINE 1 APPLICATION(S): 4 CREAM TOPICAL at 18:02

## 2018-12-13 RX ADMIN — GABAPENTIN 300 MILLIGRAM(S): 400 CAPSULE ORAL at 12:01

## 2018-12-13 RX ADMIN — PANTOPRAZOLE SODIUM 40 MILLIGRAM(S): 20 TABLET, DELAYED RELEASE ORAL at 06:52

## 2018-12-13 RX ADMIN — TIZANIDINE 4 MILLIGRAM(S): 4 TABLET ORAL at 20:59

## 2018-12-13 RX ADMIN — Medication 1 APPLICATION(S): at 18:02

## 2018-12-13 NOTE — PROGRESS NOTE ADULT - SUBJECTIVE AND OBJECTIVE BOX
HPI:  64y/o F w/ HTN, HLD, RA, Asthma s/p elective left transcarotid arterial sten via right femoral vein access (18) course complicated by code stroke approximately 8 hours postop. CTA head and neck showed a distal M2 branch occlusion. She was deemed not a candidate for intracranial thrombectomy due to the location of the M2 occlusion at that time. Patient monitored in SICU, found to be stable, and sent to the floor. MRI brain on  showed acute infarct in the left MCA distribution. TTE did not show any obvious structural cardiac source of embolism. On , she was noted to have fluctuating right hemiparesis with respect to right arm weakness. CT brain on  showed expected evolution of left MCA distribution infarct and CTA head and neck showed stable findings. Right sided deficits and aphasia possibly secondary to hypoperfusion of area supplied by the L M2 high grade stenosis due to fluctuating blood pressure. Patient started on midodrine to keep a systolic pressure between 140-160. Patient is currently being treated for E Coli UTI with cipro. Pt had a speech and swallow and was recommended to have a Mechanical Soft with Honey-Thick Liquids diet.  Per vascular team, patient will be discharged on midodrine and tapered down as tolerated. Pt was medically optimized for acute rehab admission on . (2018 15:17)      Subjective:  Patient was seen and examined. Pt had no acute events overnight.  Right toe pain improving at rest although has some pain with palpation and with ROM.  Denies SOB, cough, N/V, dysuria, constipation.      REVIEW OF SYMPTOMS   ] Constiutional WNL            [X] Cardio WNL               [X] Resp WNL  [X] GI WNL                            [X]  WNL                    [X] Heme WNL  [X] Endo WNL                       [X] Skin WNL                   [  ] MSK WNL  [  ] Neuro WNL                     [X] Cognitive WNL           [X] Psych WNL    Vital Signs Last 24 Hrs  T(C): 36.7 (18 @ 08:31)  T(F): 98 (18 @ 08:31), Max: 98.2 (18 @ 20:28)  HR: 65 (18 @ 08:31) (65 - 72)  BP: 143/86 (18 @ 08:31) (143/86 - 165/88)      Constitutional - NAD, Comfortable  	HEENT - NCAT, EOMI  	Neck - Supple,   	Chest - CTA bilaterally, No wheeze, No rhonchi, No crackles  	Cardiovascular - RRR, S1S2, No murmurs  	Abdomen - BS+, Soft, NTND  	Extremities - No C/C, No calf tenderness, no edema right foot    	Neurologic Exam -                 	   Communication expressive aphasia, follows commands, Non-fluent  	   Cranial Nerves - + right facial droop   	   Motor -Stable exam-- RUE  2/5 EF/EE, Shoulder ext, RLE hip flex 2/5, 1/5 PF/DF 2/2 to pain ; LUE/LLE 5/5             	   Sensory - impaired on right  Psychiatric - Mood stable, Affect WNL  MSK --+TTP to bilat foot bunion --worse on right.  No erythema or edema noted  Right medial forefoot TTP with pain on ROM of great toe    Functional status:  Bed mobility: min/mod A  Transfers: Min A  Gait: Amb 50' WBQC and NBQC min A WC follow  ADLs: UE dress min A, LE dress Min A           RECENT LABS/IMAGING                        10.6   21.6  )-----------( 340      ( 13 Dec 2018 06:15 )             34.5     12-13    139  |  103  |  29<H>  ----------------------------<  76  4.0   |  26  |  1.04    Ca    9.4      13 Dec 2018 06:15    TPro  7.0  /  Alb  3.2<L>  /  TBili  0.3  /  DBili  x   /  AST  20  /  ALT  31  /  AlkPhos  88  12-13      Urinalysis Basic - ( 13 Dec 2018 09:30 )    Color: Yellow / Appearance: Clear / S.015 / pH: x  Gluc: x / Ketone: Negative  / Bili: Negative / Urobili: Negative   Blood: x / Protein: Negative / Nitrite: Negative   Leuk Esterase: Moderate / RBC: 0-4 /HPF / WBC 11-25 /HPF   Sq Epi: x / Non Sq Epi: Neg.-Few / Bacteria: Few /HPF      Uric Acid 12/6: 8.3    MEDICATIONS  (STANDING):  acetaminophen   Tablet .. 650 milliGRAM(s) Oral every 6 hours  AQUAPHOR (petrolatum Ointment) 1 Application(s) Topical two times a day  aspirin  chewable 81 milliGRAM(s) Oral daily  atorvastatin 80 milliGRAM(s) Oral at bedtime  clopidogrel Tablet 75 milliGRAM(s) Oral daily  dextrose 5%. 1000 milliLiter(s) (50 mL/Hr) IV Continuous <Continuous>  docusate sodium 100 milliGRAM(s) Oral three times a day  enoxaparin Injectable 40 milliGRAM(s) SubCutaneous every 24 hours  FLUoxetine Solution 20 milliGRAM(s) Oral daily  gabapentin 300 milliGRAM(s) Oral at bedtime  gabapentin 300 milliGRAM(s) Oral <User Schedule>  lidocaine 5% Ointment 1 Application(s) Topical four times a day  memantine 5 milliGRAM(s) Oral two times a day  multivitamin 1 Tablet(s) Oral daily  nystatin Powder 1 Application(s) Topical two times a day  pantoprazole    Tablet 40 milliGRAM(s) Oral before breakfast  predniSONE   Tablet 30 milliGRAM(s) Oral daily  senna 2 Tablet(s) Oral at bedtime  tiZANidine 4 milliGRAM(s) Oral at bedtime    MEDICATIONS  (PRN):  ALBUTerol    90 MICROgram(s) HFA Inhaler 2 Puff(s) Inhalation every 6 hours PRN Shortness of Breath and/or Wheezing  melatonin 3 milliGRAM(s) Oral at bedtime PRN Insomnia HPI:  64y/o F w/ HTN, HLD, RA, Asthma s/p elective left transcarotid arterial sten via right femoral vein access (18) course complicated by code stroke approximately 8 hours postop. CTA head and neck showed a distal M2 branch occlusion. She was deemed not a candidate for intracranial thrombectomy due to the location of the M2 occlusion at that time. Patient monitored in SICU, found to be stable, and sent to the floor. MRI brain on  showed acute infarct in the left MCA distribution. TTE did not show any obvious structural cardiac source of embolism. On , she was noted to have fluctuating right hemiparesis with respect to right arm weakness. CT brain on  showed expected evolution of left MCA distribution infarct and CTA head and neck showed stable findings. Right sided deficits and aphasia possibly secondary to hypoperfusion of area supplied by the L M2 high grade stenosis due to fluctuating blood pressure. Patient started on midodrine to keep a systolic pressure between 140-160. Patient is currently being treated for E Coli UTI with cipro. Pt had a speech and swallow and was recommended to have a Mechanical Soft with Honey-Thick Liquids diet.  Per vascular team, patient will be discharged on midodrine and tapered down as tolerated. Pt was medically optimized for acute rehab admission on . (2018 15:17)      Subjective:  Patient was seen and examined. Pt had no acute events overnight.  Right toe pain improving at rest although has some pain with palpation and with ROM.  Denies SOB, cough, N/V, dysuria, constipation.      REVIEW OF SYMPTOMS   ] Constiutional WNL            [X] Cardio WNL               [X] Resp WNL  [X] GI WNL                            [X]  WNL                    [X] Heme WNL  [X] Endo WNL                       [X] Skin WNL                   [  ] MSK WNL  [  ] Neuro WNL                     [X] Cognitive WNL           [X] Psych WNL    Vital Signs Last 24 Hrs  T(C): 36.7 (18 @ 08:31)  T(F): 98 (18 @ 08:31), Max: 98.2 (18 @ 20:28)  HR: 65 (18 @ 08:31) (65 - 72)  BP: 143/86 (18 @ 08:31) (143/86 - 165/88)      Constitutional - NAD, Comfortable  	HEENT - NCAT, EOMI  	Neck - Supple,   	Chest - CTA bilaterally, No wheeze, No rhonchi, No crackles  	Cardiovascular - RRR, S1S2, No murmurs  	Abdomen - BS+, Soft, NTND  	Extremities - No C/C, No calf tenderness, no edema right foot    	Neurologic Exam -                 	   Communication expressive aphasia, follows commands, Non-fluent  	   Cranial Nerves - + right facial droop   	   Motor -Stable exam-- RUE  2/5 EF/EE, Shoulder ext, RLE hip flex 2/5, 1/5 PF/DF 2/2 to pain ; LUE/LLE 5/5             	   Sensory - impaired on right  Psychiatric - Mood stable, Affect WNL  MSK --+TTP to bilat foot bunion --worse on right.  No erythema or edema noted  Right medial forefoot TTP with pain on ROM of great toe    Functional status:  Bed mobility: min/mod A  Transfers: Min A  Gait: Amb 50' WBQC and NBQC min A WC follow  ADLs: UE dress min A, LE dress Min A           RECENT LABS/IMAGING                        10.6   21.6  )-----------( 340      ( 13 Dec 2018 06:15 )             34.5     12-13    139  |  103  |  29<H>  ----------------------------<  76  4.0   |  26  |  1.04    Ca    9.4      13 Dec 2018 06:15    TPro  7.0  /  Alb  3.2<L>  /  TBili  0.3  /  DBili  x   /  AST  20  /  ALT  31  /  AlkPhos  88  12-13      Urinalysis Basic - ( 13 Dec 2018 09:30 )    Color: Yellow / Appearance: Clear / S.015 / pH: x  Gluc: x / Ketone: Negative  / Bili: Negative / Urobili: Negative   Blood: x / Protein: Negative / Nitrite: Negative   Leuk Esterase: Moderate / RBC: 0-4 /HPF / WBC 11-25 /HPF   Sq Epi: x / Non Sq Epi: Neg.-Few / Bacteria: Few /HPF      Uric Acid 12/6: 8.3    MEDICATIONS  (STANDING):  acetaminophen   Tablet .. 650 milliGRAM(s) Oral every 6 hours  AQUAPHOR (petrolatum Ointment) 1 Application(s) Topical two times a day  ascorbic acid 500 milliGRAM(s) Oral two times a day  aspirin  chewable 81 milliGRAM(s) Oral daily  atorvastatin 80 milliGRAM(s) Oral at bedtime  chlorhexidine 0.12% Liquid 15 milliLiter(s) Oral Mucosa two times a day  clopidogrel Tablet 75 milliGRAM(s) Oral daily  dextrose 5%. 1000 milliLiter(s) (50 mL/Hr) IV Continuous <Continuous>  docusate sodium 100 milliGRAM(s) Oral three times a day  enoxaparin Injectable 40 milliGRAM(s) SubCutaneous every 24 hours  FLUoxetine Solution 20 milliGRAM(s) Oral daily  gabapentin 300 milliGRAM(s) Oral at bedtime  gabapentin 300 milliGRAM(s) Oral <User Schedule>  lidocaine 5% Ointment 1 Application(s) Topical four times a day  memantine 5 milliGRAM(s) Oral two times a day  multivitamin 1 Tablet(s) Oral daily  nystatin Powder 1 Application(s) Topical two times a day  pantoprazole    Tablet 40 milliGRAM(s) Oral before breakfast  predniSONE   Tablet 30 milliGRAM(s) Oral daily  senna 2 Tablet(s) Oral at bedtime  tiZANidine 4 milliGRAM(s) Oral at bedtime  zinc sulfate 220 milliGRAM(s) Oral daily    MEDICATIONS  (PRN):  ALBUTerol    90 MICROgram(s) HFA Inhaler 2 Puff(s) Inhalation every 6 hours PRN Shortness of Breath and/or Wheezing  melatonin 3 milliGRAM(s) Oral at bedtime PRN Insomnia

## 2018-12-13 NOTE — PROGRESS NOTE ADULT - ASSESSMENT
RUPINDER TRIPP is a 62yo Female with  Left MCA distribution stroke in the periprocedural period after carotid revascularization with carotid artery stent placement  now with decreased functional mobility, dysphagia, non-fluent aphasia, right hemiparesis, gait instability and ADL impairments.    COMORBIDITES/ACTIVE MEDICAL ISSUES     #s/p left MCA CVA  -continue Comprehensive Rehab Program of PT/OT/SLP  -ASA/Plavix/Lipitor  -Fluoxetine for motor recovery    #Bilat. 1st Toe Gout Flare  - c/w steroid taper  -podiatry c/s appreciated  -uric acid 8.3 (12/6) elevated  -c/w Lidocaine ointment    #Leukocytosis most likely 2/2 to steroid taper  -WBC 21.6 (12/13), afebrile, asymptomatic  -hospitalist following: rec check UA and CXR  -repeat CBC w/diff in AM    #Hypotension  -Midodrine with parameters (taper as tolerated)--decreased to 5mg/d  -monitor BP-No further episodes of hypotension.  SBPs>140 and Midodrine has been held last several days.  DC Midodrine    #UTI  -s/p course of cipro (completed 12/3)    #MARLY  -BMP stable 12/10    #Dysphagia  -continue SLP  -On Dysphagia 3, soft/thin lqs     #Nutrition: ensure TID, discussed with RD     #Neuropathic pain to bilat feet and right hand   -salvador 300mg qHS, 300mg q1300     #Insomnia  -monitor sleep logs, melatonin 3mg prn    Gait Instability, ADL impairments and Functional impairments:  Comprehensive Rehab Program of PT/OT     GI/Bowel Mgmt - Colace, Senna, Toileting program  /Bladder Mgmt - toileting program      Precautions / PROPHYLAXIS:   - Falls, Cardiac  - Lungs: Aspiration  - Pressure injury/Skin: Turn Q2hrs while in bed, OOB to Chair, PT/OT    - DVT: Lovenox, SCDs, TEDs     IDT Meeting 12/11 - Barriers to discharge include decreased right sided coordination, toe pain, decreased balance, right sided weakness, expressive aphasia, and decreased endurance. Currently, patient functional status is setup for eating, min assist for UB, mod assist for grooming, max assist for shower xfer, and mod A 4 steps.  ADRIANO - 12/19 VANDANA RUPINDER TRIPP is a 62yo Female with  Left MCA distribution stroke in the periprocedural period after carotid revascularization with carotid artery stent placement  now with decreased functional mobility, dysphagia, non-fluent aphasia, right hemiparesis, gait instability and ADL impairments.    COMORBIDITES/ACTIVE MEDICAL ISSUES     #s/p left MCA CVA  -continue Comprehensive Rehab Program of PT/OT/SLP  -ASA/Plavix/Lipitor  -Fluoxetine for motor recovery    #Bilat. 1st Toe Gout Flare  - c/w steroid taper  -podiatry c/s appreciated  -uric acid 8.3 (12/6) elevated  -c/w Lidocaine ointment    #Leukocytosis most likely 2/2 to steroid taper  -WBC 21.6 (12/13), afebrile, asymptomatic  -hospitalist following: rec check UA and CXR  -repeat CBC w/diff in AM    #Hypotension  -Midodrine with parameters (taper as tolerated)--decreased to 5mg/d  -monitor BP-No further episodes of hypotension.  SBPs>140 and Midodrine has been held last several days.  DC Midodrine    #UTI  -s/p course of cipro (completed 12/3)    #MARLY  -BMP stable 12/10    #Dysphagia  -continue SLP  -On Dysphagia 3, soft/thin lqs     #Nutrition: ensure TID, discussed with RD, start Vit C BID and Zinc qd     #Neuropathic pain to bilat feet and right hand   -salvador 300mg qHS, 300mg q1300     #Insomnia  -monitor sleep logs, melatonin 3mg prn    #Oral Care  -start Peridex     Gait Instability, ADL impairments and Functional impairments:  Comprehensive Rehab Program of PT/OT     GI/Bowel Mgmt - Colace, Senna, Toileting program  /Bladder Mgmt - toileting program      Precautions / PROPHYLAXIS:   - Falls, Cardiac  - Lungs: Aspiration  - Pressure injury/Skin: Turn Q2hrs while in bed, OOB to Chair, PT/OT    - DVT: Lovenox, SCDs, TEDs     IDT Meeting 12/11 - Barriers to discharge include decreased right sided coordination, toe pain, decreased balance, right sided weakness, expressive aphasia, and decreased endurance. Currently, patient functional status is setup for eating, min assist for UB, mod assist for grooming, max assist for shower xfer, and mod A 4 steps.  ADRIANO - 12/19 VANDANA

## 2018-12-14 LAB
BASOPHILS # BLD AUTO: 0.1 K/UL — SIGNIFICANT CHANGE UP (ref 0–0.2)
BASOPHILS NFR BLD AUTO: 0.4 % — SIGNIFICANT CHANGE UP (ref 0–2)
EOSINOPHIL # BLD AUTO: 0.9 K/UL — HIGH (ref 0–0.5)
EOSINOPHIL NFR BLD AUTO: 5.4 % — SIGNIFICANT CHANGE UP (ref 0–6)
HCT VFR BLD CALC: 33.7 % — LOW (ref 34.5–45)
HGB BLD-MCNC: 10.3 G/DL — LOW (ref 11.5–15.5)
LYMPHOCYTES # BLD AUTO: 25.2 % — SIGNIFICANT CHANGE UP (ref 13–44)
LYMPHOCYTES # BLD AUTO: 4.1 K/UL — HIGH (ref 1–3.3)
MCHC RBC-ENTMCNC: 23.3 PG — LOW (ref 27–34)
MCHC RBC-ENTMCNC: 30.4 GM/DL — LOW (ref 32–36)
MCV RBC AUTO: 76.7 FL — LOW (ref 80–100)
MONOCYTES # BLD AUTO: 1.1 K/UL — HIGH (ref 0–0.9)
MONOCYTES NFR BLD AUTO: 6.6 % — SIGNIFICANT CHANGE UP (ref 2–14)
NEUTROPHILS # BLD AUTO: 10.2 K/UL — HIGH (ref 1.8–7.4)
NEUTROPHILS NFR BLD AUTO: 62.4 % — SIGNIFICANT CHANGE UP (ref 43–77)
PLATELET # BLD AUTO: 342 K/UL — SIGNIFICANT CHANGE UP (ref 150–400)
RBC # BLD: 4.4 M/UL — SIGNIFICANT CHANGE UP (ref 3.8–5.2)
RBC # FLD: 16.1 % — HIGH (ref 10.3–14.5)
WBC # BLD: 16.4 K/UL — HIGH (ref 3.8–10.5)
WBC # FLD AUTO: 16.4 K/UL — HIGH (ref 3.8–10.5)

## 2018-12-14 PROCEDURE — 99233 SBSQ HOSP IP/OBS HIGH 50: CPT

## 2018-12-14 PROCEDURE — 99232 SBSQ HOSP IP/OBS MODERATE 35: CPT | Mod: GC

## 2018-12-14 RX ADMIN — Medication 1 APPLICATION(S): at 06:22

## 2018-12-14 RX ADMIN — Medication 650 MILLIGRAM(S): at 18:17

## 2018-12-14 RX ADMIN — Medication 650 MILLIGRAM(S): at 17:04

## 2018-12-14 RX ADMIN — GABAPENTIN 300 MILLIGRAM(S): 400 CAPSULE ORAL at 22:06

## 2018-12-14 RX ADMIN — Medication 30 MILLIGRAM(S): at 06:25

## 2018-12-14 RX ADMIN — NYSTATIN CREAM 1 APPLICATION(S): 100000 CREAM TOPICAL at 06:24

## 2018-12-14 RX ADMIN — Medication 650 MILLIGRAM(S): at 19:00

## 2018-12-14 RX ADMIN — SENNA PLUS 2 TABLET(S): 8.6 TABLET ORAL at 22:06

## 2018-12-14 RX ADMIN — Medication 650 MILLIGRAM(S): at 13:56

## 2018-12-14 RX ADMIN — MEMANTINE HYDROCHLORIDE 5 MILLIGRAM(S): 10 TABLET ORAL at 06:23

## 2018-12-14 RX ADMIN — ATORVASTATIN CALCIUM 80 MILLIGRAM(S): 80 TABLET, FILM COATED ORAL at 22:06

## 2018-12-14 RX ADMIN — Medication 500 MILLIGRAM(S): at 17:04

## 2018-12-14 RX ADMIN — CHLORHEXIDINE GLUCONATE 15 MILLILITER(S): 213 SOLUTION TOPICAL at 06:59

## 2018-12-14 RX ADMIN — NYSTATIN CREAM 1 APPLICATION(S): 100000 CREAM TOPICAL at 17:05

## 2018-12-14 RX ADMIN — ZINC SULFATE TAB 220 MG (50 MG ZINC EQUIVALENT) 220 MILLIGRAM(S): 220 (50 ZN) TAB at 11:32

## 2018-12-14 RX ADMIN — LIDOCAINE 1 APPLICATION(S): 4 CREAM TOPICAL at 00:18

## 2018-12-14 RX ADMIN — LIDOCAINE 1 APPLICATION(S): 4 CREAM TOPICAL at 17:05

## 2018-12-14 RX ADMIN — Medication 650 MILLIGRAM(S): at 23:01

## 2018-12-14 RX ADMIN — Medication 81 MILLIGRAM(S): at 11:32

## 2018-12-14 RX ADMIN — Medication 650 MILLIGRAM(S): at 23:50

## 2018-12-14 RX ADMIN — LIDOCAINE 1 APPLICATION(S): 4 CREAM TOPICAL at 06:23

## 2018-12-14 RX ADMIN — Medication 100 MILLIGRAM(S): at 06:23

## 2018-12-14 RX ADMIN — CHLORHEXIDINE GLUCONATE 15 MILLILITER(S): 213 SOLUTION TOPICAL at 17:04

## 2018-12-14 RX ADMIN — ENOXAPARIN SODIUM 40 MILLIGRAM(S): 100 INJECTION SUBCUTANEOUS at 22:05

## 2018-12-14 RX ADMIN — PANTOPRAZOLE SODIUM 40 MILLIGRAM(S): 20 TABLET, DELAYED RELEASE ORAL at 06:25

## 2018-12-14 RX ADMIN — Medication 20 MILLIGRAM(S): at 11:32

## 2018-12-14 RX ADMIN — LIDOCAINE 1 APPLICATION(S): 4 CREAM TOPICAL at 23:02

## 2018-12-14 RX ADMIN — Medication 1 TABLET(S): at 11:32

## 2018-12-14 RX ADMIN — Medication 1 APPLICATION(S): at 17:05

## 2018-12-14 RX ADMIN — TIZANIDINE 4 MILLIGRAM(S): 4 TABLET ORAL at 22:06

## 2018-12-14 RX ADMIN — MEMANTINE HYDROCHLORIDE 5 MILLIGRAM(S): 10 TABLET ORAL at 17:04

## 2018-12-14 RX ADMIN — CLOPIDOGREL BISULFATE 75 MILLIGRAM(S): 75 TABLET, FILM COATED ORAL at 11:32

## 2018-12-14 RX ADMIN — Medication 500 MILLIGRAM(S): at 06:22

## 2018-12-14 RX ADMIN — GABAPENTIN 300 MILLIGRAM(S): 400 CAPSULE ORAL at 14:09

## 2018-12-14 RX ADMIN — LIDOCAINE 1 APPLICATION(S): 4 CREAM TOPICAL at 11:31

## 2018-12-14 RX ADMIN — Medication 650 MILLIGRAM(S): at 11:31

## 2018-12-14 RX ADMIN — Medication 100 MILLIGRAM(S): at 22:06

## 2018-12-14 NOTE — PROGRESS NOTE ADULT - ASSESSMENT
RUPINDER TRIPP is a 64yo Female with  Left MCA distribution stroke in the periprocedural period after carotid revascularization with carotid artery stent placement  now with decreased functional mobility, dysphagia, non-fluent aphasia, right hemiparesis, gait instability and ADL impairments.    COMORBIDITES/ACTIVE MEDICAL ISSUES     #Gait Instability, ADL impairments and Functional impairment s/p left MCA CVA  -continue Comprehensive Rehab Program of PT/OT/SLP  -ASA/Plavix/Lipitor  -Fluoxetine for motor recovery    #Bilat. 1st Toe Gout Flare  - c/w steroid taper  - podiatry c/s appreciated  - uric acid 8.3 (12/6) elevated  -c/w Lidocaine ointment    #Leukocytosis most likely 2/2 to steroid taper (improving)   -WBC 21.6 (12/13), afebrile, asymptomatic  -hospitalist following: rec check UA and CXR  -repeat CBC shows improvement     #Hypotension  -Midodrine with parameters (taper as tolerated)--decreased to 5mg/d  -monitor BP-No further episodes of hypotension.  SBPs>140 and Midodrine has been held last several days.  DC Midodrine    #UTI  -s/p course of cipro (completed 12/3)    #MARLY  - resolved     #Dysphagia  -continue SLP  -On Dysphagia 3, soft/thin lqs     #Nutrition: ensure TID, discussed with RD, start Vit C BID and Zinc qd     #Neuropathic pain to bilat feet and right hand   -salvador 300mg qHS, 300mg q1300     #Insomnia  -monitor sleep logs, melatonin 3mg prn    #Oral Care  -start Peridex     GI/Bowel Mgmt - Colace, Senna, Toileting program  /Bladder Mgmt - toileting program      Precautions / PROPHYLAXIS:   - Falls, Cardiac  - Lungs: Aspiration  - Pressure injury/Skin: Turn Q2hrs while in bed, OOB to Chair, PT/OT    - DVT: Lovenox, SCDs, TEDs     IDT Meeting 12/11 - Barriers to discharge include decreased right sided coordination, toe pain, decreased balance, right sided weakness, expressive aphasia, and decreased endurance. Currently, patient functional status is setup for eating, min assist for UB, mod assist for grooming, max assist for shower xfer, and mod A 4 steps.  ADRIANO - 12/19 VANDANA

## 2018-12-14 NOTE — CHART NOTE - NSCHARTNOTEFT_GEN_A_CORE
NUTRITION FOLLOW UP    SOURCE: Medical Record/Patient    DIET: soft with thins , food allergies/intolerances noted.    Patient noted with good po 100% , No GI distress noted.(+) BM (12/14),    CURRENT WEIGHT:  133.3/60.5kg, no recent follow up weight noted., No edema noted.    Pertinent Medications: MEDICATIONS  (STANDING):  acetaminophen   Tablet .. 650 milliGRAM(s) Oral every 6 hours  AQUAPHOR (petrolatum Ointment) 1 Application(s) Topical two times a day  ascorbic acid 500 milliGRAM(s) Oral two times a day  aspirin  chewable 81 milliGRAM(s) Oral daily  atorvastatin 80 milliGRAM(s) Oral at bedtime  chlorhexidine 0.12% Liquid 15 milliLiter(s) Oral Mucosa two times a day  clopidogrel Tablet 75 milliGRAM(s) Oral daily  dextrose 5%. 1000 milliLiter(s) (50 mL/Hr) IV Continuous <Continuous>  docusate sodium 100 milliGRAM(s) Oral three times a day  enoxaparin Injectable 40 milliGRAM(s) SubCutaneous every 24 hours  FLUoxetine Solution 20 milliGRAM(s) Oral daily  gabapentin 300 milliGRAM(s) Oral at bedtime  gabapentin 300 milliGRAM(s) Oral <User Schedule>  lidocaine 5% Ointment 1 Application(s) Topical four times a day  memantine 5 milliGRAM(s) Oral two times a day  multivitamin 1 Tablet(s) Oral daily  nystatin Powder 1 Application(s) Topical two times a day  pantoprazole    Tablet 40 milliGRAM(s) Oral before breakfast  predniSONE   Tablet 30 milliGRAM(s) Oral daily  senna 2 Tablet(s) Oral at bedtime  tiZANidine 4 milliGRAM(s) Oral at bedtime  zinc sulfate 220 milliGRAM(s) Oral daily    MEDICATIONS  (PRN):  ALBUTerol    90 MICROgram(s) HFA Inhaler 2 Puff(s) Inhalation every 6 hours PRN Shortness of Breath and/or Wheezing  melatonin 3 milliGRAM(s) Oral at bedtime PRN Insomnia      PERTINENT LABS:  Date: 14 Dec 2018 05:30  Hemoglobin 10.3 (L)  Hematocrit 33.7(L)     Date: 12-13  Sodium 139  Potassium 4.0  Glucose Serum 76  BUN 29<H>  Creatinine 1.04    ACCUCHECK  POCT Blood Glucose.: 112 mg/dL (12 Dec 2018 16:46)      SKIN: surgical wound healing    ESTIMATED NEEDS:   [X] no change since previous assessment  [ ] recalculated:     PREVIOUS NUTRITION DIAGNOSIS: addressed    NUTRITION DIAGNOSIS is   [X] resolved, RD will follow as per nutrition department protocol.      MONITORING AND EVALUATION:   Current diet order is appropriate and is well tolerated, but will monitor for any changes if needed.    NUTRITION RECOMMENDATIONS: Continue current diet

## 2018-12-14 NOTE — PROGRESS NOTE ADULT - SUBJECTIVE AND OBJECTIVE BOX
HPI:  64y/o F w/ HTN, HLD, RA, Asthma s/p elective left transcarotid arterial sten via right femoral vein access (18) course complicated by code stroke approximately 8 hours postop. CTA head and neck showed a distal M2 branch occlusion. She was deemed not a candidate for intracranial thrombectomy due to the location of the M2 occlusion at that time. Patient monitored in SICU, found to be stable, and sent to the floor. MRI brain on  showed acute infarct in the left MCA distribution. TTE did not show any obvious structural cardiac source of embolism. On , she was noted to have fluctuating right hemiparesis with respect to right arm weakness. CT brain on  showed expected evolution of left MCA distribution infarct and CTA head and neck showed stable findings. Right sided deficits and aphasia possibly secondary to hypoperfusion of area supplied by the L M2 high grade stenosis due to fluctuating blood pressure. Patient started on midodrine to keep a systolic pressure between 140-160. Patient is currently being treated for E Coli UTI with cipro. Pt had a speech and swallow and was recommended to have a Mechanical Soft with Honey-Thick Liquids diet.  Per vascular team, patient will be discharged on midodrine and tapered down as tolerated. Pt was medically optimized for acute rehab admission on . (2018 15:17)      Subjective:  No acute events overnight reported by the night team resident or nursing. Patient seen and evaluated at the bedside. Aphasic patient, however is receptive and answers yes or no to questioning and also follows commands well. When asked hows she is doing overall and shakes her head yes.     REVIEW OF SYSTEMS - limited due to aphasia   Constitutional: No fever, No Chills, No fatigue  Pulm: No SOB, No cough  Cardio: No chest pain  GI:  No abdominal pain  : No dysuria  Neuro: stable     Vital Signs  T(C): 36.6 (18 @ 07:45)  T(F): 97.9 (18 @ 07:45), Max: 97.9 (18 @ 07:45)  HR: 67 (18 @ 07:45) (67 - 73)  BP: 137/81 (--18 @ 07:45) (136/83 - 139/86)  RR:  (14 - 14)  SpO2:  (99% - 100%)  Wt(kg): --      Constitutional - NAD, Comfortable  	HEENT - NCAT, EOMI  	Neck - Supple,   	Chest - CTA bilaterally, No wheeze, No rhonchi, No crackles  	Cardiovascular - RRR, S1S2, No murmurs  	Abdomen - BS+, Soft, NTND  	Extremities - No C/C, No calf tenderness, no edema right foot    	Neurologic Exam -                 	   Communication expressive aphasia, follows commands, Non-fluent  	   Cranial Nerves - + right facial droop   	   Motor -Stable exam-- RUE  2/5 EF/EE, Shoulder ext, RLE hip flex 2/5, 1/5 PF/DF 2/2 to pain ; LUE/LLE 5/5             	   Sensory - impaired on right  Psychiatric - Mood stable, Affect WNL  MSK --+TTP to bilat foot bunion --worse on right.  No erythema or edema noted  Right medial forefoot TTP with pain on ROM of great toe    Functional status:  Bed mobility: min/mod A  Transfers: Min A  Gait: Amb 50' WBQC and NBQC min A WC follow  ADLs: UE dress min A, LE dress Min A           RECENT LABS/IMAGING                                   10.3   16.4  )-----------( 342      ( 14 Dec 2018 05:30 )             33.7     12-13    139  |  103  |  29<H>  ----------------------------<  76  4.0   |  26  |  1.04    Ca    9.4      13 Dec 2018 06:15    TPro  7.0  /  Alb  3.2<L>  /  TBili  0.3  /  DBili  x   /  AST  20  /  ALT  31  /  AlkPhos  88  12-13      Urinalysis Basic - ( 13 Dec 2018 09:30 )    Color: Yellow / Appearance: Clear / S.015 / pH: x  Gluc: x / Ketone: Negative  / Bili: Negative / Urobili: Negative   Blood: x / Protein: Negative / Nitrite: Negative   Leuk Esterase: Moderate / RBC: 0-4 /HPF / WBC 11-25 /HPF   Sq Epi: x / Non Sq Epi: Neg.-Few / Bacteria: Few /HPF    ---------------------------------------------------------------               10.6   21.6  )-----------( 340      ( 13 Dec 2018 06:15 )             34.5         139  |  103  |  29<H>  ----------------------------<  76  4.0   |  26  |  1.04    Ca    9.4      13 Dec 2018 06:15    TPro  7.0  /  Alb  3.2<L>  /  TBili  0.3  /  DBili  x   /  AST  20  /  ALT  31  /  AlkPhos  88        Urinalysis Basic - ( 13 Dec 2018 09:30 )    Color: Yellow / Appearance: Clear / S.015 / pH: x  Gluc: x / Ketone: Negative  / Bili: Negative / Urobili: Negative   Blood: x / Protein: Negative / Nitrite: Negative   Leuk Esterase: Moderate / RBC: 0-4 /HPF / WBC 11-25 /HPF   Sq Epi: x / Non Sq Epi: Neg.-Few / Bacteria: Few /HPF      Uric Acid 12/6: 8.3    MEDICATIONS  (STANDING):  acetaminophen   Tablet .. 650 milliGRAM(s) Oral every 6 hours  AQUAPHOR (petrolatum Ointment) 1 Application(s) Topical two times a day  ascorbic acid 500 milliGRAM(s) Oral two times a day  aspirin  chewable 81 milliGRAM(s) Oral daily  atorvastatin 80 milliGRAM(s) Oral at bedtime  chlorhexidine 0.12% Liquid 15 milliLiter(s) Oral Mucosa two times a day  clopidogrel Tablet 75 milliGRAM(s) Oral daily  dextrose 5%. 1000 milliLiter(s) (50 mL/Hr) IV Continuous <Continuous>  docusate sodium 100 milliGRAM(s) Oral three times a day  enoxaparin Injectable 40 milliGRAM(s) SubCutaneous every 24 hours  FLUoxetine Solution 20 milliGRAM(s) Oral daily  gabapentin 300 milliGRAM(s) Oral at bedtime  gabapentin 300 milliGRAM(s) Oral <User Schedule>  lidocaine 5% Ointment 1 Application(s) Topical four times a day  memantine 5 milliGRAM(s) Oral two times a day  multivitamin 1 Tablet(s) Oral daily  nystatin Powder 1 Application(s) Topical two times a day  pantoprazole    Tablet 40 milliGRAM(s) Oral before breakfast  predniSONE   Tablet 30 milliGRAM(s) Oral daily  senna 2 Tablet(s) Oral at bedtime  tiZANidine 4 milliGRAM(s) Oral at bedtime  zinc sulfate 220 milliGRAM(s) Oral daily    MEDICATIONS  (PRN):  ALBUTerol    90 MICROgram(s) HFA Inhaler 2 Puff(s) Inhalation every 6 hours PRN Shortness of Breath and/or Wheezing  melatonin 3 milliGRAM(s) Oral at bedtime PRN Insomnia HPI:  64y/o F w/ HTN, HLD, RA, Asthma s/p elective left transcarotid arterial sten via right femoral vein access (18) course complicated by code stroke approximately 8 hours postop. CTA head and neck showed a distal M2 branch occlusion. She was deemed not a candidate for intracranial thrombectomy due to the location of the M2 occlusion at that time. Patient monitored in SICU, found to be stable, and sent to the floor. MRI brain on  showed acute infarct in the left MCA distribution. TTE did not show any obvious structural cardiac source of embolism. On , she was noted to have fluctuating right hemiparesis with respect to right arm weakness. CT brain on  showed expected evolution of left MCA distribution infarct and CTA head and neck showed stable findings. Right sided deficits and aphasia possibly secondary to hypoperfusion of area supplied by the L M2 high grade stenosis due to fluctuating blood pressure. Patient started on midodrine to keep a systolic pressure between 140-160. Patient is currently being treated for E Coli UTI with cipro. Pt had a speech and swallow and was recommended to have a Mechanical Soft with Honey-Thick Liquids diet.  Per vascular team, patient will be discharged on midodrine and tapered down as tolerated. Pt was medically optimized for acute rehab admission on . (2018 15:17)      Subjective:  No acute events overnight reported by the night team resident or nursing. Patient seen and evaluated at the bedside. Aphasic patient, however is receptive and answers yes or no to questioning and also follows commands well. When asked hows she is doing overall and shakes her head yes.     REVIEW OF SYSTEMS - limited due to aphasia   Constitutional: No fever, No Chills, No fatigue  Pulm: No SOB, No cough  Cardio: No chest pain  GI:  No abdominal pain  : No dysuria  Neuro: stable     Vital Signs  T(C): 36.6 (18 @ 07:45)  T(F): 97.9 (18 @ 07:45), Max: 97.9 (18 @ 07:45)  HR: 67 (18 @ 07:45) (67 - 73)  BP: 137/81 (--18 @ 07:45) (136/83 - 139/86)  RR:  (14 - 14)  SpO2:  (99% - 100%)  Wt(kg): --      Constitutional - NAD, Comfortable  	HEENT - NCAT, EOMI  	Neck - Supple,   	Chest - CTA bilaterally, No wheeze, No rhonchi, No crackles  	Cardiovascular - RRR, S1S2, No murmurs  	Abdomen - BS+, Soft, NTND  	Extremities - No C/C, No calf tenderness, no edema right foot    	Neurologic Exam -                 	   Communication expressive aphasia, follows commands, Non-fluent  	   Cranial Nerves - + right facial droop   	   Motor -Stable exam-- RUE  2/5 EF/EE, Shoulder ext, RLE hip flex 2/5, 1/5 PF/DF 2/2 to pain ; LUE/LLE 5/5             	   Sensory - impaired on right  Psychiatric - Mood stable, Affect WNL  MSK --+TTP to bilat foot bunion --worse on right.  No erythema or edema noted  Right medial forefoot TTP with pain on ROM of great toe    Functional status:  Bed mobility: min/mod A  Transfers: CG  Gait: Amb 80' NBQC CG/min A   ADLs: UE dress min A, LE dress Min A           RECENT LABS/IMAGING                                   10.3   16.4  )-----------( 342      ( 14 Dec 2018 05:30 )             33.7     12-13    139  |  103  |  29<H>  ----------------------------<  76  4.0   |  26  |  1.04    Ca    9.4      13 Dec 2018 06:15    TPro  7.0  /  Alb  3.2<L>  /  TBili  0.3  /  DBili  x   /  AST  20  /  ALT  31  /  AlkPhos  88  12-13      Urinalysis Basic - ( 13 Dec 2018 09:30 )    Color: Yellow / Appearance: Clear / S.015 / pH: x  Gluc: x / Ketone: Negative  / Bili: Negative / Urobili: Negative   Blood: x / Protein: Negative / Nitrite: Negative   Leuk Esterase: Moderate / RBC: 0-4 /HPF / WBC 11-25 /HPF   Sq Epi: x / Non Sq Epi: Neg.-Few / Bacteria: Few /HPF    ---------------------------------------------------------------               10.6   21.6  )-----------( 340      ( 13 Dec 2018 06:15 )             34.5         139  |  103  |  29<H>  ----------------------------<  76  4.0   |  26  |  1.04    Ca    9.4      13 Dec 2018 06:15    TPro  7.0  /  Alb  3.2<L>  /  TBili  0.3  /  DBili  x   /  AST  20  /  ALT  31  /  AlkPhos  88        Urinalysis Basic - ( 13 Dec 2018 09:30 )    Color: Yellow / Appearance: Clear / S.015 / pH: x  Gluc: x / Ketone: Negative  / Bili: Negative / Urobili: Negative   Blood: x / Protein: Negative / Nitrite: Negative   Leuk Esterase: Moderate / RBC: 0-4 /HPF / WBC 11-25 /HPF   Sq Epi: x / Non Sq Epi: Neg.-Few / Bacteria: Few /HPF      Uric Acid 12/6: 8.3    MEDICATIONS  (STANDING):  acetaminophen   Tablet .. 650 milliGRAM(s) Oral every 6 hours  AQUAPHOR (petrolatum Ointment) 1 Application(s) Topical two times a day  ascorbic acid 500 milliGRAM(s) Oral two times a day  aspirin  chewable 81 milliGRAM(s) Oral daily  atorvastatin 80 milliGRAM(s) Oral at bedtime  chlorhexidine 0.12% Liquid 15 milliLiter(s) Oral Mucosa two times a day  clopidogrel Tablet 75 milliGRAM(s) Oral daily  dextrose 5%. 1000 milliLiter(s) (50 mL/Hr) IV Continuous <Continuous>  docusate sodium 100 milliGRAM(s) Oral three times a day  enoxaparin Injectable 40 milliGRAM(s) SubCutaneous every 24 hours  FLUoxetine Solution 20 milliGRAM(s) Oral daily  gabapentin 300 milliGRAM(s) Oral at bedtime  gabapentin 300 milliGRAM(s) Oral <User Schedule>  lidocaine 5% Ointment 1 Application(s) Topical four times a day  memantine 5 milliGRAM(s) Oral two times a day  multivitamin 1 Tablet(s) Oral daily  nystatin Powder 1 Application(s) Topical two times a day  pantoprazole    Tablet 40 milliGRAM(s) Oral before breakfast  predniSONE   Tablet 30 milliGRAM(s) Oral daily  senna 2 Tablet(s) Oral at bedtime  tiZANidine 4 milliGRAM(s) Oral at bedtime  zinc sulfate 220 milliGRAM(s) Oral daily    MEDICATIONS  (PRN):  ALBUTerol    90 MICROgram(s) HFA Inhaler 2 Puff(s) Inhalation every 6 hours PRN Shortness of Breath and/or Wheezing  melatonin 3 milliGRAM(s) Oral at bedtime PRN Insomnia

## 2018-12-14 NOTE — PROGRESS NOTE ADULT - SUBJECTIVE AND OBJECTIVE BOX
Patient is a 63y old  Female who presents with a chief complaint of s/p left MCA CVA now with functional deficits (14 Dec 2018 09:28)      Patient seen and examined at bedside.     ALLERGIES:  peanut butter (Anaphylaxis)  peanuts (Anaphylaxis; Hives)  penicillins (Anaphylaxis)  propofol (Angioedema)  shellfish (Anaphylaxis; Hives)  tomatoes (Anaphylaxis; Hives)    MEDICATIONS:  acetaminophen   Tablet .. 650 milliGRAM(s) Oral every 6 hours  AQUAPHOR (petrolatum Ointment) 1 Application(s) Topical two times a day  ascorbic acid 500 milliGRAM(s) Oral two times a day  chlorhexidine 0.12% Liquid 15 milliLiter(s) Oral Mucosa two times a day  clopidogrel Tablet 75 milliGRAM(s) Oral daily  dextrose 5%. 1000 milliLiter(s) IV Continuous <Continuous>  docusate sodium 100 milliGRAM(s) Oral three times a day  gabapentin 300 milliGRAM(s) Oral at bedtime  gabapentin 300 milliGRAM(s) Oral <User Schedule>  lidocaine 5% Ointment 1 Application(s) Topical four times a day  melatonin 3 milliGRAM(s) Oral at bedtime PRN  memantine 5 milliGRAM(s) Oral two times a day  nystatin Powder 1 Application(s) Topical two times a day  pantoprazole    Tablet 40 milliGRAM(s) Oral before breakfast  predniSONE   Tablet 30 milliGRAM(s) Oral daily  tiZANidine 4 milliGRAM(s) Oral at bedtime  zinc sulfate 220 milliGRAM(s) Oral daily    Vital Signs Last 24 Hrs  T(F): 97.9 (14 Dec 2018 07:45), Max: 97.9 (14 Dec 2018 07:45)  HR: 67 (14 Dec 2018 07:45) (67 - 73)  BP: 137/81 (14 Dec 2018 07:45) (136/83 - 139/86)  RR: 14 (14 Dec 2018 07:45) (14 - 14)  SpO2: 100% (14 Dec 2018 07:45) (99% - 100%)  I&O's Summary      PHYSICAL EXAM:  General: NAD, alert, aphasic   Neck: Supple, No JVD  Lungs: Clear to auscultation bilaterally  Cardio: RRR, S1/S2, No murmurs  Abdomen: Soft, Nontender, Nondistended; Bowel sounds present  Extremities: No clubbing, cyanosis, or edema  right hemiparesis       LABS:                        10.3   16.4  )-----------( 342      ( 14 Dec 2018 05:30 )             33.7         139  |  103  |  29  ----------------------------<  76  4.0   |  26  |  1.04    Ca    9.4      13 Dec 2018 06:15    TPro  7.0  /  Alb  3.2  /  TBili  0.3  /  DBili  x   /  AST  20  /  ALT  31  /  AlkPhos  88      eGFR if Non African American: 58 mL/min/1.73M2 (18 @ 06:15)  eGFR if African American: 67 mL/min/1.73M2 (18 @ 06:15)    CAPILLARY BLOOD GLUCOSE     [id number] 6.0    Urinalysis Basic - ( 13 Dec 2018 09:30 )    Color: Yellow / Appearance: Clear / S.015 / pH: x  Gluc: x / Ketone: Negative  / Bili: Negative / Urobili: Negative   Blood: x / Protein: Negative / Nitrite: Negative   Leuk Esterase: Moderate / RBC: 0-4 /HPF / WBC 11-25 /HPF   Sq Epi: x / Non Sq Epi: Neg.-Few / Bacteria: Few /HPF      RADIOLOGY & ADDITIONAL TESTS:    Care Discussed with Consultants/Other Providers:

## 2018-12-15 PROCEDURE — 99233 SBSQ HOSP IP/OBS HIGH 50: CPT

## 2018-12-15 PROCEDURE — 99232 SBSQ HOSP IP/OBS MODERATE 35: CPT

## 2018-12-15 RX ADMIN — CHLORHEXIDINE GLUCONATE 15 MILLILITER(S): 213 SOLUTION TOPICAL at 17:14

## 2018-12-15 RX ADMIN — CHLORHEXIDINE GLUCONATE 15 MILLILITER(S): 213 SOLUTION TOPICAL at 06:00

## 2018-12-15 RX ADMIN — LIDOCAINE 1 APPLICATION(S): 4 CREAM TOPICAL at 17:14

## 2018-12-15 RX ADMIN — CLOPIDOGREL BISULFATE 75 MILLIGRAM(S): 75 TABLET, FILM COATED ORAL at 11:54

## 2018-12-15 RX ADMIN — NYSTATIN CREAM 1 APPLICATION(S): 100000 CREAM TOPICAL at 17:15

## 2018-12-15 RX ADMIN — SENNA PLUS 2 TABLET(S): 8.6 TABLET ORAL at 21:31

## 2018-12-15 RX ADMIN — Medication 650 MILLIGRAM(S): at 05:59

## 2018-12-15 RX ADMIN — Medication 650 MILLIGRAM(S): at 23:22

## 2018-12-15 RX ADMIN — Medication 500 MILLIGRAM(S): at 17:14

## 2018-12-15 RX ADMIN — Medication 81 MILLIGRAM(S): at 11:54

## 2018-12-15 RX ADMIN — MEMANTINE HYDROCHLORIDE 5 MILLIGRAM(S): 10 TABLET ORAL at 17:14

## 2018-12-15 RX ADMIN — PANTOPRAZOLE SODIUM 40 MILLIGRAM(S): 20 TABLET, DELAYED RELEASE ORAL at 06:00

## 2018-12-15 RX ADMIN — Medication 500 MILLIGRAM(S): at 05:58

## 2018-12-15 RX ADMIN — Medication 1 APPLICATION(S): at 05:59

## 2018-12-15 RX ADMIN — Medication 650 MILLIGRAM(S): at 06:47

## 2018-12-15 RX ADMIN — Medication 30 MILLIGRAM(S): at 05:58

## 2018-12-15 RX ADMIN — NYSTATIN CREAM 1 APPLICATION(S): 100000 CREAM TOPICAL at 05:59

## 2018-12-15 RX ADMIN — ZINC SULFATE TAB 220 MG (50 MG ZINC EQUIVALENT) 220 MILLIGRAM(S): 220 (50 ZN) TAB at 11:54

## 2018-12-15 RX ADMIN — Medication 20 MILLIGRAM(S): at 11:53

## 2018-12-15 RX ADMIN — TIZANIDINE 4 MILLIGRAM(S): 4 TABLET ORAL at 21:31

## 2018-12-15 RX ADMIN — GABAPENTIN 300 MILLIGRAM(S): 400 CAPSULE ORAL at 21:31

## 2018-12-15 RX ADMIN — ATORVASTATIN CALCIUM 80 MILLIGRAM(S): 80 TABLET, FILM COATED ORAL at 21:31

## 2018-12-15 RX ADMIN — Medication 100 MILLIGRAM(S): at 13:13

## 2018-12-15 RX ADMIN — Medication 1 APPLICATION(S): at 17:15

## 2018-12-15 RX ADMIN — LIDOCAINE 1 APPLICATION(S): 4 CREAM TOPICAL at 11:53

## 2018-12-15 RX ADMIN — LIDOCAINE 1 APPLICATION(S): 4 CREAM TOPICAL at 06:00

## 2018-12-15 RX ADMIN — ENOXAPARIN SODIUM 40 MILLIGRAM(S): 100 INJECTION SUBCUTANEOUS at 21:31

## 2018-12-15 RX ADMIN — MEMANTINE HYDROCHLORIDE 5 MILLIGRAM(S): 10 TABLET ORAL at 05:58

## 2018-12-15 RX ADMIN — GABAPENTIN 300 MILLIGRAM(S): 400 CAPSULE ORAL at 13:13

## 2018-12-15 RX ADMIN — Medication 1 TABLET(S): at 11:54

## 2018-12-15 NOTE — PROGRESS NOTE ADULT - SUBJECTIVE AND OBJECTIVE BOX
Patient is a 63y old  Female who presents with a chief complaint of s/p left MCA CVA now with functional deficits       Patient seen and examined at bedside. denies HA CP SOB    ALLERGIES:  peanut butter (Anaphylaxis)  peanuts (Anaphylaxis; Hives)  penicillins (Anaphylaxis)  propofol (Angioedema)  shellfish (Anaphylaxis; Hives)  tomatoes (Anaphylaxis; Hives)    MEDICATIONS:  acetaminophen   Tablet .. 650 milliGRAM(s) Oral every 6 hours  AQUAPHOR (petrolatum Ointment) 1 Application(s) Topical two times a day  ascorbic acid 500 milliGRAM(s) Oral two times a day  chlorhexidine 0.12% Liquid 15 milliLiter(s) Oral Mucosa two times a day  clopidogrel Tablet 75 milliGRAM(s) Oral daily  dextrose 5%. 1000 milliLiter(s) IV Continuous <Continuous>  docusate sodium 100 milliGRAM(s) Oral three times a day  gabapentin 300 milliGRAM(s) Oral at bedtime  gabapentin 300 milliGRAM(s) Oral <User Schedule>  lidocaine 5% Ointment 1 Application(s) Topical four times a day  melatonin 3 milliGRAM(s) Oral at bedtime PRN  memantine 5 milliGRAM(s) Oral two times a day  nystatin Powder 1 Application(s) Topical two times a day  pantoprazole    Tablet 40 milliGRAM(s) Oral before breakfast  predniSONE   Tablet 30 milliGRAM(s) Oral daily  tiZANidine 4 milliGRAM(s) Oral at bedtime  zinc sulfate 220 milliGRAM(s) Oral daily    Vital Signs Last 24 Hrs  T(C): 36.8 (15 Dec 2018 08:32), Max: 36.9 (14 Dec 2018 22:00)  T(F): 98.2 (15 Dec 2018 08:32), Max: 98.4 (14 Dec 2018 22:00)  HR: 67 (15 Dec 2018 08:32) (67 - 72)  BP: 146/82 (15 Dec 2018 08:32) (121/77 - 159/96)  BP(mean): --  RR: 14 (15 Dec 2018 08:32) (14 - 14)  SpO2: 97% (15 Dec 2018 08:32) (96% - 97%)    PHYSICAL EXAM:  General: NAD, alert, aphasic , nods yes/no  Neck: Supple, No JVD  Lungs: Clear to auscultation bilaterally  Cardio: RRR, S1/S2, No murmurs  Abdomen: Soft, Nontender, Nondistended; Bowel sounds present  Extremities: No clubbing, cyanosis, or edema  right hemiparesis         MEDICATIONS  (STANDING):  acetaminophen   Tablet .. 650 milliGRAM(s) Oral every 6 hours  AQUAPHOR (petrolatum Ointment) 1 Application(s) Topical two times a day  ascorbic acid 500 milliGRAM(s) Oral two times a day  aspirin  chewable 81 milliGRAM(s) Oral daily  atorvastatin 80 milliGRAM(s) Oral at bedtime  chlorhexidine 0.12% Liquid 15 milliLiter(s) Oral Mucosa two times a day  clopidogrel Tablet 75 milliGRAM(s) Oral daily  dextrose 5%. 1000 milliLiter(s) (50 mL/Hr) IV Continuous <Continuous>  docusate sodium 100 milliGRAM(s) Oral three times a day  enoxaparin Injectable 40 milliGRAM(s) SubCutaneous every 24 hours  FLUoxetine Solution 20 milliGRAM(s) Oral daily  gabapentin 300 milliGRAM(s) Oral at bedtime  gabapentin 300 milliGRAM(s) Oral <User Schedule>  lidocaine 5% Ointment 1 Application(s) Topical four times a day  memantine 5 milliGRAM(s) Oral two times a day  multivitamin 1 Tablet(s) Oral daily  nystatin Powder 1 Application(s) Topical two times a day  pantoprazole    Tablet 40 milliGRAM(s) Oral before breakfast  senna 2 Tablet(s) Oral at bedtime  tiZANidine 4 milliGRAM(s) Oral at bedtime  zinc sulfate 220 milliGRAM(s) Oral daily    MEDICATIONS  (PRN):  ALBUTerol    90 MICROgram(s) HFA Inhaler 2 Puff(s) Inhalation every 6 hours PRN Shortness of Breath and/or Wheezing  melatonin 3 milliGRAM(s) Oral at bedtime PRN Insomnia

## 2018-12-15 NOTE — PROGRESS NOTE ADULT - SUBJECTIVE AND OBJECTIVE BOX
INTERVAL SUBJECTIVE & REVIEW OF SYMPTOMS:  Chart reviewed. Patient seen this am. Seated in WC, appears comfortable, limited due to severe aphasia        MEDICATIONS:  acetaminophen   Tablet .. 650 milliGRAM(s) Oral every 6 hours  ALBUTerol    90 MICROgram(s) HFA Inhaler 2 Puff(s) Inhalation every 6 hours PRN  AQUAPHOR (petrolatum Ointment) 1 Application(s) Topical two times a day  ascorbic acid 500 milliGRAM(s) Oral two times a day  aspirin  chewable 81 milliGRAM(s) Oral daily  atorvastatin 80 milliGRAM(s) Oral at bedtime  chlorhexidine 0.12% Liquid 15 milliLiter(s) Oral Mucosa two times a day  clopidogrel Tablet 75 milliGRAM(s) Oral daily  dextrose 5%. 1000 milliLiter(s) IV Continuous <Continuous>  docusate sodium 100 milliGRAM(s) Oral three times a day  enoxaparin Injectable 40 milliGRAM(s) SubCutaneous every 24 hours  FLUoxetine Solution 20 milliGRAM(s) Oral daily  gabapentin 300 milliGRAM(s) Oral at bedtime  gabapentin 300 milliGRAM(s) Oral <User Schedule>  lidocaine 5% Ointment 1 Application(s) Topical four times a day  melatonin 3 milliGRAM(s) Oral at bedtime PRN  memantine 5 milliGRAM(s) Oral two times a day  multivitamin 1 Tablet(s) Oral daily  nystatin Powder 1 Application(s) Topical two times a day  pantoprazole    Tablet 40 milliGRAM(s) Oral before breakfast  senna 2 Tablet(s) Oral at bedtime  tiZANidine 4 milliGRAM(s) Oral at bedtil daily    ROS: limited due to aphasia    VITAL SIGNS  Vital Signs Last 24 Hrs  T(C): 36.8 (15 Dec 2018 08:32), Max: 36.9 (14 Dec 2018 22:00)  T(F): 98.2 (15 Dec 2018 08:32), Max: 98.4 (14 Dec 2018 22:00)  HR: 67 (15 Dec 2018 08:32) (67 - 72)  BP: 146/82 (15 Dec 2018 08:32) (121/77 - 159/96)  BP(mean): --  RR: 14 (15 Dec 2018 08:32) (14 - 14)  SpO2: 97% (15 Dec 2018 08:32) (96% - 97%)    PHYSICAL EXAM  General: NAD  Cardio: S1S2+  Resp: CLEAR  Abdomen: soft  Neuro: Alert, awake, severe expressive and receptive aphasia, right hemiplegia  Extrem: no edema     RECENT LABS:                        10.3   16.4  )-----------( 342      ( 14 Dec 2018 05:30 )             33.7     RADIOLOGY/OTHER RESULTS:    A/P:    63 year old female with left MCA infarct following a carotid revascularisation procedure for stent placement    Continue rehab program:PT/OT/ST 3 hrs/day 5 days/week    Secondary stroke prophylaxis: on ASA/Plavix, statins. On Fluoxetine - Flame trial for stroke recovery    DVT prophylaxis: on lovenox    Leucocytosis: most likely due to recent steroids. fu on Monday    Pain: on tylenol q6h, gabapentin

## 2018-12-15 NOTE — PROGRESS NOTE ADULT - ASSESSMENT
63 year old female with  Left MCA distribution stroke in the periprocedural period after carotid revascularization with carotid artery stent placement  now with decreased functional mobility, dysphagia, aphasia, right hemiparesis, gait instability and ADL impairments.    # Left MCA CVA  -c/w ASA, Plavix, Lipitor  -BP NOT at goal. Hx orthostatic hypotension. On prednisone for gout...    # ADL impairment with functional deficits with Dysphagia and Aphasia  -c/w PT/OT/SLP as tolerated   -c/w Fluoxetine for motor recovery    # Gout Flare- Stable   c/w Prednisone    # Hypotension- stable  -c/w Midodrine  -monitor BP    # MARLY- resolved  -monitor BUN/Cr  Avoid Nephrotoxic meds.     # Anemia ? ACD   -Stable H/H    Pain management   -Gabapentin      DVT ppx : Lovenox

## 2018-12-16 RX ADMIN — ZINC SULFATE TAB 220 MG (50 MG ZINC EQUIVALENT) 220 MILLIGRAM(S): 220 (50 ZN) TAB at 11:21

## 2018-12-16 RX ADMIN — CHLORHEXIDINE GLUCONATE 15 MILLILITER(S): 213 SOLUTION TOPICAL at 17:30

## 2018-12-16 RX ADMIN — Medication 81 MILLIGRAM(S): at 11:22

## 2018-12-16 RX ADMIN — CLOPIDOGREL BISULFATE 75 MILLIGRAM(S): 75 TABLET, FILM COATED ORAL at 11:22

## 2018-12-16 RX ADMIN — ATORVASTATIN CALCIUM 80 MILLIGRAM(S): 80 TABLET, FILM COATED ORAL at 21:48

## 2018-12-16 RX ADMIN — TIZANIDINE 4 MILLIGRAM(S): 4 TABLET ORAL at 21:49

## 2018-12-16 RX ADMIN — Medication 1 APPLICATION(S): at 05:27

## 2018-12-16 RX ADMIN — ENOXAPARIN SODIUM 40 MILLIGRAM(S): 100 INJECTION SUBCUTANEOUS at 21:47

## 2018-12-16 RX ADMIN — NYSTATIN CREAM 1 APPLICATION(S): 100000 CREAM TOPICAL at 05:28

## 2018-12-16 RX ADMIN — Medication 650 MILLIGRAM(S): at 07:06

## 2018-12-16 RX ADMIN — Medication 500 MILLIGRAM(S): at 17:31

## 2018-12-16 RX ADMIN — LIDOCAINE 1 APPLICATION(S): 4 CREAM TOPICAL at 05:26

## 2018-12-16 RX ADMIN — GABAPENTIN 300 MILLIGRAM(S): 400 CAPSULE ORAL at 21:48

## 2018-12-16 RX ADMIN — Medication 650 MILLIGRAM(S): at 05:26

## 2018-12-16 RX ADMIN — NYSTATIN CREAM 1 APPLICATION(S): 100000 CREAM TOPICAL at 17:30

## 2018-12-16 RX ADMIN — MEMANTINE HYDROCHLORIDE 5 MILLIGRAM(S): 10 TABLET ORAL at 05:26

## 2018-12-16 RX ADMIN — LIDOCAINE 1 APPLICATION(S): 4 CREAM TOPICAL at 17:30

## 2018-12-16 RX ADMIN — Medication 20 MILLIGRAM(S): at 05:26

## 2018-12-16 RX ADMIN — Medication 650 MILLIGRAM(S): at 22:01

## 2018-12-16 RX ADMIN — MEMANTINE HYDROCHLORIDE 5 MILLIGRAM(S): 10 TABLET ORAL at 17:31

## 2018-12-16 RX ADMIN — GABAPENTIN 300 MILLIGRAM(S): 400 CAPSULE ORAL at 13:18

## 2018-12-16 RX ADMIN — SENNA PLUS 2 TABLET(S): 8.6 TABLET ORAL at 21:48

## 2018-12-16 RX ADMIN — LIDOCAINE 1 APPLICATION(S): 4 CREAM TOPICAL at 21:48

## 2018-12-16 RX ADMIN — PANTOPRAZOLE SODIUM 40 MILLIGRAM(S): 20 TABLET, DELAYED RELEASE ORAL at 05:27

## 2018-12-16 RX ADMIN — Medication 650 MILLIGRAM(S): at 21:47

## 2018-12-16 RX ADMIN — Medication 1 TABLET(S): at 11:21

## 2018-12-16 RX ADMIN — CHLORHEXIDINE GLUCONATE 15 MILLILITER(S): 213 SOLUTION TOPICAL at 05:26

## 2018-12-16 RX ADMIN — Medication 1 APPLICATION(S): at 17:29

## 2018-12-16 RX ADMIN — Medication 500 MILLIGRAM(S): at 05:26

## 2018-12-16 RX ADMIN — Medication 20 MILLIGRAM(S): at 11:22

## 2018-12-16 RX ADMIN — Medication 650 MILLIGRAM(S): at 00:12

## 2018-12-16 RX ADMIN — LIDOCAINE 1 APPLICATION(S): 4 CREAM TOPICAL at 11:22

## 2018-12-16 NOTE — PROGRESS NOTE ADULT - SUBJECTIVE AND OBJECTIVE BOX
CHIEF COMPLAINT: No acute events overnight, no new deficits. Right toe pain.       HISTORY OF PRESENT ILLNESS  64y/o F w/ HTN, HLD, RA, Asthma s/p elective left transcarotid arterial sten via right femoral vein access (11/13/18) course complicated by code stroke approximately 8 hours postop. CTA head and neck showed a distal M2 branch occlusion. She was deemed not a candidate for intracranial thrombectomy due to the location of the M2 occlusion at that time. Patient monitored in SICU, found to be stable, and sent to the floor. MRI brain on 11/17 showed acute infarct in the left MCA distribution. TTE did not show any obvious structural cardiac source of embolism. On 11/20, she was noted to have fluctuating right hemiparesis with respect to right arm weakness. CT brain on 11/20 showed expected evolution of left MCA distribution infarct and CTA head and neck showed stable findings. Right sided deficits and aphasia possibly secondary to hypoperfusion of area supplied by the L M2 high grade stenosis due to fluctuating blood pressure. Patient started on midodrine to keep a systolic pressure between 140-160. Patient is currently being treated for E Coli UTI with cipro. Pt had a speech and swallow and was recommended to have a Mechanical Soft with Honey-Thick Liquids diet.  Per vascular team, patient will be discharged on midodrine and tapered down as tolerated. Pt was medically optimized for acute rehab admission on 11/28. (28 Nov 2018 15:17)      PAST MEDICAL & SURGICAL HISTORY:  Osteoporosis  Eczema: bilateral elbows  Carotid artery stenosis  Rheumatoid arthritis: tx with  methotrexate and prednisone  HLD (hyperlipidemia)  Carotid Artery Dissection  Eczema  Psoriasis  Asthma  HTN (Hypertension)  Myocardial Infarction  S/P lumbar fusion: h/o vertral fracture with cement fusion 209  Cataract Extraction Surgery  tonsillectomy: s/p Excision Right lymph node ; pt unclear       REVIEW OF SYMPTOMS  [X] Constitutional WNL     [X] Cardio WNL            [X] Resp WNL           [X] GI WNL                          [X]  WNL                   [X] Heme WNL              [X] Endo WNL                     [X] Skin WNL                                   [X] Cognitive WNL        [X] Psych WNL      VITALS  Vital Signs Last 24 Hrs  T(C): 36.6 (16 Dec 2018 08:26), Max: 36.9 (15 Dec 2018 21:27)  T(F): 97.8 (16 Dec 2018 08:26), Max: 98.4 (15 Dec 2018 21:27)  HR: 69 (16 Dec 2018 08:26) (69 - 70)  BP: 123/74 (16 Dec 2018 08:26) (123/74 - 159/84)  BP(mean): --  RR: 14 (16 Dec 2018 08:26) (14 - 14)  SpO2: 95% (16 Dec 2018 08:26) (95% - 99%)      PHYSICAL EXAM  Constitutional - NAD, Comfortable  HEENT - NCAT, EOMI  Neck - Supple, No limited ROM  Chest - CTA bilaterally, No wheeze, No rhonchi, No crackles  Cardiovascular - RRR, S1S2, No murmurs  Abdomen - BS+, Soft, NTND  Extremities - No C/C/E, No calf tenderness   Skin-no rash  Wounds-na      Neurologic Exam - no new neuro deficits.                     Psychiatric - Mood stable, Affect WNL     RECENT LABS                  Hemoglobin A1C, Whole Blood: 6.0 % (11-01-18 @ 14:30)              RADIOLOGY/OTHER RESULTS      CURRENT MEDICATIONS  MEDICATIONS  (STANDING):  acetaminophen   Tablet .. 650 milliGRAM(s) Oral every 6 hours  AQUAPHOR (petrolatum Ointment) 1 Application(s) Topical two times a day  ascorbic acid 500 milliGRAM(s) Oral two times a day  aspirin  chewable 81 milliGRAM(s) Oral daily  atorvastatin 80 milliGRAM(s) Oral at bedtime  chlorhexidine 0.12% Liquid 15 milliLiter(s) Oral Mucosa two times a day  clopidogrel Tablet 75 milliGRAM(s) Oral daily  dextrose 5%. 1000 milliLiter(s) (50 mL/Hr) IV Continuous <Continuous>  docusate sodium 100 milliGRAM(s) Oral three times a day  enoxaparin Injectable 40 milliGRAM(s) SubCutaneous every 24 hours  FLUoxetine Solution 20 milliGRAM(s) Oral daily  gabapentin 300 milliGRAM(s) Oral at bedtime  gabapentin 300 milliGRAM(s) Oral <User Schedule>  lidocaine 5% Ointment 1 Application(s) Topical four times a day  memantine 5 milliGRAM(s) Oral two times a day  multivitamin 1 Tablet(s) Oral daily  nystatin Powder 1 Application(s) Topical two times a day  pantoprazole    Tablet 40 milliGRAM(s) Oral before breakfast  predniSONE   Tablet 20 milliGRAM(s) Oral daily  senna 2 Tablet(s) Oral at bedtime  tiZANidine 4 milliGRAM(s) Oral at bedtime  zinc sulfate 220 milliGRAM(s) Oral daily    MEDICATIONS  (PRN):  ALBUTerol    90 MICROgram(s) HFA Inhaler 2 Puff(s) Inhalation every 6 hours PRN Shortness of Breath and/or Wheezing  melatonin 3 milliGRAM(s) Oral at bedtime PRN Insomnia      ASSESSMENT & PLAN          GI/Bowel Management - Senna   Management - Toilet Q2  Skin - Turn Q2  Pain - Tylenol PRN  DVT PPX - Lovenox  Diet - tolerating well.     Continue comprehensive acute rehab program consisting of 3hrs/day of OT/PT and SLP.

## 2018-12-16 NOTE — PROGRESS NOTE ADULT - ASSESSMENT
62yo Female with  Left MCA distribution stroke in the periprocedural period after carotid revascularization with carotid artery stent placement  now with decreased functional mobility, dysphagia, non-fluent aphasia, right hemiparesis, gait instability and ADL impairments.    -continue Comprehensive Rehab Program of PT/OT/SLP  -ASA/Plavix/Lipitor  -Fluoxetine for motor recovery    #Bilat. 1st Toe Gout Flare  - c/w steroid taper, uric acid 8.3 (12/6) elevated  -c/w Lidocaine ointment    #Leukocytosis most likely 2/2 to steroid taper, improved, afebrile  #No Hypotension reported today  -Midodrine with parameters (taper as tolerated)--decreased to 5mg/d

## 2018-12-17 ENCOUNTER — APPOINTMENT (OUTPATIENT)
Dept: INTERNAL MEDICINE | Facility: CLINIC | Age: 63
End: 2018-12-17

## 2018-12-17 LAB
ALBUMIN SERPL ELPH-MCNC: 3.3 G/DL — SIGNIFICANT CHANGE UP (ref 3.3–5)
ALP SERPL-CCNC: 91 U/L — SIGNIFICANT CHANGE UP (ref 40–120)
ALT FLD-CCNC: 40 U/L DA — SIGNIFICANT CHANGE UP (ref 10–45)
ANION GAP SERPL CALC-SCNC: 9 MMOL/L — SIGNIFICANT CHANGE UP (ref 5–17)
AST SERPL-CCNC: 18 U/L — SIGNIFICANT CHANGE UP (ref 10–40)
BASOPHILS # BLD AUTO: 0.1 K/UL — SIGNIFICANT CHANGE UP (ref 0–0.2)
BASOPHILS NFR BLD AUTO: 0.6 % — SIGNIFICANT CHANGE UP (ref 0–2)
BILIRUB SERPL-MCNC: 0.3 MG/DL — SIGNIFICANT CHANGE UP (ref 0.2–1.2)
BUN SERPL-MCNC: 28 MG/DL — HIGH (ref 7–23)
CALCIUM SERPL-MCNC: 9.3 MG/DL — SIGNIFICANT CHANGE UP (ref 8.4–10.5)
CHLORIDE SERPL-SCNC: 103 MMOL/L — SIGNIFICANT CHANGE UP (ref 96–108)
CO2 SERPL-SCNC: 28 MMOL/L — SIGNIFICANT CHANGE UP (ref 22–31)
CREAT SERPL-MCNC: 1.02 MG/DL — SIGNIFICANT CHANGE UP (ref 0.5–1.3)
EOSINOPHIL # BLD AUTO: 0.4 K/UL — SIGNIFICANT CHANGE UP (ref 0–0.5)
EOSINOPHIL NFR BLD AUTO: 2.7 % — SIGNIFICANT CHANGE UP (ref 0–6)
GLUCOSE SERPL-MCNC: 74 MG/DL — SIGNIFICANT CHANGE UP (ref 70–99)
HCT VFR BLD CALC: 36.6 % — SIGNIFICANT CHANGE UP (ref 34.5–45)
HGB BLD-MCNC: 11.2 G/DL — LOW (ref 11.5–15.5)
LYMPHOCYTES # BLD AUTO: 27.2 % — SIGNIFICANT CHANGE UP (ref 13–44)
LYMPHOCYTES # BLD AUTO: 4.3 K/UL — HIGH (ref 1–3.3)
MCHC RBC-ENTMCNC: 23.6 PG — LOW (ref 27–34)
MCHC RBC-ENTMCNC: 30.5 GM/DL — LOW (ref 32–36)
MCV RBC AUTO: 77.4 FL — LOW (ref 80–100)
MONOCYTES # BLD AUTO: 1.3 K/UL — HIGH (ref 0–0.9)
MONOCYTES NFR BLD AUTO: 8.3 % — SIGNIFICANT CHANGE UP (ref 2–14)
NEUTROPHILS # BLD AUTO: 9.7 K/UL — HIGH (ref 1.8–7.4)
NEUTROPHILS NFR BLD AUTO: 61.3 % — SIGNIFICANT CHANGE UP (ref 43–77)
PLATELET # BLD AUTO: 292 K/UL — SIGNIFICANT CHANGE UP (ref 150–400)
POTASSIUM SERPL-MCNC: 3.9 MMOL/L — SIGNIFICANT CHANGE UP (ref 3.5–5.3)
POTASSIUM SERPL-SCNC: 3.9 MMOL/L — SIGNIFICANT CHANGE UP (ref 3.5–5.3)
PROT SERPL-MCNC: 6.9 G/DL — SIGNIFICANT CHANGE UP (ref 6–8.3)
RBC # BLD: 4.73 M/UL — SIGNIFICANT CHANGE UP (ref 3.8–5.2)
RBC # FLD: 16.6 % — HIGH (ref 10.3–14.5)
SODIUM SERPL-SCNC: 140 MMOL/L — SIGNIFICANT CHANGE UP (ref 135–145)
WBC # BLD: 15.9 K/UL — HIGH (ref 3.8–10.5)
WBC # FLD AUTO: 15.9 K/UL — HIGH (ref 3.8–10.5)

## 2018-12-17 PROCEDURE — 99232 SBSQ HOSP IP/OBS MODERATE 35: CPT

## 2018-12-17 PROCEDURE — 99233 SBSQ HOSP IP/OBS HIGH 50: CPT

## 2018-12-17 RX ORDER — MEMANTINE HYDROCHLORIDE 10 MG/1
10 TABLET ORAL
Qty: 0 | Refills: 0 | Status: DISCONTINUED | OUTPATIENT
Start: 2018-12-17 | End: 2018-12-19

## 2018-12-17 RX ADMIN — NYSTATIN CREAM 1 APPLICATION(S): 100000 CREAM TOPICAL at 17:03

## 2018-12-17 RX ADMIN — GABAPENTIN 300 MILLIGRAM(S): 400 CAPSULE ORAL at 12:04

## 2018-12-17 RX ADMIN — Medication 650 MILLIGRAM(S): at 05:22

## 2018-12-17 RX ADMIN — Medication 100 MILLIGRAM(S): at 12:05

## 2018-12-17 RX ADMIN — CLOPIDOGREL BISULFATE 75 MILLIGRAM(S): 75 TABLET, FILM COATED ORAL at 11:49

## 2018-12-17 RX ADMIN — Medication 1 APPLICATION(S): at 05:23

## 2018-12-17 RX ADMIN — Medication 1 TABLET(S): at 11:51

## 2018-12-17 RX ADMIN — Medication 20 MILLIGRAM(S): at 11:50

## 2018-12-17 RX ADMIN — ENOXAPARIN SODIUM 40 MILLIGRAM(S): 100 INJECTION SUBCUTANEOUS at 20:37

## 2018-12-17 RX ADMIN — Medication 500 MILLIGRAM(S): at 17:01

## 2018-12-17 RX ADMIN — CHLORHEXIDINE GLUCONATE 15 MILLILITER(S): 213 SOLUTION TOPICAL at 17:01

## 2018-12-17 RX ADMIN — MEMANTINE HYDROCHLORIDE 10 MILLIGRAM(S): 10 TABLET ORAL at 17:02

## 2018-12-17 RX ADMIN — ZINC SULFATE TAB 220 MG (50 MG ZINC EQUIVALENT) 220 MILLIGRAM(S): 220 (50 ZN) TAB at 11:52

## 2018-12-17 RX ADMIN — LIDOCAINE 1 APPLICATION(S): 4 CREAM TOPICAL at 17:01

## 2018-12-17 RX ADMIN — Medication 500 MILLIGRAM(S): at 05:22

## 2018-12-17 RX ADMIN — ATORVASTATIN CALCIUM 80 MILLIGRAM(S): 80 TABLET, FILM COATED ORAL at 20:36

## 2018-12-17 RX ADMIN — Medication 650 MILLIGRAM(S): at 17:00

## 2018-12-17 RX ADMIN — LIDOCAINE 1 APPLICATION(S): 4 CREAM TOPICAL at 05:24

## 2018-12-17 RX ADMIN — SENNA PLUS 2 TABLET(S): 8.6 TABLET ORAL at 20:36

## 2018-12-17 RX ADMIN — CHLORHEXIDINE GLUCONATE 15 MILLILITER(S): 213 SOLUTION TOPICAL at 05:23

## 2018-12-17 RX ADMIN — LIDOCAINE 1 APPLICATION(S): 4 CREAM TOPICAL at 11:49

## 2018-12-17 RX ADMIN — TIZANIDINE 4 MILLIGRAM(S): 4 TABLET ORAL at 20:36

## 2018-12-17 RX ADMIN — Medication 650 MILLIGRAM(S): at 12:48

## 2018-12-17 RX ADMIN — Medication 650 MILLIGRAM(S): at 18:00

## 2018-12-17 RX ADMIN — Medication 20 MILLIGRAM(S): at 05:22

## 2018-12-17 RX ADMIN — Medication 100 MILLIGRAM(S): at 20:36

## 2018-12-17 RX ADMIN — PANTOPRAZOLE SODIUM 40 MILLIGRAM(S): 20 TABLET, DELAYED RELEASE ORAL at 05:22

## 2018-12-17 RX ADMIN — Medication 81 MILLIGRAM(S): at 11:50

## 2018-12-17 RX ADMIN — Medication 1 APPLICATION(S): at 17:03

## 2018-12-17 RX ADMIN — NYSTATIN CREAM 1 APPLICATION(S): 100000 CREAM TOPICAL at 05:23

## 2018-12-17 RX ADMIN — GABAPENTIN 300 MILLIGRAM(S): 400 CAPSULE ORAL at 20:36

## 2018-12-17 RX ADMIN — Medication 650 MILLIGRAM(S): at 06:13

## 2018-12-17 RX ADMIN — Medication 650 MILLIGRAM(S): at 11:48

## 2018-12-17 RX ADMIN — MEMANTINE HYDROCHLORIDE 5 MILLIGRAM(S): 10 TABLET ORAL at 05:22

## 2018-12-17 NOTE — PROGRESS NOTE ADULT - ASSESSMENT
63 year old female with  Left MCA distribution stroke in the periprocedural period after carotid revascularization with carotid artery stent placement  now with decreased functional mobility, dysphagia, aphasia, right hemiparesis, gait instability and ADL impairments.    # Left MCA CVA  -c/w ASA, Plavix, Lipitor  CAD risk management  BP long term control 130/80   low sodium diet, low cholesterol as well        # Gout Flare-  in remission   fu with op pcp setting     # Hypotension- resolved     # MARLY- resolved  -monitor BUN/Cr  Avoid Nephrotoxic meds.     # Anemia ? ACD   -Stable H/H       DVT ppx : Lovenox    high risk for morbidity, mortality secondary to the above mentioned medical conditions   reviewed with rehab team plan of care

## 2018-12-17 NOTE — PROGRESS NOTE ADULT - VASCULAR
[address] [place] [address] [facility]  Pulmonary, Critical Care, and Sleep Medicine    Name: Rina Phalen MRN: [id number]   : 1960 Hospital: [address] [place] [address]   Date: 3/2/2018        Pulmonary Critical Care Progress Note                                                Subjective/History:   Patient is a 56 y.o. [place] female with a hx of end stage renal disease, HTN, and Hypercholestermia who presented to [facility] ED with c/o constant moderate SOB onset 3-4 days. Associated sx of productive cough (with white sputum), nausea, emesis, dysuria, and decreased appetite. Pt has dialysis M/W/F with last visit on Friday, 18 with pt reporting not feeling better afterwards. Patient was intubated in the [facility] ED for hypoxia. Troponins were elevated and Cardiology was consulted. Started on Heparin gtt and ASA was ordered. Nephrology consulted for ESRD/Dialysis. Patient was transferred to [facility] ICU.       18   Liberated from ventilated yesterday. Remained on NC. Hypertension is better controlled. Speaks Greek, unable to assess orientation  Hemodynamically stable, Afebrile   Dialysis per renal.      The patient is critically ill and can not provide additional history due to ventilated. [x]The patient is unable to give any meaningful history or review of systems because the patient is:  []Intubated []Sedated   []Unresponsive [x] Comunication barrier     [x]The patient is critically ill on      []Mechanical ventilation []Pressors   []BiPAP []               Review of Systems:  Review of systems not obtained due to patient factors.     Past Medical History:  Past Medical History:   Diagnosis Date    Chronic kidney disease     ESRD (end stage renal disease) ([facility] [address]     TU-THURS-SAT    Hypercholesteremia     Hypertension     Kidney disease     Vitamin D deficiency         Past Surgical History:  Past Surgical History:   Procedure Laterality Date    VASCULAR SURGERY PROCEDURE UNLIST Medications:  Prior to Admission medications    Medication Sig Start Date End Date Taking? Authorizing Provider   hydrALAZINE (APRESOLINE) 50 mg tablet Take 25 mg by mouth three (3) times daily. Brenda Blackman MD   losartan (COZAAR) 100 mg tablet Take 100 mg by mouth daily. Brenda Blackman MD   amLODIPine (NORVASC) 10 mg tablet Take 10 mg by mouth daily. Brenda Blackman MD   glycerin, adult, (FLEET GLYCERIN, ADULT,) suppository Insert 1 Suppository into rectum daily. Indications: BOWEL EVACUATION 2/5/18   Zaki Freeman MD   oxyCODONE-acetaminophen (PERCOCET) 5-325 mg per tablet Take 1 Tab by mouth every four (4) hours as needed for Pain. Max Daily Amount: 6 Tabs. 1/22/18   Vivien Corona MD   albuterol (PROVENTIL HFA, VENTOLIN HFA, PROAIR HFA) 90 mcg/actuation inhaler Take 2 Puffs by inhalation every four (4) hours as needed for Wheezing. 11/6/17 April RACHELLE Langford PA-C   calcium acetate (PHOSLO) 667 mg cap Take 1 Cap by mouth three (3) times daily (with meals). 9/13/17   Martita Garcia MD   cloNIDine HCl (CATAPRES) 0.1 mg tablet Take 0.3 mg by mouth three (3) times daily. Brenda Blackman MD   NIFEdipine ER (ADALAT CC) 30 mg ER tablet Take 20 mg by mouth daily. Brenda Blackman MD   multivitamin with iron tablet Take 1 Tab by mouth daily.     Brenda Blackman MD       Current Facility-Administered Medications   Medication Dose Route Frequency    hydrALAZINE (APRESOLINE) tablet 100 mg  100 mg Oral BID    losartan (COZAAR) tablet 100 mg  100 mg Oral DAILY    heparin (porcine) injection 5,000 Units  5,000 Units SubCUTAneous Q8H    epoetin pilar (EPOGEN;PROCRIT) injection 4,000 Units  4,000 Units IntraVENous Q MON, WED & FRI    cloNIDine HCl (CATAPRES) tablet 0.3 mg  0.3 mg Oral BID    dexmedeTOMidine (PRECEDEX) 600 mcg in 0.9% sodium chloride 150 mL infusion  0.2-0.7 mcg/kg/hr IntraVENous TITRATE    carvedilol (COREG) tablet 25 mg  25 mg Oral Q12H    amLODIPine (NORVASC) tablet 10 mg  10 mg Oral DAILY    niCARdipine (CARDENE) 25 mg in 0.9% sodium chloride 250 mL infusion  0-15 mg/hr IntraVENous TITRATE    insulin lispro (HUMALOG) injection   SubCUTAneous Q6H    aspirin chewable tablet 81 mg  81 mg Oral DAILY    atorvastatin (LIPITOR) tablet 40 mg  40 mg Oral QHS    sodium chloride (NS) flush 5-10 mL  5-10 mL IntraVENous Q8H    famotidine (PF) (PEPCID) 20 mg in sodium chloride 10 mL injection  20 mg IntraVENous DAILY       Allergy:  Allergies   Allergen Reactions    Banana Other (comments)        Social History:  Social History   Substance Use Topics    Smoking status: Never Smoker    Smokeless tobacco: Never Used    Alcohol use No        Family History:  History reviewed. No pertinent family history.        Objective:   Vital Signs:    Visit Vitals    BP (!) 168/92  Comment: eyes open, nurse at bedside giving meds    Pulse 82    Temp 96.5 °F (35.8 °C) (Oral)    Resp 18    Ht 5' 1\" (1.549 m)    Wt 43.7 kg (96 lb 5.5 oz)    SpO2 100%    Breastfeeding No    BMI 18.2 kg/m2       O2 Device: Nasal cannula   O2 Flow Rate (L/min): 4 l/min   Temp (24hrs), Av °F (36.7 °C), Min:96.5 °F (35.8 °C), Max:98.9 °F (37.2 °C)       Patient Vitals for the past 8 hrs:   Temp Pulse Resp BP SpO2   18 1200 - 82 18 (!) 168/92 100 %   18 1145 - 88 21 (!) 169/91 100 %   18 1130 - 86 15 (!) 170/97 100 %   18 1115 - 88 17 (!) 175/97 100 %   18 1100 - 88 18 (!) 176/96 100 %   18 1045 - 96 19 (!) 154/100 100 %   18 1030 - 91 14 (!) 173/99 100 %   18 1015 - 80 16 (!) 167/93 100 %   18 1000 - 73 15 168/90 100 %   18 0945 - 72 16 (!) 132/91 100 %   18 0930 - 78 16 140/86 100 %   1815 96.5 °F (35.8 °C) 78 29 152/88 100 %   18 0827 97.6 °F (36.4 °C) - - - -   18 - 98 21 (!) 179/97 99 %   1830 - 88 19 (!) 184/91 99 %   1815 - 84 21 160/90 100 %   18 0700 - 92 21 (!) 168/94 97 %   1845 - 94 21 166/90 100 % 03/02/18 0630 - 88 24 (!) 173/92 100 %   03/02/18 0615 - 86 18 162/90 98 %   03/02/18 0600 - 95 22 (!) 183/92 100 %   03/02/18 0545 - 83 18 144/84 100 %   03/02/18 0530 - 91 22 158/90 96 %   03/02/18 0515 - (!) 106 - - (!) 82 %   03/02/18 0500 - 93 27 176/90 100 %   03/02/18 0430 - 87 21 155/82 100 %   03/02/18 0415 - 98 27 185/86 99 %     Intake/Output:   Last shift:         Last 3 shifts: 02/28 1901 - 03/02 0700  In: 650.2 [I.V.:160.2]  Out: 0     Intake/Output Summary (Last 24 hours) at 03/02/18 1213  Last data filed at 03/02/18 0400   Gross per 24 hour   Intake              240 ml   Output                0 ml   Net              240 ml       Ventilator Settings:  Mode Rate Tidal Volume Pressure FiO2 PEEP   Assist control, VC+   375 ml  8 cm H2O 30 % 5 cm H20     Peak airway pressure: 24 cm H2O    Minute ventilation: 9.49 l/min      ARDS network Guidelines: Lung protective strategy, Pl pressure goals less than or equal to 30. Physical Exam:  General appearance - Awake and alert, unable to assess orientation  Mental status - follows commands with family. Eyes - pupils equal and reactive, extraocular eye movements intact, sclera anicteric  Mouth - mucous membranes moist, pharynx normal without lesions  Neck - supple, no significant adenopathy  Chest - no tachypnea, retractions or cyanosis, bilateral lower lobes are diminshished, no wheezing or rhonchi. Equal chest excursion.   Heart - normal rate, regular rhythm, normal S1, S2, grade 2/6 holosystolic murmur auscultated at the fourth left sternal border, no rubs, clicks or gallops  Abdomen - soft, nontender, nondistended, no masses or organomegaly  Neurological -  non-focal, moves all extremities  Musculoskeletal - no joint tenderness, deformity or swelling  Extremities - peripheral pulses normal, no pedal edema, no clubbing or cyanosis  Skin - normal coloration and turgor, no rashes, no suspicious skin lesions noted    Data:     Recent Results (from the past 24 hour(s))   GLUCOSE, POC    Collection Time: 03/01/18  5:20 PM   Result Value Ref Range    Glucose (POC) 183 (H) 70 - 110 mg/dL   IRON PROFILE    Collection Time: 03/01/18  6:06 PM   Result Value Ref Range    Iron 49 (L) 50 - 175 ug/dL    TIBC 124 (L) 250 - 450 ug/dL    Iron % saturation 40 %   FERRITIN    Collection Time: 03/01/18  6:06 PM   Result Value Ref Range    Ferritin 2584 (H) 8 - 388 NG/ML   GLUCOSE, POC    Collection Time: 03/02/18 12:04 AM   Result Value Ref Range    Glucose (POC) 218 (H) 70 - 110 mg/dL   PTT    Collection Time: 03/02/18  3:40 AM   Result Value Ref Range    aPTT 33.8 23.0 - 40.7 SEC   METABOLIC PANEL, BASIC    Collection Time: 03/02/18  3:40 AM   Result Value Ref Range    Sodium 131 (L) 136 - 145 mmol/L    Potassium 4.7 3.5 - 5.5 mmol/L    Chloride 95 (L) 100 - 108 mmol/L    CO2 24 21 - 32 mmol/L    Anion gap 12 3.0 - 18 mmol/L    Glucose 155 (H) 74 - 99 mg/dL    BUN 48 (H) 7.0 - 18 MG/DL    Creatinine 4.57 (H) 0.6 - 1.3 MG/DL    BUN/Creatinine ratio 11 (L) 12 - 20      GFR est AA 12 (L) >60 ml/min/1.73m2    GFR est non-AA 10 (L) >60 ml/min/1.73m2    Calcium 8.9 8.5 - 10.1 MG/DL   CBC WITH AUTOMATED DIFF    Collection Time: 03/02/18  3:40 AM   Result Value Ref Range    WBC 13.3 (H) 4.6 - 13.2 K/uL    RBC 3.62 (L) 4.20 - 5.30 M/uL    HGB 9.0 (L) 12.0 - 16.0 g/dL    HCT 28.6 (L) 35.0 - 45.0 %    MCV 79.0 74.0 - 97.0 FL    MCH 24.9 24.0 - 34.0 PG    MCHC 31.5 31.0 - 37.0 g/dL    RDW 14.8 (H) 11.6 - 14.5 %    PLATELET 861 277 - 928 K/uL    MPV 11.0 9.2 - 11.8 FL    NEUTROPHILS 95 (H) 40 - 73 %    LYMPHOCYTES 3 (L) 21 - 52 %    MONOCYTES 2 (L) 3 - 10 %    EOSINOPHILS 0 0 - 5 %    BASOPHILS 0 0 - 2 %    ABS. NEUTROPHILS 12.6 (H) 1.8 - 8.0 K/UL    ABS. LYMPHOCYTES 0.4 (L) 0.9 - 3.6 K/UL    ABS. MONOCYTES 0.3 0.05 - 1.2 K/UL    ABS. EOSINOPHILS 0.0 0.0 - 0.4 K/UL    ABS.  BASOPHILS 0.0 0.0 - 0.1 K/UL    DF AUTOMATED     HEPATIC FUNCTION PANEL    Collection Time: 03/02/18  3:40 AM   Result Value Ref Range    Protein, total 6.8 6.4 - 8.2 g/dL    Albumin 2.8 (L) 3.4 - 5.0 g/dL    Globulin 4.0 2.0 - 4.0 g/dL    A-G Ratio 0.7 (L) 0.8 - 1.7      Bilirubin, total 0.6 0.2 - 1.0 MG/DL    Bilirubin, direct 0.1 0.0 - 0.2 MG/DL    Alk. phosphatase 141 (H) 45 - 117 U/L    AST (SGOT) 44 (H) 15 - 37 U/L    ALT (SGPT) 24 13 - 56 U/L   GLUCOSE, POC    Collection Time: 03/02/18  5:18 AM   Result Value Ref Range    Glucose (POC) 159 (H) 70 - 110 mg/dL           Recent Labs      03/01/18   0423  02/28/18   0439   FIO2I  30  30   HCO3I  24.6  25.3   PCO2I  24.0*  32.1*   PHI  7.618*  7.504*   PO2I  81  84       Special Requests:   Date Value Ref Range Status   02/26/2018 NO SPECIAL REQUESTS   Preliminary     Culture result:   Date Value Ref Range Status   02/26/2018 NO GROWTH 4 DAYS   Preliminary       Telemetry: normal sinus rhythm    ECHO 9/12/17     SUMMARY:  Left ventricle: Systolic function was normal. Ejection fraction was estimated   in the range of 60 % to 65 %. No obvious  wall motion abnormalities identified in the views obtained. Wall thickness   was mildly increased. Doppler parameters  were consistent with abnormal left ventricular relaxation (grade 1 diastolic   dysfunction). Left atrium: The atrium was mildly dilated. Pericardium: A trivial pericardial effusion was identified circumferential to   the heart. There was no evidence of  hemodynamic compromise. Imaging:  [x]I have personally reviewed the patients chest radiographs images and report     CXR Results  (Last 48 hours)               03/01/18 0609  XR CHEST PORT Final result    Impression:  IMPRESSION:   1. Tubes and catheters are unchanged. 2. Bilateral basilar hazy opacities appear unchanged. Narrative:  EXAM:  XR CHEST PORT       INDICATION: Intubated, Respiratory failure       COMPARISON: None. FINDINGS: AP and lateral radiographs of the chest was obtained. Endotracheal tube, NG tube, right IJ dialysis catheter unchanged. Cardiac silhouette is borderline in size. There is retrocardiac opacity that is unchanged. Right lung base appear hazy. Costophrenic angles are sharp. Results from Hospital Encounter encounter on 02/25/18   XR CHEST PORT   Narrative EXAM: Chest X-Ray    INDICATION: Evaluate OG tube placement    TECHNIQUE: AP view of the chest    COMPARISON: 2/27/2018, 2/26/2018, 2/25/2018    FINDINGS:   Tubes and Lines: The patient is intubated. It is 5 cm above the miquel. An  enteric tube is present with the tip below the field of view. A dialysis  catheter which is unchanged. Pleura: No pneumothorax appreciated. No effusions appreciated. Lungs:  Bibasilar opacities are noted similar to prior imaging. Cardiac silhouette: It is unremarkable    Pulmonary Vascularity: The pulmonary vasculature is unremarkable. Osseous Structures: Unremarkable         Impression IMPRESSION:  1. Tubes and lines in good position. 2.  Bibasilar opacities similar to prior imaging. Results from [place] encounter on 02/25/18   CTA CHEST W OR W WO CONT   Narrative EXAMINATION: CTA chest pulmonary    INDICATION: Acute chest pain, shortness of breath    COMPARISON: None    TECHNIQUE: CT angiography of the pulmonary arteries performed following 75 cc IV  Isovue 370, with 3-D MIP multiplanar reformations. All CT scans at this facility  are performed using dose optimization technique as appropriate to a performed  exam, to include automated exposure control, adjustment of the mA and/or kV  according to patient size (including appropriate matching first site specific  examinations), or use of iterative reconstruction technique. FINDINGS:    Cardiovascular: No pulmonary arterial filling defects identified to suggest  pulmonary embolism. Motion artifact limits evaluation of smaller segmental and  subsegmental arteries.  Ascending thoracic aorta is borderline ectatic measuring  up to 3.5 cm diameter. Heart size within normal limits. Right jugular catheter  tip at right atrium level. Moderate-sized area of cartilage fusion. Mediastinum: Possible enlarged aortopulmonary node and left hilar nodes, poorly  delineated. Left hilar anita calcification noted. Thyroid slightly  heterogeneous. Esophagus is nondistended. Pleura: Moderate volume bilateral pleural effusions. No evidence of  pneumothorax. Lungs: Probable compressive atelectasis due to pleural effusions bilaterally. Right major fissure nodule measuring 4 mm, likely pleural-based node. Upper abdomen: Unremarkable. Miscellaneous: Superficial soft tissues unremarkable. Bones: No acute osseous findings. Probable bone island in T11 posterior body. Impression IMPRESSION:    1. No pulmonary embolus identified. Smaller segmental and subsegmental pulmonary  arteries less well evaluated due to motion artifact. 2. Moderate-sized bilateral pleural effusions, and moderate volume pericardial  effusion. Borderline ectatic ascending aorta. -Probable compressive atelectasis in bilateral lungs. Superimposed infiltrate  difficult to exclude.    -Suspected left hilar and aortopulmonary window adenopathy, poorly delineated,  nonspecific. IMPRESSION:   · Language barrier- [place] speaking only  · Dysphagia- failed ST- bedside swallowing study- 3/2/18- NPO, continue with NGT  · Acute Hypoxic respiratory failure 2' to fluid overload - Extubated on 03/01/18  · Bilateral pleural effusions are improved. · Possible left hilar enlargement (reactive, volume overload?)  · Hyponatremia - 131  · ESRD (end stage renal disease) requires hemodialysis (MWF)  · NSTEMI (non-ST elevated myocardial infarction)- off IV heparin due to epistaxis  · Mildly elevated Transimines  · Hypertensive Urgency- resolved  · Hypertension- improved  · + MRSA screen- Nares:  On contact isolation- no signs of active infection at this time  · ICU psychosis- 3/1/18  · Chronic depression/anxiety? ?     RECOMMENDATIONS:   Resp: Continue Supplemental O2 NC as needed to keep goal saturations greater than 92%  Bronchodilators PRN, pulmonary hygiene care, Aspiration precautions, Keep HOB >30 degrees  D/C'd steroids  ID: Aleukocytosis and afebile. Blood cx NGTD, d/c Vancomycin. Heme/Onc: Aleukocytosis, H/H, and platelets are stable, trend CBC. CVS: B/P home medications restarted, Cardene gtt/Hydralazine PRN for uncontrolled hypertension, Trend cardiac panel, NSTEMI - heparin gtt stopped for epistaxis - Cardiology is following. Renal: Anuric, Dialysis patient, Right chest wall HD catheter, Nephrology following Dialysis per their recommendations. Trend Renal indices. GI: SUP, Zofran PRN for N/V,   Metabolic: Glycemic control - SSI PRN, Replace E-lytes per nephrology  Neuro/Sedation/Pain: PRN pain medications   Stress ulcer prophylaxis: Pepcid po  DVT prophylaxis: Heparin SC started  AM labs: Daily CBC BMP Mag and Phos   Diet: NGT with tube feeds per nutritionist recommendations. Failed swallow evaluation - Speech cont to follow. Lines/Devices: ETT 02/25, PIVs  Continue Restraints - patient pulling at telemetry, lines, HD catheter. Central line bundle followed          [x]See my orders for details    My assessment, plan of care, findings, medications, side effects etc were discussed with:  [x]nursing []PT/OT    [x]respiratory therapy [x] [facility]. [place]   []family []Patient     [x]Total critical care time exclusive of procedures   minutes with complex decision making performed and > 50% time spent in face to face evaluation. Jerald Rojas [facility]     Pulmonary, Critical Care Medicine  [facility]         [employer] [facility] Staff Addendum     I have independently evaluated the patient and reviewed the patient's chart. I have discussed the findings and care plan with ICU Care Team. Case discussed on multi-D rounds.     I agree with the above evaluation, assessment and recommendations along with the following comments and observations. Patient with hallucinations overnight. Increased agitation and combative. Unclear if this is just ICU psychosis or if there is an underlying psychological component. I have spoken with son-in-law and he states that the patient is normally happy at home. Started on low-dose Haldol and weaning Precedex drip. Patient failed Speech Therapy swallowing evaluation. NPO for now; continue with NGT for now. Pending clinical course may be able to transfer out of ICU within the next 12-24 hours.        Clinical Care and time spent coordinating care, minus procedure time: 40 min    Mili Portillo DO, FCCP  Pulmonary, Sleep and Critical Care Medicine  2:45 PM Equal and normal pulses (carotid, femoral, dorsalis pedis)

## 2018-12-17 NOTE — PROGRESS NOTE ADULT - SUBJECTIVE AND OBJECTIVE BOX
seen in the acute rehab setting     no interval co overnight     no fc     no nv no abdo pain     ros all others are negative     Vital Signs Last 24 Hrs  T(C): 36.7 (17 Dec 2018 08:45), Max: 36.7 (16 Dec 2018 20:52)  T(F): 98 (17 Dec 2018 08:45), Max: 98.1 (16 Dec 2018 20:52)  HR: 61 (17 Dec 2018 08:45) (61 - 94)  BP: 123/75 (17 Dec 2018 08:45) (123/75 - 138/87)  BP(mean): --  RR: 14 (17 Dec 2018 08:45) (14 - 14)  SpO2: 97% (17 Dec 2018 08:45) (97% - 98%)    MEDICATIONS  (STANDING):  acetaminophen   Tablet .. 650 milliGRAM(s) Oral every 6 hours  AQUAPHOR (petrolatum Ointment) 1 Application(s) Topical two times a day  ascorbic acid 500 milliGRAM(s) Oral two times a day  aspirin  chewable 81 milliGRAM(s) Oral daily  atorvastatin 80 milliGRAM(s) Oral at bedtime  chlorhexidine 0.12% Liquid 15 milliLiter(s) Oral Mucosa two times a day  clopidogrel Tablet 75 milliGRAM(s) Oral daily  dextrose 5%. 1000 milliLiter(s) (50 mL/Hr) IV Continuous <Continuous>  docusate sodium 100 milliGRAM(s) Oral three times a day  enoxaparin Injectable 40 milliGRAM(s) SubCutaneous every 24 hours  FLUoxetine Solution 20 milliGRAM(s) Oral daily  gabapentin 300 milliGRAM(s) Oral at bedtime  gabapentin 300 milliGRAM(s) Oral <User Schedule>  lidocaine 5% Ointment 1 Application(s) Topical four times a day  memantine 10 milliGRAM(s) Oral two times a day  multivitamin 1 Tablet(s) Oral daily  nystatin Powder 1 Application(s) Topical two times a day  pantoprazole    Tablet 40 milliGRAM(s) Oral before breakfast  senna 2 Tablet(s) Oral at bedtime  tiZANidine 4 milliGRAM(s) Oral at bedtime  zinc sulfate 220 milliGRAM(s) Oral daily    MEDICATIONS  (PRN):  ALBUTerol    90 MICROgram(s) HFA Inhaler 2 Puff(s) Inhalation every 6 hours PRN Shortness of Breath and/or Wheezing  melatonin 3 milliGRAM(s) Oral at bedtime PRN Insomnia    CBC Full  -  ( 17 Dec 2018 05:50 )  WBC Count : 15.9 K/uL  Hemoglobin : 11.2 g/dL  Hematocrit : 36.6 %  Platelet Count - Automated : 292 K/uL  Mean Cell Volume : 77.4 fl  Mean Cell Hemoglobin : 23.6 pg  Mean Cell Hemoglobin Concentration : 30.5 gm/dL  Auto Neutrophil # : 9.7 K/uL  Auto Lymphocyte # : 4.3 K/uL  Auto Monocyte # : 1.3 K/uL  Auto Eosinophil # : 0.4 K/uL  Auto Basophil # : 0.1 K/uL  Auto Neutrophil % : 61.3 %  Auto Lymphocyte % : 27.2 %  Auto Monocyte % : 8.3 %  Auto Eosinophil % : 2.7 %  Auto Basophil % : 0.6 %    12-17    140  |  103  |  28<H>  ----------------------------<  74  3.9   |  28  |  1.02    Ca    9.3      17 Dec 2018 05:50    TPro  6.9  /  Alb  3.3  /  TBili  0.3  /  DBili  x   /  AST  18  /  ALT  40  /  AlkPhos  91  12-17

## 2018-12-17 NOTE — PROGRESS NOTE ADULT - NEUROLOGICAL DETAILS
sensation intact/alert and oriented x 3/responds to verbal commands/responds to pain/deep reflexes intact

## 2018-12-17 NOTE — PROGRESS NOTE ADULT - SUBJECTIVE AND OBJECTIVE BOX
HPI:  62y/o F w/ HTN, HLD, RA, Asthma s/p elective left transcarotid arterial sten via right femoral vein access (11/13/18) course complicated by code stroke approximately 8 hours postop. CTA head and neck showed a distal M2 branch occlusion. She was deemed not a candidate for intracranial thrombectomy due to the location of the M2 occlusion at that time. Patient monitored in SICU, found to be stable, and sent to the floor. MRI brain on 11/17 showed acute infarct in the left MCA distribution. TTE did not show any obvious structural cardiac source of embolism. On 11/20, she was noted to have fluctuating right hemiparesis with respect to right arm weakness. CT brain on 11/20 showed expected evolution of left MCA distribution infarct and CTA head and neck showed stable findings. Right sided deficits and aphasia possibly secondary to hypoperfusion of area supplied by the L M2 high grade stenosis due to fluctuating blood pressure. Patient started on midodrine to keep a systolic pressure between 140-160. Patient is currently being treated for E Coli UTI with cipro. Pt had a speech and swallow and was recommended to have a Mechanical Soft with Honey-Thick Liquids diet.  Per vascular team, patient will be discharged on midodrine and tapered down as tolerated. Pt was medically optimized for acute rehab admission on 11/28. (28 Nov 2018 15:17)      TODAY'S SUBJECTIVE & REVIEW OF SYMPTOMS  Patient seen and examined during OT.  She remains aphasic, but communicates that she is feeling well and that her toe/gout pain is improving.      [X] Constitutional WNL        [X] Cardio WNL              [X] Resp WNL  [X] GI WNL                            [X]  WNL                    [X] Heme WNL  [X] Endo WNL                       [X] Skin WNL                  [X] MSK WNL  [] Neuro WNL                     [X] Cognitive WNL         [] Psych WNL      PHYSICAL EXAM    Vital Signs Last 24 Hrs  T(C): 36.7 (17 Dec 2018 08:45), Max: 36.7 (16 Dec 2018 20:52)  T(F): 98 (17 Dec 2018 08:45), Max: 98.1 (16 Dec 2018 20:52)  HR: 61 (17 Dec 2018 08:45) (61 - 94)  BP: 123/75 (17 Dec 2018 08:45) (123/75 - 138/87)  BP(mean): --  RR: 14 (17 Dec 2018 08:45) (14 - 14)  SpO2: 97% (17 Dec 2018 08:45) (97% - 98%)    Constitutional - NAD, Comfortable  	HEENT - NCAT, EOMI  	Neck - Supple,   	Chest - CTA bilaterally, No wheeze, No rhonchi, No crackles  	Cardiovascular - RRR, S1S2, No murmurs  	Abdomen - BS+, Soft, NTND  	Extremities - No C/C, No calf tenderness, no edema right foot   	Neurologic Exam -                 	   Communication expressive aphasia, follows commands, Non-fluent  	   Cranial Nerves - + right facial droop   	   Motor -Stable exam-- RUE  2/5 EF/EE, Shoulder ext, RLE hip flex 2/5, 1/5 PF/DF 2/2 to pain ; LUE/LLE 5/5             	   Sensory - impaired on right  Psychiatric - Mood stable, Affect WNL    MSK --+TTP to bilat foot bunion --worse on right.  erythema and swelling improved significantly from last week        Constitutional - NAD, Comfortable  HEENT - NCAT/EOMI  Chest - CTAB, no w/r/r  Cardiovascular - RRR, S1S2, no m/r/g  Abdomen - BS+, Soft, NTND  Extremities - No C/C/E, No calf tenderness, no edema/erythema to feet/toes.  Able to tolerate light touch to b/l toes feet.    Neurologic Exam -                    Communication - expressive aphasia. able to follow commands.  communicates through nodding/hand gestures.      Motor -                    LEFT    UE - ShAB 5/5, EF 5/5, EE 5/5, WE 5/5,  5/5                    RIGHT UE - ShAD 1/5, EF 1/5, EE 1/5, WE 1/5,  1/5                    LEFT    LE - HF 5/5, KE 5/5, DF 5/5, PF 5/5                    RIGHT LE - HF 2/5, KF 4/5, DF 3/5, PF 4/5        Sensory - decreased on right   Psychiatric - Mood stable, Affect WNL      RECENT LABS/IMAGING                        11.2   15.9  )-----------( 292      ( 17 Dec 2018 05:50 )             36.6     12-17    140  |  103  |  28<H>  ----------------------------<  74  3.9   |  28  |  1.02    Ca    9.3      17 Dec 2018 05:50    TPro  6.9  /  Alb  3.3  /  TBili  0.3  /  DBili  x   /  AST  18  /  ALT  40  /  AlkPhos  91  12-17          ASSESSMENT/PLAN  RUPINDER TRIPP is a 64yo Female with  Left MCA distribution stroke in the periprocedural period after carotid revascularization with carotid artery stent placement  now with decreased functional mobility, dysphagia, non-fluent aphasia, right hemiparesis, gait instability and ADL impairments.    COMORBIDITES/ACTIVE MEDICAL ISSUES     #s/p left MCA CVA  -continue Comprehensive Rehab Program of PT/OT/SLP  -ASA/Plavix/Lipitor  -Fluoxetine for motor recovery  -c/w memantine    #Bilat. 1st Toe Gout Flare  -improving  - c/w steroid taper, monitor accuchecks   -podiatry c/s appreciated  -uric acid 8.3 (12/6) elevated    #Hypotension  -improving, no longer requiring midodrine  -monitor BP    #UTI  -s/p course of cipro (completed 12/3)  -blood cx -no growth  -CBC 12/17 stable  -monitor vitals and symptoms    #MARLY  -BMP stable 12/17    #Dysphagia  -continue SLP  -On Dysphagia 3, soft/thin lqs     #Nutrition: ensure TID, discussed with RD     #Neuropathic pain to bilat feet and right hand   -salvador 300mg qHS, 300mg q1300     #Insomnia  -monitor sleep logs, melatonin 3mg prn    Gait Instability, ADL impairments and Functional impairments:  Comprehensive Rehab Program of PT/OT     GI/Bowel Mgmt - Colace, Senna, Toileting program  /Bladder Mgmt - toileting program      Precautions / PROPHYLAXIS:   - Falls, Cardiac  - Lungs: Aspiration  - Pressure injury/Skin: Turn Q2hrs while in bed, OOB to Chair, PT/OT    - DVT: Lovenox, SCDs, TEDs     IDT Meeting 12/11 - Barriers to discharge include decreased right sided coordination, toe pain, decreased balance, right sided weakness, expressive aphasia, and decreased endurance. Currently, patient functional status is setup for eating, min assist for UB, mod assist for grooming, max assist for shower xfer, and mod A 4 steps.  ADRIANO - 12/19 [facility] HPI:  62y/o F w/ HTN, HLD, RA, Asthma s/p elective left transcarotid arterial sten via right femoral vein access (11/13/18) course complicated by code stroke approximately 8 hours postop. CTA head and neck showed a distal M2 branch occlusion. She was deemed not a candidate for intracranial thrombectomy due to the location of the M2 occlusion at that time. Patient monitored in SICU, found to be stable, and sent to the floor. MRI brain on 11/17 showed acute infarct in the left MCA distribution. TTE did not show any obvious structural cardiac source of embolism. On 11/20, she was noted to have fluctuating right hemiparesis with respect to right arm weakness. CT brain on 11/20 showed expected evolution of left MCA distribution infarct and CTA head and neck showed stable findings. Right sided deficits and aphasia possibly secondary to hypoperfusion of area supplied by the L M2 high grade stenosis due to fluctuating blood pressure. Patient started on midodrine to keep a systolic pressure between 140-160. Patient is currently being treated for E Coli UTI with cipro. Pt had a speech and swallow and was recommended to have a Mechanical Soft with Honey-Thick Liquids diet.  Per vascular team, patient will be discharged on midodrine and tapered down as tolerated. Pt was medically optimized for acute rehab admission on 11/28. (28 Nov 2018 15:17)      TODAY'S SUBJECTIVE & REVIEW OF SYMPTOMS  Patient seen and examined during OT.  She remains aphasic, but communicates that she is feeling well and that her toe/gout pain is improving.      [X] Constitutional WNL        [X] Cardio WNL              [X] Resp WNL  [X] GI WNL                            [X]  WNL                    [X] Heme WNL  [X] Endo WNL                       [X] Skin WNL                  [X] MSK WNL  [] Neuro WNL                     [X] Cognitive WNL         [] Psych WNL      PHYSICAL EXAM    Vital Signs Last 24 Hrs  T(C): 36.7 (17 Dec 2018 08:45), Max: 36.7 (16 Dec 2018 20:52)  T(F): 98 (17 Dec 2018 08:45), Max: 98.1 (16 Dec 2018 20:52)  HR: 61 (17 Dec 2018 08:45) (61 - 94)  BP: 123/75 (17 Dec 2018 08:45) (123/75 - 138/87)  BP(mean): --  RR: 14 (17 Dec 2018 08:45) (14 - 14)  SpO2: 97% (17 Dec 2018 08:45) (97% - 98%)    Constitutional - NAD, Comfortable  	HEENT - NCAT, EOMI  	Neck - Supple,   	Chest - CTA bilaterally, No wheeze, No rhonchi, No crackles  	Cardiovascular - RRR, S1S2, No murmurs  	Abdomen - BS+, Soft, NTND  	Extremities - No C/C, No calf tenderness, no edema right foot   	Neurologic Exam -                 	   Communication expressive aphasia, follows commands, Non-fluent  	   Cranial Nerves - + right facial droop   	   Motor -Stable exam-- RUE  2/5 EF/EE, Shoulder ext, RLE hip flex 2/5, 1/5 PF/DF 2/2 to pain ; LUE/LLE 5/5             	   Sensory - impaired on right  Psychiatric - Mood stable, Affect WNL    MSK --+TTP to bilat foot bunion --worse on right.  erythema and swelling improved significantly from last week        Constitutional - NAD, Comfortable  HEENT - NCAT/EOMI  Chest - CTAB, no w/r/r  Cardiovascular - RRR, S1S2, no m/r/g  Abdomen - BS+, Soft, NTND  Extremities - No C/C/E, No calf tenderness, no edema/erythema to feet/toes.  Able to tolerate light touch to b/l toes feet.  Notes TTP over right elbow and shoulder and pain with passive ROM.   Neurologic Exam -                    Communication - expressive aphasia. able to follow commands.  communicates through nodding/hand gestures.      Motor -                    LEFT    UE - ShAB 5/5, EF 5/5, EE 5/5, WE 5/5,  5/5                    RIGHT UE - ShAD 1/5, EF 1/5, EE 1/5, WE 1/5,  1/5                    LEFT    LE - HF 5/5, KE 5/5, DF 5/5, PF 5/5                    RIGHT LE - HF 2/5, KF 4/5, DF 3/5, PF 4/5        Sensory - decreased on right   Psychiatric - Mood stable, Affect WNL      RECENT LABS/IMAGING                        11.2   15.9  )-----------( 292      ( 17 Dec 2018 05:50 )             36.6     12-17    140  |  103  |  28<H>  ----------------------------<  74  3.9   |  28  |  1.02    Ca    9.3      17 Dec 2018 05:50    TPro  6.9  /  Alb  3.3  /  TBili  0.3  /  DBili  x   /  AST  18  /  ALT  40  /  AlkPhos  91  12-17          ASSESSMENT/PLAN  RUPINDER TRIPP is a 62yo Female with  Left MCA distribution stroke in the periprocedural period after carotid revascularization with carotid artery stent placement  now with decreased functional mobility, dysphagia, non-fluent aphasia, right hemiparesis, gait instability and ADL impairments.    COMORBIDITES/ACTIVE MEDICAL ISSUES     #s/p left MCA CVA  -continue Comprehensive Rehab Program of PT/OT/SLP  -ASA/Plavix/Lipitor  -Fluoxetine for motor recovery  -c/w memantine    #Bilat. 1st Toe Gout Flare  -improving  - c/w steroid taper, monitor accuchecks   -podiatry c/s appreciated  -uric acid 8.3 (12/6) elevated    #Hypotension  -improving, no longer requiring midodrine  -monitor BP    #UTI  -s/p course of cipro (completed 12/3)  -blood cx -no growth  -CBC 12/17 stable  -monitor vitals and symptoms    #MARLY  -BMP stable 12/17    #Dysphagia  -continue SLP  -On Dysphagia 3, soft/thin lqs     #Nutrition: ensure TID, discussed with RD     #Neuropathic pain to bilat feet and right hand   -salvador 300mg qHS, 300mg q1300     #Insomnia  -monitor sleep logs, melatonin 3mg prn    #Right shoulder/elbow pain  -likely MSK in etiology  -Tylenol PRN    Gait Instability, ADL impairments and Functional impairments:  Comprehensive Rehab Program of PT/OT     GI/Bowel Mgmt - Colace, Senna, Toileting program  /Bladder Mgmt - toileting program      Precautions / PROPHYLAXIS:   - Falls, Cardiac  - Lungs: Aspiration  - Pressure injury/Skin: Turn Q2hrs while in bed, OOB to Chair, PT/OT    - DVT: Lovenox, SCDs, TEDs     IDT Meeting 12/11 - Barriers to discharge include decreased right sided coordination, toe pain, decreased balance, right sided weakness, expressive aphasia, and decreased endurance. Currently, patient functional status is setup for eating, min assist for UB, mod assist for grooming, max assist for shower xfer, and mod A 4 steps.  ADRIANO - 12/19 [facility] HPI:  64y/o F w/ HTN, HLD, RA, Asthma s/p elective left transcarotid arterial sten via right femoral vein access (11/13/18) course complicated by code stroke approximately 8 hours postop. CTA head and neck showed a distal M2 branch occlusion. She was deemed not a candidate for intracranial thrombectomy due to the location of the M2 occlusion at that time. Patient monitored in SICU, found to be stable, and sent to the floor. MRI brain on 11/17 showed acute infarct in the left MCA distribution. TTE did not show any obvious structural cardiac source of embolism. On 11/20, she was noted to have fluctuating right hemiparesis with respect to right arm weakness. CT brain on 11/20 showed expected evolution of left MCA distribution infarct and CTA head and neck showed stable findings. Right sided deficits and aphasia possibly secondary to hypoperfusion of area supplied by the L M2 high grade stenosis due to fluctuating blood pressure. Patient started on midodrine to keep a systolic pressure between 140-160. Patient is currently being treated for E Coli UTI with cipro. Pt had a speech and swallow and was recommended to have a Mechanical Soft with Honey-Thick Liquids diet.  Per vascular team, patient will be discharged on midodrine and tapered down as tolerated. Pt was medically optimized for acute rehab admission on 11/28. (28 Nov 2018 15:17)      TODAY'S SUBJECTIVE & REVIEW OF SYMPTOMS  Patient seen and examined during OT.  She remains aphasic, but communicates that she is feeling well and that her toe/gout pain is improving.      [X] Constitutional WNL        [X] Cardio WNL              [X] Resp WNL  [X] GI WNL                            [X]  WNL                    [X] Heme WNL  [X] Endo WNL                       [X] Skin WNL                  [X] MSK WNL  [] Neuro WNL                     [X] Cognitive WNL         [] Psych WNL      PHYSICAL EXAM    Vital Signs Last 24 Hrs  T(C): 36.7 (17 Dec 2018 08:45), Max: 36.7 (16 Dec 2018 20:52)  T(F): 98 (17 Dec 2018 08:45), Max: 98.1 (16 Dec 2018 20:52)  HR: 61 (17 Dec 2018 08:45) (61 - 94)  BP: 123/75 (17 Dec 2018 08:45) (123/75 - 138/87)  BP(mean): --  RR: 14 (17 Dec 2018 08:45) (14 - 14)  SpO2: 97% (17 Dec 2018 08:45) (97% - 98%)        Constitutional - NAD, Comfortable  HEENT - NCAT/EOMI  Chest - CTAB, no w/r/r  Cardiovascular - RRR, S1S2, no m/r/g  Abdomen - BS+, Soft, NTND  Extremities - No C/C/E, No calf tenderness, no edema/erythema to feet/toes.  Able to tolerate light touch to b/l toes feet.  Notes TTP over right elbow and shoulder and pain with passive ROM.   Neurologic Exam -                    Communication - expressive aphasia. able to follow commands.  communicates through nodding/hand gestures.      Motor -                    LEFT    UE - ShAB 5/5, EF 5/5, EE 5/5, WE 5/5,  5/5                    RIGHT UE - ShAD 1/5, EF 1/5, EE 1/5, WE 1/5,  1/5                    LEFT    LE - HF 5/5, KE 5/5, DF 5/5, PF 5/5                    RIGHT LE - HF 2/5, KF 4/5, DF 3/5, PF 4/5        Sensory - decreased on right   Psychiatric - Mood stable, Affect WNL      RECENT LABS/IMAGING                        11.2   15.9  )-----------( 292      ( 17 Dec 2018 05:50 )             36.6     12-17    140  |  103  |  28<H>  ----------------------------<  74  3.9   |  28  |  1.02    Ca    9.3      17 Dec 2018 05:50    TPro  6.9  /  Alb  3.3  /  TBili  0.3  /  DBili  x   /  AST  18  /  ALT  40  /  AlkPhos  91  12-17          ASSESSMENT/PLAN  RUPINDER TRIPP is a 62yo Female with  Left MCA distribution stroke in the periprocedural period after carotid revascularization with carotid artery stent placement  now with decreased functional mobility, dysphagia, non-fluent aphasia, right hemiparesis, gait instability and ADL impairments.    COMORBIDITES/ACTIVE MEDICAL ISSUES     #s/p left MCA CVA  -continue Comprehensive Rehab Program of PT/OT/SLP  -ASA/Plavix/Lipitor  -Fluoxetine for motor recovery  -c/w memantine    #Bilat. 1st Toe Gout Flare  -improving  - c/w steroid taper, monitor accuchecks   -podiatry c/s appreciated  -uric acid 8.3 (12/6) elevated    #Hypotension  -improving, no longer requiring midodrine  -monitor BP    #UTI  -s/p course of cipro (completed 12/3)  -blood cx -no growth  -CBC 12/17 stable  -monitor vitals and symptoms    #MARLY  -BMP stable 12/17    #Dysphagia  -continue SLP  -On Dysphagia 3, soft/thin lqs     #Nutrition: ensure TID, discussed with RD     #Neuropathic pain to bilat feet and right hand   -salvador 300mg qHS, 300mg q1300     #Insomnia  -monitor sleep logs, melatonin 3mg prn    #Right shoulder/elbow pain  -likely MSK in etiology  -Tylenol PRN    Gait Instability, ADL impairments and Functional impairments:  Comprehensive Rehab Program of PT/OT     GI/Bowel Mgmt - Colace, Senna, Toileting program  /Bladder Mgmt - toileting program      Precautions / PROPHYLAXIS:   - Falls, Cardiac  - Lungs: Aspiration  - Pressure injury/Skin: Turn Q2hrs while in bed, OOB to Chair, PT/OT    - DVT: Lovenox, SCDs, TEDs     IDT Meeting 12/11 - Barriers to discharge include decreased right sided coordination, toe pain, decreased balance, right sided weakness, expressive aphasia, and decreased endurance. Currently, patient functional status is setup for eating, min assist for UB, mod assist for grooming, max assist for shower xfer, and mod A 4 steps.  ADRIANO - 12/19 [facility]

## 2018-12-18 ENCOUNTER — TRANSCRIPTION ENCOUNTER (OUTPATIENT)
Age: 63
End: 2018-12-18

## 2018-12-18 DIAGNOSIS — I63.9 CEREBRAL INFARCTION, UNSPECIFIED: ICD-10-CM

## 2018-12-18 DIAGNOSIS — I95.9 HYPOTENSION, UNSPECIFIED: ICD-10-CM

## 2018-12-18 DIAGNOSIS — N17.9 ACUTE KIDNEY FAILURE, UNSPECIFIED: ICD-10-CM

## 2018-12-18 DIAGNOSIS — T56.0X1D TOXIC EFFECT OF LEAD AND ITS COMPOUNDS, ACCIDENTAL (UNINTENTIONAL), SUBSEQUENT ENCOUNTER: ICD-10-CM

## 2018-12-18 DIAGNOSIS — R47.02 DYSPHASIA: ICD-10-CM

## 2018-12-18 PROCEDURE — 99232 SBSQ HOSP IP/OBS MODERATE 35: CPT

## 2018-12-18 PROCEDURE — 99233 SBSQ HOSP IP/OBS HIGH 50: CPT

## 2018-12-18 RX ORDER — CHLORHEXIDINE GLUCONATE 213 G/1000ML
15 SOLUTION TOPICAL
Qty: 0 | Refills: 0 | COMMUNITY
Start: 2018-12-18

## 2018-12-18 RX ORDER — NYSTATIN CREAM 100000 [USP'U]/G
1 CREAM TOPICAL
Qty: 0 | Refills: 0 | COMMUNITY
Start: 2018-12-18

## 2018-12-18 RX ORDER — ALBUTEROL 90 UG/1
2 AEROSOL, METERED ORAL
Qty: 0 | Refills: 0 | COMMUNITY

## 2018-12-18 RX ORDER — ALBUTEROL 90 UG/1
2 AEROSOL, METERED ORAL
Qty: 0 | Refills: 0 | COMMUNITY
Start: 2018-12-18

## 2018-12-18 RX ORDER — GABAPENTIN 400 MG/1
1 CAPSULE ORAL
Qty: 0 | Refills: 0 | COMMUNITY
Start: 2018-12-18

## 2018-12-18 RX ORDER — FOLIC ACID 0.8 MG
1 TABLET ORAL
Qty: 0 | Refills: 0 | COMMUNITY

## 2018-12-18 RX ORDER — ZINC SULFATE TAB 220 MG (50 MG ZINC EQUIVALENT) 220 (50 ZN) MG
1 TAB ORAL
Qty: 0 | Refills: 0 | COMMUNITY
Start: 2018-12-18

## 2018-12-18 RX ORDER — ATORVASTATIN CALCIUM 80 MG/1
2 TABLET, FILM COATED ORAL
Qty: 0 | Refills: 0 | COMMUNITY

## 2018-12-18 RX ORDER — ACETAMINOPHEN 500 MG
2 TABLET ORAL
Qty: 0 | Refills: 0 | COMMUNITY

## 2018-12-18 RX ORDER — SENNA PLUS 8.6 MG/1
2 TABLET ORAL
Qty: 0 | Refills: 0 | COMMUNITY
Start: 2018-12-18

## 2018-12-18 RX ORDER — TIZANIDINE 4 MG/1
1 TABLET ORAL
Qty: 0 | Refills: 0 | COMMUNITY
Start: 2018-12-18

## 2018-12-18 RX ORDER — CLOPIDOGREL BISULFATE 75 MG/1
1 TABLET, FILM COATED ORAL
Qty: 0 | Refills: 0 | COMMUNITY
Start: 2018-12-18

## 2018-12-18 RX ORDER — DOCUSATE SODIUM 100 MG
1 CAPSULE ORAL
Qty: 0 | Refills: 0 | COMMUNITY
Start: 2018-12-18

## 2018-12-18 RX ORDER — LANOLIN ALCOHOL/MO/W.PET/CERES
1 CREAM (GRAM) TOPICAL
Qty: 0 | Refills: 0 | COMMUNITY
Start: 2018-12-18

## 2018-12-18 RX ORDER — ATORVASTATIN CALCIUM 80 MG/1
1 TABLET, FILM COATED ORAL
Qty: 0 | Refills: 0 | COMMUNITY
Start: 2018-12-18

## 2018-12-18 RX ORDER — PETROLATUM,WHITE
1 JELLY (GRAM) TOPICAL
Qty: 0 | Refills: 0 | COMMUNITY
Start: 2018-12-18

## 2018-12-18 RX ORDER — ASPIRIN/CALCIUM CARB/MAGNESIUM 324 MG
1 TABLET ORAL
Qty: 0 | Refills: 0 | COMMUNITY
Start: 2018-12-18

## 2018-12-18 RX ORDER — CLOPIDOGREL BISULFATE 75 MG/1
1 TABLET, FILM COATED ORAL
Qty: 0 | Refills: 0 | COMMUNITY

## 2018-12-18 RX ORDER — PANTOPRAZOLE SODIUM 20 MG/1
1 TABLET, DELAYED RELEASE ORAL
Qty: 0 | Refills: 0 | COMMUNITY
Start: 2018-12-18

## 2018-12-18 RX ORDER — ASCORBIC ACID 60 MG
1 TABLET,CHEWABLE ORAL
Qty: 0 | Refills: 0 | COMMUNITY
Start: 2018-12-18

## 2018-12-18 RX ORDER — MEMANTINE HYDROCHLORIDE 10 MG/1
1 TABLET ORAL
Qty: 0 | Refills: 0 | COMMUNITY
Start: 2018-12-18

## 2018-12-18 RX ADMIN — Medication 650 MILLIGRAM(S): at 14:24

## 2018-12-18 RX ADMIN — Medication 650 MILLIGRAM(S): at 06:07

## 2018-12-18 RX ADMIN — ENOXAPARIN SODIUM 40 MILLIGRAM(S): 100 INJECTION SUBCUTANEOUS at 21:13

## 2018-12-18 RX ADMIN — Medication 81 MILLIGRAM(S): at 12:09

## 2018-12-18 RX ADMIN — PANTOPRAZOLE SODIUM 40 MILLIGRAM(S): 20 TABLET, DELAYED RELEASE ORAL at 06:08

## 2018-12-18 RX ADMIN — ATORVASTATIN CALCIUM 80 MILLIGRAM(S): 80 TABLET, FILM COATED ORAL at 21:13

## 2018-12-18 RX ADMIN — Medication 500 MILLIGRAM(S): at 06:08

## 2018-12-18 RX ADMIN — LIDOCAINE 1 APPLICATION(S): 4 CREAM TOPICAL at 12:09

## 2018-12-18 RX ADMIN — Medication 650 MILLIGRAM(S): at 19:11

## 2018-12-18 RX ADMIN — NYSTATIN CREAM 1 APPLICATION(S): 100000 CREAM TOPICAL at 06:08

## 2018-12-18 RX ADMIN — Medication 650 MILLIGRAM(S): at 17:37

## 2018-12-18 RX ADMIN — NYSTATIN CREAM 1 APPLICATION(S): 100000 CREAM TOPICAL at 17:37

## 2018-12-18 RX ADMIN — CHLORHEXIDINE GLUCONATE 15 MILLILITER(S): 213 SOLUTION TOPICAL at 17:36

## 2018-12-18 RX ADMIN — GABAPENTIN 300 MILLIGRAM(S): 400 CAPSULE ORAL at 14:26

## 2018-12-18 RX ADMIN — Medication 1 APPLICATION(S): at 06:03

## 2018-12-18 RX ADMIN — ZINC SULFATE TAB 220 MG (50 MG ZINC EQUIVALENT) 220 MILLIGRAM(S): 220 (50 ZN) TAB at 12:09

## 2018-12-18 RX ADMIN — Medication 1 TABLET(S): at 12:09

## 2018-12-18 RX ADMIN — Medication 650 MILLIGRAM(S): at 06:44

## 2018-12-18 RX ADMIN — GABAPENTIN 300 MILLIGRAM(S): 400 CAPSULE ORAL at 21:13

## 2018-12-18 RX ADMIN — Medication 20 MILLIGRAM(S): at 12:09

## 2018-12-18 RX ADMIN — LIDOCAINE 1 APPLICATION(S): 4 CREAM TOPICAL at 06:08

## 2018-12-18 RX ADMIN — Medication 1 APPLICATION(S): at 17:37

## 2018-12-18 RX ADMIN — TIZANIDINE 4 MILLIGRAM(S): 4 TABLET ORAL at 21:13

## 2018-12-18 RX ADMIN — CHLORHEXIDINE GLUCONATE 15 MILLILITER(S): 213 SOLUTION TOPICAL at 06:08

## 2018-12-18 RX ADMIN — Medication 650 MILLIGRAM(S): at 14:26

## 2018-12-18 RX ADMIN — LIDOCAINE 1 APPLICATION(S): 4 CREAM TOPICAL at 17:36

## 2018-12-18 RX ADMIN — MEMANTINE HYDROCHLORIDE 10 MILLIGRAM(S): 10 TABLET ORAL at 06:08

## 2018-12-18 RX ADMIN — CLOPIDOGREL BISULFATE 75 MILLIGRAM(S): 75 TABLET, FILM COATED ORAL at 12:09

## 2018-12-18 RX ADMIN — Medication 100 MILLIGRAM(S): at 06:08

## 2018-12-18 RX ADMIN — Medication 10 MILLIGRAM(S): at 06:08

## 2018-12-18 RX ADMIN — MEMANTINE HYDROCHLORIDE 10 MILLIGRAM(S): 10 TABLET ORAL at 17:37

## 2018-12-18 RX ADMIN — Medication 500 MILLIGRAM(S): at 17:36

## 2018-12-18 NOTE — DISCHARGE NOTE ADULT - CARE PROVIDERS DIRECT ADDRESSES
[email or web address]

## 2018-12-18 NOTE — DISCHARGE NOTE ADULT - MEDICATION SUMMARY - MEDICATIONS TO STOP TAKING
I will STOP taking the medications listed below when I get home from the hospital:    folic acid 1 mg oral tablet  -- 1 tab(s) by mouth once a day    midodrine 10 mg oral tablet  -- 2 tab(s) by mouth every 8 hours

## 2018-12-18 NOTE — DISCHARGE NOTE ADULT - MEDICATION SUMMARY - MEDICATIONS TO TAKE
I will START or STAY ON the medications listed below when I get home from the hospital:    aspirin 81 mg oral tablet, chewable  -- 1 tab(s) by mouth once a day  -- Indication: For stroke prevention    gabapentin 300 mg oral capsule  -- 1 cap(s) by mouth once a day (at bedtime)  -- Indication: For neuropathic pain    gabapentin 300 mg oral capsule  -- 1 cap(s) by mouth once a day at 1300  -- Indication: For neuropathic pain    FLUoxetine 20 mg/5 mL oral solution  -- 5 milliliter(s) by mouth once a day  -- Indication: For Arousal    atorvastatin 80 mg oral tablet  -- 1 tab(s) by mouth once a day (at bedtime)  -- Indication: For stroke prevention    clopidogrel 75 mg oral tablet  -- 1 tab(s) by mouth once a day  -- Indication: For stroke prevention    chlorhexidine 0.12% mucous membrane liquid  -- 15 milliliter(s) mucous membrane 2 times a day  -- Indication: For oral hygiene    albuterol 90 mcg/inh inhalation aerosol  -- 2 puff(s) inhaled every 6 hours, As needed, Shortness of Breath and/or Wheezing  -- Indication: For wheezing/cough    petrolatum topical ointment  -- 1 application on skin 2 times a day  -- Indication: For Dry skin    nystatin 100,000 units/g topical powder  -- 1 application on skin 2 times a day  -- Indication: For rash    docusate sodium 100 mg oral capsule  -- 1 cap(s) by mouth 3 times a day  -- Indication: For Constipation, as needed    senna oral tablet  -- 2 tab(s) by mouth once a day (at bedtime)  -- Indication: For Constipation, as needed    zinc sulfate 220 mg oral capsule  -- 1 cap(s) by mouth once a day  -- Indication: For vitamin    memantine 10 mg oral tablet  -- 1 tab(s) by mouth 2 times a day  -- Indication: For memory/arousal    tiZANidine 4 mg oral tablet  -- 1 tab(s) by mouth once a day (at bedtime)  -- Indication: For muscle spasm    melatonin 3 mg oral tablet  -- 1 tab(s) by mouth once a day (at bedtime), As needed, Insomnia  -- Indication: For sleep, as needed    pantoprazole 40 mg oral delayed release tablet  -- 1 tab(s) by mouth once a day (before a meal)  -- Indication: For reflux    Multiple Vitamins oral tablet  -- 1 tab(s) by mouth once a day  -- Indication: For vitamin    ascorbic acid 500 mg oral tablet  -- 1 tab(s) by mouth 2 times a day  -- Indication: For vitamin

## 2018-12-18 NOTE — DISCHARGE NOTE ADULT - CARE PROVIDER_API CALL
Nixon Loya), Neurology; Vascular Neurology  [address]  [place] [address]  Phone: [phone]  Fax: [phone]    Nanette Pena (), Brain Injury Medicine; PhysicalRehab Medicine  [address] [place] [address]  Phone: [phone]  Fax: [phone]

## 2018-12-18 NOTE — PROGRESS NOTE ADULT - ATTENDING COMMENTS
Pt. seen with resident.  Agree with documentation above as per resident with amendments made as appropriate. Patient medically stable. Making progress towards rehab goals.     Gout resolving--completing steroid taper     No facial swelling noted.    d/c planning to [facility] tomorrow.
Nedra Paz MD
Pt. seen with resident.  Agree with documentation above as per resident with amendments made as appropriate. Patient medically stable. Making progress towards rehab goals.       increase neurontin for neuropathic pain.
Pt. seen with NP.  Agree with documentation above as per NP with amendments made as appropriate. Patient medically stable. Making progress towards rehab goals.     Limited improvement in pain today after neurontin last night.  Will monitor for another day --if neuropathic pain not improving tomorrow will increase neurontin.
Pt. seen with resident.  Agree with documentation above as per resident with amendments made as appropriate. Patient medically stable. Making progress towards rehab goals.     Improving neuropathic pain--cont. neurontin.  Sleeping well at night.
Pt. seen with resident.  Agree with documentation above as per resident with amendments made as appropriate. Patient medically stable. Making progress towards rehab goals.     completing steroid taper,  gout resolving.      Increase namenda for aphasia
Pt. seen with resident.  Agree with documentation above as per resident with amendments made as appropriate. Patient medically stable. Making progress towards rehab goals.     pain and swelling bilat MTPs --c/w gout flare. d/w podiatry.  started on colchine.  monitor
Agree with above.  Medically stable; no longer requiring Midodrine.  Right foot pain much improved; less TTP.  Labs noted; improved WBC.  F/U labs on 12/17.  Continue comprehensive rehab; making functional gains
Agree with above.  Pt seen and examined.  Labs noted with leukocytosis; UA remarkable only for mod LE, 11-25 WBC and few bacteria.  CXR with min left lung density similar to CXR on 12/1.  Pt has been afebrile.  Poor oral hygiene noted; add Peridex.  Add Vit C, Zinc to boost immunity.  F/U cbc in am.  Great toe pain improved with prednisone and lidocaine ointment.  Cont comprehensive rehab
Pt. seen with resident.  Agree with documentation above as per resident . Patient medically stable. Making progress towards rehab goals.     Gout pain improving.  Tolerating therapy
Pt. seen with resident.  Agree with documentation above as per resident with amendments made as appropriate. Patient medically stable. Making progress towards rehab goals.     Gout pain improved with steroid taper.  Neuropathic pain controlled with gabapentin.     Hypotension improved.  SBP 130s-150s.  Taper midodrine to 5mg/d    Aphasia--trial namenda --start 5mg bid
Pt. seen with resident.  Agree with documentation above as per resident with amendments made as appropriate. Patient medically stable. Making progress towards rehab goals.     bilat. gout pain in 1st MTPs joints not improving with colchine.  Stopped and started Prednisone taper.  1st dose stat.      Muscle spasms---started trial of tizanidine 2mg qhs.

## 2018-12-18 NOTE — DISCHARGE NOTE ADULT - MEDICATION SUMMARY - MEDICATIONS TO CHANGE
I will SWITCH the dose or number of times a day I take the medications listed below when I get home from the hospital:    atorvastatin 40 mg oral tablet  -- 2 tab(s) by mouth once a day

## 2018-12-18 NOTE — DISCHARGE NOTE ADULT - HOSPITAL COURSE
64y/o F w/ HTN, HLD, RA, Asthma s/p elective left transcarotid arterial sten via right femoral vein access (11/13/18).  Hospital course complicated by code stroke approximately 8 hours postop. CTA head and neck showed a distal M2 branch occlusion. She was deemed not a candidate for intracranial thrombectomy due to the location of the M2 occlusion at that time. Patient monitored in SICU, found to be stable, and sent to the floor. MRI brain on 11/17 showed acute infarct in the left MCA distribution. TTE did not show any obvious structural cardiac source of embolism. On 11/20, she was noted to have fluctuating right hemiparesis with respect to right arm weakness. CT brain on 11/20 showed expected evolution of left MCA distribution infarct and CTA head and neck showed stable findings. Right sided deficits and aphasia possibly secondary to hypoperfusion of area supplied by the L M2 high grade stenosis due to fluctuating blood pressure. Patient started on midodrine to keep a systolic pressure between 140-160. Patient was treated for E Coli UTI with cipro. Pt had a speech and swallow and was recommended to have a Mechanical Soft with Honey-Thick Liquids diet.  She was evaluated by PT and OT.  Pt was medically optimized and discharged to [facility] on 11/28/2018.      Rehab course notable for gouty flare, treated with colchicine and prednisone taper.  Pt also with muscle spasms, managed with tizanidine.  Namenda for arousal.  Patient participated in daily therapies and made good functional gains throughout admission.   She was deemed stable for discharge to [facility].

## 2018-12-18 NOTE — PROGRESS NOTE ADULT - SUBJECTIVE AND OBJECTIVE BOX
HPI:  64y/o F w/ HTN, HLD, RA, Asthma s/p elective left transcarotid arterial sten via right femoral vein access (11/13/18) course complicated by code stroke approximately 8 hours postop. CTA head and neck showed a distal M2 branch occlusion. She was deemed not a candidate for intracranial thrombectomy due to the location of the M2 occlusion at that time. Patient monitored in SICU, found to be stable, and sent to the floor. MRI brain on 11/17 showed acute infarct in the left MCA distribution. TTE did not show any obvious structural cardiac source of embolism. On 11/20, she was noted to have fluctuating right hemiparesis with respect to right arm weakness. CT brain on 11/20 showed expected evolution of left MCA distribution infarct and CTA head and neck showed stable findings. Right sided deficits and aphasia possibly secondary to hypoperfusion of area supplied by the L M2 high grade stenosis due to fluctuating blood pressure. Patient started on midodrine to keep a systolic pressure between 140-160. Patient is currently being treated for E Coli UTI with cipro. Pt had a speech and swallow and was recommended to have a Mechanical Soft with Honey-Thick Liquids diet.  Per vascular team, patient will be discharged on midodrine and tapered down as tolerated. Pt was medically optimized for acute rehab admission on 11/28. (28 Nov 2018 15:17)      TODAY'S SUBJECTIVE & REVIEW OF SYMPTOMS  Patient seen and examined participating in therapy.  No acute events overnight. She remains aphasic, but communicates that her foot pain and neuropathic pain are both improving and that she slept well last night.  She has no new complaints today.  Discussed with patient that she will be discharged to [facility] tomorrow.  Patient indicated understanding and agreement.       [X] Constitutional WNL        [X] Cardio WNL              [X] Resp WNL  [X] GI WNL                            [X]  WNL                    [X] Heme WNL  [X] Endo WNL                       [X] Skin WNL                  [X] MSK WNL  [] Neuro WNL                     [X] Cognitive WNL         [] Psych WNL      PHYSICAL EXAM    Vital Signs Last 24 Hrs  T(C): 36.8 (18 Dec 2018 08:16), Max: 36.8 (18 Dec 2018 08:16)  T(F): 98.3 (18 Dec 2018 08:16), Max: 98.3 (18 Dec 2018 08:16)  HR: 68 (18 Dec 2018 08:16) (68 - 77)  BP: 153/91 (18 Dec 2018 08:16) (144/84 - 153/91)  BP(mean): --  RR: 14 (18 Dec 2018 08:16) (14 - 14)  SpO2: 99% (18 Dec 2018 08:16) (98% - 99%)      Constitutional - NAD, Comfortable  HEENT - NCAT/EOMI  Chest - CTAB, no w/r/r  Cardiovascular - RRR, S1S2, no m/r/g  Abdomen - BS+, Soft, NTND  Extremities - No C/C/E  Neurologic Exam -         Cranial Nerves - +right sided facial droop; remainder of CN grossly intact	             Communication - expressive aphasia. able to follow commands.  communicates through nodding/hand gestures.      Motor - stable, no new deficits    Psychiatric - Mood stable, Affect WNL      RECENT LABS/IMAGING                        11.2   15.9  )-----------( 292      ( 17 Dec 2018 05:50 )             36.6     12-17    140  |  103  |  28<H>  ----------------------------<  74  3.9   |  28  |  1.02    Ca    9.3      17 Dec 2018 05:50    TPro  6.9  /  Alb  3.3  /  TBili  0.3  /  DBili  x   /  AST  18  /  ALT  40  /  AlkPhos  91  12-17          ASSESSMENT/PLAN  RUPINDER TRIPP is a 64yo Female with  Left MCA distribution stroke in the periprocedural period after carotid revascularization with carotid artery stent placement  now with decreased functional mobility, dysphagia, non-fluent aphasia, right hemiparesis, gait instability and ADL impairments.    COMORBIDITES/ACTIVE MEDICAL ISSUES     #s/p left MCA CVA  -continue Comprehensive Rehab Program of PT/OT/SLP  -ASA/Plavix/Lipitor  -Fluoxetine for motor recovery  -c/w memantine  -pt's family concerned about "puffy" right cheek. pt's cheek appears stable; she has chronic right-sided facial droop due to CVA    #Bilat. 1st Toe Gout Flare  -improving  - c/w steroid taper, monitor accuchecks   -podiatry c/s appreciated  -uric acid 8.3 (12/6) elevated    #Hypotension  -improving, no longer requiring midodrine  -monitor BP    #UTI  -resolved  -s/p course of cipro (completed 12/3)  -blood cx -no growth  -CBC 12/17 stable  -monitor vitals and symptoms    #MARLY  -BMP stable 12/17    #Dysphagia  -continue SLP  -On Dysphagia 3, soft/thin lqs     #Nutrition: ensure TID, discussed with RD     #Neuropathic pain to bilat feet and right hand   -improving  -salvador 300mg qHS, 300mg q1300     #Insomnia  -monitor sleep logs, melatonin 3mg prn    #Right shoulder/elbow pain  -likely MSK in etiology  -Tylenol PRN    Gait Instability, ADL impairments and Functional impairments:  Comprehensive Rehab Program of PT/OT     GI/Bowel Mgmt - Colace, Senna, Toileting program  /Bladder Mgmt - toileting program      Precautions / PROPHYLAXIS:   - Falls, Cardiac  - Lungs: Aspiration  - Pressure injury/Skin: Turn Q2hrs while in bed, OOB to Chair, PT/OT    - DVT: Lovenox, SCDs, TEDs     IDT Meeting 12/11 - Barriers to discharge include decreased right sided coordination, toe pain, decreased balance, right sided weakness, expressive aphasia, and decreased endurance. Currently, patient functional status is setup for eating, min assist for UB, mod assist for grooming, max assist for shower xfer, and mod A 4 steps.  ADRIANO - 12/19 VANDANA

## 2018-12-18 NOTE — PROGRESS NOTE ADULT - SUBJECTIVE AND OBJECTIVE BOX
Patient is a 63y old  Female who presents with a chief complaint of s/p left MCA CVA now with functional deficits (17 Dec 2018 12:37)          Patient seen and examined at bedside.    ALLERGIES:  peanut butter (Anaphylaxis)  peanuts (Anaphylaxis; Hives)  penicillins (Anaphylaxis)  propofol (Angioedema)  shellfish (Anaphylaxis; Hives)  tomatoes (Anaphylaxis; Hives)        Vital Signs Last 24 Hrs  T(F): 98.3 (18 Dec 2018 08:16), Max: 98.3 (18 Dec 2018 08:16)  HR: 68 (18 Dec 2018 08:16) (68 - 77)  BP: 153/91 (18 Dec 2018 08:16) (144/84 - 153/91)  RR: 14 (18 Dec 2018 08:16) (14 - 14)  SpO2: 99% (18 Dec 2018 08:16) (98% - 99%)  I&O's Summary    17 Dec 2018 07:01  -  18 Dec 2018 07:00  --------------------------------------------------------  IN: 480 mL / OUT: 0 mL / NET: 480 mL      MEDICATIONS:  acetaminophen   Tablet .. 650 milliGRAM(s) Oral every 6 hours  ALBUTerol    90 MICROgram(s) HFA Inhaler 2 Puff(s) Inhalation every 6 hours PRN  AQUAPHOR (petrolatum Ointment) 1 Application(s) Topical two times a day  ascorbic acid 500 milliGRAM(s) Oral two times a day  aspirin  chewable 81 milliGRAM(s) Oral daily  atorvastatin 80 milliGRAM(s) Oral at bedtime  chlorhexidine 0.12% Liquid 15 milliLiter(s) Oral Mucosa two times a day  clopidogrel Tablet 75 milliGRAM(s) Oral daily  dextrose 5%. 1000 milliLiter(s) IV Continuous <Continuous>  docusate sodium 100 milliGRAM(s) Oral three times a day  enoxaparin Injectable 40 milliGRAM(s) SubCutaneous every 24 hours  FLUoxetine Solution 20 milliGRAM(s) Oral daily  gabapentin 300 milliGRAM(s) Oral at bedtime  gabapentin 300 milliGRAM(s) Oral <User Schedule>  lidocaine 5% Ointment 1 Application(s) Topical four times a day  melatonin 3 milliGRAM(s) Oral at bedtime PRN  memantine 10 milliGRAM(s) Oral two times a day  multivitamin 1 Tablet(s) Oral daily  nystatin Powder 1 Application(s) Topical two times a day  pantoprazole    Tablet 40 milliGRAM(s) Oral before breakfast  predniSONE   Tablet 10 milliGRAM(s) Oral daily  senna 2 Tablet(s) Oral at bedtime  tiZANidine 4 milliGRAM(s) Oral at bedtime  zinc sulfate 220 milliGRAM(s) Oral daily      PHYSICAL EXAM:  General: NAD, A/O x 3  ENT: MMM  Neck: Supple, No JVD  Lungs: Clear to auscultation bilaterally  Cardio: RRR, S1/S2, No murmurs  Abdomen: Soft, Nontender, Nondistended; Bowel sounds present  Extremities: No cyanosis, No edema    LABS:                        11.2   15.9  )-----------( 292      ( 17 Dec 2018 05:50 )             36.6     12-17    140  |  103  |  28  ----------------------------<  74  3.9   |  28  |  1.02    Ca    9.3      17 Dec 2018 05:50    TPro  6.9  /  Alb  3.3  /  TBili  0.3  /  DBili  x   /  AST  18  /  ALT  40  /  AlkPhos  91  12-17    eGFR if Non African American: 60 mL/min/1.73M2 (12-17-18 @ 05:50)  eGFR if : 69 mL/min/1.73M2 (12-17-18 @ 05:50)                    CAPILLARY BLOOD GLUCOSE        11-01 [id number] 6.0          RADIOLOGY & ADDITIONAL TESTS:    Care Discussed with Consultants/Other Providers:

## 2018-12-18 NOTE — DISCHARGE NOTE ADULT - PATIENT PORTAL LINK FT
You can access the [facility], offered by [facility], by registering with the following website: [email or web address]

## 2018-12-18 NOTE — PROGRESS NOTE ADULT - PROBLEM SELECTOR PROBLEM 2
Lead-induced gout involving toe, unspecified chronicity, unspecified laterality, subsequent encounter

## 2018-12-18 NOTE — DISCHARGE NOTE ADULT - PLAN OF CARE
s/p elective left transcarotid arterial stent Please continue medications as instructed above.  Please follow up with your surgeon, Dr. Heath in 2 weeks You were admitted to rehab because you suffered a stroke.  This was likely due to your high blood pressure and diabetes.  Please continue to take aspirin and plavix as instructed above.  Please follow up with Dr. Nixon Loya (neurology) within 1-2 weeks of discharge and Dr. Pena (rehab) within 1 month of discharge.  If you experience sudden numbness or weakness of the face, arm, or leg, especially on one side of the body, or confusion, trouble speaking or understanding, you need to return to the Emergency Room immediately.  You should also return to the ER if you experience trouble seeing in one or both eyes, trouble walking, dizziness, loss of balance or coordination, or severe headache.    For neuropathic pain, please continue medications as instructed above. Please continue medications as instructed above.  Please follow up with your surgeon, Dr. Heath in 2 weeks. You were admitted to rehab because you suffered a stroke. Please continue to take aspirin, plavix, and atorvastatin as instructed above.  Please follow up with Dr. Nixon Loya (neurology) within 1-2 weeks of discharge and Dr. Pena (rehab) within 1 month of discharge.  If you experience sudden numbness or weakness of the face, arm, or leg, especially on one side of the body, or confusion, trouble speaking or understanding, you need to return to the Emergency Room immediately.  You should also return to the ER if you experience trouble seeing in one or both eyes, trouble walking, dizziness, loss of balance or coordination, or severe headache.    For neuropathic pain, please continue medications as instructed above.

## 2018-12-18 NOTE — DISCHARGE NOTE ADULT - CARE PLAN
Principal Discharge DX:	Carotid artery stenosis  Goal:	s/p elective left transcarotid arterial stent  Assessment and plan of treatment:	Please continue medications as instructed above.  Please follow up with your surgeon, Dr. Heath in 2 weeks  Secondary Diagnosis:	Ischemic cerebrovascular accident (CVA)  Assessment and plan of treatment:	You were admitted to rehab because you suffered a stroke.  This was likely due to your high blood pressure and diabetes.  Please continue to take aspirin and plavix as instructed above.  Please follow up with Dr. Nixon Loya (neurology) within 1-2 weeks of discharge and Dr. Pena (rehab) within 1 month of discharge.  If you experience sudden numbness or weakness of the face, arm, or leg, especially on one side of the body, or confusion, trouble speaking or understanding, you need to return to the Emergency Room immediately.  You should also return to the ER if you experience trouble seeing in one or both eyes, trouble walking, dizziness, loss of balance or coordination, or severe headache.    For neuropathic pain, please continue medications as instructed above. Principal Discharge DX:	Carotid artery stenosis  Goal:	s/p elective left transcarotid arterial stent  Assessment and plan of treatment:	Please continue medications as instructed above.  Please follow up with your surgeon, Dr. Heath in 2 weeks.  Secondary Diagnosis:	Ischemic cerebrovascular accident (CVA)  Assessment and plan of treatment:	You were admitted to rehab because you suffered a stroke. Please continue to take aspirin, plavix, and atorvastatin as instructed above.  Please follow up with Dr. Nixon Loya (neurology) within 1-2 weeks of discharge and Dr. Pena (rehab) within 1 month of discharge.  If you experience sudden numbness or weakness of the face, arm, or leg, especially on one side of the body, or confusion, trouble speaking or understanding, you need to return to the Emergency Room immediately.  You should also return to the ER if you experience trouble seeing in one or both eyes, trouble walking, dizziness, loss of balance or coordination, or severe headache.    For neuropathic pain, please continue medications as instructed above.

## 2018-12-19 VITALS
HEART RATE: 72 BPM | TEMPERATURE: 98 F | SYSTOLIC BLOOD PRESSURE: 153 MMHG | OXYGEN SATURATION: 99 % | RESPIRATION RATE: 14 BRPM | DIASTOLIC BLOOD PRESSURE: 84 MMHG

## 2018-12-19 PROCEDURE — 99238 HOSP IP/OBS DSCHRG MGMT 30/<: CPT

## 2018-12-19 PROCEDURE — 99233 SBSQ HOSP IP/OBS HIGH 50: CPT

## 2018-12-19 RX ADMIN — Medication 650 MILLIGRAM(S): at 11:52

## 2018-12-19 RX ADMIN — Medication 1 TABLET(S): at 11:46

## 2018-12-19 RX ADMIN — GABAPENTIN 300 MILLIGRAM(S): 400 CAPSULE ORAL at 11:46

## 2018-12-19 RX ADMIN — CLOPIDOGREL BISULFATE 75 MILLIGRAM(S): 75 TABLET, FILM COATED ORAL at 11:46

## 2018-12-19 RX ADMIN — Medication 650 MILLIGRAM(S): at 12:32

## 2018-12-19 RX ADMIN — MEMANTINE HYDROCHLORIDE 10 MILLIGRAM(S): 10 TABLET ORAL at 05:33

## 2018-12-19 RX ADMIN — Medication 650 MILLIGRAM(S): at 05:32

## 2018-12-19 RX ADMIN — Medication 81 MILLIGRAM(S): at 11:47

## 2018-12-19 RX ADMIN — Medication 500 MILLIGRAM(S): at 05:33

## 2018-12-19 RX ADMIN — LIDOCAINE 1 APPLICATION(S): 4 CREAM TOPICAL at 05:33

## 2018-12-19 RX ADMIN — Medication 20 MILLIGRAM(S): at 11:46

## 2018-12-19 RX ADMIN — Medication 1 APPLICATION(S): at 05:32

## 2018-12-19 RX ADMIN — Medication 650 MILLIGRAM(S): at 06:44

## 2018-12-19 RX ADMIN — Medication 10 MILLIGRAM(S): at 05:33

## 2018-12-19 RX ADMIN — ZINC SULFATE TAB 220 MG (50 MG ZINC EQUIVALENT) 220 MILLIGRAM(S): 220 (50 ZN) TAB at 11:46

## 2018-12-19 RX ADMIN — PANTOPRAZOLE SODIUM 40 MILLIGRAM(S): 20 TABLET, DELAYED RELEASE ORAL at 05:33

## 2018-12-19 RX ADMIN — NYSTATIN CREAM 1 APPLICATION(S): 100000 CREAM TOPICAL at 05:32

## 2018-12-19 NOTE — PROGRESS NOTE ADULT - SUBJECTIVE AND OBJECTIVE BOX
HPI:  62y/o F w/ HTN, HLD, RA, Asthma s/p elective left transcarotid arterial sten via right femoral vein access (11/13/18) course complicated by code stroke approximately 8 hours postop. CTA head and neck showed a distal M2 branch occlusion. She was deemed not a candidate for intracranial thrombectomy due to the location of the M2 occlusion at that time. Patient monitored in SICU, found to be stable, and sent to the floor. MRI brain on 11/17 showed acute infarct in the left MCA distribution. TTE did not show any obvious structural cardiac source of embolism. On 11/20, she was noted to have fluctuating right hemiparesis with respect to right arm weakness. CT brain on 11/20 showed expected evolution of left MCA distribution infarct and CTA head and neck showed stable findings. Right sided deficits and aphasia possibly secondary to hypoperfusion of area supplied by the L M2 high grade stenosis due to fluctuating blood pressure. Patient started on midodrine to keep a systolic pressure between 140-160. Patient is currently being treated for E Coli UTI with cipro. Pt had a speech and swallow and was recommended to have a Mechanical Soft with Honey-Thick Liquids diet.  Per vascular team, patient will be discharged on midodrine and tapered down as tolerated. Pt was medically optimized for acute rehab admission on 11/28. (28 Nov 2018 15:17)      PAST MEDICAL & SURGICAL HISTORY:  Osteoporosis  Eczema: bilateral elbows  Carotid artery stenosis  Rheumatoid arthritis: tx with  methotrexate and prednisone  HLD (hyperlipidemia)  Carotid Artery Dissection  Eczema  Psoriasis  Asthma  HTN (Hypertension)  Myocardial Infarction  S/P lumbar fusion: h/o vertral fracture with cement fusion 209  Cataract Extraction Surgery  tonsillectomy: s/p Excision Right lymph node ; pt unclear      Subjective:  no new complaints, discharge today        VITALS  Vital Signs Last 24 Hrs  T(C): 36.9 (19 Dec 2018 07:45), Max: 36.9 (19 Dec 2018 07:45)  T(F): 98.4 (19 Dec 2018 07:45), Max: 98.4 (19 Dec 2018 07:45)  HR: 72 (19 Dec 2018 07:45) (72 - 82)  BP: 153/84 (19 Dec 2018 07:45) (145/80 - 153/84)  BP(mean): --  RR: 14 (19 Dec 2018 07:45) (14 - 14)  SpO2: 99% (19 Dec 2018 07:45) (97% - 99%)      PHYSICAL EXAM  Constitutional - NAD, Comfortable  HEENT - NCAT/EOMI  Chest - CTAB, no w/r/r  Cardiovascular - RRR, S1S2, no m/r/g  Abdomen - BS+, Soft, NTND  Extremities - No C/C/E  Neurologic Exam -         Cranial Nerves - +right sided facial droop; remainder of CN grossly intact	             Communication - expressive aphasia. able to follow commands.  communicates through nodding/hand gestures.      Motor - stable,    LEFT    UE - ShAB 5/5, EF 5/5, EE 5/5, WE 5/5,  5/5                    RIGHT UE - ShAB 1/5, EF 1/5, EE 1/5, WE 1/5,  1/5                    LEFT    LE - HF 5/5, KE 5/5, DF 5/5, PF 5/5                    RIGHT LE - 4/5,    Psychiatric - Mood stable, Affect WNL        RECENT LABS                  RADIOLOGY/OTHER RESULTS      MEDICATIONS  (STANDING):  acetaminophen   Tablet .. 650 milliGRAM(s) Oral every 6 hours  AQUAPHOR (petrolatum Ointment) 1 Application(s) Topical two times a day  ascorbic acid 500 milliGRAM(s) Oral two times a day  aspirin  chewable 81 milliGRAM(s) Oral daily  atorvastatin 80 milliGRAM(s) Oral at bedtime  chlorhexidine 0.12% Liquid 15 milliLiter(s) Oral Mucosa two times a day  clopidogrel Tablet 75 milliGRAM(s) Oral daily  dextrose 5%. 1000 milliLiter(s) (50 mL/Hr) IV Continuous <Continuous>  docusate sodium 100 milliGRAM(s) Oral three times a day  enoxaparin Injectable 40 milliGRAM(s) SubCutaneous every 24 hours  FLUoxetine Solution 20 milliGRAM(s) Oral daily  gabapentin 300 milliGRAM(s) Oral at bedtime  gabapentin 300 milliGRAM(s) Oral <User Schedule>  lidocaine 5% Ointment 1 Application(s) Topical four times a day  memantine 10 milliGRAM(s) Oral two times a day  multivitamin 1 Tablet(s) Oral daily  nystatin Powder 1 Application(s) Topical two times a day  pantoprazole    Tablet 40 milliGRAM(s) Oral before breakfast  senna 2 Tablet(s) Oral at bedtime  tiZANidine 4 milliGRAM(s) Oral at bedtime  zinc sulfate 220 milliGRAM(s) Oral daily    MEDICATIONS  (PRN):  ALBUTerol    90 MICROgram(s) HFA Inhaler 2 Puff(s) Inhalation every 6 hours PRN Shortness of Breath and/or Wheezing  melatonin 3 milliGRAM(s) Oral at bedtime PRN Insomnia

## 2018-12-19 NOTE — PROGRESS NOTE ADULT - ASSESSMENT
RUPINDER TRIPP is a 64yo Female with  Left MCA distribution stroke in the periprocedural period after carotid revascularization with carotid artery stent placement  now with decreased functional mobility, dysphagia, non-fluent aphasia, right hemiparesis, gait instability and ADL impairments.    medically stable for discharge to [facility]--today.     #s/p left MCA CVA  -continue Comprehensive Rehab Program of PT/OT/SLP at [facility]  -ASA/Plavix/Lipitor  -Fluoxetine for motor recovery  -c/w memantine  -    #Bilat. 1st Toe Gout Flare  -resolved  - -blood cx -no growth  -    #Dysphagia  -continue SLP  -On Dysphagia 3, soft/thin lqs         #Neuropathic pain to bilat feet and right hand   -improving  -salvador 300mg qHS, 300mg q1300     IDT Meeting 12/18 - Barriers to discharge include decreased right sided coordination, toe pain, decreased balance, right sided weakness, expressive aphasia, and decreased endurance. Currently, patient functional status is setup for eating, min assist for ADLs CG transfers, Amb. NBQC and 12 steps with min A.

## 2018-12-19 NOTE — PROGRESS NOTE ADULT - SUBJECTIVE AND OBJECTIVE BOX
Patient is a 63y old  Female who presents with a chief complaint of s/p left MCA CVA now with functional deficits (18 Dec 2018 15:51)          Patient seen and examined at bedside.    ALLERGIES:  peanut butter (Anaphylaxis)  peanuts (Anaphylaxis; Hives)  penicillins (Anaphylaxis)  propofol (Angioedema)  shellfish (Anaphylaxis; Hives)  tomatoes (Anaphylaxis; Hives)        Vital Signs Last 24 Hrs  T(F): 98.4 (19 Dec 2018 07:45), Max: 98.4 (19 Dec 2018 07:45)  HR: 72 (19 Dec 2018 07:45) (72 - 82)  BP: 153/84 (19 Dec 2018 07:45) (145/80 - 153/84)  RR: 14 (19 Dec 2018 07:45) (14 - 14)  SpO2: 99% (19 Dec 2018 07:45) (97% - 99%)  I&O's Summary    18 Dec 2018 07:01  -  19 Dec 2018 07:00  --------------------------------------------------------  IN: 600 mL / OUT: 650 mL / NET: -50 mL      MEDICATIONS:  acetaminophen   Tablet .. 650 milliGRAM(s) Oral every 6 hours  ALBUTerol    90 MICROgram(s) HFA Inhaler 2 Puff(s) Inhalation every 6 hours PRN  AQUAPHOR (petrolatum Ointment) 1 Application(s) Topical two times a day  ascorbic acid 500 milliGRAM(s) Oral two times a day  aspirin  chewable 81 milliGRAM(s) Oral daily  atorvastatin 80 milliGRAM(s) Oral at bedtime  chlorhexidine 0.12% Liquid 15 milliLiter(s) Oral Mucosa two times a day  clopidogrel Tablet 75 milliGRAM(s) Oral daily  dextrose 5%. 1000 milliLiter(s) IV Continuous <Continuous>  docusate sodium 100 milliGRAM(s) Oral three times a day  enoxaparin Injectable 40 milliGRAM(s) SubCutaneous every 24 hours  FLUoxetine Solution 20 milliGRAM(s) Oral daily  gabapentin 300 milliGRAM(s) Oral at bedtime  gabapentin 300 milliGRAM(s) Oral <User Schedule>  lidocaine 5% Ointment 1 Application(s) Topical four times a day  melatonin 3 milliGRAM(s) Oral at bedtime PRN  memantine 10 milliGRAM(s) Oral two times a day  multivitamin 1 Tablet(s) Oral daily  nystatin Powder 1 Application(s) Topical two times a day  pantoprazole    Tablet 40 milliGRAM(s) Oral before breakfast  senna 2 Tablet(s) Oral at bedtime  tiZANidine 4 milliGRAM(s) Oral at bedtime  zinc sulfate 220 milliGRAM(s) Oral daily      PHYSICAL EXAM:  General: NAD, A/O x 3  ENT: MMM  Neck: Supple, No JVD  Lungs: Clear to auscultation bilaterally  Cardio: RRR, S1/S2, No murmurs  Abdomen: Soft, Nontender, Nondistended; Bowel sounds present  Extremities: No cyanosis, No edema    LABS:                        11.2   15.9  )-----------( 292      ( 17 Dec 2018 05:50 )             36.6     12-17    140  |  103  |  28  ----------------------------<  74  3.9   |  28  |  1.02    Ca    9.3      17 Dec 2018 05:50    TPro  6.9  /  Alb  3.3  /  TBili  0.3  /  DBili  x   /  AST  18  /  ALT  40  /  AlkPhos  91  12-17    eGFR if Non African American: 60 mL/min/1.73M2 (12-17-18 @ 05:50)  eGFR if : 69 mL/min/1.73M2 (12-17-18 @ 05:50)                    CAPILLARY BLOOD GLUCOSE        11-01 [id number] 6.0          RADIOLOGY & ADDITIONAL TESTS:    Care Discussed with Consultants/Other Providers:

## 2018-12-19 NOTE — PROGRESS NOTE ADULT - PROVIDER SPECIALTY LIST ADULT
Hospitalist
Neurology
Neurology
Physiatry
Rehab Medicine
Hospitalist
Rehab Medicine
Rehab Medicine

## 2018-12-19 NOTE — PROGRESS NOTE ADULT - REASON FOR ADMISSION
s/p left MCA CVA now with functional deficits

## 2018-12-20 PROCEDURE — 87086 URINE CULTURE/COLONY COUNT: CPT

## 2018-12-20 PROCEDURE — 83550 IRON BINDING TEST: CPT

## 2018-12-20 PROCEDURE — 84100 ASSAY OF PHOSPHORUS: CPT

## 2018-12-20 PROCEDURE — 92507 TX SP LANG VOICE COMM INDIV: CPT

## 2018-12-20 PROCEDURE — 97535 SELF CARE MNGMENT TRAINING: CPT

## 2018-12-20 PROCEDURE — 97116 GAIT TRAINING THERAPY: CPT

## 2018-12-20 PROCEDURE — 84134 ASSAY OF PREALBUMIN: CPT

## 2018-12-20 PROCEDURE — 82728 ASSAY OF FERRITIN: CPT

## 2018-12-20 PROCEDURE — 73630 X-RAY EXAM OF FOOT: CPT

## 2018-12-20 PROCEDURE — 97163 PT EVAL HIGH COMPLEX 45 MIN: CPT

## 2018-12-20 PROCEDURE — 80048 BASIC METABOLIC PNL TOTAL CA: CPT

## 2018-12-20 PROCEDURE — 93970 EXTREMITY STUDY: CPT

## 2018-12-20 PROCEDURE — 83735 ASSAY OF MAGNESIUM: CPT

## 2018-12-20 PROCEDURE — 83605 ASSAY OF LACTIC ACID: CPT

## 2018-12-20 PROCEDURE — 85027 COMPLETE CBC AUTOMATED: CPT

## 2018-12-20 PROCEDURE — 97140 MANUAL THERAPY 1/> REGIONS: CPT

## 2018-12-20 PROCEDURE — 92523 SPEECH SOUND LANG COMPREHEN: CPT

## 2018-12-20 PROCEDURE — 97110 THERAPEUTIC EXERCISES: CPT

## 2018-12-20 PROCEDURE — 92526 ORAL FUNCTION THERAPY: CPT

## 2018-12-20 PROCEDURE — 81001 URINALYSIS AUTO W/SCOPE: CPT

## 2018-12-20 PROCEDURE — 97112 NEUROMUSCULAR REEDUCATION: CPT

## 2018-12-20 PROCEDURE — 82962 GLUCOSE BLOOD TEST: CPT

## 2018-12-20 PROCEDURE — 83540 ASSAY OF IRON: CPT

## 2018-12-20 PROCEDURE — 80053 COMPREHEN METABOLIC PANEL: CPT

## 2018-12-20 PROCEDURE — 97530 THERAPEUTIC ACTIVITIES: CPT

## 2018-12-20 PROCEDURE — 97124 MASSAGE THERAPY: CPT

## 2018-12-20 PROCEDURE — 71045 X-RAY EXAM CHEST 1 VIEW: CPT

## 2018-12-20 PROCEDURE — 84550 ASSAY OF BLOOD/URIC ACID: CPT

## 2018-12-20 PROCEDURE — 92610 EVALUATE SWALLOWING FUNCTION: CPT

## 2018-12-20 PROCEDURE — 87040 BLOOD CULTURE FOR BACTERIA: CPT

## 2018-12-20 PROCEDURE — 93005 ELECTROCARDIOGRAM TRACING: CPT

## 2019-01-01 DIAGNOSIS — I69.818 OTHER SYMPTOMS AND SIGNS INVOLVING COGNITIVE FUNCTIONS FOLLOWING OTHER CEREBROVASCULAR DISEASE: ICD-10-CM

## 2019-01-01 DIAGNOSIS — N39.0 URINARY TRACT INFECTION, SITE NOT SPECIFIED: ICD-10-CM

## 2019-01-01 DIAGNOSIS — G47.00 INSOMNIA, UNSPECIFIED: ICD-10-CM

## 2019-01-01 DIAGNOSIS — Z79.02 LONG TERM (CURRENT) USE OF ANTITHROMBOTICS/ANTIPLATELETS: ICD-10-CM

## 2019-01-01 DIAGNOSIS — Z79.82 LONG TERM (CURRENT) USE OF ASPIRIN: ICD-10-CM

## 2019-01-01 DIAGNOSIS — R50.9 FEVER, UNSPECIFIED: ICD-10-CM

## 2019-01-01 DIAGNOSIS — D50.9 IRON DEFICIENCY ANEMIA, UNSPECIFIED: ICD-10-CM

## 2019-01-01 DIAGNOSIS — Z48.812 ENCOUNTER FOR SURGICAL AFTERCARE FOLLOWING SURGERY ON THE CIRCULATORY SYSTEM: ICD-10-CM

## 2019-01-01 DIAGNOSIS — M81.0 AGE-RELATED OSTEOPOROSIS WITHOUT CURRENT PATHOLOGICAL FRACTURE: ICD-10-CM

## 2019-01-01 DIAGNOSIS — M25.511 PAIN IN RIGHT SHOULDER: ICD-10-CM

## 2019-01-01 DIAGNOSIS — M25.521 PAIN IN RIGHT ELBOW: ICD-10-CM

## 2019-01-01 DIAGNOSIS — M10.172: ICD-10-CM

## 2019-01-01 DIAGNOSIS — I69.891 DYSPHAGIA FOLLOWING OTHER CEREBROVASCULAR DISEASE: ICD-10-CM

## 2019-01-01 DIAGNOSIS — I69.851 HEMIPLEGIA AND HEMIPARESIS FOLLOWING OTHER CEREBROVASCULAR DISEASE AFFECTING RIGHT DOMINANT SIDE: ICD-10-CM

## 2019-01-01 DIAGNOSIS — R20.8 OTHER DISTURBANCES OF SKIN SENSATION: ICD-10-CM

## 2019-01-01 DIAGNOSIS — M06.9 RHEUMATOID ARTHRITIS, UNSPECIFIED: ICD-10-CM

## 2019-01-01 DIAGNOSIS — E78.5 HYPERLIPIDEMIA, UNSPECIFIED: ICD-10-CM

## 2019-01-01 DIAGNOSIS — D72.829 ELEVATED WHITE BLOOD CELL COUNT, UNSPECIFIED: ICD-10-CM

## 2019-01-01 DIAGNOSIS — J45.909 UNSPECIFIED ASTHMA, UNCOMPLICATED: ICD-10-CM

## 2019-01-01 DIAGNOSIS — K59.00 CONSTIPATION, UNSPECIFIED: ICD-10-CM

## 2019-01-01 DIAGNOSIS — B96.20 UNSPECIFIED ESCHERICHIA COLI [E. COLI] AS THE CAUSE OF DISEASES CLASSIFIED ELSEWHERE: ICD-10-CM

## 2019-01-01 DIAGNOSIS — M79.2 NEURALGIA AND NEURITIS, UNSPECIFIED: ICD-10-CM

## 2019-01-01 DIAGNOSIS — Z51.89 ENCOUNTER FOR OTHER SPECIFIED AFTERCARE: ICD-10-CM

## 2019-01-01 DIAGNOSIS — K21.9 GASTRO-ESOPHAGEAL REFLUX DISEASE WITHOUT ESOPHAGITIS: ICD-10-CM

## 2019-01-01 DIAGNOSIS — I69.892 FACIAL WEAKNESS FOLLOWING OTHER CEREBROVASCULAR DISEASE: ICD-10-CM

## 2019-01-01 DIAGNOSIS — T38.0X5A ADVERSE EFFECT OF GLUCOCORTICOIDS AND SYNTHETIC ANALOGUES, INITIAL ENCOUNTER: ICD-10-CM

## 2019-01-01 DIAGNOSIS — N17.9 ACUTE KIDNEY FAILURE, UNSPECIFIED: ICD-10-CM

## 2019-01-01 DIAGNOSIS — I95.9 HYPOTENSION, UNSPECIFIED: ICD-10-CM

## 2019-01-01 DIAGNOSIS — I69.820 APHASIA FOLLOWING OTHER CEREBROVASCULAR DISEASE: ICD-10-CM

## 2019-01-01 DIAGNOSIS — M62.831 MUSCLE SPASM OF CALF: ICD-10-CM

## 2019-01-01 DIAGNOSIS — R26.9 UNSPECIFIED ABNORMALITIES OF GAIT AND MOBILITY: ICD-10-CM

## 2019-01-01 DIAGNOSIS — I10 ESSENTIAL (PRIMARY) HYPERTENSION: ICD-10-CM
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
## 2019-01-01 DIAGNOSIS — B35.1 TINEA UNGUIUM: ICD-10-CM

## 2019-01-02 ENCOUNTER — APPOINTMENT (OUTPATIENT)
Dept: NEUROLOGY | Facility: CLINIC | Age: 64
End: 2019-01-02
Payer: MEDICARE

## 2019-01-02 VITALS — HEART RATE: 73 BPM | SYSTOLIC BLOOD PRESSURE: 150 MMHG | DIASTOLIC BLOOD PRESSURE: 86 MMHG

## 2019-01-02 DIAGNOSIS — I10 ESSENTIAL (PRIMARY) HYPERTENSION: ICD-10-CM

## 2019-01-02 DIAGNOSIS — E78.00 PURE HYPERCHOLESTEROLEMIA, UNSPECIFIED: ICD-10-CM

## 2019-01-02 PROCEDURE — 99215 OFFICE O/P EST HI 40 MIN: CPT

## 2019-01-02 RX ORDER — ASPIRIN 81 MG/1
81 TABLET, CHEWABLE ORAL
Refills: 0 | Status: ACTIVE | COMMUNITY

## 2019-01-02 RX ORDER — FLUOXETINE HYDROCHLORIDE 20 MG/5ML
20 SOLUTION ORAL DAILY
Refills: 0 | Status: ACTIVE | COMMUNITY

## 2019-01-02 RX ORDER — TIZANIDINE 4 MG/1
4 TABLET ORAL
Refills: 0 | Status: ACTIVE | COMMUNITY

## 2019-01-02 RX ORDER — GLUCOSAMINE HCL/CHONDROITIN SU 500-400 MG
3 CAPSULE ORAL AT BEDTIME
Refills: 0 | Status: ACTIVE | COMMUNITY

## 2019-01-02 RX ORDER — GABAPENTIN 300 MG/1
300 CAPSULE ORAL
Refills: 0 | Status: ACTIVE | COMMUNITY

## 2019-01-02 RX ORDER — IPRATROPIUM/ALBUTEROL SULFATE 0.5-3MG/3
0.5-2.5 (3) AMPUL FOR NEBULIZATION (ML) INHALATION
Refills: 0 | Status: ACTIVE | COMMUNITY

## 2019-01-02 RX ORDER — PETROLATUM 420 MG/G
OINTMENT TOPICAL
Refills: 0 | Status: ACTIVE | COMMUNITY

## 2019-01-02 RX ORDER — SENNOSIDES 8.6 MG
8.6 TABLET ORAL
Refills: 0 | Status: ACTIVE | COMMUNITY

## 2019-01-02 RX ORDER — MEMANTINE HYDROCHLORIDE 10 MG/1
10 TABLET, FILM COATED ORAL TWICE DAILY
Refills: 0 | Status: ACTIVE | COMMUNITY

## 2019-01-02 RX ORDER — OMEPRAZOLE MAGNESIUM 40 MG/1
40 CAPSULE, DELAYED RELEASE ORAL DAILY
Refills: 0 | Status: ACTIVE | COMMUNITY

## 2019-01-03 ENCOUNTER — APPOINTMENT (OUTPATIENT)
Dept: VASCULAR SURGERY | Facility: CLINIC | Age: 64
End: 2019-01-03

## 2019-01-10 ENCOUNTER — APPOINTMENT (OUTPATIENT)
Dept: PHYSICAL MEDICINE AND REHAB | Facility: CLINIC | Age: 64
End: 2019-01-10

## 2019-01-11 ENCOUNTER — APPOINTMENT (OUTPATIENT)
Dept: VASCULAR SURGERY | Facility: CLINIC | Age: 64
End: 2019-01-11
Payer: MEDICARE

## 2019-01-11 VITALS
TEMPERATURE: 98.2 F | DIASTOLIC BLOOD PRESSURE: 95 MMHG | HEART RATE: 84 BPM | BODY MASS INDEX: 29.12 KG/M2 | SYSTOLIC BLOOD PRESSURE: 152 MMHG | HEIGHT: 57 IN | WEIGHT: 135 LBS

## 2019-01-11 DIAGNOSIS — I65.22 OCCLUSION AND STENOSIS OF LEFT CAROTID ARTERY: ICD-10-CM

## 2019-01-11 DIAGNOSIS — I63.9 CEREBRAL INFARCTION, UNSPECIFIED: ICD-10-CM

## 2019-01-11 PROCEDURE — 93880 EXTRACRANIAL BILAT STUDY: CPT

## 2019-01-11 PROCEDURE — 99024 POSTOP FOLLOW-UP VISIT: CPT

## 2019-01-21 ENCOUNTER — RX RENEWAL (OUTPATIENT)
Age: 64
End: 2019-01-21

## 2019-01-21 RX ORDER — ATORVASTATIN CALCIUM 40 MG/1
40 TABLET, FILM COATED ORAL
Qty: 90 | Refills: 3 | Status: ACTIVE | COMMUNITY
Start: 2018-10-09 | End: 1900-01-01

## 2019-01-24 ENCOUNTER — APPOINTMENT (OUTPATIENT)
Dept: PULMONOLOGY | Facility: CLINIC | Age: 64
End: 2019-01-24

## 2019-02-12 RX ORDER — TIZANIDINE 4 MG/1
1 TABLET ORAL
Qty: 0 | Refills: 0 | COMMUNITY
Start: 2019-02-12

## 2019-02-18 ENCOUNTER — INPATIENT (INPATIENT)
Facility: HOSPITAL | Age: 64
LOS: 7 days | Discharge: NURSING, REHAB OR LONG-TERM CARE | DRG: 689 | End: 2019-02-26
Attending: INTERNAL MEDICINE | Admitting: HOSPITALIST
Payer: COMMERCIAL

## 2019-02-18 VITALS
DIASTOLIC BLOOD PRESSURE: 104 MMHG | HEART RATE: 102 BPM | SYSTOLIC BLOOD PRESSURE: 144 MMHG | TEMPERATURE: 98 F | OXYGEN SATURATION: 100 % | RESPIRATION RATE: 17 BRPM

## 2019-02-18 DIAGNOSIS — I63.9 CEREBRAL INFARCTION, UNSPECIFIED: ICD-10-CM

## 2019-02-18 DIAGNOSIS — I10 ESSENTIAL (PRIMARY) HYPERTENSION: ICD-10-CM

## 2019-02-18 DIAGNOSIS — G93.40 ENCEPHALOPATHY, UNSPECIFIED: ICD-10-CM

## 2019-02-18 DIAGNOSIS — Z98.1 ARTHRODESIS STATUS: Chronic | ICD-10-CM

## 2019-02-18 DIAGNOSIS — R53.83 OTHER FATIGUE: ICD-10-CM

## 2019-02-18 DIAGNOSIS — J45.909 UNSPECIFIED ASTHMA, UNCOMPLICATED: ICD-10-CM

## 2019-02-18 DIAGNOSIS — N39.0 URINARY TRACT INFECTION, SITE NOT SPECIFIED: ICD-10-CM

## 2019-02-18 DIAGNOSIS — L40.9 PSORIASIS, UNSPECIFIED: ICD-10-CM

## 2019-02-18 DIAGNOSIS — Z29.9 ENCOUNTER FOR PROPHYLACTIC MEASURES, UNSPECIFIED: ICD-10-CM

## 2019-02-18 DIAGNOSIS — M06.9 RHEUMATOID ARTHRITIS, UNSPECIFIED: ICD-10-CM

## 2019-02-18 LAB
ALBUMIN SERPL ELPH-MCNC: 3.9 G/DL — SIGNIFICANT CHANGE UP (ref 3.3–5)
ALP SERPL-CCNC: 135 U/L — HIGH (ref 40–120)
ALT FLD-CCNC: 36 U/L — SIGNIFICANT CHANGE UP (ref 10–45)
ANION GAP SERPL CALC-SCNC: 15 MMOL/L — SIGNIFICANT CHANGE UP (ref 5–17)
ANISOCYTOSIS BLD QL: SLIGHT — SIGNIFICANT CHANGE UP
APPEARANCE UR: ABNORMAL
APTT BLD: 26.7 SEC — LOW (ref 27.5–36.3)
AST SERPL-CCNC: 37 U/L — SIGNIFICANT CHANGE UP (ref 10–40)
BACTERIA # UR AUTO: ABNORMAL
BASE EXCESS BLDV CALC-SCNC: 4.6 MMOL/L — HIGH (ref -2–2)
BASOPHILS # BLD AUTO: 0 K/UL — SIGNIFICANT CHANGE UP (ref 0–0.2)
BASOPHILS NFR BLD AUTO: 0.5 % — SIGNIFICANT CHANGE UP (ref 0–2)
BILIRUB SERPL-MCNC: 0.3 MG/DL — SIGNIFICANT CHANGE UP (ref 0.2–1.2)
BILIRUB UR-MCNC: NEGATIVE — SIGNIFICANT CHANGE UP
BUN SERPL-MCNC: 18 MG/DL — SIGNIFICANT CHANGE UP (ref 7–23)
CA-I SERPL-SCNC: 1.17 MMOL/L — SIGNIFICANT CHANGE UP (ref 1.12–1.3)
CALCIUM SERPL-MCNC: 9.9 MG/DL — SIGNIFICANT CHANGE UP (ref 8.4–10.5)
CHLORIDE BLDV-SCNC: 107 MMOL/L — SIGNIFICANT CHANGE UP (ref 96–108)
CHLORIDE SERPL-SCNC: 102 MMOL/L — SIGNIFICANT CHANGE UP (ref 96–108)
CO2 BLDV-SCNC: 30 MMOL/L — SIGNIFICANT CHANGE UP (ref 22–30)
CO2 SERPL-SCNC: 22 MMOL/L — SIGNIFICANT CHANGE UP (ref 22–31)
COLOR SPEC: ABNORMAL
CREAT SERPL-MCNC: 1.24 MG/DL — SIGNIFICANT CHANGE UP (ref 0.5–1.3)
DACRYOCYTES BLD QL SMEAR: SLIGHT — SIGNIFICANT CHANGE UP
DIFF PNL FLD: ABNORMAL
EOSINOPHIL # BLD AUTO: 0.5 K/UL — SIGNIFICANT CHANGE UP (ref 0–0.5)
EOSINOPHIL NFR BLD AUTO: 6.7 % — HIGH (ref 0–6)
GAS PNL BLDV: 136 MMOL/L — SIGNIFICANT CHANGE UP (ref 136–145)
GAS PNL BLDV: SIGNIFICANT CHANGE UP
GLUCOSE BLDV-MCNC: 97 MG/DL — SIGNIFICANT CHANGE UP (ref 70–99)
GLUCOSE SERPL-MCNC: 99 MG/DL — SIGNIFICANT CHANGE UP (ref 70–99)
GLUCOSE UR QL: NEGATIVE — SIGNIFICANT CHANGE UP
HCO3 BLDV-SCNC: 29 MMOL/L — SIGNIFICANT CHANGE UP (ref 21–29)
HCT VFR BLD CALC: 39 % — SIGNIFICANT CHANGE UP (ref 34.5–45)
HCT VFR BLDA CALC: 39 % — SIGNIFICANT CHANGE UP (ref 39–50)
HGB BLD CALC-MCNC: 12.8 G/DL — SIGNIFICANT CHANGE UP (ref 11.5–15.5)
HGB BLD-MCNC: 13 G/DL — SIGNIFICANT CHANGE UP (ref 11.5–15.5)
INR BLD: 1.08 RATIO — SIGNIFICANT CHANGE UP (ref 0.88–1.16)
KETONES UR-MCNC: NEGATIVE — SIGNIFICANT CHANGE UP
LACTATE BLDV-MCNC: 1.4 MMOL/L — SIGNIFICANT CHANGE UP (ref 0.7–2)
LEUKOCYTE ESTERASE UR-ACNC: ABNORMAL
LYMPHOCYTES # BLD AUTO: 1.7 K/UL — SIGNIFICANT CHANGE UP (ref 1–3.3)
LYMPHOCYTES # BLD AUTO: 21.4 % — SIGNIFICANT CHANGE UP (ref 13–44)
MCHC RBC-ENTMCNC: 23.5 PG — LOW (ref 27–34)
MCHC RBC-ENTMCNC: 33.2 GM/DL — SIGNIFICANT CHANGE UP (ref 32–36)
MCV RBC AUTO: 70.6 FL — LOW (ref 80–100)
MICROCYTES BLD QL: SLIGHT — SIGNIFICANT CHANGE UP
MONOCYTES # BLD AUTO: 1.2 K/UL — HIGH (ref 0–0.9)
MONOCYTES NFR BLD AUTO: 14.6 % — HIGH (ref 2–14)
NEUTROPHILS # BLD AUTO: 4.5 K/UL — SIGNIFICANT CHANGE UP (ref 1.8–7.4)
NEUTROPHILS NFR BLD AUTO: 56.8 % — SIGNIFICANT CHANGE UP (ref 43–77)
NITRITE UR-MCNC: POSITIVE
OVALOCYTES BLD QL SMEAR: SLIGHT — SIGNIFICANT CHANGE UP
PCO2 BLDV: 43 MMHG — SIGNIFICANT CHANGE UP (ref 35–50)
PH BLDV: 7.44 — SIGNIFICANT CHANGE UP (ref 7.35–7.45)
PH UR: 6.5 — SIGNIFICANT CHANGE UP (ref 5–8)
PLAT MORPH BLD: NORMAL — SIGNIFICANT CHANGE UP
PLATELET # BLD AUTO: 251 K/UL — SIGNIFICANT CHANGE UP (ref 150–400)
PO2 BLDV: 62 MMHG — HIGH (ref 25–45)
POIKILOCYTOSIS BLD QL AUTO: SLIGHT — SIGNIFICANT CHANGE UP
POLYCHROMASIA BLD QL SMEAR: SLIGHT — SIGNIFICANT CHANGE UP
POTASSIUM BLDV-SCNC: 5.9 MMOL/L — HIGH (ref 3.5–5.3)
POTASSIUM SERPL-MCNC: 5 MMOL/L — SIGNIFICANT CHANGE UP (ref 3.5–5.3)
POTASSIUM SERPL-SCNC: 5 MMOL/L — SIGNIFICANT CHANGE UP (ref 3.5–5.3)
PROT SERPL-MCNC: 7.5 G/DL — SIGNIFICANT CHANGE UP (ref 6–8.3)
PROT UR-MCNC: ABNORMAL
PROTHROM AB SERPL-ACNC: 12.4 SEC — SIGNIFICANT CHANGE UP (ref 10–12.9)
RAPID RVP RESULT: SIGNIFICANT CHANGE UP
RBC # BLD: 5.53 M/UL — HIGH (ref 3.8–5.2)
RBC # FLD: 15.8 % — HIGH (ref 10.3–14.5)
RBC BLD AUTO: ABNORMAL
RBC CASTS # UR COMP ASSIST: SIGNIFICANT CHANGE UP /HPF (ref 0–4)
SAO2 % BLDV: 90 % — HIGH (ref 67–88)
SODIUM SERPL-SCNC: 139 MMOL/L — SIGNIFICANT CHANGE UP (ref 135–145)
SP GR SPEC: 1.02 — SIGNIFICANT CHANGE UP (ref 1.01–1.02)
TARGETS BLD QL SMEAR: SLIGHT — SIGNIFICANT CHANGE UP
UROBILINOGEN FLD QL: NEGATIVE — SIGNIFICANT CHANGE UP
WBC # BLD: 7.9 K/UL — SIGNIFICANT CHANGE UP (ref 3.8–10.5)
WBC # FLD AUTO: 7.9 K/UL — SIGNIFICANT CHANGE UP (ref 3.8–10.5)
WBC UR QL: >50

## 2019-02-18 PROCEDURE — 71045 X-RAY EXAM CHEST 1 VIEW: CPT | Mod: 26

## 2019-02-18 PROCEDURE — 70496 CT ANGIOGRAPHY HEAD: CPT | Mod: 26

## 2019-02-18 PROCEDURE — 70450 CT HEAD/BRAIN W/O DYE: CPT | Mod: 26,59

## 2019-02-18 PROCEDURE — 99291 CRITICAL CARE FIRST HOUR: CPT

## 2019-02-18 PROCEDURE — 99223 1ST HOSP IP/OBS HIGH 75: CPT

## 2019-02-18 PROCEDURE — 93010 ELECTROCARDIOGRAM REPORT: CPT

## 2019-02-18 PROCEDURE — 70498 CT ANGIOGRAPHY NECK: CPT | Mod: 26

## 2019-02-18 RX ORDER — GABAPENTIN 400 MG/1
300 CAPSULE ORAL DAILY
Qty: 0 | Refills: 0 | Status: DISCONTINUED | OUTPATIENT
Start: 2019-02-19 | End: 2019-02-26

## 2019-02-18 RX ORDER — INSULIN LISPRO 100/ML
VIAL (ML) SUBCUTANEOUS
Qty: 0 | Refills: 0 | Status: DISCONTINUED | OUTPATIENT
Start: 2019-02-18 | End: 2019-02-26

## 2019-02-18 RX ORDER — CHLORHEXIDINE GLUCONATE 213 G/1000ML
15 SOLUTION TOPICAL
Qty: 0 | Refills: 0 | Status: DISCONTINUED | OUTPATIENT
Start: 2019-02-18 | End: 2019-02-26

## 2019-02-18 RX ORDER — AZTREONAM 2 G
1000 VIAL (EA) INJECTION EVERY 8 HOURS
Qty: 0 | Refills: 0 | Status: DISCONTINUED | OUTPATIENT
Start: 2019-02-19 | End: 2019-02-23

## 2019-02-18 RX ORDER — SODIUM CHLORIDE 9 MG/ML
1000 INJECTION, SOLUTION INTRAVENOUS
Qty: 0 | Refills: 0 | Status: DISCONTINUED | OUTPATIENT
Start: 2019-02-18 | End: 2019-02-26

## 2019-02-18 RX ORDER — CIPROFLOXACIN LACTATE 400MG/40ML
400 VIAL (ML) INTRAVENOUS ONCE
Qty: 0 | Refills: 0 | Status: COMPLETED | OUTPATIENT
Start: 2019-02-18 | End: 2019-02-18

## 2019-02-18 RX ORDER — ATORVASTATIN CALCIUM 80 MG/1
40 TABLET, FILM COATED ORAL AT BEDTIME
Qty: 0 | Refills: 0 | Status: DISCONTINUED | OUTPATIENT
Start: 2019-02-18 | End: 2019-02-26

## 2019-02-18 RX ORDER — LANOLIN ALCOHOL/MO/W.PET/CERES
3 CREAM (GRAM) TOPICAL AT BEDTIME
Qty: 0 | Refills: 0 | Status: DISCONTINUED | OUTPATIENT
Start: 2019-02-18 | End: 2019-02-26

## 2019-02-18 RX ORDER — ALLOPURINOL 300 MG
100 TABLET ORAL DAILY
Qty: 0 | Refills: 0 | Status: DISCONTINUED | OUTPATIENT
Start: 2019-02-18 | End: 2019-02-26

## 2019-02-18 RX ORDER — DEXTROSE 50 % IN WATER 50 %
12.5 SYRINGE (ML) INTRAVENOUS ONCE
Qty: 0 | Refills: 0 | Status: DISCONTINUED | OUTPATIENT
Start: 2019-02-18 | End: 2019-02-26

## 2019-02-18 RX ORDER — SENNA PLUS 8.6 MG/1
2 TABLET ORAL AT BEDTIME
Qty: 0 | Refills: 0 | Status: DISCONTINUED | OUTPATIENT
Start: 2019-02-18 | End: 2019-02-26

## 2019-02-18 RX ORDER — AZTREONAM 2 G
1000 VIAL (EA) INJECTION ONCE
Qty: 0 | Refills: 0 | Status: COMPLETED | OUTPATIENT
Start: 2019-02-18 | End: 2019-02-18

## 2019-02-18 RX ORDER — COLCHICINE 0.6 MG
1 TABLET ORAL
Qty: 0 | Refills: 0 | COMMUNITY

## 2019-02-18 RX ORDER — AZTREONAM 2 G
VIAL (EA) INJECTION
Qty: 0 | Refills: 0 | Status: DISCONTINUED | OUTPATIENT
Start: 2019-02-18 | End: 2019-02-23

## 2019-02-18 RX ORDER — SODIUM CHLORIDE 9 MG/ML
1000 INJECTION, SOLUTION INTRAVENOUS
Qty: 0 | Refills: 0 | Status: DISCONTINUED | OUTPATIENT
Start: 2019-02-18 | End: 2019-02-20

## 2019-02-18 RX ORDER — GABAPENTIN 400 MG/1
300 CAPSULE ORAL AT BEDTIME
Qty: 0 | Refills: 0 | Status: DISCONTINUED | OUTPATIENT
Start: 2019-02-18 | End: 2019-02-26

## 2019-02-18 RX ORDER — ENOXAPARIN SODIUM 100 MG/ML
40 INJECTION SUBCUTANEOUS EVERY 24 HOURS
Qty: 0 | Refills: 0 | Status: DISCONTINUED | OUTPATIENT
Start: 2019-02-18 | End: 2019-02-26

## 2019-02-18 RX ORDER — ALLOPURINOL 300 MG
1 TABLET ORAL
Qty: 0 | Refills: 0 | COMMUNITY

## 2019-02-18 RX ORDER — MEMANTINE HYDROCHLORIDE 10 MG/1
10 TABLET ORAL
Qty: 0 | Refills: 0 | Status: DISCONTINUED | OUTPATIENT
Start: 2019-02-18 | End: 2019-02-26

## 2019-02-18 RX ORDER — FLUOXETINE HCL 10 MG
20 CAPSULE ORAL DAILY
Qty: 0 | Refills: 0 | Status: DISCONTINUED | OUTPATIENT
Start: 2019-02-18 | End: 2019-02-26

## 2019-02-18 RX ORDER — ASPIRIN/CALCIUM CARB/MAGNESIUM 324 MG
81 TABLET ORAL DAILY
Qty: 0 | Refills: 0 | Status: DISCONTINUED | OUTPATIENT
Start: 2019-02-18 | End: 2019-02-26

## 2019-02-18 RX ORDER — ACETAMINOPHEN 500 MG
650 TABLET ORAL EVERY 6 HOURS
Qty: 0 | Refills: 0 | Status: DISCONTINUED | OUTPATIENT
Start: 2019-02-18 | End: 2019-02-26

## 2019-02-18 RX ORDER — CLOPIDOGREL BISULFATE 75 MG/1
75 TABLET, FILM COATED ORAL DAILY
Qty: 0 | Refills: 0 | Status: DISCONTINUED | OUTPATIENT
Start: 2019-02-18 | End: 2019-02-26

## 2019-02-18 RX ORDER — ACETAMINOPHEN 500 MG
2 TABLET ORAL
Qty: 0 | Refills: 0 | COMMUNITY

## 2019-02-18 RX ORDER — GLUCAGON INJECTION, SOLUTION 0.5 MG/.1ML
1 INJECTION, SOLUTION SUBCUTANEOUS ONCE
Qty: 0 | Refills: 0 | Status: DISCONTINUED | OUTPATIENT
Start: 2019-02-18 | End: 2019-02-26

## 2019-02-18 RX ORDER — DEXTROSE 50 % IN WATER 50 %
15 SYRINGE (ML) INTRAVENOUS ONCE
Qty: 0 | Refills: 0 | Status: DISCONTINUED | OUTPATIENT
Start: 2019-02-18 | End: 2019-02-26

## 2019-02-18 RX ADMIN — Medication 200 MILLIGRAM(S): at 13:26

## 2019-02-18 RX ADMIN — Medication 50 MILLIGRAM(S): at 19:34

## 2019-02-18 NOTE — ED ADULT NURSE NOTE - NSIMPLEMENTINTERV_GEN_ALL_ED
Implemented All Fall with Harm Risk Interventions:  [place] to call system. Call bell, personal items and telephone within reach. Instruct patient to call for assistance. Room bathroom lighting operational. Non-slip footwear when patient is off stretcher. Physically safe environment: no spills, clutter or unnecessary equipment. Stretcher in lowest position, wheels locked, appropriate side rails in place. Provide visual cue, wrist band, yellow gown, etc. Monitor gait and stability. Monitor for mental status changes and reorient to person, place, and time. Review medications for side effects contributing to fall risk. Reinforce activity limits and safety measures with patient and family. Provide visual clues: red socks.

## 2019-02-18 NOTE — H&P ADULT - PROBLEM SELECTOR PLAN 3
L MCA CVA as complication of previous L carotid stenting procedure. Some residual MCA   - deficits appear to be at baseline  - continue aspirin, high dose statin L MCA CVA as complication of previous L carotid stenting procedure. Some residual MCA   - deficits appear to be at baseline  - continue aspirin, plavix, high dose statin

## 2019-02-18 NOTE — H&P ADULT - PROBLEM SELECTOR PLAN 4
Appears to be stable- not on any immunosuppressives or DMARD  - cont gabapentin 300mg po bid for pain.

## 2019-02-18 NOTE — ED PROVIDER NOTE - PHYSICAL EXAMINATION
Examined post CT scan.   General: Well developed, female in NAD - attempts to nod in response to question.   HEENT: NCAT. R pupil - ?surgical, NR. L pupil - pinpoint. No gaze preference.  Neuro: Not opening eyes to voice, but attempts to follow commands. Verbalized one word when examined later. Showed 2 fingers on LUE. Lifted LLE against gravity. RUE - 0/5 [1/5 baseline, per note in Dec 2018]. RLE - wiggled toes to command.  Cardiovascular: RRR. No murmurs/rubs/gallops.  Respiratory: CTAB. No wheezes/rales/rhonci. Anterior auscultation only.   GI: Soft, NT, ND. No masses palpated. BS+  Extremities: No edema.  Skin: Warm, dry. No rashes noted on exposed skin.

## 2019-02-18 NOTE — CONSULT NOTE ADULT - SUBJECTIVE AND OBJECTIVE BOX
Neurology Consult    HPI:  63 year old female with past medical hx of L M2 CVA(residual global aphasia, R hemiparesis after L transcarotid arterial stent), HTN, HLD, RA p/w AMS. LKW 1120. Patient from [facility]; normally non verbal but interactive. Patient acutely became unresponsive and less interactive, EMS called.     REVIEW OF SYSTEMS:  Unable to assess due to clinical condition.     MEDICATIONS    PMH: Osteoporosis  Eczema  Rheumatoid arthritis  HLD (hyperlipidemia)  Eczema  Psoriasis  Asthma  HTN (Hypertension)  Myocardial Infarction    PSH: S/P lumbar fusion  Cataract Extraction Surgery  tonsillectomy    FAMILY HISTORY:  Family history of diabetes mellitus in mother (Mother)  Family history of hypertension in mother (Mother)    SOCIAL HISTORY:  No history of tobacco or alcohol use     Allergies    peanuts (Anaphylaxis; Hives)  penicillins (Anaphylaxis)  propofol (Angioedema)  shellfish (Anaphylaxis; Hives)  tomatoes (Anaphylaxis; Hives)    Intolerances    peanut butter (Anaphylaxis)    Vital Signs Last 24 Hrs  T(C): --  T(F): --  HR: --  BP: --  BP(mean): --  RR: --  SpO2: --    Neurological Examination:    Mental Status: Patient is somnolent but arousable, responding sporadically to commands, non verbal    Cranial Nerves: L pupil round/responsive, R pupil irregular(prior cataract sg), Eyes conjugate, moving spontaneously in all directions, -BTT, V1-V3 intact, no gross facial asymmetry, tongue/uvula midline    Motor Exam:  Withdraws LUE/BLE to noxious stimuli briskly  No withdrawal of RUE to noxious stimuli    Normal bulk/tone    Sensory: Facial grimace to noxious stimuli x4    Coordination: Unable to assess    Gait: Unable to assess     Reflexes: Bilateral 2+ Biceps, Brachial, +1 Patellar, Ankle  Plantar: Equivocal bilaterally    GENERAL: No acute distress  HEENT:  Normocephalic, atraumatic  EXTREMITIES: No edema, clubbing, cyanosis  MUSCULOSKELETAL: Normal range of motion  SKIN: No rashes    LABS:    Hemoglobin A1C:     RADIOLOGY:  CT head:  MRI:

## 2019-02-18 NOTE — H&P ADULT - PROBLEM SELECTOR PLAN 1
likely toxic/metabolic in setting of presumed UTI given +UA vs. less likely but must consider seizure vs. doubt new CVA. Appears to be improved at present.  - CT head/CTA no new findings, shows chronic L MCA stroke  - neuro recs appreciated- will check routine EEG  - f/u MRI brain noncontrast if not contraindicated by stent  - toxic metabolic workup as below  - continue neuro checks likely toxic/metabolic in setting of presumed UTI given +UA vs. less likely but must consider seizure vs. doubt new CVA. Appears to be improved at present.  - CT head/CTA no new findings, shows chronic L MCA stroke  - neuro recs appreciated- will check routine EEG  - f/u MRI brain noncontrast- stent performed at [facility] and is MR compatible.  - toxic metabolic workup as below  - continue neuro checks

## 2019-02-18 NOTE — H&P ADULT - PROBLEM SELECTOR PLAN 5
Some scattered erythematous macules noted on face/chest. Minimally active disease  -continue monitoring Some scattered erythematous macules noted on face/chest. Minimally active disease  -continue triamcinolone ointment prn

## 2019-02-18 NOTE — H&P ADULT - MENTAL STATUS
aox 0  but difficult to assess due to aphasa- when asked her own name, patient repeatedly responds 'no.'

## 2019-02-18 NOTE — ED PROVIDER NOTE - CRITICAL CARE PROVIDED
consultation with other physicians/additional history taking/documentation/direct patient care (not related to procedure)/interpretation of diagnostic studies/conducted a detailed discussion of DNR status

## 2019-02-18 NOTE — PROVIDER CONTACT NOTE (OTHER) - ASSESSMENT
patient hypertensive 175/110. pt hypophonic & aphasic, shakes her head when asked for her name, , and location. Said "no" when asked if she is in pain.

## 2019-02-18 NOTE — H&P ADULT - PROBLEM SELECTOR PLAN 7
No signs of respiratory distress, no obvious wheezes on exam. Comfortable and saturating well on RA  - continue monitoring  - duonebs if needed

## 2019-02-18 NOTE — H&P ADULT - PROBLEM SELECTOR PLAN 2
Presumed based on encephalopathy and +UA. Started on cipro 400mg iv in ED  - would treat for now: patient with recent hospitalization and in institutional environment, would switch to zosyn 3.375gm iv q8 hours, adjust abx per urine culture  - low utility for blood cultures at this juncture Presumed based on encephalopathy and +UA. Started on cipro 400mg iv in ED  - would treat for now: patient with recent hospitalization and in institutional environment, would switch to aztreonam 1gm iv q 8 hours- pt documented to have PCN allergy  - low utility for blood cultures at this juncture

## 2019-02-18 NOTE — H&P ADULT - HISTORY OF PRESENT ILLNESS
63 year old female with past medical hx of L M2 CVA(residual global aphasia, R hemiparesis after L transcarotid arterial stent), HTN, HLD, RA presents from NH with acute encephalopathy and unresponsiveness. Pt nonverbal but answering yes/no questions. Does not recall any events. Unreliable historian due to aphasia. Per documentation, patient last noted to be normal around 1120am this morning, but thereafter was noted to be obtunded so brought in by EMS. No tonic/clonic activity noted. Stroke code called upon presentation, patient evaluated by stroke team and underwent CT head/CTA head/neck. Low suspicion for CVA and patient also not TPA or endovascular candidate. Toxic/metabolic encephalopathy suspected. At present, patient asleep but readily arousable and interactive- replies 'no' to pain, fevers/chills, no sob/cough.     In ED: 97.7, 102, 144/104, 17, 100 RA. Given cipro 400iv x 1.

## 2019-02-18 NOTE — H&P ADULT - ASSESSMENT
63 year old female with past medical hx of L M2 CVA(residual global aphasia, R hemiparesis after L transcarotid arterial stent), HTN, HLD, RA presents from NH with acute encephalopathy likely 2/2 UTI without sepsis vs. possible seizure vs. less likely acute on chronic CVA

## 2019-02-18 NOTE — ED PROVIDER NOTE - OBJECTIVE STATEMENT
63 year old female with past medical hx of L M2 CVA (w/ residual R sided weakness - RUE 1/5, RLE 4/5 per Dec 2018 note), HTN, HLD, RA, Asthma who was brought in by EMS as a code stroke. Per EMS, patient is non-verbal but interactive at baseline. Nursing staff found patient to be more lethargic and called EMS. When EMS arrived, patient was noted to be on a non-rebreather mask, saturating 100%. Per EMS, patient is DNR. No other history available at presentation. 63 year old female with past medical hx of L M2 CVA (w/ residual R sided weakness - RUE 1/5, RLE 4/5 per Dec 2018 note), HTN, HLD, RA, Asthma who was brought in by EMS as a code stroke. Per EMS, patient is non-verbal but interactive at baseline. Nursing staff found patient to be more lethargic and called EMS. When EMS arrived, patient was noted to be on a non-rebreather mask, saturating 100%. Per EMS, patient is DNR. No other history available at presentation.    Code stroke was called. Neurology requested CT head; CTA Head and Neck.

## 2019-02-18 NOTE — ED PROVIDER NOTE - CLINICAL SUMMARY MEDICAL DECISION MAKING FREE TEXT BOX
see MDM / attending note Fellow Assessment:   63 year old female with past medical hx of L M2 CVA (w/ residual R sided weakness - RUE 1/5, RLE 4/5 per Dec 2018 note), HTN, HLD, RA, Asthma who was brought in by EMS as a code stroke. Neurology present. CT Head. CTA Head/Neck ordered. No tPA given. Will order a sepsis workup for lethargy. CBC, CMP, UA, CXR, RVP.  Will also get trops and EKG.       see MDM / attending note Fellow Assessment:   63 year old female with past medical hx of L M2 CVA (w/ residual R sided weakness - RUE 1/5, RLE 4/5 per Dec 2018 note), HTN, HLD, RA, Asthma who was brought in by EMS as a code stroke. Neurology present. CT Head. CTA Head/Neck ordered. No tPA given. Will order a sepsis workup for lethargy. CBC, CMP, UA, CXR, RVP.  UA positive - started on Ciprofloxacin. Will admit to medicine      see MDM / attending note

## 2019-02-18 NOTE — H&P ADULT - NSHPLABSRESULTS_GEN_ALL_CORE
.  LABS:                         13.0   7.9   )-----------( 251      ( 2019 11:47 )             39.0         139  |  102  |  18  ----------------------------<  99  5.0   |  22  |  1.24    Ca    9.9      2019 11:47    TPro  7.5  /  Alb  3.9  /  TBili  0.3  /  DBili  x   /  AST  37  /  ALT  36  /  AlkPhos  135<H>      PT/INR - ( 2019 12:30 )   PT: 12.4 sec;   INR: 1.08 ratio         PTT - ( 2019 12:30 )  PTT:26.7 sec  Urinalysis Basic - ( 2019 12:40 )    Color: Light Orange / Appearance: Turbid / S.023 / pH: x  Gluc: x / Ketone: Negative  / Bili: Negative / Urobili: Negative   Blood: x / Protein: 30 mg/dL / Nitrite: Positive   Leuk Esterase: Large / RBC: 2-5 /hpf / WBC >50   Sq Epi: x / Non Sq Epi: x / Bacteria: Few            CXR personally reviewed: clear lungs  EKG personally reviewed: NSR    < from: CT Angio Neck w/ IV Cont (19 @ 12:11) >    < from: CT Brain Stroke Protocol (19 @ 12:11) >    IMPRESSION:   No CT evidence of acute intracranial hemorrhage, brain edema, or mass   effect.     Chronic left MCA infarct.    < end of copied text >        CTA NECK:    Metallic intravascular stent involving the proximal left internal carotid   artery.55-60% short segment stenosis by NASCET criteria within the mid   aspect of the stent, similar in appearance since 2018.    CTA BRAIN:    Several tandem short segment high-grade stenosis of a left M2 branch   (series 5, image 319 and image 330), with normal flow-related enhancement   beyond the levels of stenosis, similar in appearance to study from   2018.    No vascular aneurysm.    < end of copied text >

## 2019-02-18 NOTE — ED PROVIDER NOTE - ATTENDING CONTRIBUTION TO CARE
------------ATTENDING NOTE------------   Code Stroke activation by EMS prior to ED arrival, called for described sudden onset decreased responsiveness while interacting w/ staff this morning, complicated by baseline nonverbal from past CVA, no new medications/changes or trauma or fevers/illness reported, awaiting labs/imaging and close reassessments -->  - Boubacar Pathak MD   ----------------------------------------------    *MCKENZIE

## 2019-02-18 NOTE — ED ADULT NURSE NOTE - OBJECTIVE STATEMENT
62 yo female A&OX3 presents to the ED for the c/o unresponsives at nursing home. Pt arrived to the ED via EMS, Code stroke initiated t 1133 this morning. Pt appears lethargic and disoriented. EMS states that pt is not baseline. Hx of  stroke in Nov 2018 with residual weakness to l upper and lower extremities. Pt able to follow commands, + response to painful stimuli. EMS denies any recent falls or head trauma. Lungs clear, equal, b/l no sob and no cough. Abd round, soft non tender and non distended. + psoriasis to abd and back. MAEX4 62 yo female A&OX3 presents to the ED for the c/o unresponsives at nursing home. Pt arrived to the ED via EMS, Code stroke initiated t 1133 this morning. Pt appears lethargic and disoriented. EMS states that pt is not baseline. Hx of  stroke in Nov 2018 with residual weakness to r upper and lower extremities. Pt able to follow commands, + response to painful stimuli. EMS denies any recent falls or head trauma. Lungs clear, equal, b/l no sob and no cough. Abd round, soft non tender and non distended. + psoriasis to abd and back. MAEX4

## 2019-02-18 NOTE — CONSULT NOTE ADULT - ASSESSMENT
63 year old female with past medical hx of L M2 CVA(residual global aphasia, R hemiparesis after L transcarotid arterial stent), HTN, HLD, RA p/w AMS. NIHSS 20, MRS 4. Patient currently not at baseline mental state, etiology currently unknown but would consider both toxic metabolic workup as well as EEG given previous insult to brain parenchyma. Otherwise, patient neurologic deficits correlate with baseline; suspicion lower for acute stroke. TPA not given as patient w/ recent hx of major ischemic infarct, not a candidate for endovascular intervention as pt baseline poor.     Recs:  MRI head w/o contrast  Routine EEG  Toxic metabolic workup

## 2019-02-18 NOTE — H&P ADULT - EYES COMMENTS
+L eye sliggishly reactive, R eye nonreactive. +Facial asymmetry with R sided droop, tongue deviation to R.

## 2019-02-18 NOTE — H&P ADULT - PROBLEM SELECTOR PLAN 6
Permissive HTN SBP between 140-160 as per prior documentation given ICA and M2 segment stenoses and prior episode of worsening of CVA features when BP drops below  - continue monitoring off antihypertensives at present Permissive HTN SBP between 140-160 as per prior documentation given ICA and M2 segment stenoses and prior episode of worsening of CVA features when BP drops below 140  - continue monitoring off antihypertensives at present

## 2019-02-19 LAB
ANION GAP SERPL CALC-SCNC: 16 MMOL/L — SIGNIFICANT CHANGE UP (ref 5–17)
BASOPHILS # BLD AUTO: 0.07 K/UL — SIGNIFICANT CHANGE UP (ref 0–0.2)
BASOPHILS NFR BLD AUTO: 0.8 % — SIGNIFICANT CHANGE UP (ref 0–2)
BUN SERPL-MCNC: 13 MG/DL — SIGNIFICANT CHANGE UP (ref 7–23)
CALCIUM SERPL-MCNC: 10.4 MG/DL — SIGNIFICANT CHANGE UP (ref 8.4–10.5)
CHLORIDE SERPL-SCNC: 101 MMOL/L — SIGNIFICANT CHANGE UP (ref 96–108)
CO2 SERPL-SCNC: 24 MMOL/L — SIGNIFICANT CHANGE UP (ref 22–31)
CREAT SERPL-MCNC: 1.11 MG/DL — SIGNIFICANT CHANGE UP (ref 0.5–1.3)
EOSINOPHIL # BLD AUTO: 0.57 K/UL — HIGH (ref 0–0.5)
EOSINOPHIL NFR BLD AUTO: 6.5 % — HIGH (ref 0–6)
GLUCOSE SERPL-MCNC: 82 MG/DL — SIGNIFICANT CHANGE UP (ref 70–99)
HBA1C BLD-MCNC: 5.5 % — SIGNIFICANT CHANGE UP (ref 4–5.6)
HCT VFR BLD CALC: 41.7 % — SIGNIFICANT CHANGE UP (ref 34.5–45)
HGB BLD-MCNC: 12.5 G/DL — SIGNIFICANT CHANGE UP (ref 11.5–15.5)
IMM GRANULOCYTES NFR BLD AUTO: 0.1 % — SIGNIFICANT CHANGE UP (ref 0–1.5)
LYMPHOCYTES # BLD AUTO: 1.82 K/UL — SIGNIFICANT CHANGE UP (ref 1–3.3)
LYMPHOCYTES # BLD AUTO: 20.8 % — SIGNIFICANT CHANGE UP (ref 13–44)
MCHC RBC-ENTMCNC: 21.7 PG — LOW (ref 27–34)
MCHC RBC-ENTMCNC: 30 GM/DL — LOW (ref 32–36)
MCV RBC AUTO: 72.4 FL — LOW (ref 80–100)
MONOCYTES # BLD AUTO: 0.87 K/UL — SIGNIFICANT CHANGE UP (ref 0–0.9)
MONOCYTES NFR BLD AUTO: 9.9 % — SIGNIFICANT CHANGE UP (ref 2–14)
NEUTROPHILS # BLD AUTO: 5.43 K/UL — SIGNIFICANT CHANGE UP (ref 1.8–7.4)
NEUTROPHILS NFR BLD AUTO: 61.9 % — SIGNIFICANT CHANGE UP (ref 43–77)
PLATELET # BLD AUTO: 357 K/UL — SIGNIFICANT CHANGE UP (ref 150–400)
POTASSIUM SERPL-MCNC: 4.2 MMOL/L — SIGNIFICANT CHANGE UP (ref 3.5–5.3)
POTASSIUM SERPL-SCNC: 4.2 MMOL/L — SIGNIFICANT CHANGE UP (ref 3.5–5.3)
RBC # BLD: 5.76 M/UL — HIGH (ref 3.8–5.2)
RBC # FLD: 17.9 % — HIGH (ref 10.3–14.5)
SODIUM SERPL-SCNC: 141 MMOL/L — SIGNIFICANT CHANGE UP (ref 135–145)
WBC # BLD: 8.77 K/UL — SIGNIFICANT CHANGE UP (ref 3.8–10.5)
WBC # FLD AUTO: 8.77 K/UL — SIGNIFICANT CHANGE UP (ref 3.8–10.5)

## 2019-02-19 PROCEDURE — 70551 MRI BRAIN STEM W/O DYE: CPT | Mod: 26

## 2019-02-19 PROCEDURE — 99221 1ST HOSP IP/OBS SF/LOW 40: CPT

## 2019-02-19 PROCEDURE — 99233 SBSQ HOSP IP/OBS HIGH 50: CPT

## 2019-02-19 RX ORDER — LABETALOL HCL 100 MG
5 TABLET ORAL ONCE
Qty: 0 | Refills: 0 | Status: COMPLETED | OUTPATIENT
Start: 2019-02-19 | End: 2019-02-19

## 2019-02-19 RX ORDER — HYDRALAZINE HCL 50 MG
5 TABLET ORAL ONCE
Qty: 0 | Refills: 0 | Status: DISCONTINUED | OUTPATIENT
Start: 2019-02-19 | End: 2019-02-19

## 2019-02-19 RX ORDER — HYDRALAZINE HCL 50 MG
10 TABLET ORAL EVERY 4 HOURS
Qty: 0 | Refills: 0 | Status: DISCONTINUED | OUTPATIENT
Start: 2019-02-19 | End: 2019-02-26

## 2019-02-19 RX ADMIN — Medication 20 MILLIGRAM(S): at 13:20

## 2019-02-19 RX ADMIN — Medication 1 APPLICATION(S): at 18:43

## 2019-02-19 RX ADMIN — Medication 1 DROP(S): at 13:18

## 2019-02-19 RX ADMIN — SENNA PLUS 2 TABLET(S): 8.6 TABLET ORAL at 00:01

## 2019-02-19 RX ADMIN — MEMANTINE HYDROCHLORIDE 10 MILLIGRAM(S): 10 TABLET ORAL at 06:53

## 2019-02-19 RX ADMIN — GABAPENTIN 300 MILLIGRAM(S): 400 CAPSULE ORAL at 23:04

## 2019-02-19 RX ADMIN — GABAPENTIN 300 MILLIGRAM(S): 400 CAPSULE ORAL at 00:01

## 2019-02-19 RX ADMIN — ATORVASTATIN CALCIUM 40 MILLIGRAM(S): 80 TABLET, FILM COATED ORAL at 23:04

## 2019-02-19 RX ADMIN — Medication 1 APPLICATION(S): at 06:54

## 2019-02-19 RX ADMIN — ATORVASTATIN CALCIUM 40 MILLIGRAM(S): 80 TABLET, FILM COATED ORAL at 00:01

## 2019-02-19 RX ADMIN — Medication 81 MILLIGRAM(S): at 13:18

## 2019-02-19 RX ADMIN — Medication 1 DROP(S): at 23:04

## 2019-02-19 RX ADMIN — ENOXAPARIN SODIUM 40 MILLIGRAM(S): 100 INJECTION SUBCUTANEOUS at 00:01

## 2019-02-19 RX ADMIN — Medication 50 MILLIGRAM(S): at 13:15

## 2019-02-19 RX ADMIN — Medication 50 MILLIGRAM(S): at 22:43

## 2019-02-19 RX ADMIN — SENNA PLUS 2 TABLET(S): 8.6 TABLET ORAL at 23:04

## 2019-02-19 RX ADMIN — CLOPIDOGREL BISULFATE 75 MILLIGRAM(S): 75 TABLET, FILM COATED ORAL at 13:18

## 2019-02-19 RX ADMIN — Medication 50 MILLIGRAM(S): at 06:53

## 2019-02-19 RX ADMIN — Medication 1 DROP(S): at 06:53

## 2019-02-19 RX ADMIN — Medication 100 MILLIGRAM(S): at 13:20

## 2019-02-19 RX ADMIN — Medication 5 MILLIGRAM(S): at 13:15

## 2019-02-19 RX ADMIN — CHLORHEXIDINE GLUCONATE 15 MILLILITER(S): 213 SOLUTION TOPICAL at 18:44

## 2019-02-19 RX ADMIN — ENOXAPARIN SODIUM 40 MILLIGRAM(S): 100 INJECTION SUBCUTANEOUS at 23:05

## 2019-02-19 RX ADMIN — MEMANTINE HYDROCHLORIDE 10 MILLIGRAM(S): 10 TABLET ORAL at 18:42

## 2019-02-19 RX ADMIN — CHLORHEXIDINE GLUCONATE 15 MILLILITER(S): 213 SOLUTION TOPICAL at 06:54

## 2019-02-19 NOTE — PROGRESS NOTE ADULT - PROBLEM SELECTOR PLAN 4
-Appears to be stable- not on any immunosuppressives or DMARD  -cont gabapentin 300mg po bid for pain.

## 2019-02-19 NOTE — CHART NOTE - NSCHARTNOTEFT_GEN_A_CORE
MRI head:   Evolution of a left subinsular, left frontal and left corona radiata   infarct.   Per Dr. Loya this is an old infarct and okay with neuro checks q4 hrs.  Will continue to monitor.

## 2019-02-19 NOTE — PROGRESS NOTE ADULT - PROBLEM SELECTOR PLAN 6
-Chronic  -Permissive HTN SBP between 140-160 as per prior documentation given ICA and M2 segment stenoses and prior episode of worsening of CVA features when BP drops below 140  -SBP> 160 with DBP >100. Will provide Labetalol 5mg IV once.

## 2019-02-19 NOTE — PROGRESS NOTE ADULT - PROBLEM SELECTOR PLAN 3
-Hx of L MCA CVA as complication of previous L carotid stenting procedure. Some residual MCA   -Deficits appear to be at baseline  -Continue aspirin, Plavix, high dose statin

## 2019-02-19 NOTE — PROGRESS NOTE ADULT - PROBLEM SELECTOR PLAN 5
-Chronic  -Some scattered erythematous macules noted on face/chest.  -Continue triamcinolone ointment prn

## 2019-02-19 NOTE — CHART NOTE - NSCHARTNOTEFT_GEN_A_CORE
[id number]  RUPINDER TRIPP    Spoke with Attending Regarding persistent Htn who advised to add Hydralazine 10mg IV q 4 prn only if SBP >160.     Sebas Ramirez PA-C   Dept of Medicine   [id number] spectra

## 2019-02-19 NOTE — PROGRESS NOTE ADULT - PROBLEM SELECTOR PLAN 1
-Secondary to UTI given +UA vs. less likely seizure vs. CVA.   -CT head/CTA no new findings, shows chronic L MCA stroke  -neuro recs appreciated; Follow up with EEG and MRI  -continue neuro checks

## 2019-02-20 ENCOUNTER — APPOINTMENT (OUTPATIENT)
Dept: NEUROLOGY | Facility: CLINIC | Age: 64
End: 2019-02-20

## 2019-02-20 LAB
-  AMIKACIN: SIGNIFICANT CHANGE UP
-  AMPICILLIN/SULBACTAM: SIGNIFICANT CHANGE UP
-  AMPICILLIN: SIGNIFICANT CHANGE UP
-  AZTREONAM: SIGNIFICANT CHANGE UP
-  CEFAZOLIN: SIGNIFICANT CHANGE UP
-  CEFEPIME: SIGNIFICANT CHANGE UP
-  CEFOXITIN: SIGNIFICANT CHANGE UP
-  CEFTRIAXONE: SIGNIFICANT CHANGE UP
-  CIPROFLOXACIN: SIGNIFICANT CHANGE UP
-  ERTAPENEM: SIGNIFICANT CHANGE UP
-  GENTAMICIN: SIGNIFICANT CHANGE UP
-  IMIPENEM: SIGNIFICANT CHANGE UP
-  LEVOFLOXACIN: SIGNIFICANT CHANGE UP
-  MEROPENEM: SIGNIFICANT CHANGE UP
-  NITROFURANTOIN: SIGNIFICANT CHANGE UP
-  PIPERACILLIN/TAZOBACTAM: SIGNIFICANT CHANGE UP
-  TIGECYCLINE: SIGNIFICANT CHANGE UP
-  TOBRAMYCIN: SIGNIFICANT CHANGE UP
-  TRIMETHOPRIM/SULFAMETHOXAZOLE: SIGNIFICANT CHANGE UP
CULTURE RESULTS: SIGNIFICANT CHANGE UP
HCV AB S/CO SERPL IA: 0.1 S/CO — SIGNIFICANT CHANGE UP (ref 0–0.79)
HCV AB SERPL-IMP: SIGNIFICANT CHANGE UP
METHOD TYPE: SIGNIFICANT CHANGE UP
ORGANISM # SPEC MICROSCOPIC CNT: SIGNIFICANT CHANGE UP
ORGANISM # SPEC MICROSCOPIC CNT: SIGNIFICANT CHANGE UP
SPECIMEN SOURCE: SIGNIFICANT CHANGE UP

## 2019-02-20 PROCEDURE — 99231 SBSQ HOSP IP/OBS SF/LOW 25: CPT

## 2019-02-20 PROCEDURE — 99233 SBSQ HOSP IP/OBS HIGH 50: CPT

## 2019-02-20 PROCEDURE — 95819 EEG AWAKE AND ASLEEP: CPT | Mod: 26

## 2019-02-20 RX ORDER — DEXTROSE 50 % IN WATER 50 %
12.5 SYRINGE (ML) INTRAVENOUS ONCE
Qty: 0 | Refills: 0 | Status: COMPLETED | OUTPATIENT
Start: 2019-02-20 | End: 2019-02-20

## 2019-02-20 RX ORDER — SODIUM CHLORIDE 9 MG/ML
1000 INJECTION INTRAMUSCULAR; INTRAVENOUS; SUBCUTANEOUS
Qty: 0 | Refills: 0 | Status: DISCONTINUED | OUTPATIENT
Start: 2019-02-20 | End: 2019-02-21

## 2019-02-20 RX ADMIN — CHLORHEXIDINE GLUCONATE 15 MILLILITER(S): 213 SOLUTION TOPICAL at 06:22

## 2019-02-20 RX ADMIN — CLOPIDOGREL BISULFATE 75 MILLIGRAM(S): 75 TABLET, FILM COATED ORAL at 14:59

## 2019-02-20 RX ADMIN — Medication 50 MILLIGRAM(S): at 22:35

## 2019-02-20 RX ADMIN — SENNA PLUS 2 TABLET(S): 8.6 TABLET ORAL at 22:22

## 2019-02-20 RX ADMIN — Medication 50 MILLIGRAM(S): at 14:59

## 2019-02-20 RX ADMIN — Medication 1 APPLICATION(S): at 18:48

## 2019-02-20 RX ADMIN — Medication 50 MILLIGRAM(S): at 06:20

## 2019-02-20 RX ADMIN — GABAPENTIN 300 MILLIGRAM(S): 400 CAPSULE ORAL at 22:22

## 2019-02-20 RX ADMIN — Medication 1 DROP(S): at 22:21

## 2019-02-20 RX ADMIN — Medication 1 APPLICATION(S): at 06:22

## 2019-02-20 RX ADMIN — MEMANTINE HYDROCHLORIDE 10 MILLIGRAM(S): 10 TABLET ORAL at 18:47

## 2019-02-20 RX ADMIN — Medication 100 MILLIGRAM(S): at 14:59

## 2019-02-20 RX ADMIN — CHLORHEXIDINE GLUCONATE 15 MILLILITER(S): 213 SOLUTION TOPICAL at 17:41

## 2019-02-20 RX ADMIN — ATORVASTATIN CALCIUM 40 MILLIGRAM(S): 80 TABLET, FILM COATED ORAL at 22:22

## 2019-02-20 RX ADMIN — GABAPENTIN 300 MILLIGRAM(S): 400 CAPSULE ORAL at 14:59

## 2019-02-20 RX ADMIN — MEMANTINE HYDROCHLORIDE 10 MILLIGRAM(S): 10 TABLET ORAL at 06:22

## 2019-02-20 RX ADMIN — Medication 1 DROP(S): at 06:22

## 2019-02-20 RX ADMIN — Medication 20 MILLIGRAM(S): at 15:01

## 2019-02-20 RX ADMIN — Medication 12.5 GRAM(S): at 09:14

## 2019-02-20 RX ADMIN — Medication 1 DROP(S): at 15:01

## 2019-02-20 RX ADMIN — SODIUM CHLORIDE 75 MILLILITER(S): 9 INJECTION INTRAMUSCULAR; INTRAVENOUS; SUBCUTANEOUS at 15:03

## 2019-02-20 RX ADMIN — Medication 81 MILLIGRAM(S): at 14:59

## 2019-02-20 RX ADMIN — ENOXAPARIN SODIUM 40 MILLIGRAM(S): 100 INJECTION SUBCUTANEOUS at 22:35

## 2019-02-20 NOTE — PROGRESS NOTE ADULT - PROBLEM SELECTOR PLAN 2
Presumed based on encephalopathy and +UA. Started on cipro 400mg iv in ED  -UC growing E.coli, sensitive to aztreonam, will continue   -check bladder scan for retention

## 2019-02-20 NOTE — PROGRESS NOTE ADULT - SUBJECTIVE AND OBJECTIVE BOX
Patient is a 63y old  Female who presents with a chief complaint of acute encephalopathy (19 Feb 2019 13:13)    SUBJECTIVE / OVERNIGHT EVENTS: pt noted to have minimal urine output overnight, she currently denies any discomfort.     MEDICATIONS  (STANDING):  allopurinol 100 milliGRAM(s) Oral daily  artificial  tears Solution 1 Drop(s) Both EYES every 8 hours  aspirin  chewable 81 milliGRAM(s) Oral daily  atorvastatin 40 milliGRAM(s) Oral at bedtime  aztreonam  IVPB      aztreonam  IVPB 1000 milliGRAM(s) IV Intermittent every 8 hours  chlorhexidine 0.12% Liquid 15 milliLiter(s) Oral Mucosa two times a day  clopidogrel Tablet 75 milliGRAM(s) Oral daily  dextrose 5%. 1000 milliLiter(s) (50 mL/Hr) IV Continuous <Continuous>  dextrose 50% Injectable 12.5 Gram(s) IV Push once  enoxaparin Injectable 40 milliGRAM(s) SubCutaneous every 24 hours  FLUoxetine Solution 20 milliGRAM(s) Oral daily  gabapentin 300 milliGRAM(s) Oral at bedtime  gabapentin 300 milliGRAM(s) Oral daily  insulin lispro (HumaLOG) corrective regimen sliding scale   SubCutaneous three times a day before meals  lactated ringers. 1000 milliLiter(s) (75 mL/Hr) IV Continuous <Continuous>  memantine 10 milliGRAM(s) Oral two times a day  senna 2 Tablet(s) Oral at bedtime  triamcinolone 0.1% Ointment 1 Application(s) Topical two times a day    MEDICATIONS  (PRN):  acetaminophen   Tablet .. 650 milliGRAM(s) Oral every 6 hours PRN Temp greater or equal to 38C (100.4F)  dextrose 40% Gel 15 Gram(s) Oral once PRN Blood Glucose LESS THAN 70 milliGRAM(s)/deciliter  glucagon  Injectable 1 milliGRAM(s) IntraMuscular once PRN Glucose LESS THAN 70 milligrams/deciliter  hydrALAZINE Injectable 10 milliGRAM(s) IV Push every 4 hours PRN SBP > 160  melatonin 3 milliGRAM(s) Oral at bedtime PRN Insomnia      Vital Signs Last 24 Hrs  T(C): 36.9 (20 Feb 2019 07:47), Max: 37.2 (19 Feb 2019 16:31)  T(F): 98.5 (20 Feb 2019 07:47), Max: 98.9 (19 Feb 2019 16:31)  HR: 113 (20 Feb 2019 07:47) (73 - 113)  BP: 128/87 (20 Feb 2019 07:47) (128/87 - 160/108)  BP(mean): --  RR: 18 (20 Feb 2019 07:47) (18 - 18)  SpO2: 95% (20 Feb 2019 07:47) (95% - 97%)  CAPILLARY BLOOD GLUCOSE      POCT Blood Glucose.: 92 mg/dL (20 Feb 2019 12:10)  POCT Blood Glucose.: 126 mg/dL (20 Feb 2019 09:49)  POCT Blood Glucose.: 80 mg/dL (20 Feb 2019 08:33)  POCT Blood Glucose.: 90 mg/dL (19 Feb 2019 21:43)  POCT Blood Glucose.: 105 mg/dL (19 Feb 2019 17:16)    I&O's Summary    19 Feb 2019 07:01  -  20 Feb 2019 07:00  --------------------------------------------------------  IN: 150 mL / OUT: 0 mL / NET: 150 mL      PHYSICAL EXAM:  GENERAL: NAD  EYES: conjunctiva and sclera clear  NECK: Supple  CHEST/LUNG: Clear to auscultation bilaterally; No wheeze  HEART: +S1/S2, reg   ABDOMEN: Soft, Nontender, Nondistended; Bowel sounds present  EXTREMITIES: no peripheral edema   PSYCH: Alert and awake, answers questions   NEUROLOGY: aphasic speech, R hemiparesis     LABS:                        12.5   8.77  )-----------( 357      ( 19 Feb 2019 08:17 )             41.7     02-19    141  |  101  |  13  ----------------------------<  82  4.2   |  24  |  1.11    Ca    10.4      19 Feb 2019 05:48

## 2019-02-20 NOTE — PROGRESS NOTE ADULT - PROBLEM SELECTOR PLAN 3
L MCA CVA as complication of previous L carotid stenting procedure. Some residual MCA   - deficits appear to be at baseline  - continue aspirin, plavix, high dose statin

## 2019-02-20 NOTE — PROGRESS NOTE ADULT - PROBLEM SELECTOR PLAN 5
Some scattered erythematous macules noted on face/chest. Minimally active disease  -continue triamcinolone ointment prn

## 2019-02-20 NOTE — EEG REPORT - NS EEG TEXT BOX
[facility]   COMPREHENSIVE EPILEPSY CENTER   REPORT OF ROUTINE VIDEO EEG     [facility]: [address] [place] [address], [address], [place] [address], Ph#: [phone]  [facility]: [id number], [place] [address], Ph#: [phone]  Office: [address] [place] [address], [facility] [address], [place] [address] Ph#: [phone]    Patient Name: RUPINDER TRIPP  Age and : 63y ([birth year])  MRN #: [id number]  Location: [place] [address]  Referring Physician: Nael Ocampo    Study Date: 19    _____________________________________________________________  TECHNICAL INFORMATION    Placement and Labeling of Electrodes:  The EEG was performed utilizing 20 channels referential EEG connections (coronal over temporal over parasagittal montage) using all standard 10-20 electrode placements with EKG.  Recording was at a sampling rate of 256 samples per second per channel.  Time synchronized digital video recording was done simultaneously with EEG recording.  A low light infrared camera was used for low light recording.  Aidan and seizure detection algorithms were utilized.    _____________________________________________________________  HISTORY  Patient is a 63y old  Female who presents with a chief complaint of acute encephalopathy (2019 14:24)    PERTINENT MEDICATION:  gabapentin 300 milliGRAM(s) Oral at bedtime  gabapentin 300 milliGRAM(s) Oral daily    _____________________________________________________________  STUDY INTERPRETATION    Findings: The background was continuous, spontaneously variable and reactive. No definitive posterior dominant rhythm seen.    Background Slowing:  Diffuse excess polymorphic delta slowing.    Focal Slowing:   Intermittent subtle focal polymorphic delta and theta slowing in the left frontal region (max Fp1/F3).     Sleep Background:  Drowsiness was characterized by fragmentation, attenuation, and slowing of the background activity.    Sleep was characterized by the presence of vertex waves, symmetric sleep spindles and K-complexes.    Other Non-Epileptiform Findings:  None were present.    Interictal Epileptiform Activity:   None were present.    Events:  Clinical events: None recorded.  Seizures: None recorded.    Activation Procedures:   Hyperventilation was not performed.    Photic stimulation was not performed.     Artifacts:  Intermittent myogenic and movement artifacts were noted.    ECG:  The heart rate on single channel ECG was predominantly between 100-120 BPM.    _____________________________________________________________  EEG SUMMARY/CLASSIFICATION    Abnormal EEG in the awake, drowsy and asleep states.  - Intermittent subtle focal polymorphic delta and theta slowing in the left frontal region (max Fp1/F3).   - Mild to moderate generalized slowing.    _____________________________________________________________  EEG IMPRESSION/CLINICAL CORRELATE    Abnormal EEG study.  1. Functional abnormality in the left frontal region.   2. Mild to moderate nonspecific diffuse cerebral dysfunction.   3. No epileptiform pattern or seizure seen.    ____________________________________________    Diamond Dejesus MD  Epilepsy Fellow    Yosef Tian MD  Attending Physician, [facility]

## 2019-02-20 NOTE — PROGRESS NOTE ADULT - PROBLEM SELECTOR PLAN 1
-likely toxic/metabolic in setting of presumed UTI given +UA vs. less likely but must consider seizure vs. doubt new CVA.  -CT head/CTA no new findings, shows chronic L MCA stroke  -MRI shows evolution of a left subinsular, left frontal and left corona radiata infarct, per neuro this is consistent with prior infarct, continuing with neuro checks   -neuro recs appreciated- will check routine EEG  - toxic metabolic workup as below

## 2019-02-20 NOTE — PROGRESS NOTE ADULT - PROBLEM SELECTOR PLAN 6
Permissive HTN SBP between 140-160 as per prior documentation given ICA and M2 segment stenoses and prior episode of worsening of CVA features when BP drops below 140  - continue monitoring off antihypertensives at present

## 2019-02-21 DIAGNOSIS — E16.2 HYPOGLYCEMIA, UNSPECIFIED: ICD-10-CM

## 2019-02-21 PROCEDURE — 99233 SBSQ HOSP IP/OBS HIGH 50: CPT

## 2019-02-21 RX ORDER — SODIUM CHLORIDE 9 MG/ML
1000 INJECTION, SOLUTION INTRAVENOUS
Qty: 0 | Refills: 0 | Status: DISCONTINUED | OUTPATIENT
Start: 2019-02-21 | End: 2019-02-22

## 2019-02-21 RX ORDER — DOCUSATE SODIUM 100 MG
100 CAPSULE ORAL
Qty: 0 | Refills: 0 | Status: DISCONTINUED | OUTPATIENT
Start: 2019-02-21 | End: 2019-02-26

## 2019-02-21 RX ADMIN — SODIUM CHLORIDE 75 MILLILITER(S): 9 INJECTION, SOLUTION INTRAVENOUS at 12:46

## 2019-02-21 RX ADMIN — Medication 1 DROP(S): at 21:32

## 2019-02-21 RX ADMIN — CLOPIDOGREL BISULFATE 75 MILLIGRAM(S): 75 TABLET, FILM COATED ORAL at 12:10

## 2019-02-21 RX ADMIN — GABAPENTIN 300 MILLIGRAM(S): 400 CAPSULE ORAL at 12:53

## 2019-02-21 RX ADMIN — Medication 100 MILLIGRAM(S): at 21:33

## 2019-02-21 RX ADMIN — CHLORHEXIDINE GLUCONATE 15 MILLILITER(S): 213 SOLUTION TOPICAL at 18:38

## 2019-02-21 RX ADMIN — Medication 1 APPLICATION(S): at 18:39

## 2019-02-21 RX ADMIN — Medication 1 DROP(S): at 15:28

## 2019-02-21 RX ADMIN — Medication 50 MILLIGRAM(S): at 15:28

## 2019-02-21 RX ADMIN — CHLORHEXIDINE GLUCONATE 15 MILLILITER(S): 213 SOLUTION TOPICAL at 05:44

## 2019-02-21 RX ADMIN — MEMANTINE HYDROCHLORIDE 10 MILLIGRAM(S): 10 TABLET ORAL at 21:33

## 2019-02-21 RX ADMIN — Medication 100 MILLIGRAM(S): at 12:10

## 2019-02-21 RX ADMIN — Medication 81 MILLIGRAM(S): at 12:10

## 2019-02-21 RX ADMIN — Medication 50 MILLIGRAM(S): at 21:30

## 2019-02-21 RX ADMIN — ENOXAPARIN SODIUM 40 MILLIGRAM(S): 100 INJECTION SUBCUTANEOUS at 21:37

## 2019-02-21 RX ADMIN — SODIUM CHLORIDE 75 MILLILITER(S): 9 INJECTION, SOLUTION INTRAVENOUS at 21:32

## 2019-02-21 RX ADMIN — SENNA PLUS 2 TABLET(S): 8.6 TABLET ORAL at 21:32

## 2019-02-21 RX ADMIN — Medication 50 MILLIGRAM(S): at 05:43

## 2019-02-21 RX ADMIN — SODIUM CHLORIDE 75 MILLILITER(S): 9 INJECTION INTRAMUSCULAR; INTRAVENOUS; SUBCUTANEOUS at 05:43

## 2019-02-21 RX ADMIN — Medication 1 APPLICATION(S): at 05:44

## 2019-02-21 RX ADMIN — GABAPENTIN 300 MILLIGRAM(S): 400 CAPSULE ORAL at 21:33

## 2019-02-21 RX ADMIN — ATORVASTATIN CALCIUM 40 MILLIGRAM(S): 80 TABLET, FILM COATED ORAL at 21:32

## 2019-02-21 RX ADMIN — Medication 20 MILLIGRAM(S): at 15:28

## 2019-02-21 RX ADMIN — Medication 1 DROP(S): at 05:44

## 2019-02-21 RX ADMIN — MEMANTINE HYDROCHLORIDE 10 MILLIGRAM(S): 10 TABLET ORAL at 05:44

## 2019-02-21 NOTE — PROGRESS NOTE ADULT - PROBLEM SELECTOR PLAN 1
-likely toxic/metabolic in setting of presumed UTI given +UA vs. less likely but must consider seizure vs. doubt new CVA.  -CT head/CTA no new findings, shows chronic L MCA stroke  -MRI shows evolution of a left subinsular, left frontal and left corona radiata infarct, per neuro this is consistent with prior infarct, continuing with neuro checks   -routine EEG neg for seizure activity   - toxic metabolic workup as below  -overall improving, following commands and communicating today Presumed based on encephalopathy and +UA. Started on cipro 400mg iv in ED  -UC growing E.coli, sensitive to aztreonam, will continue   -robb placed for retention

## 2019-02-21 NOTE — PROGRESS NOTE ADULT - PROBLEM SELECTOR PLAN 8
- ppx: lovenox  - diet: f/u dysphagia screen given encephalopathy No signs of respiratory distress, no obvious wheezes on exam. Comfortable and saturating well on RA  - continue monitoring  - duonebs if needed

## 2019-02-21 NOTE — PROVIDER CONTACT NOTE (OTHER) - SITUATION
wanted to know if we should give dextrose IV, even though fs glucose is not 70 or below per protocol they want fs glucose to be at least 100

## 2019-02-21 NOTE — PROVIDER CONTACT NOTE (OTHER) - ACTION/TREATMENT ORDERED:
She will speak to Dr. Ocampo about possible dextrose in IV fluid
NP notified, pt has no complaints at this time, BP rechecked to be 160/100 manual, pt stable at this time, will continue to monitor.

## 2019-02-21 NOTE — PROGRESS NOTE ADULT - PROBLEM SELECTOR PLAN 3
L MCA CVA as complication of previous L carotid stenting procedure. Some residual MCA   - deficits appear to be at baseline  - continue aspirin, plavix, high dose statin -Poor PO intake noted, encouraged to eat food that the patient likes.   -Started on IVF with dextrose for hypoglycemia due to poor PO intake. Will get nutrition consult. Can d/c fluids as intake improves.

## 2019-02-21 NOTE — PROGRESS NOTE ADULT - SUBJECTIVE AND OBJECTIVE BOX
Patient is a 63y old  Female who presents with a chief complaint of acute encephalopathy (20 Feb 2019 14:24)    SUBJECTIVE / OVERNIGHT EVENTS: pt has robb placed yesterday for urinary retention, denies any pain or discomfort today    MEDICATIONS  (STANDING):  allopurinol 100 milliGRAM(s) Oral daily  artificial  tears Solution 1 Drop(s) Both EYES every 8 hours  aspirin  chewable 81 milliGRAM(s) Oral daily  atorvastatin 40 milliGRAM(s) Oral at bedtime  aztreonam  IVPB      aztreonam  IVPB 1000 milliGRAM(s) IV Intermittent every 8 hours  chlorhexidine 0.12% Liquid 15 milliLiter(s) Oral Mucosa two times a day  clopidogrel Tablet 75 milliGRAM(s) Oral daily  dextrose 5% + sodium chloride 0.9%. 1000 milliLiter(s) (75 mL/Hr) IV Continuous <Continuous>  dextrose 5%. 1000 milliLiter(s) (50 mL/Hr) IV Continuous <Continuous>  dextrose 50% Injectable 12.5 Gram(s) IV Push once  enoxaparin Injectable 40 milliGRAM(s) SubCutaneous every 24 hours  FLUoxetine Solution 20 milliGRAM(s) Oral daily  gabapentin 300 milliGRAM(s) Oral at bedtime  gabapentin 300 milliGRAM(s) Oral daily  insulin lispro (HumaLOG) corrective regimen sliding scale   SubCutaneous three times a day before meals  memantine 10 milliGRAM(s) Oral two times a day  senna 2 Tablet(s) Oral at bedtime  triamcinolone 0.1% Ointment 1 Application(s) Topical two times a day    MEDICATIONS  (PRN):  acetaminophen   Tablet .. 650 milliGRAM(s) Oral every 6 hours PRN Temp greater or equal to 38C (100.4F)  dextrose 40% Gel 15 Gram(s) Oral once PRN Blood Glucose LESS THAN 70 milliGRAM(s)/deciliter  glucagon  Injectable 1 milliGRAM(s) IntraMuscular once PRN Glucose LESS THAN 70 milligrams/deciliter  hydrALAZINE Injectable 10 milliGRAM(s) IV Push every 4 hours PRN SBP > 160  melatonin 3 milliGRAM(s) Oral at bedtime PRN Insomnia      Vital Signs Last 24 Hrs  T(C): 36.7 (21 Feb 2019 07:38), Max: 36.7 (20 Feb 2019 23:32)  T(F): 98.1 (21 Feb 2019 07:38), Max: 98.1 (21 Feb 2019 07:38)  HR: 107 (21 Feb 2019 07:38) (107 - 113)  BP: 112/69 (21 Feb 2019 07:38) (109/63 - 112/69)  BP(mean): --  RR: 18 (21 Feb 2019 07:38) (18 - 18)  SpO2: 96% (21 Feb 2019 07:38) (95% - 96%)  CAPILLARY BLOOD GLUCOSE      POCT Blood Glucose.: 84 mg/dL (21 Feb 2019 12:36)  POCT Blood Glucose.: 126 mg/dL (21 Feb 2019 09:05)  POCT Blood Glucose.: 85 mg/dL (21 Feb 2019 08:13)  POCT Blood Glucose.: 89 mg/dL (21 Feb 2019 07:40)  POCT Blood Glucose.: 88 mg/dL (20 Feb 2019 21:55)  POCT Blood Glucose.: 106 mg/dL (20 Feb 2019 16:49)    I&O's Summary    20 Feb 2019 07:01  -  21 Feb 2019 07:00  --------------------------------------------------------  IN: 900 mL / OUT: 1350 mL / NET: -450 mL        PHYSICAL EXAM:  GENERAL: NAD  EYES:  conjunctiva and sclera clear  NECK: Supple, No JVD  CHEST/LUNG: Clear to auscultation bilaterally; No wheeze  HEART: +S1/S2, reg   ABDOMEN: Soft, Nontender, Nondistended; Bowel sounds present  EXTREMITIES:  no edema   : robb in place with clear urine   PSYCH: Alert and awake, follows commands   NEUROLOGY: aphasic speech

## 2019-02-21 NOTE — PROGRESS NOTE ADULT - PROBLEM SELECTOR PLAN 4
Appears to be stable- not on any immunosuppressives or DMARD  - cont gabapentin 300mg po bid for pain. L MCA CVA as complication of previous L carotid stenting procedure. Some residual MCA   - deficits appear to be at baseline  - continue aspirin, plavix, high dose statin

## 2019-02-21 NOTE — PROGRESS NOTE ADULT - PROBLEM SELECTOR PLAN 5
Some scattered erythematous macules noted on face/chest. Minimally active disease  -continue triamcinolone ointment prn Appears to be stable- not on any immunosuppressives or DMARD  - cont gabapentin 300mg po bid for pain.

## 2019-02-21 NOTE — PROGRESS NOTE ADULT - PROBLEM SELECTOR PLAN 2
Presumed based on encephalopathy and +UA. Started on cipro 400mg iv in ED  -UC growing E.coli, sensitive to aztreonam, will continue   -robb placed for retention -likely toxic/metabolic in setting of presumed UTI given +UA vs. less likely but must consider seizure vs. doubt new CVA.  -CT head/CTA no new findings, shows chronic L MCA stroke  -MRI shows evolution of a left subinsular, left frontal and left corona radiata infarct, per neuro this is consistent with prior infarct, continuing with neuro checks   -routine EEG neg for seizure activity   - toxic metabolic workup as below  -overall improving, following commands and communicating today

## 2019-02-21 NOTE — PROGRESS NOTE ADULT - PROBLEM SELECTOR PLAN 7
No signs of respiratory distress, no obvious wheezes on exam. Comfortable and saturating well on RA  - continue monitoring  - duonebs if needed Permissive HTN SBP between 140-160 as per prior documentation given ICA and M2 segment stenoses and prior episode of worsening of CVA features when BP drops below 140  - continue monitoring off antihypertensives at present

## 2019-02-22 LAB
ANION GAP SERPL CALC-SCNC: 13 MMOL/L — SIGNIFICANT CHANGE UP (ref 5–17)
BUN SERPL-MCNC: 11 MG/DL — SIGNIFICANT CHANGE UP (ref 7–23)
CALCIUM SERPL-MCNC: 9 MG/DL — SIGNIFICANT CHANGE UP (ref 8.4–10.5)
CHLORIDE SERPL-SCNC: 103 MMOL/L — SIGNIFICANT CHANGE UP (ref 96–108)
CO2 SERPL-SCNC: 23 MMOL/L — SIGNIFICANT CHANGE UP (ref 22–31)
CREAT SERPL-MCNC: 0.81 MG/DL — SIGNIFICANT CHANGE UP (ref 0.5–1.3)
GLUCOSE SERPL-MCNC: 79 MG/DL — SIGNIFICANT CHANGE UP (ref 70–99)
HCT VFR BLD CALC: 36.3 % — SIGNIFICANT CHANGE UP (ref 34.5–45)
HGB BLD-MCNC: 10.8 G/DL — LOW (ref 11.5–15.5)
MCHC RBC-ENTMCNC: 21.8 PG — LOW (ref 27–34)
MCHC RBC-ENTMCNC: 29.8 GM/DL — LOW (ref 32–36)
MCV RBC AUTO: 73.2 FL — LOW (ref 80–100)
PLATELET # BLD AUTO: 290 K/UL — SIGNIFICANT CHANGE UP (ref 150–400)
POTASSIUM SERPL-MCNC: 3.6 MMOL/L — SIGNIFICANT CHANGE UP (ref 3.5–5.3)
POTASSIUM SERPL-SCNC: 3.6 MMOL/L — SIGNIFICANT CHANGE UP (ref 3.5–5.3)
RBC # BLD: 4.96 M/UL — SIGNIFICANT CHANGE UP (ref 3.8–5.2)
RBC # FLD: 17 % — HIGH (ref 10.3–14.5)
SODIUM SERPL-SCNC: 139 MMOL/L — SIGNIFICANT CHANGE UP (ref 135–145)
WBC # BLD: 10.16 K/UL — SIGNIFICANT CHANGE UP (ref 3.8–10.5)
WBC # FLD AUTO: 10.16 K/UL — SIGNIFICANT CHANGE UP (ref 3.8–10.5)

## 2019-02-22 PROCEDURE — 99233 SBSQ HOSP IP/OBS HIGH 50: CPT

## 2019-02-22 RX ADMIN — Medication 1 DROP(S): at 21:29

## 2019-02-22 RX ADMIN — Medication 50 MILLIGRAM(S): at 21:29

## 2019-02-22 RX ADMIN — GABAPENTIN 300 MILLIGRAM(S): 400 CAPSULE ORAL at 21:29

## 2019-02-22 RX ADMIN — MEMANTINE HYDROCHLORIDE 10 MILLIGRAM(S): 10 TABLET ORAL at 06:02

## 2019-02-22 RX ADMIN — CLOPIDOGREL BISULFATE 75 MILLIGRAM(S): 75 TABLET, FILM COATED ORAL at 13:11

## 2019-02-22 RX ADMIN — ENOXAPARIN SODIUM 40 MILLIGRAM(S): 100 INJECTION SUBCUTANEOUS at 21:30

## 2019-02-22 RX ADMIN — Medication 100 MILLIGRAM(S): at 06:02

## 2019-02-22 RX ADMIN — Medication 20 MILLIGRAM(S): at 13:10

## 2019-02-22 RX ADMIN — GABAPENTIN 300 MILLIGRAM(S): 400 CAPSULE ORAL at 13:10

## 2019-02-22 RX ADMIN — CHLORHEXIDINE GLUCONATE 15 MILLILITER(S): 213 SOLUTION TOPICAL at 06:01

## 2019-02-22 RX ADMIN — ATORVASTATIN CALCIUM 40 MILLIGRAM(S): 80 TABLET, FILM COATED ORAL at 21:29

## 2019-02-22 RX ADMIN — Medication 100 MILLIGRAM(S): at 13:10

## 2019-02-22 RX ADMIN — Medication 1 DROP(S): at 13:11

## 2019-02-22 RX ADMIN — Medication 50 MILLIGRAM(S): at 13:10

## 2019-02-22 RX ADMIN — Medication 1 DROP(S): at 06:01

## 2019-02-22 RX ADMIN — SENNA PLUS 2 TABLET(S): 8.6 TABLET ORAL at 21:29

## 2019-02-22 RX ADMIN — Medication 81 MILLIGRAM(S): at 13:10

## 2019-02-22 RX ADMIN — MEMANTINE HYDROCHLORIDE 10 MILLIGRAM(S): 10 TABLET ORAL at 19:03

## 2019-02-22 RX ADMIN — Medication 100 MILLIGRAM(S): at 19:02

## 2019-02-22 RX ADMIN — Medication 1 APPLICATION(S): at 06:01

## 2019-02-22 RX ADMIN — CHLORHEXIDINE GLUCONATE 15 MILLILITER(S): 213 SOLUTION TOPICAL at 19:02

## 2019-02-22 RX ADMIN — Medication 50 MILLIGRAM(S): at 05:58

## 2019-02-22 RX ADMIN — Medication 1 APPLICATION(S): at 19:03

## 2019-02-22 NOTE — DIETITIAN INITIAL EVALUATION ADULT. - NS AS NUTRI INTERV MEALS SNACK
Continue c current diet as tolerated. Continue to provide assistance c menus to ensure pt is able to get foods she likes to eat.

## 2019-02-22 NOTE — DIETITIAN INITIAL EVALUATION ADULT. - FACTORS AFF FOOD INTAKE
pt reports in house she has been eating fairly, as per RN, pt eats better when she receives food she likes it eat- extensive time spent c pt to complete menu for tomorrow to provide foods pt will like to eat; noted per [facility]BS on 11/28/2018, pt recommended for soft diet c thin liquids; pt reports she does not have daily BMs, declined prunes or prune juice, agreeable to taking stool softeners- noted pt is already on bowel regimen pt reports in house she has been eating fairly, as per RN, pt eats better when she receives food she likes it eat- extensive time spent c pt to complete menu for tomorrow to provide foods pt will like to eat; noted per MBS on 11/28/2018, pt recommended for soft diet c thin liquids; pt reports she does not have daily BMs, declined prunes or prune juice, agreeable to taking stool softeners- noted pt is already on bowel regimen; RN did not report any chewing/swallowing issues at this time

## 2019-02-22 NOTE — PROGRESS NOTE ADULT - PROBLEM SELECTOR PLAN 3
-likely toxic/metabolic in setting of presumed UTI given +UA vs. less likely but must consider seizure vs. doubt new CVA.  -CT head/CTA no new findings, shows chronic L MCA stroke  -MRI shows evolution of a left subinsular, left frontal and left corona radiata infarct, per neuro this is consistent with prior infarct, continuing with neuro checks   -routine EEG neg for seizure activity   - toxic metabolic workup as above   -overall improving, following commands and communicating

## 2019-02-22 NOTE — DIETITIAN INITIAL EVALUATION ADULT. - PROBLEM SELECTOR PLAN 2
Presumed based on encephalopathy and +UA. Started on cipro 400mg iv in ED  - would treat for now: patient with recent hospitalization and in institutional environment, would switch to aztreonam 1gm iv q 8 hours- pt documented to have PCN allergy  - low utility for blood cultures at this juncture

## 2019-02-22 NOTE — PROGRESS NOTE ADULT - PROBLEM SELECTOR PLAN 2
Presumed based on encephalopathy and +UA. Started on cipro 400mg iv in ED  -UC growing E.coli, sensitive to aztreonam, will continue   -robb placed for retention

## 2019-02-22 NOTE — DIETITIAN INITIAL EVALUATION ADULT. - OTHER INFO
Pt seen for consult for "Assessment." Noted per previous RD note from November 2018, wt of 138-142 pounds, noted admit wt now of 134.4 pounds, wt relatively stable at this time. Pt confirmed allergies to the following: eggs (able to tolerate them in bakery products), mushrooms, shellfish, fish (able to tolerate tuna fish), tomatoes (fresh only), peanuts. Pt reports she dislikes the Ensure Enlive and did not want to try any other supplements at this time. Noted pt was taking zinC sulfate PTA. Discussed importance of adequate at all meals especially given pt had hypoglycemia previously/

## 2019-02-22 NOTE — DIETITIAN INITIAL EVALUATION ADULT. - ENERGY NEEDS
ht: 4'9" (written by pt), wt:134.4 pounds, BMI: kg/m2, IBW: pounds +/- 10% ht: 4'9" (written by pt), wt:134.4 pounds, BMI: 29.1 kg/m2, IBW: 93 pounds +/- 10%    Pt admitted c AMS, presents from NH with acute encephalopathy likely secondary to UTI without sepsis versus possible seizure versus less likely acute on chronic CVA.

## 2019-02-22 NOTE — DIETITIAN INITIAL EVALUATION ADULT. - ORAL INTAKE PTA
reports at rehab she was eating well and had a good appetite, confirms she was eating at all 3 meals/good

## 2019-02-22 NOTE — PROGRESS NOTE ADULT - SUBJECTIVE AND OBJECTIVE BOX
Patient is a 63y old  Female who presents with a chief complaint of acute encephalopathy (21 Feb 2019 12:53)    SUBJECTIVE / OVERNIGHT EVENTS: no acute events overnight     MEDICATIONS  (STANDING):  allopurinol 100 milliGRAM(s) Oral daily  artificial  tears Solution 1 Drop(s) Both EYES every 8 hours  aspirin  chewable 81 milliGRAM(s) Oral daily  atorvastatin 40 milliGRAM(s) Oral at bedtime  aztreonam  IVPB      aztreonam  IVPB 1000 milliGRAM(s) IV Intermittent every 8 hours  chlorhexidine 0.12% Liquid 15 milliLiter(s) Oral Mucosa two times a day  clopidogrel Tablet 75 milliGRAM(s) Oral daily  dextrose 5% + sodium chloride 0.9%. 1000 milliLiter(s) (75 mL/Hr) IV Continuous <Continuous>  dextrose 5%. 1000 milliLiter(s) (50 mL/Hr) IV Continuous <Continuous>  dextrose 50% Injectable 12.5 Gram(s) IV Push once  docusate sodium 100 milliGRAM(s) Oral two times a day  enoxaparin Injectable 40 milliGRAM(s) SubCutaneous every 24 hours  FLUoxetine Solution 20 milliGRAM(s) Oral daily  gabapentin 300 milliGRAM(s) Oral at bedtime  gabapentin 300 milliGRAM(s) Oral daily  insulin lispro (HumaLOG) corrective regimen sliding scale   SubCutaneous three times a day before meals  memantine 10 milliGRAM(s) Oral two times a day  senna 2 Tablet(s) Oral at bedtime  triamcinolone 0.1% Ointment 1 Application(s) Topical two times a day    MEDICATIONS  (PRN):  acetaminophen   Tablet .. 650 milliGRAM(s) Oral every 6 hours PRN Temp greater or equal to 38C (100.4F)  dextrose 40% Gel 15 Gram(s) Oral once PRN Blood Glucose LESS THAN 70 milliGRAM(s)/deciliter  glucagon  Injectable 1 milliGRAM(s) IntraMuscular once PRN Glucose LESS THAN 70 milligrams/deciliter  hydrALAZINE Injectable 10 milliGRAM(s) IV Push every 4 hours PRN SBP > 160  melatonin 3 milliGRAM(s) Oral at bedtime PRN Insomnia      Vital Signs Last 24 Hrs  T(C): 36.7 (22 Feb 2019 07:22), Max: 36.8 (21 Feb 2019 23:43)  T(F): 98.1 (22 Feb 2019 07:22), Max: 98.2 (21 Feb 2019 23:43)  HR: 101 (22 Feb 2019 07:22) (101 - 113)  BP: 142/88 (22 Feb 2019 07:22) (139/85 - 149/90)  BP(mean): --  RR: 18 (22 Feb 2019 07:22) (18 - 18)  SpO2: 97% (22 Feb 2019 07:22) (96% - 97%)  CAPILLARY BLOOD GLUCOSE      POCT Blood Glucose.: 106 mg/dL (22 Feb 2019 12:09)  POCT Blood Glucose.: 102 mg/dL (22 Feb 2019 08:23)  POCT Blood Glucose.: 98 mg/dL (21 Feb 2019 21:15)  POCT Blood Glucose.: 138 mg/dL (21 Feb 2019 17:04)  POCT Blood Glucose.: 136 mg/dL (21 Feb 2019 13:31)    I&O's Summary    21 Feb 2019 07:01  -  22 Feb 2019 07:00  --------------------------------------------------------  IN: 380 mL / OUT: 800 mL / NET: -420 mL    22 Feb 2019 07:01  -  22 Feb 2019 12:53  --------------------------------------------------------  IN: 160 mL / OUT: 0 mL / NET: 160 mL        PHYSICAL EXAM:  GENERAL: NAD, sitting up in bed   EYES:  conjunctiva and sclera clear  NECK: Supple, No JVD  CHEST/LUNG: Clear to auscultation bilaterally; No wheeze  HEART: +S1/S2, reg   ABDOMEN: Soft, Nontender, Nondistended; Bowel sounds present  EXTREMITIES:  no edema   : robb in place with clear urine   PSYCH: Alert and awake, follows commands   NEUROLOGY: aphasic speech       LABS:                        10.8   10.16 )-----------( 290      ( 22 Feb 2019 10:01 )             36.3     02-22    139  |  103  |  11  ----------------------------<  79  3.6   |  23  |  0.81    Ca    9.0      22 Feb 2019 07:27

## 2019-02-22 NOTE — DIETITIAN INITIAL EVALUATION ADULT. - PROBLEM SELECTOR PLAN 1
likely toxic/metabolic in setting of presumed UTI given +UA vs. less likely but must consider seizure vs. doubt new CVA. Appears to be improved at present.  - CT head/CTA no new findings, shows chronic L MCA stroke  - neuro recs appreciated- will check routine EEG  - f/u MRI brain noncontrast- stent performed at [facility] and is MR compatible.  - toxic metabolic workup as below  - continue neuro checks

## 2019-02-22 NOTE — DIETITIAN INITIAL EVALUATION ADULT. - NS AS NUTRI INTERV ED CONTENT
Encouraged pt to consume all of her meals, emphasized nutrient dense foods, discussed role of fiber and fluid to promote regulation of BMs. Encouraged her to ask for assistance c menus and meals as needed.

## 2019-02-22 NOTE — PROGRESS NOTE ADULT - PROBLEM SELECTOR PLAN 1
-Poor PO intake noted, encouraged to eat food that the patient likes.   -Started on IVF with dextrose for hypoglycemia due to poor PO intake. Pt noted to be eating more today, will d/c IVF and observe  -Nutrition to see patient today

## 2019-02-23 DIAGNOSIS — T17.908A UNSPECIFIED FOREIGN BODY IN RESPIRATORY TRACT, PART UNSPECIFIED CAUSING OTHER INJURY, INITIAL ENCOUNTER: ICD-10-CM

## 2019-02-23 LAB
HCT VFR BLD CALC: 38.3 % — SIGNIFICANT CHANGE UP (ref 34.5–45)
HGB BLD-MCNC: 11 G/DL — LOW (ref 11.5–15.5)
MCHC RBC-ENTMCNC: 21.5 PG — LOW (ref 27–34)
MCHC RBC-ENTMCNC: 28.7 GM/DL — LOW (ref 32–36)
MCV RBC AUTO: 74.8 FL — LOW (ref 80–100)
PLATELET # BLD AUTO: 295 K/UL — SIGNIFICANT CHANGE UP (ref 150–400)
RBC # BLD: 5.12 M/UL — SIGNIFICANT CHANGE UP (ref 3.8–5.2)
RBC # FLD: 17.2 % — HIGH (ref 10.3–14.5)
WBC # BLD: 10.03 K/UL — SIGNIFICANT CHANGE UP (ref 3.8–10.5)
WBC # FLD AUTO: 10.03 K/UL — SIGNIFICANT CHANGE UP (ref 3.8–10.5)

## 2019-02-23 PROCEDURE — 99233 SBSQ HOSP IP/OBS HIGH 50: CPT

## 2019-02-23 PROCEDURE — 71045 X-RAY EXAM CHEST 1 VIEW: CPT | Mod: 26

## 2019-02-23 RX ORDER — CIPROFLOXACIN LACTATE 400MG/40ML
400 VIAL (ML) INTRAVENOUS EVERY 12 HOURS
Qty: 0 | Refills: 0 | Status: COMPLETED | OUTPATIENT
Start: 2019-02-23 | End: 2019-02-24

## 2019-02-23 RX ADMIN — Medication 50 MILLIGRAM(S): at 05:30

## 2019-02-23 RX ADMIN — GABAPENTIN 300 MILLIGRAM(S): 400 CAPSULE ORAL at 21:10

## 2019-02-23 RX ADMIN — SENNA PLUS 2 TABLET(S): 8.6 TABLET ORAL at 21:10

## 2019-02-23 RX ADMIN — Medication 1 DROP(S): at 21:11

## 2019-02-23 RX ADMIN — Medication 100 MILLIGRAM(S): at 05:31

## 2019-02-23 RX ADMIN — GABAPENTIN 300 MILLIGRAM(S): 400 CAPSULE ORAL at 12:40

## 2019-02-23 RX ADMIN — Medication 200 MILLIGRAM(S): at 17:50

## 2019-02-23 RX ADMIN — MEMANTINE HYDROCHLORIDE 10 MILLIGRAM(S): 10 TABLET ORAL at 17:51

## 2019-02-23 RX ADMIN — Medication 1 DROP(S): at 05:31

## 2019-02-23 RX ADMIN — ATORVASTATIN CALCIUM 40 MILLIGRAM(S): 80 TABLET, FILM COATED ORAL at 21:10

## 2019-02-23 RX ADMIN — Medication 1 DROP(S): at 12:46

## 2019-02-23 RX ADMIN — CLOPIDOGREL BISULFATE 75 MILLIGRAM(S): 75 TABLET, FILM COATED ORAL at 12:39

## 2019-02-23 RX ADMIN — CHLORHEXIDINE GLUCONATE 15 MILLILITER(S): 213 SOLUTION TOPICAL at 05:31

## 2019-02-23 RX ADMIN — MEMANTINE HYDROCHLORIDE 10 MILLIGRAM(S): 10 TABLET ORAL at 05:30

## 2019-02-23 RX ADMIN — Medication 100 MILLIGRAM(S): at 12:39

## 2019-02-23 RX ADMIN — Medication 1 APPLICATION(S): at 17:52

## 2019-02-23 RX ADMIN — Medication 81 MILLIGRAM(S): at 12:39

## 2019-02-23 RX ADMIN — Medication 1 APPLICATION(S): at 05:31

## 2019-02-23 RX ADMIN — Medication 100 MILLIGRAM(S): at 17:52

## 2019-02-23 RX ADMIN — CHLORHEXIDINE GLUCONATE 15 MILLILITER(S): 213 SOLUTION TOPICAL at 17:50

## 2019-02-23 RX ADMIN — Medication 20 MILLIGRAM(S): at 12:40

## 2019-02-23 NOTE — PHYSICAL THERAPY INITIAL EVALUATION ADULT - PERTINENT HX OF CURRENT PROBLEM, REHAB EVAL
62 y/o F with PMHx L M2 CVA (residual global aphasia, R hemiparesis after L transcarotid arterial stent), RA presents from NH with acute encephalopathy and unresponsiveness likely 2/2 UTI without sepsis vs possible seizure vs less likely acute on chronic CVA. Pt normally non verbal but interactive. MRI Head: no acute infarct. Evolution of a L subinsular, L frontal, and L corona radiata infarct.

## 2019-02-23 NOTE — PROGRESS NOTE ADULT - PROBLEM SELECTOR PLAN 2
-Poor PO intake noted, encouraged to eat food that the patient likes.   -improved po intake and sugars. continue to monitor.

## 2019-02-23 NOTE — PHYSICAL THERAPY INITIAL EVALUATION ADULT - ADDITIONAL COMMENTS
As per care coordinator note pt was in [facility] for acute rehab 11/2018 after she had a CVA and then transferred to [facility] for [facility]ANDANA. Pt required assistance with ADLs and functional mobility prior to admission. Prior to CVA pt was living with her sister and was fully independent.

## 2019-02-23 NOTE — PROGRESS NOTE ADULT - PROBLEM SELECTOR PLAN 1
possible aspiration as per the patient, not septic, sats well with no respiratory distress.   Will check CXR  repeat S/S eval.

## 2019-02-23 NOTE — PROGRESS NOTE ADULT - SUBJECTIVE AND OBJECTIVE BOX
Patient is a 63y old  Female who presents with a chief complaint of acute encephalopathy (22 Feb 2019 12:52)    SUBJECTIVE / OVERNIGHT EVENTS:    Patient unable to speak due to stroke but communicate via gestures. No acute events overnight. Patient states she felt like she aspirated and was coughing after her meals.     MEDICATIONS  (STANDING):  allopurinol 100 milliGRAM(s) Oral daily  artificial  tears Solution 1 Drop(s) Both EYES every 8 hours  aspirin  chewable 81 milliGRAM(s) Oral daily  atorvastatin 40 milliGRAM(s) Oral at bedtime  chlorhexidine 0.12% Liquid 15 milliLiter(s) Oral Mucosa two times a day  ciprofloxacin   IVPB 400 milliGRAM(s) IV Intermittent every 12 hours  clopidogrel Tablet 75 milliGRAM(s) Oral daily  dextrose 5%. 1000 milliLiter(s) (50 mL/Hr) IV Continuous <Continuous>  dextrose 50% Injectable 12.5 Gram(s) IV Push once  docusate sodium 100 milliGRAM(s) Oral two times a day  enoxaparin Injectable 40 milliGRAM(s) SubCutaneous every 24 hours  FLUoxetine Solution 20 milliGRAM(s) Oral daily  gabapentin 300 milliGRAM(s) Oral at bedtime  gabapentin 300 milliGRAM(s) Oral daily  insulin lispro (HumaLOG) corrective regimen sliding scale   SubCutaneous three times a day before meals  memantine 10 milliGRAM(s) Oral two times a day  senna 2 Tablet(s) Oral at bedtime  triamcinolone 0.1% Ointment 1 Application(s) Topical two times a day    MEDICATIONS  (PRN):  acetaminophen   Tablet .. 650 milliGRAM(s) Oral every 6 hours PRN Temp greater or equal to 38C (100.4F)  dextrose 40% Gel 15 Gram(s) Oral once PRN Blood Glucose LESS THAN 70 milliGRAM(s)/deciliter  glucagon  Injectable 1 milliGRAM(s) IntraMuscular once PRN Glucose LESS THAN 70 milligrams/deciliter  hydrALAZINE Injectable 10 milliGRAM(s) IV Push every 4 hours PRN SBP > 160  melatonin 3 milliGRAM(s) Oral at bedtime PRN Insomnia        CAPILLARY BLOOD GLUCOSE      POCT Blood Glucose.: 114 mg/dL (23 Feb 2019 09:02)  POCT Blood Glucose.: 78 mg/dL (23 Feb 2019 08:12)  POCT Blood Glucose.: 87 mg/dL (22 Feb 2019 21:23)  POCT Blood Glucose.: 104 mg/dL (22 Feb 2019 17:58)    I&O's Summary    22 Feb 2019 07:01  -  23 Feb 2019 07:00  --------------------------------------------------------  IN: 580 mL / OUT: 1950 mL / NET: -1370 mL    23 Feb 2019 07:01  -  23 Feb 2019 12:10  --------------------------------------------------------  IN: 120 mL / OUT: 0 mL / NET: 120 mL    Vital Signs Last 24 Hrs  T(C): 37.3 (23 Feb 2019 07:30), Max: 37.3 (23 Feb 2019 07:30)  T(F): 99.2 (23 Feb 2019 07:30), Max: 99.2 (23 Feb 2019 07:30)  HR: 92 (23 Feb 2019 07:30) (92 - 100)  BP: 126/83 (23 Feb 2019 07:30) (126/83 - 138/87)  BP(mean): --  RR: 18 (23 Feb 2019 07:30) (17 - 18)  SpO2: 98% (23 Feb 2019 07:30) (96% - 98%)    PHYSICAL EXAM  GENERAL: NAD, well-developed  HEAD:  Atraumatic, Normocephalic  EYES: EOMI, PERRLA, conjunctiva and sclera clear  NECK: Supple, No JVD  CHEST/LUNG: left sided rhonchi; No wheeze  HEART: Regular rate and rhythm; No murmurs, rubs, or gallops  ABDOMEN: Soft, Nontender, Nondistended; Bowel sounds present  EXTREMITIES:  2+ Peripheral Pulses, No clubbing, cyanosis, or edema  PSYCH: Awake and follows command.   Neuro: aphasic, Right near hemiparesis  SKIN: No rashes or lesions    LABS:                        10.8   10.16 )-----------( 290      ( 22 Feb 2019 10:01 )             36.3     02-22    139  |  103  |  11  ----------------------------<  79  3.6   |  23  |  0.81    Ca    9.0      22 Feb 2019 07:27                RADIOLOGY & ADDITIONAL TESTS:    Imaging Personally Reviewed:  Consultant(s) Notes Reviewed:  Dietitian   Care Discussed with Consultants/Other Providers:

## 2019-02-23 NOTE — PROGRESS NOTE ADULT - PROBLEM SELECTOR PLAN 3
Presumed based on encephalopathy and +UA. Started on cipro 400mg iv in ED  -UC growing E.coli, sensitive to Cipro, has been on aztreonam will de-escalate. Day 6/7 of atb.   -robb placed for retention, TOV tonight.

## 2019-02-24 LAB
ANION GAP SERPL CALC-SCNC: 12 MMOL/L — SIGNIFICANT CHANGE UP (ref 5–17)
BUN SERPL-MCNC: 8 MG/DL — SIGNIFICANT CHANGE UP (ref 7–23)
CALCIUM SERPL-MCNC: 9.3 MG/DL — SIGNIFICANT CHANGE UP (ref 8.4–10.5)
CHLORIDE SERPL-SCNC: 101 MMOL/L — SIGNIFICANT CHANGE UP (ref 96–108)
CO2 SERPL-SCNC: 26 MMOL/L — SIGNIFICANT CHANGE UP (ref 22–31)
CREAT SERPL-MCNC: 0.77 MG/DL — SIGNIFICANT CHANGE UP (ref 0.5–1.3)
GLUCOSE SERPL-MCNC: 74 MG/DL — SIGNIFICANT CHANGE UP (ref 70–99)
POTASSIUM SERPL-MCNC: 3.2 MMOL/L — LOW (ref 3.5–5.3)
POTASSIUM SERPL-SCNC: 3.2 MMOL/L — LOW (ref 3.5–5.3)
SODIUM SERPL-SCNC: 139 MMOL/L — SIGNIFICANT CHANGE UP (ref 135–145)

## 2019-02-24 PROCEDURE — 99233 SBSQ HOSP IP/OBS HIGH 50: CPT

## 2019-02-24 RX ORDER — POTASSIUM CHLORIDE 20 MEQ
40 PACKET (EA) ORAL EVERY 4 HOURS
Qty: 0 | Refills: 0 | Status: COMPLETED | OUTPATIENT
Start: 2019-02-24 | End: 2019-02-24

## 2019-02-24 RX ORDER — POLYETHYLENE GLYCOL 3350 17 G/17G
17 POWDER, FOR SOLUTION ORAL
Qty: 0 | Refills: 0 | Status: DISCONTINUED | OUTPATIENT
Start: 2019-02-24 | End: 2019-02-26

## 2019-02-24 RX ADMIN — Medication 40 MILLIEQUIVALENT(S): at 18:28

## 2019-02-24 RX ADMIN — ENOXAPARIN SODIUM 40 MILLIGRAM(S): 100 INJECTION SUBCUTANEOUS at 22:15

## 2019-02-24 RX ADMIN — GABAPENTIN 300 MILLIGRAM(S): 400 CAPSULE ORAL at 22:12

## 2019-02-24 RX ADMIN — Medication 1 APPLICATION(S): at 18:29

## 2019-02-24 RX ADMIN — CHLORHEXIDINE GLUCONATE 15 MILLILITER(S): 213 SOLUTION TOPICAL at 18:28

## 2019-02-24 RX ADMIN — Medication 200 MILLIGRAM(S): at 05:53

## 2019-02-24 RX ADMIN — MEMANTINE HYDROCHLORIDE 10 MILLIGRAM(S): 10 TABLET ORAL at 18:28

## 2019-02-24 RX ADMIN — Medication 1 DROP(S): at 22:14

## 2019-02-24 RX ADMIN — GABAPENTIN 300 MILLIGRAM(S): 400 CAPSULE ORAL at 13:46

## 2019-02-24 RX ADMIN — Medication 100 MILLIGRAM(S): at 18:28

## 2019-02-24 RX ADMIN — ATORVASTATIN CALCIUM 40 MILLIGRAM(S): 80 TABLET, FILM COATED ORAL at 22:13

## 2019-02-24 RX ADMIN — CLOPIDOGREL BISULFATE 75 MILLIGRAM(S): 75 TABLET, FILM COATED ORAL at 13:46

## 2019-02-24 RX ADMIN — Medication 200 MILLIGRAM(S): at 18:27

## 2019-02-24 RX ADMIN — Medication 81 MILLIGRAM(S): at 13:46

## 2019-02-24 RX ADMIN — SENNA PLUS 2 TABLET(S): 8.6 TABLET ORAL at 22:14

## 2019-02-24 RX ADMIN — Medication 100 MILLIGRAM(S): at 05:53

## 2019-02-24 RX ADMIN — Medication 1 DROP(S): at 05:54

## 2019-02-24 RX ADMIN — Medication 20 MILLIGRAM(S): at 13:46

## 2019-02-24 RX ADMIN — CHLORHEXIDINE GLUCONATE 15 MILLILITER(S): 213 SOLUTION TOPICAL at 05:53

## 2019-02-24 RX ADMIN — Medication 40 MILLIEQUIVALENT(S): at 13:51

## 2019-02-24 RX ADMIN — MEMANTINE HYDROCHLORIDE 10 MILLIGRAM(S): 10 TABLET ORAL at 05:53

## 2019-02-24 RX ADMIN — ENOXAPARIN SODIUM 40 MILLIGRAM(S): 100 INJECTION SUBCUTANEOUS at 00:55

## 2019-02-24 RX ADMIN — Medication 1 DROP(S): at 13:50

## 2019-02-24 RX ADMIN — Medication 100 MILLIGRAM(S): at 13:49

## 2019-02-24 RX ADMIN — Medication 1 APPLICATION(S): at 05:54

## 2019-02-24 NOTE — PROGRESS NOTE ADULT - PROBLEM SELECTOR PLAN 1
possible aspiration as per the patient, not septic, sats well with no respiratory distress.   CXR WNL  repeat S/S eval.

## 2019-02-24 NOTE — PROGRESS NOTE ADULT - PROBLEM SELECTOR PLAN 3
Presumed based on encephalopathy and +UA. Started on cipro 400mg iv in ED  -UC growing E.coli, sensitive to Cipro, has been on aztreonam will de-escalate. Day 7/7 of atb.   -passed TOV

## 2019-02-24 NOTE — PROGRESS NOTE ADULT - SUBJECTIVE AND OBJECTIVE BOX
Patient is a 63y old  Female who presents with a chief complaint of acute encephalopathy (23 Feb 2019 12:09)    SUBJECTIVE / OVERNIGHT EVENTS:    No acute events overnight. Patient passed TOV. Aphasic but able to communicate via hand gestures. Still having minimal dry cough.    MEDICATIONS  (STANDING):  allopurinol 100 milliGRAM(s) Oral daily  artificial  tears Solution 1 Drop(s) Both EYES every 8 hours  aspirin  chewable 81 milliGRAM(s) Oral daily  atorvastatin 40 milliGRAM(s) Oral at bedtime  chlorhexidine 0.12% Liquid 15 milliLiter(s) Oral Mucosa two times a day  ciprofloxacin   IVPB 400 milliGRAM(s) IV Intermittent every 12 hours  clopidogrel Tablet 75 milliGRAM(s) Oral daily  dextrose 5%. 1000 milliLiter(s) (50 mL/Hr) IV Continuous <Continuous>  dextrose 50% Injectable 12.5 Gram(s) IV Push once  docusate sodium 100 milliGRAM(s) Oral two times a day  enoxaparin Injectable 40 milliGRAM(s) SubCutaneous every 24 hours  FLUoxetine Solution 20 milliGRAM(s) Oral daily  gabapentin 300 milliGRAM(s) Oral at bedtime  gabapentin 300 milliGRAM(s) Oral daily  insulin lispro (HumaLOG) corrective regimen sliding scale   SubCutaneous three times a day before meals  memantine 10 milliGRAM(s) Oral two times a day  polyethylene glycol 3350 17 Gram(s) Oral two times a day  potassium chloride   Powder 40 milliEquivalent(s) Oral every 4 hours  senna 2 Tablet(s) Oral at bedtime  triamcinolone 0.1% Ointment 1 Application(s) Topical two times a day    MEDICATIONS  (PRN):  acetaminophen   Tablet .. 650 milliGRAM(s) Oral every 6 hours PRN Temp greater or equal to 38C (100.4F)  dextrose 40% Gel 15 Gram(s) Oral once PRN Blood Glucose LESS THAN 70 milliGRAM(s)/deciliter  glucagon  Injectable 1 milliGRAM(s) IntraMuscular once PRN Glucose LESS THAN 70 milligrams/deciliter  hydrALAZINE Injectable 10 milliGRAM(s) IV Push every 4 hours PRN SBP > 160  melatonin 3 milliGRAM(s) Oral at bedtime PRN Insomnia        CAPILLARY BLOOD GLUCOSE      POCT Blood Glucose.: 110 mg/dL (24 Feb 2019 08:57)  POCT Blood Glucose.: 71 mg/dL (24 Feb 2019 08:20)  POCT Blood Glucose.: 95 mg/dL (23 Feb 2019 21:43)  POCT Blood Glucose.: 82 mg/dL (23 Feb 2019 17:00)  POCT Blood Glucose.: 93 mg/dL (23 Feb 2019 14:02)  POCT Blood Glucose.: 81 mg/dL (23 Feb 2019 12:19)    I&O's Summary    23 Feb 2019 07:01  -  24 Feb 2019 07:00  --------------------------------------------------------  IN: 620 mL / OUT: 200 mL / NET: 420 mL    24 Feb 2019 07:01  -  24 Feb 2019 09:56  --------------------------------------------------------  IN: 400 mL / OUT: 0 mL / NET: 400 mL    PHYSICAL EXAM  GENERAL: NAD, well-developed  HEAD:  Atraumatic, Normocephalic  EYES: EOMI, PERRLA, conjunctiva and sclera clear  NECK: Supple, No JVD  CHEST/LUNG: left sided rhonchi; No wheeze  HEART: Regular rate and rhythm; No murmurs, rubs, or gallops  ABDOMEN: Soft, Nontender, Nondistended; Bowel sounds present  EXTREMITIES:  2+ Peripheral Pulses, No clubbing, cyanosis, or edema  PSYCH: Awake and follows command.   Neuro: aphasic, Right near hemiparesis  SKIN: No rashes or lesions      LABS:                        11.0   10.03 )-----------( 295      ( 23 Feb 2019 07:47 )             38.3     02-24    139  |  101  |  8   ----------------------------<  74  3.2<L>   |  26  |  0.77    Ca    9.3      24 Feb 2019 07:03    RADIOLOGY & ADDITIONAL TESTS:    Imaging Personally Reviewed:  Consultant(s) Notes Reviewed:    Care Discussed with Consultants/Other Providers: Patient is a 63y old  Female who presents with a chief complaint of acute encephalopathy (23 Feb 2019 12:09)    SUBJECTIVE / OVERNIGHT EVENTS:    No acute events overnight. Patient passed TOV. Aphasic but able to communicate via hand gestures. Still having minimal dry cough.    MEDICATIONS  (STANDING):  allopurinol 100 milliGRAM(s) Oral daily  artificial  tears Solution 1 Drop(s) Both EYES every 8 hours  aspirin  chewable 81 milliGRAM(s) Oral daily  atorvastatin 40 milliGRAM(s) Oral at bedtime  chlorhexidine 0.12% Liquid 15 milliLiter(s) Oral Mucosa two times a day  ciprofloxacin   IVPB 400 milliGRAM(s) IV Intermittent every 12 hours  clopidogrel Tablet 75 milliGRAM(s) Oral daily  dextrose 5%. 1000 milliLiter(s) (50 mL/Hr) IV Continuous <Continuous>  dextrose 50% Injectable 12.5 Gram(s) IV Push once  docusate sodium 100 milliGRAM(s) Oral two times a day  enoxaparin Injectable 40 milliGRAM(s) SubCutaneous every 24 hours  FLUoxetine Solution 20 milliGRAM(s) Oral daily  gabapentin 300 milliGRAM(s) Oral at bedtime  gabapentin 300 milliGRAM(s) Oral daily  insulin lispro (HumaLOG) corrective regimen sliding scale   SubCutaneous three times a day before meals  memantine 10 milliGRAM(s) Oral two times a day  polyethylene glycol 3350 17 Gram(s) Oral two times a day  potassium chloride   Powder 40 milliEquivalent(s) Oral every 4 hours  senna 2 Tablet(s) Oral at bedtime  triamcinolone 0.1% Ointment 1 Application(s) Topical two times a day    MEDICATIONS  (PRN):  acetaminophen   Tablet .. 650 milliGRAM(s) Oral every 6 hours PRN Temp greater or equal to 38C (100.4F)  dextrose 40% Gel 15 Gram(s) Oral once PRN Blood Glucose LESS THAN 70 milliGRAM(s)/deciliter  glucagon  Injectable 1 milliGRAM(s) IntraMuscular once PRN Glucose LESS THAN 70 milligrams/deciliter  hydrALAZINE Injectable 10 milliGRAM(s) IV Push every 4 hours PRN SBP > 160  melatonin 3 milliGRAM(s) Oral at bedtime PRN Insomnia        CAPILLARY BLOOD GLUCOSE      POCT Blood Glucose.: 110 mg/dL (24 Feb 2019 08:57)  POCT Blood Glucose.: 71 mg/dL (24 Feb 2019 08:20)  POCT Blood Glucose.: 95 mg/dL (23 Feb 2019 21:43)  POCT Blood Glucose.: 82 mg/dL (23 Feb 2019 17:00)  POCT Blood Glucose.: 93 mg/dL (23 Feb 2019 14:02)  POCT Blood Glucose.: 81 mg/dL (23 Feb 2019 12:19)    I&O's Summary    23 Feb 2019 07:01  -  24 Feb 2019 07:00  --------------------------------------------------------  IN: 620 mL / OUT: 200 mL / NET: 420 mL    24 Feb 2019 07:01  -  24 Feb 2019 09:56  --------------------------------------------------------  IN: 400 mL / OUT: 0 mL / NET: 400 mL    Vital Signs Last 24 Hrs  T(C): 36.3 (24 Feb 2019 08:05), Max: 36.8 (23 Feb 2019 23:49)  T(F): 97.3 (24 Feb 2019 08:05), Max: 98.3 (23 Feb 2019 23:49)  HR: 103 (24 Feb 2019 08:05) (100 - 103)  BP: 129/83 (24 Feb 2019 08:05) (129/83 - 133/89)  BP(mean): --  RR: 18 (24 Feb 2019 08:05) (18 - 18)  SpO2: 96% (24 Feb 2019 08:05) (96% - 96%)    PHYSICAL EXAM  GENERAL: NAD, well-developed  HEAD:  Atraumatic, Normocephalic  EYES: EOMI, PERRLA, conjunctiva and sclera clear  NECK: Supple, No JVD  CHEST/LUNG: left sided rhonchi; No wheeze  HEART: Regular rate and rhythm; No murmurs, rubs, or gallops  ABDOMEN: Soft, Nontender, Nondistended; Bowel sounds present  EXTREMITIES:  2+ Peripheral Pulses, No clubbing, cyanosis, or edema  PSYCH: Awake and follows command.   Neuro: aphasic, Right sided hemiparesis  SKIN: No rashes or lesions      LABS:                        11.0   10.03 )-----------( 295      ( 23 Feb 2019 07:47 )             38.3     02-24    139  |  101  |  8   ----------------------------<  74  3.2<L>   |  26  |  0.77    Ca    9.3      24 Feb 2019 07:03    RADIOLOGY & ADDITIONAL TESTS:    Imaging Personally Reviewed:  Consultant(s) Notes Reviewed:    Care Discussed with Consultants/Other Providers:

## 2019-02-24 NOTE — PROGRESS NOTE ADULT - ATTENDING COMMENTS
Plan discussed with patient's sister Elvia via phone.
Poor PO intake noted, encouraged to eat food that the patient likes. Started on IVF with dextrose for hypoglycemia due to poor PO intake. Will get nutrition consult. Can d/c fluids as intake improves.
Dae Hyeon Kim MD-PGY4  Chief Resident  Department of Internal Medicine  Pager [phone]
Dae Hyeon Kim MD-PGY4  Chief Resident  Department of Internal Medicine  Pager [phone]

## 2019-02-25 LAB
ANION GAP SERPL CALC-SCNC: 11 MMOL/L — SIGNIFICANT CHANGE UP (ref 5–17)
BUN SERPL-MCNC: 6 MG/DL — LOW (ref 7–23)
CALCIUM SERPL-MCNC: 9.3 MG/DL — SIGNIFICANT CHANGE UP (ref 8.4–10.5)
CHLORIDE SERPL-SCNC: 102 MMOL/L — SIGNIFICANT CHANGE UP (ref 96–108)
CO2 SERPL-SCNC: 26 MMOL/L — SIGNIFICANT CHANGE UP (ref 22–31)
CREAT SERPL-MCNC: 0.74 MG/DL — SIGNIFICANT CHANGE UP (ref 0.5–1.3)
GLUCOSE BLDC GLUCOMTR-MCNC: 79 MG/DL — SIGNIFICANT CHANGE UP (ref 70–99)
GLUCOSE BLDC GLUCOMTR-MCNC: 87 MG/DL — SIGNIFICANT CHANGE UP (ref 70–99)
GLUCOSE BLDC GLUCOMTR-MCNC: 95 MG/DL — SIGNIFICANT CHANGE UP (ref 70–99)
GLUCOSE BLDC GLUCOMTR-MCNC: 98 MG/DL — SIGNIFICANT CHANGE UP (ref 70–99)
GLUCOSE SERPL-MCNC: 74 MG/DL — SIGNIFICANT CHANGE UP (ref 70–99)
HCT VFR BLD CALC: 35.6 % — SIGNIFICANT CHANGE UP (ref 34.5–45)
HGB BLD-MCNC: 10.4 G/DL — LOW (ref 11.5–15.5)
MCHC RBC-ENTMCNC: 21.4 PG — LOW (ref 27–34)
MCHC RBC-ENTMCNC: 29.2 GM/DL — LOW (ref 32–36)
MCV RBC AUTO: 73.4 FL — LOW (ref 80–100)
PLATELET # BLD AUTO: 293 K/UL — SIGNIFICANT CHANGE UP (ref 150–400)
POTASSIUM SERPL-MCNC: 3.7 MMOL/L — SIGNIFICANT CHANGE UP (ref 3.5–5.3)
POTASSIUM SERPL-SCNC: 3.7 MMOL/L — SIGNIFICANT CHANGE UP (ref 3.5–5.3)
RBC # BLD: 4.85 M/UL — SIGNIFICANT CHANGE UP (ref 3.8–5.2)
RBC # FLD: 16.8 % — HIGH (ref 10.3–14.5)
SODIUM SERPL-SCNC: 139 MMOL/L — SIGNIFICANT CHANGE UP (ref 135–145)
WBC # BLD: 11.24 K/UL — HIGH (ref 3.8–10.5)
WBC # FLD AUTO: 11.24 K/UL — HIGH (ref 3.8–10.5)

## 2019-02-25 PROCEDURE — 99232 SBSQ HOSP IP/OBS MODERATE 35: CPT

## 2019-02-25 RX ADMIN — Medication 100 MILLIGRAM(S): at 13:05

## 2019-02-25 RX ADMIN — CHLORHEXIDINE GLUCONATE 15 MILLILITER(S): 213 SOLUTION TOPICAL at 18:37

## 2019-02-25 RX ADMIN — Medication 1 APPLICATION(S): at 05:51

## 2019-02-25 RX ADMIN — Medication 1 DROP(S): at 05:50

## 2019-02-25 RX ADMIN — GABAPENTIN 300 MILLIGRAM(S): 400 CAPSULE ORAL at 13:04

## 2019-02-25 RX ADMIN — MEMANTINE HYDROCHLORIDE 10 MILLIGRAM(S): 10 TABLET ORAL at 18:37

## 2019-02-25 RX ADMIN — Medication 20 MILLIGRAM(S): at 13:03

## 2019-02-25 RX ADMIN — MEMANTINE HYDROCHLORIDE 10 MILLIGRAM(S): 10 TABLET ORAL at 05:50

## 2019-02-25 RX ADMIN — Medication 1 APPLICATION(S): at 18:38

## 2019-02-25 RX ADMIN — ATORVASTATIN CALCIUM 40 MILLIGRAM(S): 80 TABLET, FILM COATED ORAL at 21:36

## 2019-02-25 RX ADMIN — Medication 1 DROP(S): at 13:06

## 2019-02-25 RX ADMIN — ENOXAPARIN SODIUM 40 MILLIGRAM(S): 100 INJECTION SUBCUTANEOUS at 23:58

## 2019-02-25 RX ADMIN — Medication 1 DROP(S): at 21:37

## 2019-02-25 RX ADMIN — SENNA PLUS 2 TABLET(S): 8.6 TABLET ORAL at 21:36

## 2019-02-25 RX ADMIN — Medication 81 MILLIGRAM(S): at 13:04

## 2019-02-25 RX ADMIN — CLOPIDOGREL BISULFATE 75 MILLIGRAM(S): 75 TABLET, FILM COATED ORAL at 13:03

## 2019-02-25 RX ADMIN — CHLORHEXIDINE GLUCONATE 15 MILLILITER(S): 213 SOLUTION TOPICAL at 05:50

## 2019-02-25 RX ADMIN — Medication 100 MILLIGRAM(S): at 05:50

## 2019-02-25 RX ADMIN — GABAPENTIN 300 MILLIGRAM(S): 400 CAPSULE ORAL at 21:36

## 2019-02-25 RX ADMIN — POLYETHYLENE GLYCOL 3350 17 GRAM(S): 17 POWDER, FOR SOLUTION ORAL at 05:50

## 2019-02-25 NOTE — PROGRESS NOTE ADULT - SUBJECTIVE AND OBJECTIVE BOX
Patient is a 63y old  Female who presents with a chief complaint of acute encephalopathy (24 Feb 2019 09:55)    HPI: Pt in bed. Awake, denies complaints.     Vital Signs Last 24 Hrs  T(C): 36.8 (25 Feb 2019 07:42), Max: 36.8 (25 Feb 2019 07:42)  T(F): 98.3 (25 Feb 2019 07:42), Max: 98.3 (25 Feb 2019 07:42)  HR: 78 (25 Feb 2019 13:53) (78 - 88)  BP: 137/79 (25 Feb 2019 13:53) (122/84 - 144/94)  BP(mean): --  RR: 18 (25 Feb 2019 07:42) (18 - 18)  SpO2: 98% (25 Feb 2019 13:53) (94% - 98%)                          10.4   11.24 )-----------( 293      ( 25 Feb 2019 10:41 )             35.6     02-25    139  |  102  |  6<L>  ----------------------------<  74  3.7   |  26  |  0.74    Ca    9.3      25 Feb 2019 07:11      MEDICATIONS  (STANDING):  allopurinol 100 milliGRAM(s) Oral daily  artificial  tears Solution 1 Drop(s) Both EYES every 8 hours  aspirin  chewable 81 milliGRAM(s) Oral daily  atorvastatin 40 milliGRAM(s) Oral at bedtime  chlorhexidine 0.12% Liquid 15 milliLiter(s) Oral Mucosa two times a day  clopidogrel Tablet 75 milliGRAM(s) Oral daily  dextrose 5%. 1000 milliLiter(s) (50 mL/Hr) IV Continuous <Continuous>  dextrose 50% Injectable 12.5 Gram(s) IV Push once  docusate sodium 100 milliGRAM(s) Oral two times a day  enoxaparin Injectable 40 milliGRAM(s) SubCutaneous every 24 hours  FLUoxetine Solution 20 milliGRAM(s) Oral daily  gabapentin 300 milliGRAM(s) Oral at bedtime  gabapentin 300 milliGRAM(s) Oral daily  insulin lispro (HumaLOG) corrective regimen sliding scale   SubCutaneous three times a day before meals  memantine 10 milliGRAM(s) Oral two times a day  polyethylene glycol 3350 17 Gram(s) Oral two times a day  senna 2 Tablet(s) Oral at bedtime  triamcinolone 0.1% Ointment 1 Application(s) Topical two times a day    MEDICATIONS  (PRN):  acetaminophen   Tablet .. 650 milliGRAM(s) Oral every 6 hours PRN Temp greater or equal to 38C (100.4F)  dextrose 40% Gel 15 Gram(s) Oral once PRN Blood Glucose LESS THAN 70 milliGRAM(s)/deciliter  glucagon  Injectable 1 milliGRAM(s) IntraMuscular once PRN Glucose LESS THAN 70 milligrams/deciliter  hydrALAZINE Injectable 10 milliGRAM(s) IV Push every 4 hours PRN SBP > 160  melatonin 3 milliGRAM(s) Oral at bedtime PRN Insomnia

## 2019-02-25 NOTE — PROGRESS NOTE ADULT - REASON FOR ADMISSION
acute encephalopathy

## 2019-02-25 NOTE — SWALLOW BEDSIDE ASSESSMENT ADULT - COMMENTS
subjective c/o single episode of coughing with PO intake of hard solid (meat). Pt denied recurrence of above episode and reported tolerating current soft texture diet.

## 2019-02-25 NOTE — SWALLOW BEDSIDE ASSESSMENT ADULT - ASR SWALLOW ASPIRATION MONITOR
pneumonia/Monitor for s/s aspiration/laryngeal penetration. If noted:  D/C p.o. intake, provide non-oral nutrition/hydration/meds, and contact this service @ [phone]/change of breathing pattern/cough/fever/throat clearing/upper respiratory infection/gurgly voice

## 2019-02-25 NOTE — SWALLOW BEDSIDE ASSESSMENT ADULT - NS ASR SWALLOW FINDINGS DISCUS
Nursing/Patient/RN: Clemencia Dietitian/Nursing/Patient/RN: Clemencia RD: Aurea Dietitian/Nursing/RN: Clemencia RD: Aurea NP: Iva/Binu

## 2019-02-25 NOTE — SWALLOW BEDSIDE ASSESSMENT ADULT - SWALLOW EVAL: PATIENT/FAMILY GOALS STATEMENT
Pt able to answer yes/no questions accurately. When prompted with yes/no questions, pt denied any current difficulty with PO intake and reported single episode of coughing with hard solid (meat) as it was difficult to masticate. Pt denied c/o pharyngeal stasis or recent swallow evaluations.

## 2019-02-25 NOTE — SWALLOW BEDSIDE ASSESSMENT ADULT - SWALLOW EVAL: RECOMMENDED FEEDING/EATING TECHNIQUES
position upright (90 degrees)/small sips/bites/maintain upright posture during/after eating for 30 mins/no straws

## 2019-02-25 NOTE — SWALLOW BEDSIDE ASSESSMENT ADULT - SLP GENERAL OBSERVATIONS
Pt awake in bed, oriented x3 (when prompted with yes/no questions), able to communicate needs (with yes/ no questions) and follow directions for exam. + Pt awake in bed, oriented x3 (when prompted with yes/no questions), able to communicate needs (with yes/ no questions) and follow directions for exam. +R facial droop and right tongue deviation.

## 2019-02-25 NOTE — PROGRESS NOTE ADULT - ASSESSMENT
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    1. UTI- E coli. Completed treatment.     2. Metabolic encephalopathy- Likely due to UTI. Mental status now improved.    -MRI shows evolution of a left subinsular, left frontal and left corona radiata infarct, per neuro this is consistent with prior infarct, continuing with neuro checks   -routine EEG neg for seizure activity   - toxic metabolic workup as above   -overall improving, following commands and communicating.     3. H/o CVA L MCA CVA as complication of previous L carotid stenting procedure. Some residual MCA   - deficits appear to be at baseline  - continue aspirin, plavix, high dose statin.    4. Permissive HTN SBP between 140-160 as per prior documentation given ICA and M2 segment stenoses and prior episode of worsening of CVA features when BP drops below 140  - continue monitoring off antihypertensives at present.     Awaiting rehab placement. 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    1. UTI- E coli. Completed treatment.     2. Metabolic encephalopathy- Likely due to UTI. Mental status now improved.    -MRI shows evolution of a left subinsular, left frontal and left corona radiata infarct, per neuro this is consistent with prior infarct  -routine EEG neg for seizure activity     3. H/o CVA L MCA CVA as complication of previous L carotid stenting procedure. - deficits appear to be at baseline  - continue aspirin, plavix, high dose statin.    4. Permissive HTN- SBP between 140-160 as per prior documentation given ICA and M2 segment stenoses and prior episode of worsening of CVA features when BP drops below 140  - continue monitoring off antihypertensives at present.     Awaiting rehab placement.

## 2019-02-25 NOTE — SWALLOW BEDSIDE ASSESSMENT ADULT - SWALLOW EVAL: DIAGNOSIS
Pt presents with 1. An oral and suspected pharyngeal dysphagia marked by prolonged mastication, delayed oral transit time, suspected uncontrolled loss, and subjective c/o single episode of coughing with PO intake of hard solid (meat). Pt denied recurrence of above episode and reported tolerating current soft texture diet. 2. Aphasia

## 2019-02-26 ENCOUNTER — TRANSCRIPTION ENCOUNTER (OUTPATIENT)
Age: 64
End: 2019-02-26

## 2019-02-26 ENCOUNTER — OTHER (OUTPATIENT)
Age: 64
End: 2019-02-26

## 2019-02-26 VITALS
HEART RATE: 83 BPM | SYSTOLIC BLOOD PRESSURE: 149 MMHG | TEMPERATURE: 98 F | OXYGEN SATURATION: 97 % | RESPIRATION RATE: 18 BRPM | DIASTOLIC BLOOD PRESSURE: 91 MMHG

## 2019-02-26 LAB
GLUCOSE BLDC GLUCOMTR-MCNC: 79 MG/DL — SIGNIFICANT CHANGE UP (ref 70–99)
GLUCOSE BLDC GLUCOMTR-MCNC: 79 MG/DL — SIGNIFICANT CHANGE UP (ref 70–99)
HCT VFR BLD CALC: 33.8 % — LOW (ref 34.5–45)
HGB BLD-MCNC: 11.1 G/DL — LOW (ref 11.5–15.5)
MCHC RBC-ENTMCNC: 23.2 PG — LOW (ref 27–34)
MCHC RBC-ENTMCNC: 32.8 GM/DL — SIGNIFICANT CHANGE UP (ref 32–36)
MCV RBC AUTO: 70.7 FL — LOW (ref 80–100)
PLATELET # BLD AUTO: 248 K/UL — SIGNIFICANT CHANGE UP (ref 150–400)
RBC # BLD: 4.78 M/UL — SIGNIFICANT CHANGE UP (ref 3.8–5.2)
RBC # FLD: 16.1 % — HIGH (ref 10.3–14.5)
WBC # BLD: 10.4 K/UL — SIGNIFICANT CHANGE UP (ref 3.8–10.5)
WBC # FLD AUTO: 10.4 K/UL — SIGNIFICANT CHANGE UP (ref 3.8–10.5)

## 2019-02-26 PROCEDURE — 96374 THER/PROPH/DIAG INJ IV PUSH: CPT | Mod: XU

## 2019-02-26 PROCEDURE — 87798 DETECT AGENT NOS DNA AMP: CPT

## 2019-02-26 PROCEDURE — 86803 HEPATITIS C AB TEST: CPT

## 2019-02-26 PROCEDURE — 70498 CT ANGIOGRAPHY NECK: CPT

## 2019-02-26 PROCEDURE — 71045 X-RAY EXAM CHEST 1 VIEW: CPT

## 2019-02-26 PROCEDURE — 84295 ASSAY OF SERUM SODIUM: CPT

## 2019-02-26 PROCEDURE — 81001 URINALYSIS AUTO W/SCOPE: CPT

## 2019-02-26 PROCEDURE — 87486 CHLMYD PNEUM DNA AMP PROBE: CPT

## 2019-02-26 PROCEDURE — 85014 HEMATOCRIT: CPT

## 2019-02-26 PROCEDURE — 82947 ASSAY GLUCOSE BLOOD QUANT: CPT

## 2019-02-26 PROCEDURE — 99239 HOSP IP/OBS DSCHRG MGMT >30: CPT

## 2019-02-26 PROCEDURE — 87186 SC STD MICRODIL/AGAR DIL: CPT

## 2019-02-26 PROCEDURE — 84132 ASSAY OF SERUM POTASSIUM: CPT

## 2019-02-26 PROCEDURE — 83605 ASSAY OF LACTIC ACID: CPT

## 2019-02-26 PROCEDURE — 80048 BASIC METABOLIC PNL TOTAL CA: CPT

## 2019-02-26 PROCEDURE — 85610 PROTHROMBIN TIME: CPT

## 2019-02-26 PROCEDURE — 97530 THERAPEUTIC ACTIVITIES: CPT

## 2019-02-26 PROCEDURE — 93005 ELECTROCARDIOGRAM TRACING: CPT

## 2019-02-26 PROCEDURE — 85027 COMPLETE CBC AUTOMATED: CPT

## 2019-02-26 PROCEDURE — 87633 RESP VIRUS 12-25 TARGETS: CPT

## 2019-02-26 PROCEDURE — 85730 THROMBOPLASTIN TIME PARTIAL: CPT

## 2019-02-26 PROCEDURE — 70450 CT HEAD/BRAIN W/O DYE: CPT

## 2019-02-26 PROCEDURE — 82962 GLUCOSE BLOOD TEST: CPT

## 2019-02-26 PROCEDURE — 99281 EMR DPT VST MAYX REQ PHY/QHP: CPT | Mod: 25

## 2019-02-26 PROCEDURE — 80053 COMPREHEN METABOLIC PANEL: CPT

## 2019-02-26 PROCEDURE — 82435 ASSAY OF BLOOD CHLORIDE: CPT

## 2019-02-26 PROCEDURE — 82803 BLOOD GASES ANY COMBINATION: CPT

## 2019-02-26 PROCEDURE — 95819 EEG AWAKE AND ASLEEP: CPT

## 2019-02-26 PROCEDURE — 92610 EVALUATE SWALLOWING FUNCTION: CPT

## 2019-02-26 PROCEDURE — 87086 URINE CULTURE/COLONY COUNT: CPT

## 2019-02-26 PROCEDURE — 82330 ASSAY OF CALCIUM: CPT

## 2019-02-26 PROCEDURE — 70496 CT ANGIOGRAPHY HEAD: CPT

## 2019-02-26 PROCEDURE — 70551 MRI BRAIN STEM W/O DYE: CPT

## 2019-02-26 PROCEDURE — 83036 HEMOGLOBIN GLYCOSYLATED A1C: CPT

## 2019-02-26 PROCEDURE — 87581 M.PNEUMON DNA AMP PROBE: CPT

## 2019-02-26 RX ORDER — POLYETHYLENE GLYCOL 3350 17 G/17G
17 POWDER, FOR SOLUTION ORAL
Qty: 0 | Refills: 0 | COMMUNITY
Start: 2019-02-26

## 2019-02-26 RX ORDER — DOCUSATE SODIUM 100 MG
1 CAPSULE ORAL
Qty: 0 | Refills: 0 | COMMUNITY
Start: 2019-02-26

## 2019-02-26 RX ADMIN — Medication 20 MILLIGRAM(S): at 12:12

## 2019-02-26 RX ADMIN — Medication 1 APPLICATION(S): at 06:31

## 2019-02-26 RX ADMIN — Medication 1 DROP(S): at 06:25

## 2019-02-26 RX ADMIN — MEMANTINE HYDROCHLORIDE 10 MILLIGRAM(S): 10 TABLET ORAL at 06:25

## 2019-02-26 RX ADMIN — Medication 100 MILLIGRAM(S): at 12:11

## 2019-02-26 RX ADMIN — Medication 81 MILLIGRAM(S): at 12:11

## 2019-02-26 RX ADMIN — CLOPIDOGREL BISULFATE 75 MILLIGRAM(S): 75 TABLET, FILM COATED ORAL at 12:11

## 2019-02-26 RX ADMIN — GABAPENTIN 300 MILLIGRAM(S): 400 CAPSULE ORAL at 12:12

## 2019-02-26 RX ADMIN — CHLORHEXIDINE GLUCONATE 15 MILLILITER(S): 213 SOLUTION TOPICAL at 06:25

## 2019-02-26 NOTE — DISCHARGE NOTE ADULT - CARE PLAN
Principal Discharge DX:	Acute encephalopathy  Goal:	resolved  Assessment and plan of treatment:	likely due to UTI  Secondary Diagnosis:	Urinary tract infection without hematuria, site unspecified  Assessment and plan of treatment:	HOME CARE INSTRUCTIONS  f you were prescribed antibiotics, take them exactly as your caregiver instructs you. Finish the medication even if you feel better after you have only taken some of the medication.  Drink enough water and fluids to keep your urine clear or pale yellow.  Avoid caffeine, tea, and carbonated beverages. They tend to irritate your bladder.  Empty your bladder often. Avoid holding urine for long periods of time.  Empty your bladder before and after sexual intercourse.  After a bowel movement, women should cleanse from front to back. Use each tissue only once.  SEEK MEDICAL CARE IF:  You have back pain.  You develop a fever.  Your symptoms do not begin to resolve within 3 days.  SEEK IMMEDIATE MEDICAL CARE IF:  You have severe back pain or lower abdominal pain.  You develop chills.  You have nausea or vomiting.  You have continued burning or discomfort with urination.  Secondary Diagnosis:	Psoriasis  Assessment and plan of treatment:	continue triamcinolone ointment prn.  Secondary Diagnosis:	Essential hypertension  Assessment and plan of treatment:	Low salt diet  Activity as tolerated.  Take all medication as prescribed.  Follow up with your medical doctor for routine blood pressure monitoring at your next visit.  Notify your doctor if you have any of the following symptoms:   Dizziness, Lightheadedness, Blurry vision, Headache, Chest pain, Shortness of breath  Secondary Diagnosis:	Hyperlipidemia, unspecified hyperlipidemia type  Assessment and plan of treatment:	Continue with your cholesterol medications. Eat a heart healthy diet that is low in saturated fats and salt, and includes whole grains, fruits, vegetables and lean protein; exercise regularly (consult with your physician or cardiologist first); maintain a heart healthy weight; if you smoke - quit (A resource to help you stop smoking is the [facility] – phone number 447-618-7186.). Continue to follow with your primary physician or cardiologist.  Seek medical help for dizziness, Lightheadedness, Blurry vision, Headache, Chest pain, Shortness of breath  Secondary Diagnosis:	Cerebrovascular accident (CVA) due to occlusion of carotid artery, unspecified blood vessel laterality  Assessment and plan of treatment:	continue with aspirin and Plavix

## 2019-02-26 NOTE — DISCHARGE NOTE ADULT - SECONDARY DIAGNOSIS.
Urinary tract infection without hematuria, site unspecified Psoriasis Essential hypertension Hyperlipidemia, unspecified hyperlipidemia type Cerebrovascular accident (CVA) due to occlusion of carotid artery, unspecified blood vessel laterality

## 2019-02-26 NOTE — DISCHARGE NOTE ADULT - PATIENT PORTAL LINK FT
You can access the [facility] Patient Portal, offered by [facility], by registering with the following website: [email or web address]

## 2019-02-26 NOTE — DISCHARGE NOTE ADULT - CARE PROVIDERS DIRECT ADDRESSES
[email or web address]

## 2019-02-26 NOTE — DISCHARGE NOTE ADULT - MEDICATION SUMMARY - MEDICATIONS TO STOP TAKING
I will STOP taking the medications listed below when I get home from the hospital:    tiZANidine 2 mg oral tablet  -- 1 tab(s) by mouth once a day for 7 days

## 2019-02-26 NOTE — DISCHARGE NOTE ADULT - INSTRUCTIONS
Dysphagia 3 , with thin liquids- no tolatoes, no eggs, no fish, no mushrooms, no shellfish Dysphagia 3 , with thin liquids- no tomatoes, no eggs, no fish, no mushrooms, no shellfish

## 2019-02-26 NOTE — DISCHARGE NOTE ADULT - PLAN OF CARE
resolved likely due to UTI HOME CARE INSTRUCTIONS  f you were prescribed antibiotics, take them exactly as your caregiver instructs you. Finish the medication even if you feel better after you have only taken some of the medication.  Drink enough water and fluids to keep your urine clear or pale yellow.  Avoid caffeine, tea, and carbonated beverages. They tend to irritate your bladder.  Empty your bladder often. Avoid holding urine for long periods of time.  Empty your bladder before and after sexual intercourse.  After a bowel movement, women should cleanse from front to back. Use each tissue only once.  SEEK MEDICAL CARE IF:  You have back pain.  You develop a fever.  Your symptoms do not begin to resolve within 3 days.  SEEK IMMEDIATE MEDICAL CARE IF:  You have severe back pain or lower abdominal pain.  You develop chills.  You have nausea or vomiting.  You have continued burning or discomfort with urination. continue triamcinolone ointment prn. Low salt diet  Activity as tolerated.  Take all medication as prescribed.  Follow up with your medical doctor for routine blood pressure monitoring at your next visit.  Notify your doctor if you have any of the following symptoms:   Dizziness, Lightheadedness, Blurry vision, Headache, Chest pain, Shortness of breath Continue with your cholesterol medications. Eat a heart healthy diet that is low in saturated fats and salt, and includes whole grains, fruits, vegetables and lean protein; exercise regularly (consult with your physician or cardiologist first); maintain a heart healthy weight; if you smoke - quit (A resource to help you stop smoking is the [facility] – phone number [phone].). Continue to follow with your primary physician or cardiologist.  Seek medical help for dizziness, Lightheadedness, Blurry vision, Headache, Chest pain, Shortness of breath continue with aspirin and Plavix

## 2019-02-26 NOTE — DISCHARGE NOTE ADULT - MEDICATION SUMMARY - MEDICATIONS TO TAKE
I will START or STAY ON the medications listed below when I get home from the hospital:    aspirin 81 mg oral tablet, chewable  -- 1 tab(s) by mouth once a day  -- Indication: For Cerebrovascular accident (CVA) due to occlusion of carotid artery, unspecified blood vessel laterality    acetaminophen 325 mg oral tablet  -- 2 tab(s) by mouth every 6 hours, As Needed  -- Indication: For Pain    gabapentin 300 mg oral capsule  -- 1 cap(s) by mouth once a day (at bedtime)  -- Indication: For Neuropathy     gabapentin 300 mg oral capsule  -- 1 cap(s) by mouth once a day at 1300  -- Indication: For Neuropathy     FLUoxetine 20 mg/5 mL oral solution  -- 5 milliliter(s) by mouth once a day (in the morning)  -- Indication: For CNS    allopurinol 100 mg oral tablet  -- 1 tab(s) by mouth once a day  -- Indication: For gout    atorvastatin 10 mg oral tablet  -- 1 tab(s) by mouth once a day  -- Indication: For Hld    clopidogrel 75 mg oral tablet  -- 1 tab(s) by mouth once a day  -- Indication: For CVA (cerebral vascular accident)    chlorhexidine 0.12% mucous membrane liquid  -- 15 milliliter(s) mucous membrane 2 times a day  -- Indication: For mouth    triamcinolone 0.1% topical ointment  -- Apply on skin to affected area 2 times a day  -- Indication: For Psoriasis    docusate sodium 100 mg oral capsule  -- 1 cap(s) by mouth 2 times a day  -- Indication: For Constipation     polyethylene glycol 3350 oral powder for reconstitution  -- 17 gram(s) by mouth once a day, As Needed for constipation  -- Indication: For Constipation     senna oral tablet  -- 2 tab(s) by mouth once a day (at bedtime)  -- Indication: For Constipation     memantine 10 mg oral tablet  -- 1 tab(s) by mouth 2 times a day  -- Indication: For Cns    melatonin 3 mg oral tablet  -- 1 tab(s) by mouth once a day (at bedtime), As needed, Insomnia  -- Indication: For insomnia     ocular lubricant ophthalmic solution  -- 1 drop(s) to each affected eye every 8 hours  -- Indication: For Eye

## 2019-02-26 NOTE — DISCHARGE NOTE ADULT - CARE PROVIDER_API CALL
Elda Scanlon)  Internal Medicine; Rheumatology  [address], [address]  [place] [address]  Phone: [phone]  Fax: [phone]  Follow Up Time:     Jairo Maxwell)  Geriatric Medicine; Internal Medicine  [address], [address]  [place] [address]  Phone: [phone]  Fax: [phone]  Follow Up Time: Elda Scanlon)  Internal Medicine; Rheumatology  [address], [address]  [place] [address]  Phone: [phone]  Fax: [phone]  Follow Up Time:     Jairo Maxwell)  Geriatric Medicine; Internal Medicine  [address] [facility] [address], [address]  [place] [address]  Phone: [phone]  Fax: [phone]  Follow Up Time:     Teddy Camacho)  Neurology  [address], [address]  [place] [address]  Phone: [phone]  Fax: [phone]  Follow Up Time:

## 2019-02-26 NOTE — CHART NOTE - NSCHARTNOTEFT_GEN_A_CORE
Pt feels well. Awake, aphasic, able to follow commands.   	  63 year old female with past medical hx of L M2 CVA(residual global aphasia, R hemiparesis after L transcarotid arterial stent) aw acute encephalopathy likely due to UTI.    1. UTI- E coli. Completed treatment.     2. Metabolic encephalopathy- Likely due to UTI. Mental status now improved.    -MRI shows evolution of a left subinsular, left frontal and left corona radiata infarct, per neuro this is consistent with prior infarct  -routine EEG neg for seizure activity     3. H/o CVA L MCA CVA as complication of previous L carotid stenting procedure. - deficits appear to be at baseline  - continue aspirin, plavix, high dose statin.    4. Permissive HTN- SBP between 140-160 as per prior documentation given ICA and M2 segment stenoses and prior episode of worsening of CVA features when BP drops below 140  - continue monitoring off antihypertensives at present.     D/c to rehab today. D/c time 45 mins.

## 2019-02-26 NOTE — DISCHARGE NOTE ADULT - PROVIDER TOKENS
PROVIDER:[TOKEN:[[id number]]],PROVIDER:[TOKEN:[[id number]]] PROVIDER:[TOKEN:[[id number]]],PROVIDER:[TOKEN:[[id number]]],PROVIDER:[TOKEN:[[id number]]]

## 2019-02-27 NOTE — PATIENT PROFILE ADULT - STATED REASON FOR ADMISSION
DIVERTICULITIS    You have been diagnosed with diverticulitis. This is a condition in which small pouches form in your colon (large intestine) and become inflamed or infected.     DIET:  · If no nausea or abdominal pain is present, you may have a low-fiber diet.  · If your pain worsens, go back to clear liquids or stop eating to rest your  colon, and call your physician.  · Make sure you continue your full course of antibiotics and do not skip doses.    ACTIVITY:  · You may return to your regular activities as tolerated.  · Make sure you get adequate rest while you are recovering.    NOTIFY YOUR DOCTOR IF YOU HAVE THESE SYMPTOMS:  · Increased crampy abdominal pain  · Fever over 101°  · Persistent nausea or vomiting  · Recurrent night sweats  · Recurrent diarrhea with blood in your stool    RETURNING TO WORK/SCHOOL  · Fatigue is usually what limits your ability to participate in work or school activities. Most people take between 1-2 weeks off work.  You may return to work or school when you feel you are ready.      FOLLOW-UP:  APPOINTMENT: DR. ÁNGEL LYONS  You should be seen 2-3 weeks following surgery. To schedule or reschedule your appointment please call one of following locations.     · [facility], [address]:  [phone]   [facility]  · [facility]:  [phone]    [id number] [place]    If you have any problems or questions concerning your surgical procedure, please do not hesitate to call [phone].           AMS/ lethargy

## 2019-02-28 ENCOUNTER — OTHER (OUTPATIENT)
Age: 64
End: 2019-02-28

## 2019-03-22 ENCOUNTER — LABORATORY RESULT (OUTPATIENT)
Age: 64
End: 2019-03-22

## 2019-03-26 ENCOUNTER — APPOINTMENT (OUTPATIENT)
Dept: NEUROLOGY | Facility: CLINIC | Age: 64
End: 2019-03-26

## 2019-04-08 ENCOUNTER — APPOINTMENT (OUTPATIENT)
Dept: RHEUMATOLOGY | Facility: CLINIC | Age: 64
End: 2019-04-08

## 2019-04-18 ENCOUNTER — APPOINTMENT (OUTPATIENT)
Dept: VASCULAR SURGERY | Facility: CLINIC | Age: 64
End: 2019-04-18

## 2019-04-22 ENCOUNTER — APPOINTMENT (OUTPATIENT)
Dept: INTERNAL MEDICINE | Facility: CLINIC | Age: 64
End: 2019-04-22

## 2019-04-23 ENCOUNTER — APPOINTMENT (OUTPATIENT)
Dept: MRI IMAGING | Facility: CLINIC | Age: 64
End: 2019-04-23
Payer: MEDICARE

## 2019-04-23 ENCOUNTER — OUTPATIENT (OUTPATIENT)
Dept: OUTPATIENT SERVICES | Facility: HOSPITAL | Age: 64
LOS: 1 days | End: 2019-04-23
Payer: MEDICARE

## 2019-04-23 DIAGNOSIS — Z00.8 ENCOUNTER FOR OTHER GENERAL EXAMINATION: ICD-10-CM

## 2019-04-23 DIAGNOSIS — Z98.1 ARTHRODESIS STATUS: Chronic | ICD-10-CM

## 2019-04-23 PROCEDURE — 73720 MRI LWR EXTREMITY W/O&W/DYE: CPT | Mod: 26,RT

## 2019-04-23 PROCEDURE — 73720 MRI LWR EXTREMITY W/O&W/DYE: CPT

## 2019-04-23 PROCEDURE — A9585: CPT

## 2019-05-30 ENCOUNTER — APPOINTMENT (OUTPATIENT)
Dept: RHEUMATOLOGY | Facility: CLINIC | Age: 64
End: 2019-05-30

## 2019-08-01 NOTE — H&P ADULT - MOTOR
Physical Therapy Daily Treatment    Visit Count: 9  Plan of Care: 2019 Through: 2019  Insurance Information: physical and speech therapy combined cap of $2040 per calendar year  Referred by: Kiet Reed MD; Next provider visit (if known/scheduled): None current  Medical Diagnosis (from order):   V49.89 (ICD-9-CM) - Z74.09 (ICD-10-CM) - Impaired functional mobility, balance, and endurance   780.79 (ICD-9-CM) - R53.1 (ICD-10-CM) - Weakness   780.4 (ICD-9-CM) - R42 (ICD-10-CM) - Dizziness   331.5 (ICD-9-CM) - G91.2 (ICD-10-CM) - NPH (normal pressure hydrocephalus)  Treatment Diagnosis: Gait/balance symptoms with impaired gait, impaired mobility, impaired activity tolerance, impaired balance, increased risk for falls     Date of onset/injury: ~1 year  Diagnosis Precautions: fall risk; dizziness bilateral hearing mild/mod-severe sensorineural hearing loss  Chart reviewed at time of initial evaluation (relevant co-morbidities, allergies, tests and medications listed):     SUBJECTIVE   Pt. reports she still has difficulty walking as fast as she would like to to or that she walked in the past.  Pain left le/10    Current Symptoms: dizziness mild this morning, none during Physical Therapy today      Functional Change: She reports difficulty with walking outside and feels better waling with her hiking poles.    OBJECTIVE   Gait: Pt. walks without an assistive device with a narrow (but improved spacing from the previous  session) base of support and an overall decrease in forward weight shifting over each leg in the middle stance position. She demonstrated decreased eccentric control on her left foot anterior tibialis.         Treatment   Gait Training & Neuromuscular Reeducation:  Resisted walking with strap on the Magnum pulley system  at 36# with belt positioned at waist level  5 repetitions forward , to the right to the left and backwards with contact guard to stand by assistance.  Alternating 8\" Step  taps without upper extremity support at variable speeds to fatigue (Increased from 6 inch height)  ADDED: stepping in clock patterns from a large river stone int the middle to stones set at 12, 10.2,9,3,8,4 and 6 o'clock positions with 2 hand moderate assistance.    Standing away from the [place] with standby to contact guard assistance of this therapist.:   Side stepping at the [place] with cross over walking crossing just in front, crossing just behind and alternating front to back braid walking with no hand support to occasional 2 hand support  Hurdles with standby assistance:  Stepping over 5 inch jonathan then side step to the right step over a jonathan then stepping to the left and over a jonathan repeat x 4 times.  Side stepping over 5 inch jonathan and then walking backward to steps then side step to the right over the jonathan and taking 2 steps forward repeat x 4 times.  Reviewed with minimal assistance of one hand:  Side stepping up and down incline  Side stepping crossing just in front and then just behind  Small river rocks with a pattern with a single the 4 off set pattern and a 6th at the end  the rocks had a mat under and over them pt. performed this activity at the [place] with 1 hand support 25% of the time walking forward and 30-40% of the time when she was side stepping to the left and to the right.   Therapeutic exercises:  Lateral step up and over 4 inch step x 12 reps.  Magnum Standing hip extension with 30# x 12 reps with each leg  ADDED:  TruStretch: hip flexor, hamstring, quadriceps muscle and gastrocnemius/soleus muscle  stretching with 20-30 second hold    Skilled input: verbal instruction/cues, tactile instruction/cues, posture correction, education/instruction on balance and neuro muscular re-educationEducated patient on: parameters for high repetitions and blocked practice to her (safe) maximal fatigue to facilitate motor learning and at high intensity for neuroplasticity.      Home Program:   Cross  over walking just in front with side stepping to the right and the left to be performed at her counter top.  Access Code: [email or web address]   URL: [email or web address]   Date: 07/15/2019 Printed for patient   Prepared by: Anna Randall   Program Notes   Need 500 repetitions a day for neuroplasticity: 5-15 minutes on one task; set a timer to your phone or listen to your 3 favorite songs while exercising to track your time. Up to 3-5 times/day as time allows.   Exercises   Step Taps on High Step - 10 reps - 3 sets - 1x daily - 7x weekly   Standing Weight Shift - 10 reps - 3 sets - 1x daily - 7x weekly   ADDED:  Access Code: [email or web address]   URL: [email or web address]   Date: 07/17/2019   Prepared by: Alexa Sevilla     Exercises  • Seated Ankle Inversion with Anchored Resistance - 10 reps - 1 sets - 2 hold - 1x daily - 7x weekly  • Seated Ankle Eversion with Anchored Resistance - 10 reps - 1 sets - 2 hold - 1x daily - 7x weekly  • Seated Ankle Dorsiflexion with Anchored Resistance - Leg Straight - 10 reps - 1 sets - 2 hold - 1x daily - 7x weekly    Writer verbally educated the patient and received verbal consent from the patient on hand placement, positioning of patient, and techniques to be performed today including clothing adjustments for techniques, therapist position for techniques, hand placement and palpation for techniques as described above and how they are pertinent to the patient's plan of care.      Suggestions for next session as indicated: progress per plan of care,Plan to continue balance training.    ASSESSMENT   This patient demonstrated improved balance with resisted walking exercises and with walking on the river rocks. She also demonstrated improved balance with tandem walking on the line and tapping her toe on top of a 8 inch step.    Pain after treatment (patient reported, 0-10 scale): Pain is not an issue - improved confidence in her balance   Result of above outlined education: Verbalizes  understanding, Demonstrates understanding and Needs reinforcement    THERAPY DAILY BILLING   Insurance: MEDICARE 2. [employer]    Evaluation Procedures:  No evaluation codes were used on this date of service    Timed Procedures:  Gait Training, 15 minutes  Neuromuscular Re-Education, 25 minutes  Therapeutic Exercise, 15 minutes    Untimed Procedures:  No untimed codes were used on this date of service    Total Treatment Time: 55 minutes     +RUE +1/5 strength, +RLE +1/5 hip flexion. +5/5 LUE/LLE

## 2019-11-18 ENCOUNTER — LABORATORY RESULT (OUTPATIENT)
Age: 64
End: 2019-11-18

## 2019-11-19 ENCOUNTER — APPOINTMENT (OUTPATIENT)
Dept: DERMATOLOGY | Facility: CLINIC | Age: 64
End: 2019-11-19
Payer: MEDICARE

## 2019-11-19 DIAGNOSIS — Z79.899 OTHER LONG TERM (CURRENT) DRUG THERAPY: ICD-10-CM

## 2019-11-19 DIAGNOSIS — L30.9 DERMATITIS, UNSPECIFIED: ICD-10-CM

## 2019-11-19 DIAGNOSIS — D48.5 NEOPLASM OF UNCERTAIN BEHAVIOR OF SKIN: ICD-10-CM

## 2019-11-19 PROCEDURE — 11104 PUNCH BX SKIN SINGLE LESION: CPT

## 2019-11-19 PROCEDURE — 99214 OFFICE O/P EST MOD 30 MIN: CPT | Mod: 25

## 2019-11-25 LAB
ALBUMIN SERPL ELPH-MCNC: 3.4 G/DL
ALP BLD-CCNC: 113 U/L
ALT SERPL-CCNC: 12 U/L
ANION GAP SERPL CALC-SCNC: 14 MMOL/L
AST SERPL-CCNC: 32 U/L
BILIRUB SERPL-MCNC: 0.3 MG/DL
BUN SERPL-MCNC: 16 MG/DL
CALCIUM SERPL-MCNC: 9.5 MG/DL
CHLORIDE SERPL-SCNC: 98 MMOL/L
CHOLEST SERPL-MCNC: 145 MG/DL
CHOLEST/HDLC SERPL: 3.3 RATIO
CO2 SERPL-SCNC: 28 MMOL/L
CREAT SERPL-MCNC: 0.97 MG/DL
GLUCOSE SERPL-MCNC: 87 MG/DL
HBV CORE IGG+IGM SER QL: REACTIVE
HBV SURFACE AB SER QL: REACTIVE
HBV SURFACE AG SER QL: NONREACTIVE
HCV AB SER QL: NONREACTIVE
HCV S/CO RATIO: 0.15 S/CO
HDLC SERPL-MCNC: 44 MG/DL
LDLC SERPL CALC-MCNC: 68 MG/DL
MAGNESIUM SERPL-MCNC: 2.1 MG/DL
POTASSIUM SERPL-SCNC: 5.1 MMOL/L
PROT SERPL-MCNC: 6.7 G/DL
SODIUM SERPL-SCNC: 139 MMOL/L
TOTAL IGE SMQN RAST: 1171 KU/L
TRIGL SERPL-MCNC: 164 MG/DL

## 2020-01-01 NOTE — PATIENT PROFILE ADULT - BRADEN FRICTION AND SHEAR
[FreeTextEntry1] : all lab data was reviewed with patient in detail from 11/13/2019\par  73 yo woman  with CKD 3, microalbuminuria, HTN, uncontrolled DMT2 overweight\par -HTN- blood pressure trending upward- instructed her to monitor BP at home-  focus on lifestyle modification- no change in meds today\par - CKD 3- creat  acceptable 1.16; electrolytes good-  BP, volume status acceptable\par --microalbuminuria-  stable- angioedema with lisinopril-  c/w same plan: avoid RAAS - if develops worsening proteinuria- consider allergy eval to see if candidate for ARB\par - DMT2-: uncontrolled--A1C 8.6%-  reminded that poor glycemic control is a big factor in progression of CKDas above- emphasized need for better glycemic control.\par - urine frequency- improving \par -hyperlipidemia- LP good, c/w Atorvastatin\par -overweight- continues on bupropion to suppress appetite\par \par f/u OV 4 6 months  (3) no apparent problem

## 2020-01-10 ENCOUNTER — APPOINTMENT (OUTPATIENT)
Dept: HEPATOLOGY | Facility: CLINIC | Age: 65
End: 2020-01-10
Payer: MEDICARE

## 2020-01-10 VITALS
TEMPERATURE: 97.3 F | SYSTOLIC BLOOD PRESSURE: 146 MMHG | HEIGHT: 57 IN | BODY MASS INDEX: 22.01 KG/M2 | WEIGHT: 102 LBS | RESPIRATION RATE: 16 BRPM | DIASTOLIC BLOOD PRESSURE: 84 MMHG | HEART RATE: 76 BPM

## 2020-01-10 DIAGNOSIS — Z20.5 CONTACT WITH AND (SUSPECTED) EXPOSURE TO VIRAL HEPATITIS: ICD-10-CM

## 2020-01-10 LAB
ALBUMIN SERPL ELPH-MCNC: 2.5 G/DL
ALP BLD-CCNC: 112 U/L
ALT SERPL-CCNC: 15 U/L
ANION GAP SERPL CALC-SCNC: 13 MMOL/L
AST SERPL-CCNC: 25 U/L
BILIRUB SERPL-MCNC: 0.3 MG/DL
BUN SERPL-MCNC: 17 MG/DL
CALCIUM SERPL-MCNC: 8.9 MG/DL
CHLORIDE SERPL-SCNC: 100 MMOL/L
CO2 SERPL-SCNC: 26 MMOL/L
CREAT SERPL-MCNC: 0.69 MG/DL
POTASSIUM SERPL-SCNC: 5.6 MMOL/L
PROT SERPL-MCNC: 6.1 G/DL
SODIUM SERPL-SCNC: 139 MMOL/L

## 2020-01-10 PROCEDURE — 99203 OFFICE O/P NEW LOW 30 MIN: CPT

## 2020-01-13 LAB
HBV DNA # SERPL NAA+PROBE: NOT DETECTED
HEPB DNA PCR LOG: NOT DETECTED LOG10IU/ML

## 2020-01-15 LAB
HBV E AB SER QL: POSITIVE
HBV E AG SER QL: NEGATIVE

## 2020-01-21 NOTE — PROGRESS NOTE ADULT - SUBJECTIVE AND OBJECTIVE BOX
SICU AM PROGRESS NOTE    HISTORY  64y/o F w/ HTN, HLD, RA, Asthma  s/p elective left transcarotid arterial sten via right femoral vein access (11/13/18). course complicated by code stroke approximately 8 hours postop. CTA head and neck showed a distal M2 branch occlusion. She was deemed not a candidate for intracranial thrombectomy due to the location of the M2 occlusion at that time. Patient monitored in SICU, found to be stable, and sent to the floor. The following day patient was found with possible worsening right side weakness and worsening aphasia. CT showed no acute infarct and CTA showed ~75% stenosis of left carotid stent    24 HOUR EVENTS:  - waxing and waning mental status, aphasia, R sided weakness  - started 10 mg midodrine q8h  - on madai gtt for SBP goal 140-160  - swallow study at beside recommend mechanical soft with thin liquids      SUBJECTIVE/ROS:  [ ] A ten-point review of systems was otherwise negative except as noted.  [X] Due to altered mental status/intubation, subjective information were not able to be obtained from the patient. History was obtained, to the extent possible, from review of the chart and collateral sources of information.      NEURO  Exam:  - waxing/waning speech  - R tongue deviation  - Mid R lip droop  [x] Adequacy of sedation and pain control has been assessed and adjusted      RESPIRATORY  RR: 13 (11-17-18 @ 01:00) (13 - 25)  SpO2: 100% (11-17-18 @ 01:00) (92% - 100%)  Wt(kg): --  Exam: Lungs clear to auscultation, Normal expansion/effort      CARDIOVASCULAR  HR: 66 (11-17-18 @ 01:00) (59 - 122)  BP: 138/66 (11-17-18 @ 01:00) (92/62 - 197/81)  BP(mean): 86 (11-17-18 @ 01:00) (61 - 111)  ABP: --  ABP(mean): --  Wt(kg): --  CVP(cm H2O): --      Exam: S1, S2  Cardiac Rhythm: normal sinus  Meds: midodrine 10 milliGRAM(s) Oral every 8 hours  phenylephrine    Infusion 0.5 MICROgram(s)/kG/Min IV Continuous <Continuous>        GI/NUTRITION  Exam: Abdomen soft, Non-tender, Non-distended  Current Diet:  NPO      GENITOURINARY  I&O's Detail    11-15 @ 07:01 - 11-16 @ 07:00  --------------------------------------------------------  IN:    IV PiggyBack: 350 mL    lactated ringers.: 225 mL  Total IN: 575 mL    OUT:    Voided: 2900 mL  Total OUT: 2900 mL    Total NET: -2325 mL      11-16 @ 07:01 - 11-17 @ 02:29  --------------------------------------------------------  IN:    phenylephrine   Infusion: 184 mL    phenylephrine   Infusion: 658.8 mL    sodium chloride 0.9%.: 800 mL  Total IN: 1642.8 mL    OUT:    Voided: 1125 mL  Total OUT: 1125 mL    Total NET: 517.8 mL          11-16    134<L>  |  93<L>  |  12  ----------------------------<  147<H>  4.0   |  28  |  1.04    Ca    9.0      16 Nov 2018 06:18  Phos  3.3     11-16  Mg     2.2     11-16    TPro  6.4  /  Alb  3.8  /  TBili  1.3<H>  /  DBili  x   /  AST  14  /  ALT  14  /  AlkPhos  64  11-16    [ ] Sandoval catheter, indication: N/A  Meds: sodium chloride 0.9%. 1000 milliLiter(s) IV Continuous <Continuous>        HEMATOLOGIC  Meds: clopidogrel Tablet 75 milliGRAM(s) Oral daily  heparin  Injectable 5000 Unit(s) SubCutaneous every 8 hours    [x] VTE Prophylaxis                        10.7   22.52 )-----------( 233      ( 16 Nov 2018 06:18 )             33.8     PT/INR - ( 16 Nov 2018 06:18 )   PT: 13.5 SEC;   INR: 1.21          PTT - ( 16 Nov 2018 06:18 )  PTT:42.0 SEC  Transfusion     [ ] PRBC   [ ] Platelets   [ ] FFP   [ ] Cryoprecipitate      INFECTIOUS DISEASES  T(C): 36.7 (11-17-18 @ 00:00), Max: 37.4 (11-16-18 @ 08:13)  Wt(kg): --  WBC Count: 22.52 K/uL (11-16 @ 06:18)    Recent Cultures:    Meds:       ENDOCRINE  CAPILLARY BLOOD GLUCOSE      POCT Blood Glucose.: 161 mg/dL (16 Nov 2018 06:49)    Meds: atorvastatin 80 milliGRAM(s) Oral at bedtime        ACCESS DEVICES:  [X] Peripheral IV  [ ] Central Venous Line	[ ] R	[ ] L	[ ] IJ	[ ] Fem	[ ] SC	Placed:   [ ] Arterial Line		[ ] R	[ ] L	[ ] Fem	[ ] Rad	[ ] Ax	Placed:   [ ] PICC:					[ ] Mediport  [ ] Urinary Catheter, Date Placed:   [ ] Necessity of urinary, arterial, and venous catheters discussed    OTHER MEDICATIONS:  chlorhexidine 4% Liquid 1 Application(s) Topical <User Schedule>      IMAGING:  < from: CT Angio Neck w/ IV Cont (11.16.18 @ 08:05) >  IMPRESSION:  Microvascular disease. No evidence for acute intracranial hemorrhage or   new acute transcortical territorial infarction.  CT angiography of the extracranial circulation demonstrates no interval   change. There is left internal carotid artery 75% in-stent stenosis.  CT angiography of the intracranial circulation is unchanged. There are   multiple tandem moderate tosevere stenoses along bilateral [place], MCA, and   PCA vessels as described above. Most significantly, is high-grade   stenosis along the left M2 superior division, unchanged from prior   imaging.    < end of copied text > Self

## 2020-01-27 ENCOUNTER — APPOINTMENT (OUTPATIENT)
Dept: DERMATOLOGY | Facility: CLINIC | Age: 65
End: 2020-01-27

## 2020-02-27 ENCOUNTER — OUTPATIENT (OUTPATIENT)
Dept: OUTPATIENT SERVICES | Facility: HOSPITAL | Age: 65
LOS: 1 days | Discharge: HOME | End: 2020-02-27

## 2020-02-27 DIAGNOSIS — Z98.1 ARTHRODESIS STATUS: Chronic | ICD-10-CM

## 2020-02-27 DIAGNOSIS — D72.829 ELEVATED WHITE BLOOD CELL COUNT, UNSPECIFIED: ICD-10-CM

## 2020-03-06 ENCOUNTER — RESULT REVIEW (OUTPATIENT)
Age: 65
End: 2020-03-06

## 2020-03-06 ENCOUNTER — LABORATORY RESULT (OUTPATIENT)
Age: 65
End: 2020-03-06

## 2020-03-06 ENCOUNTER — APPOINTMENT (OUTPATIENT)
Dept: HEMATOLOGY ONCOLOGY | Facility: CLINIC | Age: 65
End: 2020-03-06
Payer: MEDICARE

## 2020-03-06 VITALS
OXYGEN SATURATION: 98 % | DIASTOLIC BLOOD PRESSURE: 81 MMHG | TEMPERATURE: 98.2 F | RESPIRATION RATE: 16 BRPM | SYSTOLIC BLOOD PRESSURE: 132 MMHG | HEART RATE: 89 BPM

## 2020-03-06 DIAGNOSIS — D64.9 ANEMIA, UNSPECIFIED: ICD-10-CM

## 2020-03-06 LAB
BASOPHILS # BLD AUTO: 0.06 K/UL — SIGNIFICANT CHANGE UP (ref 0–0.2)
BASOPHILS NFR BLD AUTO: 0.7 % — SIGNIFICANT CHANGE UP (ref 0–2)
EOSINOPHIL # BLD AUTO: 0.82 K/UL — HIGH (ref 0–0.5)
EOSINOPHIL NFR BLD AUTO: 9 % — HIGH (ref 0–6)
FERRITIN SERPL-MCNC: 76 NG/ML
HCT VFR BLD CALC: 35.8 % — SIGNIFICANT CHANGE UP (ref 34.5–45)
HGB BLD-MCNC: 10.6 G/DL — LOW (ref 11.5–15.5)
IMM GRANULOCYTES NFR BLD AUTO: 0.5 % — SIGNIFICANT CHANGE UP (ref 0–1.5)
IRON SATN MFR SERPL: 31 %
IRON SERPL-MCNC: 72 UG/DL
LYMPHOCYTES # BLD AUTO: 1.69 K/UL — SIGNIFICANT CHANGE UP (ref 1–3.3)
LYMPHOCYTES # BLD AUTO: 18.6 % — SIGNIFICANT CHANGE UP (ref 13–44)
MCHC RBC-ENTMCNC: 22.2 PG — LOW (ref 27–34)
MCHC RBC-ENTMCNC: 29.6 GM/DL — LOW (ref 32–36)
MCV RBC AUTO: 75.1 FL — LOW (ref 80–100)
MONOCYTES # BLD AUTO: 0.74 K/UL — SIGNIFICANT CHANGE UP (ref 0–0.9)
MONOCYTES NFR BLD AUTO: 8.1 % — SIGNIFICANT CHANGE UP (ref 2–14)
NEUTROPHILS # BLD AUTO: 5.74 K/UL — SIGNIFICANT CHANGE UP (ref 1.8–7.4)
NEUTROPHILS NFR BLD AUTO: 63.1 % — SIGNIFICANT CHANGE UP (ref 43–77)
NRBC # BLD: 0 /100 WBCS — SIGNIFICANT CHANGE UP (ref 0–0)
PLATELET # BLD AUTO: 329 K/UL — SIGNIFICANT CHANGE UP (ref 150–400)
RBC # BLD: 4.77 M/UL — SIGNIFICANT CHANGE UP (ref 3.8–5.2)
RBC # FLD: 19.2 % — HIGH (ref 10.3–14.5)
TIBC SERPL-MCNC: 234 UG/DL
UIBC SERPL-MCNC: 162 UG/DL
WBC # BLD: 9.1 K/UL — SIGNIFICANT CHANGE UP (ref 3.8–10.5)
WBC # FLD AUTO: 9.1 K/UL — SIGNIFICANT CHANGE UP (ref 3.8–10.5)

## 2020-03-06 PROCEDURE — 99214 OFFICE O/P EST MOD 30 MIN: CPT

## 2020-03-06 NOTE — ASSESSMENT
[FreeTextEntry1] : 63yo F w/ HTN, HLD, RA not on any medication, CVA in the periprocedural period after carotid revascularization with carotid artery stent placement w/ aphasia and R hemiparesis, psoriasis. She was restarted on MTX 10mg qwk with folic acid on 1/10/20. She had labwork done on 1/28 which showed a worsening microcytic anemia (Hgb 8.7) with baseline ~10.\par Her Hgb today is 10.6 which is at baseline. We will check B12, folate and iron studies today. If all unremarkable, then may be methotrexate related. She had been on it before, can consider rechallenge and monitor labs closely or changing to different medication for psoriasis. \par Will call [facility] once labs resulted. CBC printed and letter provided to NH

## 2020-03-06 NOTE — PHYSICAL EXAM
[Normal] : affect appropriate [de-identified] : in wheelchair [de-identified] : dry scaly skin diffusely [de-identified] : aphasia, R hemiparesis

## 2020-07-11 NOTE — PROGRESS NOTE ADULT - SUBJECTIVE AND OBJECTIVE BOX
Pt presents to ED c/o abd pain. Pt not willing to give much information in triage due to pain. +NVD. States she took Zofran PTA S: Patient seen and examined at bedside.  Found to be aphasic with new onset right arm weakness.  Code stroke was called.  SICU consult placed.    O: Vital Signs  T(C): 37.1 (11-16 @ 12:00), Max: 37.8 (11-15 @ 21:49)  HR: 65 (11-16 @ 12:10) (65 - 125)  BP: 170/78 (11-16 @ 12:10) (92/74 - 183/83)  RR: 19 (11-16 @ 12:10) (17 - 28)  SpO2: 100% (11-16 @ 12:10) (92% - 100%)  11-15-18 @ 07:01  -  11-16-18 @ 07:00  --------------------------------------------------------  IN: 575 mL / OUT: 2900 mL / NET: -2325 mL    11-16-18 @ 07:01  -  11-16-18 @ 12:28  --------------------------------------------------------  IN: 334 mL / OUT: 0 mL / NET: 334 mL    General: NAD  Abdominal: soft, NT, ND  Extremities: ROM/strength absent on patient's right side. Right groin puncture dressing c/d/i, right groin soft, no evidence of hematoma  Neuro: apahasic with worsened R. facial droop, tongue deviation to right.                        10.7   22.52 )-----------( 233      ( 16 Nov 2018 06:18 )             33.8   11-16    134<L>  |  93<L>  |  12  ----------------------------<  147<H>  4.0   |  28  |  1.04    Ca    9.0      16 Nov 2018 06:18  Phos  3.3     11-16  Mg     2.2     11-16    TPro  6.4  /  Alb  3.8  /  TBili  1.3<H>  /  DBili  x   /  AST  14  /  ALT  14  /  AlkPhos  64  11-16

## 2020-07-20 NOTE — H&P PST ADULT - NSANTHBMIRD_ENT_A_CORE
July 20, 2020        Conchita Andrade  [address] [address]    To Whom It May Concern:    This is to certify Conchita Andrade was evaluated on 07/20/20 and is unable to return to work.    Conchita Andrade should self-quarantine until at least 7/30/2020.  ?  CDC guidelines for return to work are as follows:  • At least 3 days (72 hours) have passed since fever resolution without use of fever reducing medication and improvement in respiratory symptoms (e.g., cough, shortness of breath) and  • At least 10 days have passed since symptoms first appeared    Many employers are asking that employees be seen and reexamined to return to work. We ask that you follow the CDC guidelines above and that you do not ask that your employees be seen back in the clinic setting for a Return to Work slip when off due to presumed Covid related symptoms.    Obviously the Coronavirus is a rapidly evolving situation and recommendations are changing regularly to prevent spread of the disease and further loss of life.    Thank you for your understanding during these unusual times.        Electronically signed by:   GABRIEL León                 [email or web address]   [address] [place] [address]       No

## 2020-07-27 NOTE — HISTORY OF PRESENT ILLNESS
Nephrology Progress Note          CC: We are following this patient for HTN and CKD. Admitted for acute cerebellar CVA    Admitted with headaches and dizziness; he was found to have acute ischemic stroke: MRI  showed bilateral cerebellar infarcts, small on right, and moderately sized on the left( also had CT and CTA).  Stroke team was consulted and recommended against tPA.      I had seen him in 2019 at [facility] but has not kept OP follow up. He  has a significant past medical history of HTN known for several years; he stopped BP meds at some point in 2018 ( started on an exercise regimen and weight loss). He was admitted in 2019 with  profound BP elevation 230's/120's.    The patient has elevated creatinine of 1.4 in 2019. SUBJECTIVE:    Persistent dizziness  BP overall much better but spikes at times    No dyspnea, CP. No cough or wheezing. No N/V, abd pain, or diarrhea. No F/C. Sister at bedside      Physical Exam:    VITALS:  BP (!) 146/98   Pulse 84   Temp 98.3 °F (36.8 °C) (Oral)   Resp 18   Ht 6' 2\" (1.88 m)   Wt 226 lb 10.1 oz (102.8 kg)   SpO2 93%   BMI 29.10 kg/m²   TEMPERATURE:  Current - Temp: 98.3 °F (36.8 °C); Max - Temp  Av.6 °F (37 °C)  Min: 98.3 °F (36.8 °C)  Max: 99.4 °F (37.4 °C)  RESPIRATIONS RANGE: Resp  Av  Min: 15  Max: 18  PULSE RANGE: Pulse  Av.6  Min: 70  Max: 93  BLOOD PRESSURE RANGE:  Systolic (64EUQ), LÓPEZ:007 , Min:102 , WSR:519   ; Diastolic (46UNJ), HOX:23, Min:59, Max:98    PULSE OXIMETRY RANGE: SpO2  Av.7 %  Min: 92 %  Max: 95 %    Constitutional:  NAD  HEENT:  No oral lesions  Lungs:  clear  Cardiovascular:  RRR, no murmur  Abd:  Soft, NT  Ext:  No edema  Skin:  No rash  Neuro: no asterixis.  No focal loss of strength      DATA:    CBC:   Recent Labs     20  0554 20  0528 20  0418   WBC 7.0 6.9 7.3   RBC 4.41 4.27 4.24   HGB 13.0* 12.6* 12.4*   HCT 38.1* 36.8* 37.2*    284 297     BMP: Recent Labs     07/25/20  0554 07/26/20  0528 07/27/20  0418   * 134* 138   K 4.1 4.3 4.3   CL 99 98* 100   CO2 24 26 25   BUN 19 18 19   CREATININE 1.3 1.2 1.4*   CALCIUM 9.7 9.5 9.7   GLUCOSE 106* 122* 113*     Phosphorus:  No results for input(s): PHOS in the last 72 hours. Magnesium:  No results for input(s): MG in the last 72 hours. IMPRESSION/RECOMMENDATIONS:      1. Uncontrolled HTN - most likely essential HTn with non-compliance  Secondary workup: plasma metanephrines & catecholamines - pnding  Renin/aldosterone - pending  Renal arterial Doppler done but results pending  BP reasonable at present  2. CKD stage 2 - baseline creatinine near 1.5  3. Cerebellar infarcts  4. Bilateral cerebrovascular disease: CTA head and neck showed Right V4 segment of vertebral artery occlusion, Left V4 segment is severely stenotic. Right intracranial ICA cavernous and paraclinoid segments 70% stenosis.  Left intracranial ICA cavernous segment 50% stenosis.   No intervention for now    Antonietta Hammer MD [de-identified] : 65yo F w/ HTN, HLD, RA not on any medication, CVA in the periprocedural period after carotid revascularization with carotid artery stent placement w/ aphasia and R hemiparesis, psoriasis here for evaluation of anemia. \par \par She was previously on MTX for her RA which had helped her psoriasis. She was restarted on MTX 10mg qwk with folic acid on 1/10/20. She had labwork done on 1/28 which showed a worsening microcytic anemia (Hgb 8.7). Methotrexate has been held since. No other changes. They deny any blood noted in stools. She had colonoscopy many years ago. \par Her baseline Hgb is around 10 since 11/2018 when she was admitted for CVA. WBC and plt count wnl. \par She is currently residing at [facility].

## 2021-02-22 NOTE — PROGRESS NOTE ADULT - PROBLEM SELECTOR PLAN 6
PHYSICIAN NEXT STEPS:  Call the Patient    CHIEF COMPLAINT:  Chief Complaint/Protocol Used: Patient Declines Triage  Onset: Wheezing      ASSESSMENT:  ? Onset: Wheezing  -------------------------------------------------------    DISPOSITION:  Disposition Recommendation: Unspecified  Patient Directed To: Unspecified  Patient Intended Action: Unspecified    CALL NOTES:  05/06/2019 at 8:08 AM by Barbara Seaman  ? Caller mother Grant is not with patient. The patient declines assessment and triage for wheezing at this time. As a result no disposition was reached. Caller verbalized patient had wheezing, but verbalized he is fine now, but is requesting an   appointment.  Appointment made for patient at [facility] with Dr. Inman on 5/6/2019 at 2:25 PM. Caller mother verbalized patient will be at appointment. Nurse sent message to Dr. Ureña on declined nurse triage.    DISPOSITION OVERRIDE/PROVIDER CONSULT:  Disposition Override: N/A  Override Source: Unspecified  Consulted with PCP: No  Consulted with On-Call Physician: No    CALLER CONTACT INFO:  Name: GRANT SARAVIA (Mother)  Phone 1: [phone] (Home Phone) - Preferred      ENCOUNTER STARTED:  05/06/19 07:57:28 AM  ENCOUNTER ASSIGNED TO/CLOSED BY:  Barbara Seaman @ 05/06/19 08:10:05 AM      -------------------------------------------------------    UNDERSTANDS CARE ADVICE: No    AGREES WITH CARE ADVICE: No    WILL FOLLOW CARE ADVICE: No    -------------------------------------------------------   Permissive HTN SBP between 140-160 as per prior documentation given ICA and M2 segment stenoses and prior episode of worsening of CVA features when BP drops below 140  - continue monitoring off antihypertensives at present No Some scattered erythematous macules noted on face/chest. Minimally active disease  -continue triamcinolone ointment prn

## 2021-06-20 NOTE — H&P ADULT - NSHPLABSRESULTS_GEN_ALL_CORE
Cardiology Progress Note    Assessment:  Non-ST segment elevation myocardial infarction, chest pain-free  Coronary artery disease with history of remote coronary artery bypass graft surgery  Chronic kidney disease  Peripheral arterial disease    Plan:  Awaiting CV surgery opinion about feasibility of redo bypass surgery  Continue metoprolol and Imdur      Subjective:   Underwent coronary angiogram earlier today.  Denies chest pains or shortness of breath this afternoon    Objective:   /69 (Patient Position: Lying)  Pulse 93  Temp 98.7  F (37.1  C) (Oral)   Resp 18  Ht 6' (1.829 m)  Wt 198 lb 11.2 oz (90.1 kg)  SpO2 92%  BMI 26.95 kg/m2    Intake/Output Summary (Last 24 hours) at 10/01/18 1314  Last data filed at 10/01/18 0954   Gross per 24 hour   Intake              435 ml   Output              250 ml   Net              185 ml         Physical Exam:  GENERAL: no distress  NECK: No JVD  LUNGS: Clear to auscultation.  CARDIAC: regular  rhythm, S1 & S2 normal.  No heaves, thrills, gallops or murmurs.  ABDOMEN: flat, negative hepatosplenomegaly, soft and non-tender.  EXTREMITIES: No evidence of cyanosis, clubbing or edema.    Current Facility-Administered Medications   Medication Dose Route Frequency Provider Last Rate Last Dose     acetaminophen tablet 500 mg (TYLENOL)  500 mg Oral Q4H PRN Miki Mac MD         acetaminophen tablet 650 mg (TYLENOL)  650 mg Oral Q4H PRN Derrick Andersen DO   650 mg at 09/29/18 2051     adenosine 90 mg/90 mL - Pressure Wire - Pt Wt < 120 kg  90 mg  Continuous PRN Miki Mac MD   Stopped at 10/01/18 1052     albuterol nebulizer solution 2.5 mg (PROVENTIL)  2.5 mg Nebulization Q6H PRN Alex Roche MD         calcium (as carbonate) chewable tablet 400 mg (TUMS)  400 mg Oral QID PRN Derrick Andersen DO         dextrose 50 % (D50W) syringe 20-50 mL  20-50 mL Intravenous PRN Alex Roche MD         glipiZIDE (GLUCOTROL) tablet 15 mg  15 mg Oral Daily before  supper Alex Roche MD   15 mg at 09/30/18 1650     glipiZIDE tablet 10 mg (GLUCOTROL)  10 mg Oral QAM AC Alex Roche MD   10 mg at 09/30/18 0901     glucagon (human recombinant) injection 1 mg  1 mg Subcutaneous PRN Alex Roche MD         heparin 25,000 units in 0.45% sodium chloride (100 units/ml) 250 mL infusion  1-40 Units/kg/hr Intravenous Continuous Derrick Andersen DO   Stopped at 10/01/18 1007     hydrALAZINE tablet 25 mg (APRESOLINE)  25 mg Oral Q4H PRN Derrick Andersen DO         influenza vacc high dose (age 65 or >) (PF) 2018-19 injection 0.5 mL (FLUZONE HIGH DOSE)  0.5 mL Intramuscular Prior to Discharge Karmen Petersen MD         insulin aspart U-100 injection (NovoLOG)   Subcutaneous TID with meals Derrick Andersen DO   2 Units at 10/01/18 0842     insulin aspart U-100 injection (NovoLOG)   Subcutaneous QHS Derrick Andersen DO   2 Units at 09/30/18 2131     isosorbide dinitrate tablet 20 mg (ISORDIL)  20 mg Oral TID dinitrates Scott Briggs MD   20 mg at 10/01/18 1244     loratadine tablet 10 mg (CLARITIN)  10 mg Oral DAILY Alex Roche MD   10 mg at 10/01/18 0837     melatonin tablet 3 mg  3 mg Oral Bedtime PRN Derrick Andersen DO   3 mg at 09/30/18 0002     metoprolol succinate 24 hr tablet 50 mg (TOPROL-XL)  50 mg Oral BID Alex Roche MD   50 mg at 10/01/18 0837     morphine injection 1-2 mg  1-2 mg Intravenous Q3H PRN Miki Mac MD         naloxone injection 0.2-0.4 mg (NARCAN)  0.2-0.4 mg Intravenous PRN Scott Briggs MD        Or     naloxone injection 0.2-0.4 mg (NARCAN)  0.2-0.4 mg Intramuscular PRN Scott Briggs MD         nitroglycerin SL tablet 0.4 mg (NITROSTAT)  0.4 mg Sublingual Q5 Min PRN Yarely Kealey, MD         nitroglycerin SL tablet 0.4 mg (NITROSTAT)  0.4 mg Sublingual Q5 Min PRN Alex Roche MD         omeprazole capsule 20 mg (PriLOSEC)  20 mg Oral DAILY Alex Roche MD   20 mg at 10/01/18 0837     ondansetron injection 4 mg (ZOFRAN)  4 mg  Intravenous Q4H PRN Derrick Andersen DO         oxyCODONE immediate release tablet 5-10 mg (ROXICODONE)  5-10 mg Oral Q4H PRN Miki Mac MD         simvastatin tablet 40 mg (ZOCOR)  40 mg Oral QHS Alex Roche MD   40 mg at 09/30/18 2129     triamcinolone 0.1 % cream (KENALOG)   Topical BID LAZARO Kaur         vancomycin oral solution 125 mg (VANCOCIN)  125 mg Oral Every Other Day Derrick Andersen DO   125 mg at 09/30/18 1431       Cardiographics:    Telemetry: Normal sinus rhythm    Coronary angio:    Prox Cx to Mid Cx lesion 70% stenosed.    Dist Cx lesion 90% stenosed.    Ost 1st Mrg to 1st Mrg lesion 100% stenosed.    Dist LAD-1 lesion 50% stenosed.    Dist LAD-2 lesion 90% stenosed.    Mid LAD lesion 70% stenosed.    Pressure wire/FFR was performed on the lesion. pre diagnositic: 0.82. post diagnostic: 0.65.    Pressure wire/FFR was performed on the lesion.    Prox RCA to Dist RCA lesion 99% stenosed.    Dist Graft lesion 100% stenosed.    Origin to Dist Graft lesion 100% stenosed.  Lab Results:     Results from last 7 days  Lab Units 10/01/18  0502   LN-SODIUM mmol/L 137   LN-POTASSIUM mmol/L 4.5   LN-CHLORIDE mmol/L 104   LN-CO2 mmol/L 25   LN-BLOOD UREA NITROGEN mg/dL 15   LN-CREATININE mg/dL 1.13   LN-CALCIUM mg/dL 9.7       Results from last 7 days  Lab Units 10/01/18  0502 09/29/18  0906 09/29/18  0059   LN-WHITE BLOOD CELL COUNT thou/uL  --  7.6 8.5   LN-HEMOGLOBIN g/dL 12.4* 13.4* 12.9*   LN-HEMATOCRIT %  --  41.1 40.1   LN-PLATELET COUNT thou/uL 123* 144 139*     BNP   Date Value Ref Range Status   09/29/2018 40 0 - 72 pg/mL Final     Lab Results   Component Value Date    INR 1.37 (H) 10/01/2018    INR 1.73 (H) 09/29/2018    INR 1.62 (H) 09/29/2018     Lab Results   Component Value Date    CKMB 1 10/25/2013    TROPONINI 2.45 () 09/30/2018    TROPONINI 1.40 () 09/29/2018    TROPONINI 0.47 () 09/29/2018       Bobby (Nathan)  MD Allan   10.1   14.53 )-----------( 412      ( 28 Nov 2018 06:00 )             33.1     11-28    139  |  103  |  7   ----------------------------<  98  4.6   |  22  |  1.06    Ca    9.8      28 Nov 2018 06:00  Phos  3.8     11-28  Mg     2.1     11-28    CT brain on 11/13 did not show any evidence of acute infarct or hemorrhage.   CTA head and neck on 11/13 showed multifocal intracranial atherosclerosis including severe stenosis of left MCA proximal and distal M1 segment, multifocal stenosis involving inferior M2 segment and occlusion of the superior M2 segment and left proximal ICA stent.  CTA head and neck on 11/16 showed 75% stenosis of left ICA stent (in-stent stenosis), which appears to be same as compared to 11/13 to my eye.   MRI brain on 11/17 showed acute infarct in the left MCA distribution. TTE did not show any obvious structural cardiac source of embolism. CT brain on 11/20 showed expected evolution of left MCA distribution infarct and CTA head and neck showed stable findings.

## 2021-07-25 NOTE — DISCHARGE NOTE ADULT - HOSPITAL COURSE
Progress Note    Margarita Avitia MD             Daily Progress Note: 7/25/2021      Subjective: The patient is seen for follow  up. Patient is sitting in the chair has swelling on the left upper limb as well as both the lower limbs. But it is better he says when he lies down. Denies any chest pain according to patient he is getting short of breath on walking around requested PT to have follow-up with patient    Problem List:  Problem List as of 7/25/2021 Never Reviewed        Codes Class Noted - Resolved    Pulmonary edema ICD-10-CM: J81.1  ICD-9-CM: 799  7/13/2021 - Present        GI bleed ICD-10-CM: K92.2  ICD-9-CM: 578.9  1/5/2021 - Present              Medications reviewed  Current Facility-Administered Medications   Medication Dose Route Frequency    amLODIPine (NORVASC) tablet 5 mg  5 mg Oral DAILY    apixaban (ELIQUIS) tablet 2.5 mg  2.5 mg Oral BID    sevelamer carbonate (RENVELA) tab 1,600 mg  1,600 mg Oral TID WITH MEALS    furosemide (LASIX) tablet 80 mg  80 mg Oral DAILY    0.9% sodium chloride infusion 250 mL  250 mL IntraVENous PRN    0.9% sodium chloride infusion 250 mL  250 mL IntraVENous PRN    metoprolol tartrate (LOPRESSOR) tablet 25 mg  25 mg Oral DAILY    tamsulosin (FLOMAX) capsule 0.4 mg  0.4 mg Oral DAILY    [Held by provider] hydrALAZINE (APRESOLINE) tablet 50 mg  50 mg Oral TID    sodium bicarbonate tablet 650 mg  650 mg Oral TID    0.9% sodium chloride infusion 250 mL  250 mL IntraVENous PRN    calcitRIOL (ROCALTROL) capsule 0.25 mcg  0.25 mcg Oral DAILY    ergocalciferol capsule 50,000 Units  50,000 Units Oral Q7D    hydrALAZINE (APRESOLINE) 20 mg/mL injection 40 mg  40 mg IntraVENous Q6H PRN       Review of Systems:   A comprehensive review of systems was negative except for that written in the HPI.     Objective:   Physical Exam:     Visit Vitals  /85 (BP 1 Location: Right upper arm, BP Patient Position: Sitting)   Pulse (!) 56   Temp 97.4 °F (36.3 °C) Resp 22   Ht 6' (1.829 m)   Wt 98.2 kg (216 lb 7.9 oz)   SpO2 97%   BMI 29.36 kg/m²    O2 Flow Rate (L/min): 2 l/min O2 Device: None (Room air)    Temp (24hrs), Av.8 °F (36.6 °C), Min:97.3 °F (36.3 °C), Max:98.6 °F (37 °C)    701 - 1900  In: -   Out: 200 [Urine:200]   1901 - 700  In: -   Out: 671 [Urine:675]    General:  Alert, cooperative, no distress, appears stated age. Lungs:   Clear to auscultation bilaterally. Chest wall:  No tenderness or deformity. Heart:  Regular rate and rhythm, S1, S2 normal, no murmur, click, rub or gallop. Abdomen:   Soft, non-tender. Bowel sounds normal. No masses,  No organomegaly. Extremities: Extremities normal, atraumatic, no cyanosis 2+ edema both the lower limbs as well as left upper limb has edema   Pulses: 2+ and symmetric all extremities. Skin: Skin color, texture, turgor normal. No rashes or lesions   Neurologic: CNII-XII intact. No gross sensory or motor deficits     Data Review:       Recent Days:  Recent Labs     21  0814 21  0400   WBC 4.6 4.5   HGB 8.9* 8.5*   HCT 29.8* 28.1*   * 143*     Recent Labs     21  0814 21  0400    137   K 4.9 4.7    104   CO2 23 23   GLU 91 92   BUN 96* 91*   CREA 7.26* 7.32*   CA 8.1* 8.0*   PHOS 8.2* 8.3*   ALB 3.2* 3.0*     No results for input(s): PH, PCO2, PO2, HCO3, FIO2 in the last 72 hours.   Results     Procedure Component Value Units Date/Time    MRSA SCREEN - PCR (NASAL) [[id number]] Collected: 21    Order Status: Completed Specimen: Swab Updated: 21     MRSA by PCR, Nasal Not Detected       COVID-19 RAPID TEST [[id number]] Collected: 21 0539    Order Status: Completed Specimen: Nasopharyngeal Updated: 21 0655     Specimen source Nasopharyngeal        COVID-19 rapid test Not Detected        Comment: Rapid Abbott ID Now   Rapid NAAT:  The specimen is NEGATIVE for SARS-CoV-2, the novel coronavirus associated with COVID-19. Negative results should be treated as presumptive and, if inconsistent with clinical signs and symptoms or necessary for patient management, should be tested with an alternative molecular assay. Negative results do not preclude SARS-CoV-2 infection and should not be used as the sole basis for patient management decisions. This test has been authorized by the FDA under   an Emergency Use Authorization (EUA) for use by authorized laboratories. Fact sheet for Healthcare Providers: [employer]co[employer]nz Fact sheet for Patients: [employer]za[employer]co[employer]nz   Methodology: Isothermal Nucleic Acid Amplification         CULTURE, BLOOD, PAIRED [[id number]] Collected: 07/12/21 2240    Order Status: Completed Specimen: Blood Updated: 07/16/21 1346     Special Requests: No Special Requests        Culture result:       Two of four bottles have been flagged positive by instrument. Bottles have been sent to [facility] laboratory to assess for possible growth. Gram Positive Cocci in clusters CALLED TO AND READ BACK BY chiquita mtz r.n. [id number] 07/14/2021 by hanane                  Staphylococcus species, coagulase negative GROWING IN THE 1ST OF 4 BOTTLES DRAWN                  Staphylococcus species, coagulase negative (2nd colony type/strain) GROWING IN THE 2ND OF 4 BOTTLES DRAWN               24 Hour Results:  Recent Results (from the past 24 hour(s))   IRON PROFILE    Collection Time: 07/25/21 12:55 PM   Result Value Ref Range    Iron 88 35 - 150 ug/dL    TIBC 322 250 - 450 ug/dL    Iron % saturation 27 20 - 50 %       XR CHEST PORT   Final Result   There is continued moderate pulmonary interstitial and alveolar   edema with a slight interval improvement within the right upper lobe. The   remainder of this examination is unchanged.       XR CHEST PORT   Final Result      XR CHEST PORT   Final Result      LOWER EXT ART PVR MULT LEVEL SEG PRESSURES   Final Result      DUPLEX LOWER EXT VENOUS BILAT Final Result      XR CHEST PORT   Final Result   Findings/impression: Stable cardiomediastinal silhouette. Similar central   pulmonary vascular congestion and bilateral patchy airspace opacities. No   significant pleural effusion. No pneumothorax. Findings are not significantly changed. XR CHEST PORT   Final Result      US RETROPERITONEUM COMP   Final Result   Medical renal disease on the right      DUPLEX UPPER EXT VENOUS LEFT   Final Result      XR CHEST PORT   Final Result   Findings/impression:      Cardiac silhouette is enlarged. Central vascular congestion and patchy central   airspace disease/edema. Suspect trace right pleural effusion. No evidence of pneumothorax. No acute osseous abnormality identified. XR CHEST PA LAT    (Results Pending)        Assessment: Acute DVT left upper limb  Acute CHF now resolved  Anemia  Acute on chronic renal failure now stabilize kidney function  Hypertension  Ventricular tachycardia with hypotension        Plan: We will continue current medications. Care Plan discussed with: Patient/Family    Total time spent with patient: 30 minutes.     Reyes Ache, MD 63 year old female with past medical hx of L M2 CVA(residual global aphasia, R hemiparesis after L transcarotid arterial stent) aw acute encephalopathy likely due to UTI.    1. UTI- E coli. Completed treatment.     2. Metabolic encephalopathy- Likely due to UTI. Mental status now improved.    -MRI shows evolution of a left subinsular, left frontal and left corona radiata infarct, per neuro this is consistent with prior infarct  -routine EEG neg for seizure activity     3. H/o CVA L MCA CVA as complication of previous L carotid stenting procedure. - deficits appear to be at baseline  - continue aspirin, plavix, high dose statin.    4. Permissive HTN- SBP between 140-160 as per prior documentation given ICA and M2 segment stenoses and prior episode of worsening of CVA features when BP drops below 140  - continue monitoring off antihypertensives at present.     D/c to rehab today with follow up with her PCP, Neurology and Rheumatology 63 year old female with past medical hx of L M2 CVA(residual global aphasia, R hemiparesis after L transcarotid arterial stent), HTN, HLD, RA presents from NH with acute encephalopathy and unresponsiveness.     Stroke code called upon presentation, patient evaluated by stroke team and underwent CT head/CTA head/neck. Low suspicion for CVA and patient also not TPA or endovascular candidate. Toxic/metabolic encephalopathy suspected. Found to have a UTI.     Started on IV abx, cx grew E coli. Completed 7 days of abx.     Uncontrolled htn on admission upto 175/101. Given IV Hydralazine and Labetalol. BP improved. To maintain [ermissive HTN- SBP between 140-160 as per prior documentation given ICA and M2 segment stenoses and prior episode of worsening of CVA features when BP drops below 140.    MRI showed evolution of a left subinsular, left frontal and left corona radiata infarct, per neuro this is consistent with prior infarct. Routine EEG neg for seizure activity     Mental status improved during admission.     D/radha to rehab with follow up with her PCP, Neurology and Rheumatology     Diagnoses: E coli UTI; Uncontrolled htn; Metabolic encephalopathy; R hemiparesis; DM-2; Prerenal azotemia; Rheumatoid arthritis; Psoriasis; Hypokalemia 63 year old female with past medical hx of L M2 CVA(residual global aphasia, R hemiparesis after L transcarotid arterial stent), HTN, HLD, RA presents from NH with acute encephalopathy and unresponsiveness.     Stroke code called upon presentation, patient evaluated by stroke team and underwent CT head/CTA head/neck. Low suspicion for CVA and patient also not TPA or endovascular candidate. Toxic/metabolic encephalopathy suspected.     Found to have a UTI. Started on IV abx, cx grew E coli. Completed 7 days of abx.     Uncontrolled htn on admission upto 175/101. Given IV Hydralazine and Labetalol. BP improved. To maintain Permissive HTN- SBP between 140-160 as per prior documentation given ICA and M2 segment stenoses and prior episode of worsening of CVA features when BP drops below 140.    MRI done per neuro- showed evolution of a left subinsular, left frontal and left corona radiata infarct, per neuro this is consistent with prior infarct. Routine EEG neg for seizure activity     Mental status improved during admission, became alert, able to follow commands but w persistent R hemiparesis and aphasia. D/radha to rehab with follow up with her PCP, Neurology and Rheumatology     Diagnoses: E coli UTI; Uncontrolled htn; Metabolic encephalopathy; R hemiparesis; DM-2; Prerenal azotemia; Rheumatoid arthritis; Psoriasis; Hypokalemia

## 2023-05-10 NOTE — PATIENT PROFILE ADULT - HAVE YOU EXPERIENCED A TRAUMATIC EVENT?
Test was performed.  :1967  AGE:55 year old  Ordering provider: Dr. Galvin  Diagnosis: No associated diagnoses for current labs     Patient here today for EKG test.   no

## 2024-02-05 ENCOUNTER — EMERGENCY (EMERGENCY)
Facility: HOSPITAL | Age: 69
LOS: 1 days | Discharge: NURSING, REHAB OR LONG-TERM CARE | End: 2024-02-05
Attending: EMERGENCY MEDICINE
Payer: MEDICARE

## 2024-02-05 VITALS
OXYGEN SATURATION: 100 % | DIASTOLIC BLOOD PRESSURE: 103 MMHG | SYSTOLIC BLOOD PRESSURE: 161 MMHG | RESPIRATION RATE: 19 BRPM | TEMPERATURE: 98 F | HEART RATE: 112 BPM

## 2024-02-05 VITALS
TEMPERATURE: 98 F | DIASTOLIC BLOOD PRESSURE: 103 MMHG | RESPIRATION RATE: 16 BRPM | OXYGEN SATURATION: 100 % | HEART RATE: 100 BPM | SYSTOLIC BLOOD PRESSURE: 160 MMHG

## 2024-02-05 DIAGNOSIS — Z98.1 ARTHRODESIS STATUS: Chronic | ICD-10-CM

## 2024-02-05 PROCEDURE — 12002 RPR S/N/AX/GEN/TRNK2.6-7.5CM: CPT

## 2024-02-05 PROCEDURE — 72125 CT NECK SPINE W/O DYE: CPT | Mod: 26,MA

## 2024-02-05 PROCEDURE — 70450 CT HEAD/BRAIN W/O DYE: CPT | Mod: 26,MA

## 2024-02-05 PROCEDURE — 99284 EMERGENCY DEPT VISIT MOD MDM: CPT | Mod: 25

## 2024-02-05 PROCEDURE — 71045 X-RAY EXAM CHEST 1 VIEW: CPT | Mod: 26

## 2024-02-05 PROCEDURE — 71045 X-RAY EXAM CHEST 1 VIEW: CPT

## 2024-02-05 PROCEDURE — 72170 X-RAY EXAM OF PELVIS: CPT

## 2024-02-05 PROCEDURE — 72125 CT NECK SPINE W/O DYE: CPT | Mod: MA

## 2024-02-05 PROCEDURE — 90715 TDAP VACCINE 7 YRS/> IM: CPT

## 2024-02-05 PROCEDURE — 72170 X-RAY EXAM OF PELVIS: CPT | Mod: 26

## 2024-02-05 PROCEDURE — 70450 CT HEAD/BRAIN W/O DYE: CPT | Mod: MA

## 2024-02-05 PROCEDURE — 90471 IMMUNIZATION ADMIN: CPT

## 2024-02-05 RX ORDER — TETANUS TOXOID, REDUCED DIPHTHERIA TOXOID AND ACELLULAR PERTUSSIS VACCINE, ADSORBED 5; 2.5; 8; 8; 2.5 [IU]/.5ML; [IU]/.5ML; UG/.5ML; UG/.5ML; UG/.5ML
0.5 SUSPENSION INTRAMUSCULAR ONCE
Refills: 0 | Status: COMPLETED | OUTPATIENT
Start: 2024-02-05 | End: 2024-02-05

## 2024-02-05 RX ADMIN — TETANUS TOXOID, REDUCED DIPHTHERIA TOXOID AND ACELLULAR PERTUSSIS VACCINE, ADSORBED 0.5 MILLILITER(S): 5; 2.5; 8; 8; 2.5 SUSPENSION INTRAMUSCULAR at 22:03

## 2024-02-05 NOTE — ED PROVIDER NOTE - NSICDXPASTMEDICALHX_GEN_ALL_CORE_FT
PAST MEDICAL HISTORY:  Asthma     Carotid Artery Dissection     Carotid artery stenosis     Eczema     Eczema bilateral elbows    HLD (hyperlipidemia)     HTN (Hypertension)     Myocardial Infarction     Osteoporosis     Psoriasis     Rheumatoid arthritis tx with  methotrexate and prednisone

## 2024-02-05 NOTE — ED ADULT NURSE NOTE - OBJECTIVE STATEMENT
68y F PMH COPD, CVA with R sided deficits, aphasia bibems from [facility] s/p fall. Pt was sitting at the edge of the bed when she slid down and hit her head on the dresser in the room. Pt on aspirin. Bleeding controlled with ems. Pt head wrapped with gauze. Pt on 2L NC for copd. MD Dinero at bedside. Pt nonverbal at baseline, aware of her environment, follows commands. MOLST in chart. Comfort and safety maintained.

## 2024-02-05 NOTE — ED PROVIDER NOTE - OBJECTIVE STATEMENT
69 yo F past medical hx of L M2 CVA(residual global aphasia, R hemiparesis after L transcarotid arterial stent), HTN, HLD, RA presents for head injury after a fall. Patient was brought in from [facility] after she had a fall. Patient at her baseline mental status, answering questions appropriately with yes/no answers (not able to write to communicate). When asked if she is in pain, shakes her head no. Denies fevers, chills, nausea, vomiting, dizziness, chest pain, SOB, abdominal pain, dysuria, hematuria. Of note, patient is on ASA/plavix for previous CVA. 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 Patient was brought in from [facility] after she had a fall. Patient at her baseline mental status, answering questions appropriately with yes/no answers (not able to write to communicate). When asked if she is in pain, shakes her head no. Denies fevers, chills, nausea, vomiting, dizziness, chest pain, SOB, abdominal pain, dysuria, hematuria. Of note, patient is on ASA/plavix for previous CVA.  Rasheeda: [facility] called: states patient was at edge of bed and slipped and hit back of head on nightstand. was witnessed by PCA in room but who could not get to her in time. on ASA and plavix.

## 2024-02-05 NOTE — ED ADULT NURSE NOTE - NSFALLHARMRISKINTERV_ED_ALL_ED
Assistance OOB with selected safe patient handling equipment if applicable/Assistance with ambulation/Communicate risk of Fall with Harm to all staff, patient, and family/Monitor gait and stability/Provide visual cue: red socks, yellow wristband, yellow gown, etc/Reinforce activity limits and safety measures with patient and family/Bed in lowest position, wheels locked, appropriate side rails in place/Call bell, personal items and telephone in reach/Instruct patient to call for assistance before getting out of bed/chair/stretcher/Non-slip footwear applied when patient is off stretcher/[place] to call system/Physically safe environment - no spills, clutter or unnecessary equipment/Purposeful Proactive Rounding/Room/bathroom lighting operational, light cord in reach

## 2024-02-05 NOTE — ED PROVIDER NOTE - PHYSICAL EXAMINATION
PHYSICAL EXAM:    GENERAL: NAD  HEENT:  Atraumatic  CHEST/LUNG: Chest rise equal bilaterally  HEART: Regular rate and rhythm  ABDOMEN: Soft, Nontender, Nondistended  EXTREMITIES:  Extremities warm  PSYCH: A&Ox3, non-verbal  SKIN: No obvious rashes or lesions  NEUROLOGY:

## 2024-02-05 NOTE — ED ADULT NURSE NOTE - NSICDXPASTSURGICALHX_GEN_ALL_CORE_FT
PAST SURGICAL HISTORY:  Cataract Extraction Surgery     S/P lumbar fusion h/o vertral fracture with cement fusion 209    tonsillectomy s/p Excision Right lymph node ; pt unclear

## 2024-02-05 NOTE — ED PROVIDER NOTE - NSICDXPASTSURGICALHX_GEN_ALL_CORE_FT
PAST SURGICAL HISTORY:  Cataract Extraction Surgery     S/P lumbar fusion h/o vertral fracture with cement fusion 209    tonsillectomy s/p Excision Right lymph node ; pt unclear    
negative

## 2024-02-05 NOTE — ED PROVIDER NOTE - NSFOLLOWUPINSTRUCTIONS_ED_ALL_ED_FT
You were seen in the ER today after falling and hitting your head.    You had a laceration on your head that required staples to be repaired.  You had 6 staples placed that will need to be removed in 7 to 10 days.    Follow-up with your primary care doctor in the next 1 week to be reevaluated.    You may take Tylenol 650 mg every 6 hours as needed to help with any pain.    Return to the ER right away if you develop any new or worsening symptoms including worsening headache, dizziness, loss of consciousness, vomiting, or if you are otherwise concerned.

## 2024-02-05 NOTE — ED ADULT TRIAGE NOTE - CHIEF COMPLAINT QUOTE
head injury s/p fall oob, no LOC, at baseline mental status  nonverbal at baseline from previous stroke
Breath sounds clear and equal bilaterally.

## 2024-02-05 NOTE — ED ADULT NURSE NOTE - CHIEF COMPLAINT QUOTE
head injury s/p fall oob, no LOC, at baseline mental status  nonverbal at baseline from previous stroke

## 2024-02-05 NOTE — ED PROVIDER NOTE - PATIENT PORTAL LINK FT
You can access the FollowMyHealth Patient Portal offered by [facility] by registering at the following website: [email or web address]. By joining [employer]’s FollowMyHealth portal, you will also be able to view your health information using other applications (apps) compatible with our system.

## 2024-02-05 NOTE — ED PROVIDER NOTE - CLINICAL SUMMARY MEDICAL DECISION MAKING FREE TEXT BOX
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 Physical exam ....   Will obtain head imaging and labwork. 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   Will obtain head imaging.  Bar: 68  year old female with pmhx of L M2 CVA, htn, hld, RA here for head injury s/p fall from bed. Patient was at edge of bed and slipped down and hit head on floor. PCA in room at the time witnessed fall and was not able to get to her in time. + scalp laceration. denies loc. not ablet o verbally express answers.  PE: att exam: patient awake alert. nonverbal. 5 cm linear laceration to left side top of scalp.  LUNGS nonlabored respirations. CARD RRR.  Abdomen soft NT ND no rebound no guarding no CVA tenderness. EXT: moving all extremities.. neuro A&Ox3, nonverbal. will get imaging r/o ich.  dori staple laceration, adacel, likely to be discharged back to home. will get xrays to r/o traumatic injury to chest/pelvis.

## 2024-02-05 NOTE — ED PROVIDER NOTE - NSICDXFAMILYHX_GEN_ALL_CORE_FT
FAMILY HISTORY:  Mother  Still living? Unknown  Family history of diabetes mellitus in mother, Age at diagnosis: Age Unknown  Family history of hypertension in mother, Age at diagnosis: Age Unknown

## 2024-07-15 NOTE — SWALLOW BEDSIDE ASSESSMENT ADULT - ASR SWALLOW RECOMMEND DIAG
It's possible, just hold the steroids for now and see if the itching stops. Objective testing is not clinically indicated at present time

## 2024-10-23 NOTE — PROGRESS NOTE ADULT - SUBJECTIVE AND OBJECTIVE BOX
Mykel Pena M.D. Pager Number [phone]    Patient is a 63y old  Female who presents with a chief complaint of acute encephalopathy (2019 17:59)      SUBJECTIVE / OVERNIGHT EVENTS:  Pt seen and examined at bedside. No acute events overnight.  Pt is minimally verbal, which is her baseline.     ROS:  Unable to assess     Allergies    peanuts (Anaphylaxis; Hives)  penicillins (Anaphylaxis)  propofol (Angioedema)  shellfish (Anaphylaxis; Hives)  tomatoes (Anaphylaxis; Hives)    Intolerances    peanut butter (Anaphylaxis)      MEDICATIONS  (STANDING):  allopurinol 100 milliGRAM(s) Oral daily  artificial  tears Solution 1 Drop(s) Both EYES every 8 hours  aspirin  chewable 81 milliGRAM(s) Oral daily  atorvastatin 40 milliGRAM(s) Oral at bedtime  aztreonam  IVPB      aztreonam  IVPB 1000 milliGRAM(s) IV Intermittent every 8 hours  chlorhexidine 0.12% Liquid 15 milliLiter(s) Oral Mucosa two times a day  clopidogrel Tablet 75 milliGRAM(s) Oral daily  dextrose 5%. 1000 milliLiter(s) (50 mL/Hr) IV Continuous <Continuous>  dextrose 50% Injectable 12.5 Gram(s) IV Push once  enoxaparin Injectable 40 milliGRAM(s) SubCutaneous every 24 hours  FLUoxetine Solution 20 milliGRAM(s) Oral daily  gabapentin 300 milliGRAM(s) Oral at bedtime  gabapentin 300 milliGRAM(s) Oral daily  insulin lispro (HumaLOG) corrective regimen sliding scale   SubCutaneous three times a day before meals  labetalol Injectable 5 milliGRAM(s) IV Push once  lactated ringers. 1000 milliLiter(s) (75 mL/Hr) IV Continuous <Continuous>  memantine 10 milliGRAM(s) Oral two times a day  senna 2 Tablet(s) Oral at bedtime  triamcinolone 0.1% Ointment 1 Application(s) Topical two times a day    MEDICATIONS  (PRN):  acetaminophen   Tablet .. 650 milliGRAM(s) Oral every 6 hours PRN Temp greater or equal to 38C (100.4F)  dextrose 40% Gel 15 Gram(s) Oral once PRN Blood Glucose LESS THAN 70 milliGRAM(s)/deciliter  glucagon  Injectable 1 milliGRAM(s) IntraMuscular once PRN Glucose LESS THAN 70 milligrams/deciliter  melatonin 3 milliGRAM(s) Oral at bedtime PRN Insomnia      Vital Signs Last 24 Hrs  T(C): 37.2 (2019 12:32), Max: 37.2 (2019 12:32)  T(F): 98.9 (2019 12:32), Max: 98.9 (2019 12:32)  HR: 111 (2019 12:32) (94 - 115)  BP: 168/117 (2019 12:32) (142/87 - 180/102)  BP(mean): 117 (2019 17:59) (117 - 117)  RR: 18 (2019 12:32) (17 - 18)  SpO2: 98% (2019 12:32) (97% - 100%)  CAPILLARY BLOOD GLUCOSE      POCT Blood Glucose.: 109 mg/dL (2019 13:10)  POCT Blood Glucose.: 78 mg/dL (2019 09:14)  POCT Blood Glucose.: 119 mg/dL (2019 20:16)    I&O's Summary      PHYSICAL EXAM:  GENERAL: NAD  HEENT:  Atraumatic, Normocephalic. +Facial asymmetry with R sided droop, tongue deviation to R  NECK: Supple, No JVD  CHEST/LUNG: Clear to auscultation bilaterally; No wheeze  HEART: Rapid, Regular rate and rhythm; No murmurs, rubs, or gallops  ABDOMEN: Soft, Nontender, Nondistended; Bowel sounds present  EXTREMITIES:  RUE/RLE  contracted   NEUROLOGY: AAOx0, +Facial asymmetry with R sided droop, tongue deviation to R  PSYCH: calm  SKIN: No rashes or lesions    LABS:                        12.5   8.77  )-----------( 357      ( 2019 08:17 )             41.7     02-19    141  |  101  |  13  ----------------------------<  82  4.2   |  24  |  1.11    Ca    10.4      2019 05:48    TPro  7.5  /  Alb  3.9  /  TBili  0.3  /  DBili  x   /  AST  37  /  ALT  36  /  AlkPhos  135<H>  02-18    PT/INR - ( 2019 12:30 )   PT: 12.4 sec;   INR: 1.08 ratio         PTT - ( 2019 12:30 )  PTT:26.7 sec      Urinalysis Basic - ( 2019 12:40 )    Color: Light Orange / Appearance: Turbid / S.023 / pH: x  Gluc: x / Ketone: Negative  / Bili: Negative / Urobili: Negative   Blood: x / Protein: 30 mg/dL / Nitrite: Positive   Leuk Esterase: Large / RBC: 2-5 /hpf / WBC >50   Sq Epi: x / Non Sq Epi: x / Bacteria: Few        RADIOLOGY & ADDITIONAL TESTS:    Imaging Personally Reviewed:    Consultant(s) Notes Reviewed:      Care Discussed with Consultants/Other Providers:    Case Discussed with Family:    Goals of Care: 36.8

## 2025-06-27 NOTE — ED CLERICAL - NSCLERICAL TASK_GEN_ALL_ED
Called and spoke with pharmacy. Confirmed dose and SIG. Last filled 6/12/25, but only for a partial script and patient will run out of medication in 2 days per pharmacy. Per pharmacy patient has been getting prescriptions and filling them.    Pre-Hospital Care Report (PCR)